# Patient Record
Sex: MALE | Race: WHITE | NOT HISPANIC OR LATINO | Employment: OTHER | URBAN - METROPOLITAN AREA
[De-identification: names, ages, dates, MRNs, and addresses within clinical notes are randomized per-mention and may not be internally consistent; named-entity substitution may affect disease eponyms.]

---

## 2017-06-14 ENCOUNTER — INITIAL CONSULT (OUTPATIENT)
Dept: RADIATION ONCOLOGY | Facility: CLINIC | Age: 82
End: 2017-06-14
Payer: MEDICARE

## 2017-06-14 VITALS
HEART RATE: 61 BPM | HEIGHT: 70 IN | DIASTOLIC BLOOD PRESSURE: 74 MMHG | TEMPERATURE: 98 F | SYSTOLIC BLOOD PRESSURE: 146 MMHG | RESPIRATION RATE: 18 BRPM | WEIGHT: 167 LBS | BODY MASS INDEX: 23.91 KG/M2

## 2017-06-14 DIAGNOSIS — C44.42 SQUAMOUS CELL CARCINOMA, SCALP/NECK: ICD-10-CM

## 2017-06-14 PROCEDURE — 99215 OFFICE O/P EST HI 40 MIN: CPT | Mod: ,,, | Performed by: RADIOLOGY

## 2017-06-14 NOTE — PROGRESS NOTES
Brit Jennings  [id number]  1935 6/14/2017  Norma Miles Md  [address]  [place] [address]    REASON FOR CONSULTATION:     ICD-10-CM ICD-9-CM    1. 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  In February of this year he underwent radiation to the right eyebrow with good results and has had a complete response to therapy. Undergoing routine skin surveillance and a new lesion was identified on the left anterior scalp. It was biopsy-proven to be a well-differentiated squamous cell carcinoma. The lesion was measuring approximately 3 mm although the path report states that the tumor does not extend to the inked margins as well as a biopsy specimen and he does have extensive nest of cells within the epidermis. There was no evidence of perineural or vascular invasion.  The patient has healed up well. He has no unusual signs or symptoms such as pain inflammation or swelling to indicate infection of the biopsy area    Review of Systems   Constitutional: Negative for chills and diaphoresis.   HENT:   Negative for lump/mass and mouth sores.    Eyes: Negative for eye problems and icterus.   Respiratory: Negative for cough and dizziness on exertion.    Cardiovascular: Negative for chest pain and leg swelling.   Gastrointestinal: Negative for blood in stool and constipation.   Genitourinary: Negative for dysuria and frequency.    Musculoskeletal: Negative for arthralgias and back pain.   Skin: Negative for itching and rash.   Neurological: Negative for headaches, light-headedness and numbness.   Hematological: Negative for adenopathy. Does not bruise/bleed easily.   Psychiatric/Behavioral: Negative for confusion. The patient is not  "nervous/anxious.      No past medical history on file.  No past surgical history on file.  Social History     Social History    Marital status:      Spouse name: N/A    Number of children: N/A    Years of education: N/A     Social History Main Topics    Smoking status: Never Smoker    Smokeless tobacco: None    Alcohol use None    Drug use: Unknown    Sexual activity: Not Asked     Other Topics Concern    None     Social History Narrative    None     No family history on file.    PRIOR HISTORY OF CHEMOTHERAPY OR RADIOTHERAPY: Please see HPI for patients prior oncologic history.    Medication List with Changes/Refills   Current Medications    AMLODIPINE (NORVASC) 5 MG TABLET    Take 5 mg by mouth once daily.    CHERATUSSIN AC  MG/5 ML SYRUP    GIVE 5 ML EVERY 4 HRS FOR 14 DAYS    GEL-ADELITA 0.4 % GEL    APPLY TO TEETH AS DIRECTED    LISINOPRIL (PRINIVIL,ZESTRIL) 20 MG TABLET    Take 20 mg by mouth once daily.    PROAIR HFA 90 MCG/ACTUATION INHALER    INHALE 2PUFFS BY MOUTH EVERY 4 HOURS AS NEEDED    SALIVA SUBSTITUTE COMBO NO.3 SPRA SPRAY    2 sprays by Mucous Membrane route 3 (three) times daily.    SOLIFENACIN (VESICARE) 10 MG TABLET    Take 1 tablet (10 mg total) by mouth once daily.     Review of patient's allergies indicates:   Allergen Reactions    Pollen extracts Other (See Comments)       QUALITY OF LIFE: 90%- Able to Carry on Normal Activity: Minor Symptoms of Disease    Vitals:    06/14/17 1316   BP: (!) 146/74   Pulse: 61   Resp: 18   Temp: 97.8 °F (36.6 °C)   TempSrc: Oral   Weight: 75.8 kg (167 lb)   Height: 5' 10" (1.778 m)   PainSc: 0-No pain       PHYSICAL EXAM:   GENERAL: alert; in no apparent distress.   HEAD: normocephalic, atraumatic.  EYES: pupils are equal, round, reactive to light and accommodation. Sclera anicteric. Conjunctiva not injected.   NOSE/THROAT: no nasal erythema or rhinorrhea. Oropharynx pink, without erythema, ulcerations or thrush.   NECK: no cervical motion " rigidity; supple with no masses.  CHEST: clear to auscultation bilaterally; no wheezes, crackles or rubs. Patient is speaking comfortably on room air with normal work of breathing without using accessory muscles of respiration.  CARDIOVASCULAR: regular rate and rhythm; no murmurs, rubs or gallops.  ABDOMEN: soft, nontender, nondistended. Bowel sounds present.   MUSCULOSKELETAL: no tenderness to palpation along the spine or scapulae. Normal range of motion.  NEUROLOGIC: cranial nerves II-XII intact bilaterally. Strength 5/5 in bilateral upper and lower extremities. No sensory deficits appreciated. Reflexes globally intact. No cerebellar signs. Normal gait.  LYMPHATIC: no cervical, supraclavicular or axillary adenopathy appreciated bilaterally.   EXTREMITIES: no clubbing, cyanosis, edema.  SKIN: no erythema, rashes, ulcerations noted. The area around the right eyebrow is well-healed. The skin is smooth and dry and no evidence of recurrent disease.  The new area on the scalp shows a circular 3 mm area of recent biopsy. There is a Band-Aid on the area itself. There does not appear to be significant progression or satellite lesion  Nodes-no palpable adenopathy in the face neck or submandibular areas  Eyes-patient has good extraocular mobility. There is no evidence of significant tearing or dryness    REVIEW OF IMAGING/PATHOLOGY/LABS: Please see HPI. All images reviewed personally by dictating physician.     ASSESSMENT: Very pleasant gentleman with a history of synchronous carcinomas of the skin with a new lesion on the right anterior scalp easily identified through pictures given by Dr. Miles.   PLAN: I discussed the options of surgery versus definitive radiation therapy. He is a better candidate for radiation therapy because of the location of the lesion and the risks involved in bone exposure. Radiation can be used the treat this area do recommend 50 andres in 20 fractions using a 6 energy electron-beam to the 90%  isodose line  I was very clear my discussion about the side effects of treatment including redness of the skin progressing the soreness possibly a sunburn-like effect and desquamation of the skin itself.  Long-term affects of scarring and tightening of the skin and hair loss was also disclosed. The patient's on the consent form    We discussed the risks and benefits of the above treatment and have gone over in detail the acute and late toxicities of radiation therapy to the skin of the scalp. The patient expressed  understanding and has signed a consent form which is included in the patients chart. The patient has our contact information and understands that they are free to contact us at any time with questions or concerns regarding radiation therapy.    DISPOSITION: RTC FOR CT SIM    TIME SPENT WITH PATIENT: I have personally seen and evaluated this patient. Approximately one hour was spent in consultation with the patient, greater than 50% of this time was spent discussing the personalized oncologic treatment plan and details of radiation therapy with the patient.     PHYSICIAN: Bi Hernandez MD

## 2017-06-15 ENCOUNTER — DOCUMENTATION ONLY (OUTPATIENT)
Dept: RADIATION ONCOLOGY | Facility: CLINIC | Age: 82
End: 2017-06-15

## 2017-06-15 ENCOUNTER — TREATMENT (OUTPATIENT)
Dept: RADIATION ONCOLOGY | Facility: CLINIC | Age: 82
End: 2017-06-15
Payer: MEDICARE

## 2017-06-15 PROCEDURE — 77334 RADIATION TREATMENT AID(S): CPT | Mod: ,,, | Performed by: RADIOLOGY

## 2017-06-15 PROCEDURE — 77290 THER RAD SIMULAJ FIELD CPLX: CPT | Mod: ,,, | Performed by: RADIOLOGY

## 2017-06-15 PROCEDURE — 77300 RADIATION THERAPY DOSE PLAN: CPT | Mod: ,,, | Performed by: RADIOLOGY

## 2017-06-15 PROCEDURE — 77263 THER RADIOLOGY TX PLNG CPLX: CPT | Mod: ,,, | Performed by: RADIOLOGY

## 2017-06-15 NOTE — PROGRESS NOTES
"Brit Jennings  [id number]  1935  6/15/2017  No referring provider defined for this encounter.    DIAGNOSIS: No diagnosis found.    TREATMENT SITE(S): scalp skin    INTENT: CURATIVE    TREATMENT SETTING: RT ALONE     MODALITY: ELECTRON    TECHNIQUE:  3D CONFORMAL RADIOTHERAPY (3DCRT)    IMRT MEDICAL NECESSITY: Target volume irregular shape/proximate to critical structures    HPI: This with a prior history of skin cancer recently treated to the right superior eyebrow with good results. Patient has a new lesion on his anterior left scalp biopsy-proven. Side effects discussed. KPS equal to 80    I have personally performed treatment planning for the patient, reviewing relevant history/physical and imaging. I have defined GTV, CTV, PTV and organs at risk.     In order to accomplish this plan, I am ordering:  SIMULATION: CT SIMULATION FOR PLACEMENT OF TREATMENT FIELDS    CONTRAST: none    TO ACCOMPLISH REPRODUCIBLE POSITION: VACLOC, BREAST BOARD, WING BOARD, AQUAPLAST MASK, PROLOCK (SBRT),"ACCUFORM HEADREST (SRS), FULL BLADDER and EMPTY STOMACH    DEVICES FOR BEAM SHAPING: CUSTOMIZED MLC    CUSTOMIZED BOLUS: 1CM DAILY    IMAGING: DAILY KV/KV OBI, DAILY KV/KV FOR SETUP" ASSISTANCE, DAILY PORTALS, WEEKLY PORTALS, CBCT DAILY and CBCT WEEKLY    I have ordered a weekly physics check.  SPECIAL PHYSICS CONSULT: NO  REASON: N/A    SPECIAL TREATMENT CIRCUMSTANCE: NO   Concurrent or recent administration of chemotherapeutic agents which are  known potent radiosensitizers and thus will require vigilant monitoring for  exaggerated radiation toxicities.    LABS: NONE    ANTICIPATED PRESCRIPTION: 5000 cgy in 20 fractions, 6 energy electron being, 1 cm bolus daily    TREATMENT: DAILY    PHYSICIAN: Bi Hernandez MD      "

## 2017-07-19 ENCOUNTER — DOCUMENTATION ONLY (OUTPATIENT)
Dept: RADIATION ONCOLOGY | Facility: CLINIC | Age: 82
End: 2017-07-19

## 2017-07-19 NOTE — PROGRESS NOTES
Brit Jennings  [id number]  1935  7/19/2017    DIAGNOSIS: No diagnosis found.    CURRENT DOSE (cGy): 1250 cGy    CONCURRENT CHEMOTHERAPY: no     SUBJECTIVE:  Patient has completed his first week of the planned four-week course of radiation for his squamous cell cancer of the scalp. Clinical well voices no problems today. He remains active    OBJECTIVE:  There were no vitals filed for this visit.  Weight: Stable  Exam  Treatment area showing grade 2 erythema the lesion is flattened out. Is no satellite progression or desquamation  KPS: 90%- Able to Carry on Normal Activity: Minor Symptoms of Disease    Portal imaging reviewed and approved.    ASSESSMENT AND PLAN:  Continue on with radiation therapy. He'll complete a fracture #20 or 50 gray.    Will continue as planned.    Treatment chart and setup reviewed and approved.    PHYSICIAN: Bi Hernandez MD

## 2017-07-24 ENCOUNTER — DOCUMENTATION ONLY (OUTPATIENT)
Dept: RADIATION ONCOLOGY | Facility: CLINIC | Age: 82
End: 2017-07-24

## 2017-07-24 NOTE — PROGRESS NOTES
Brit Jennings  [id number]  1935 7/24/2017    DIAGNOSIS: No diagnosis found.    CURRENT DOSE (cGy): 2000 cGy    CONCURRENT CHEMOTHERAPY: no     SUBJECTIVE:  Patient and his second week of radiation therapy doing very well. Is not report any unusual side effects of the treatment area on his left anterior scalp.  He remains active    OBJECTIVE:  There were no vitals filed for this visit.PS: 90%- Able to Carry on Normal Activity: Minor Symptoms of Disease   Treatment area showing grade 2 erythema, no desquamation    Portal imaging reviewed and approved.    ASSESSMENT AND PLAN:   continue with radiation therapy doing very well. The lesion is regressing    Will continue as planned.    Treatment chart and setup reviewed and approved.    PHYSICIAN: Bi Hernandez MD

## 2017-08-01 ENCOUNTER — DOCUMENTATION ONLY (OUTPATIENT)
Dept: RADIATION ONCOLOGY | Facility: CLINIC | Age: 82
End: 2017-08-01

## 2017-08-01 NOTE — PROGRESS NOTES
Brit Jennings  [id number]  1935 8/1/2017    DIAGNOSIS: I, [id number] SCCa scalp    CURRENT DOSE (cGy): 3500/5000    CONCURRENT CHEMOTHERAPY: none    SUBJECTIVE:  No complaints.    OBJECTIVE:  There were no vitals filed for this visit.  Weight: 174.5  KPS: 90%- Able to Carry on Normal Activity: Minor Symptoms of Disease  Nad, ao x 3  No LAD  Mild skin erythema without desquamation in field; smooth    Setup reviewed and approved.    ASSESSMENT AND PLAN:  Tolerating RT well. Aquaphor prn    Will continue as planned.    Treatment chart and setup reviewed and approved.    PHYSICIAN: Manjeet Aguirre Jr, MD

## 2017-08-08 ENCOUNTER — DOCUMENTATION ONLY (OUTPATIENT)
Dept: RADIATION ONCOLOGY | Facility: CLINIC | Age: 82
End: 2017-08-08

## 2017-08-08 NOTE — PROGRESS NOTES
Brit Jennings  [id number]  1935  8/8/2017    DIAGNOSIS: No diagnosis found.    CURRENT DOSE (cGy): 4750 cGy    CONCURRENT CHEMOTHERAPY: no     SUBJECTIVE:  Patient going to complete radiation therapy tomorrow for his squamous cell cancer of the left anterior scalp.    Clinically doing well and voices no prompts today the areas largely asymptomatic.    OBJECTIVE:  There were no vitals filed for this visit.  Weight: Stable  Exam  Treatment area showing grade 2 erythema. There is no desquamation.  The lesion has completely flattened. I do not see any progression or satellite  No adenopathy with respect to f the face buccal submandibular or cervical regions  KPS: 90%- Able to Carry on Normal Activity: Minor Symptoms of Disease    Portal imaging reviewed and approved.    ASSESSMENT AND PLAN:  Patient completing radiation therapy tomorrow. Dose will be 50 gray.  He will follow-up with me in 3 weeks for routine check of the acute effects of treatment.    I told to keep the area clean and dry.    I did ask him to make an appointment with Dr. Kwok in about 6-8 weeks.    Will continue as planned.    Treatment chart and setup reviewed and approved.    PHYSICIAN: Bi Hernandez MD

## 2017-08-09 ENCOUNTER — TREATMENT (OUTPATIENT)
Dept: RADIATION ONCOLOGY | Facility: CLINIC | Age: 82
End: 2017-08-09
Payer: MEDICARE

## 2017-08-09 PROCEDURE — G6012 RADIATION TREATMENT DELIVERY: HCPCS | Mod: ,,, | Performed by: RADIOLOGY

## 2017-09-01 ENCOUNTER — OFFICE VISIT (OUTPATIENT)
Dept: RADIATION ONCOLOGY | Facility: CLINIC | Age: 82
End: 2017-09-01
Payer: MEDICARE

## 2017-09-01 VITALS
WEIGHT: 168 LBS | SYSTOLIC BLOOD PRESSURE: 136 MMHG | BODY MASS INDEX: 24.05 KG/M2 | HEART RATE: 64 BPM | HEIGHT: 70 IN | RESPIRATION RATE: 18 BRPM | DIASTOLIC BLOOD PRESSURE: 62 MMHG

## 2017-09-01 DIAGNOSIS — C44.42 SQUAMOUS CELL CARCINOMA, SCALP/NECK: Primary | ICD-10-CM

## 2017-09-01 PROCEDURE — 99024 POSTOP FOLLOW-UP VISIT: CPT | Mod: ,,, | Performed by: RADIOLOGY

## 2017-09-01 NOTE — PROGRESS NOTES
Brit Jennings  [id number]  1935 9/1/2017  No referring provider defined for this encounter.    DIAGNOSIS: I, [id number] SCCa scalp  REASON FOR VISIT: Routine scheduled follow-up.    HISTORY OF PRESENT ILLNESS:   82-year-old gentleman with a prior history of skin cancer recently treated to the right eyebrow 50Gy ending 3/22/17 with a CR.     Undergoing routine skin surveillance and a new lesion was identified on the left anterior scalp. It was biopsy-proven to be a well-differentiated squamous cell carcinoma. The lesion was measuring approximately 3 mm although the path report states that the tumor does not extend to the inked margins as well as a biopsy specimen and he does have extensive nest of cells within the epidermis. There was no evidence of perineural or vascular invasion.    Completed 50Gy 8/9/17.    INTERVAL HISTORY:   Mr. Jennings tolerated his radiotherapy very well and had a very minimal skin reaction going to treatment. Today he presents without complaints, specifically no fatigue, lumps or bumps, pain or itchiness.    Review of Systems   Constitutional: Negative for appetite change, chills, fever and unexpected weight change.   HENT:   Negative for lump/mass, mouth sores, sore throat, trouble swallowing and voice change.    Eyes: Negative for eye problems and icterus.   Respiratory: Negative for chest tightness, cough, hemoptysis and shortness of breath.    Cardiovascular: Negative for chest pain and leg swelling.   Gastrointestinal: Negative for abdominal distention, abdominal pain, blood in stool, constipation, diarrhea, nausea and vomiting.   Genitourinary: Negative for bladder incontinence, difficulty urinating, dysuria, frequency, hematuria and nocturia.    Musculoskeletal: Negative for back pain, gait problem, neck pain and neck stiffness.   Skin: Positive for rash.   Neurological: Negative for extremity weakness, gait problem, headaches, numbness and seizures.   Hematological: Negative for  "adenopathy.     No past medical history on file.  No past surgical history on file.  Social History     Social History    Marital status:      Spouse name: N/A    Number of children: N/A    Years of education: N/A     Social History Main Topics    Smoking status: Never Smoker    Smokeless tobacco: None    Alcohol use None    Drug use: Unknown    Sexual activity: Not Asked     Other Topics Concern    None     Social History Narrative    None     No family history on file.  Medication List with Changes/Refills   Current Medications    AMLODIPINE (NORVASC) 5 MG TABLET    Take 5 mg by mouth once daily.    CHERATUSSIN AC  MG/5 ML SYRUP    GIVE 5 ML EVERY 4 HRS FOR 14 DAYS    GEL-ADELITA 0.4 % GEL    APPLY TO TEETH AS DIRECTED    LISINOPRIL (PRINIVIL,ZESTRIL) 20 MG TABLET    Take 20 mg by mouth once daily.    PROAIR HFA 90 MCG/ACTUATION INHALER    INHALE 2PUFFS BY MOUTH EVERY 4 HOURS AS NEEDED    SALIVA SUBSTITUTE COMBO NO.3 SPRA SPRAY    2 sprays by Mucous Membrane route 3 (three) times daily.    SOLIFENACIN (VESICARE) 10 MG TABLET    Take 1 tablet (10 mg total) by mouth once daily.     Review of patient's allergies indicates:   Allergen Reactions    Pollen extracts Other (See Comments)       QUALITY OF LIFE: 90%- Able to Carry on Normal Activity: Minor Symptoms of Disease    Vitals:    09/01/17 1324   BP: 136/62   Pulse: 64   Resp: 18   Weight: 76.2 kg (168 lb)   Height: 5' 10" (1.778 m)   PainSc: 0-No pain       PHYSICAL EXAM:   GENERAL: alert; in no apparent distress.   HEAD: normocephalic, atraumatic.  EYES: pupils are equal, round, reactive to light and accommodation. Sclera anicteric. Conjunctiva not injected.   NOSE/THROAT: no nasal erythema or rhinorrhea. Oropharynx pink, without erythema, ulcerations or thrush.   NECK: no cervical motion rigidity; supple with no masses.  CHEST:Patient is speaking comfortably on room air with normal work of breathing without using accessory muscles of " respiration.  NEUROLOGIC: cranial nerves II-XII intact bilaterally. Strength 5/5 in bilateral upper and lower extremities. No sensory deficits appreciated. Normal gait.  LYMPHATIC: no cervical, supraclavicular adenopathy appreciated bilaterally.   EXTREMITIES: no clubbing, cyanosis, edema.  SKIN: mild hyperpigmentation with dry scaling in field; no ulceration or nodularity, no erythema; prior site remains a CR     ANCILLARY DATA: none    ASSESSMENT: 82M with stage I SCCa of R eyebrow s/p 50Gy with CR and of L scalp s/p 50Gy ending 8/9/17.  PLAN:  Mr. Jennings did very well throughout his radiotherapeutic course and today is presenting without any complaints for his three-week follow-up. He is not having any pain or discomfort at the treated site is no longer applying any Aquaphor. He's not complaining any fatigue or any new lumps or bumps. He is going to arrange follow-up with his dermatologist in the next month. Physical exam today since no signs of persistent disease and I explained to him that the scaly skin will eventually slough off likely revealing hypopigmented smooth skin as was the case at his right eyebrow, which is still demonstrating a complete response. He expressed understanding and I would like to get one more look in 3 months to ensure complete response and we will set up an appointment with him today.    All questions answered and contact information provided. Patient understands free to call us anytime with any questions or concerns regarding radiation therapy.    TIME SPENT WITH PATIENT: I have personally seen and evaluated this patient. Approximately 30 minutes were spent with the patient discussing follow-up careplan.     PHYSICIAN: Manjeet Aguirre Jr, MD

## 2017-10-20 RX ORDER — SOLIFENACIN SUCCINATE 10 MG/1
10 TABLET, FILM COATED ORAL DAILY
Qty: 90 TABLET | Refills: 0 | Status: SHIPPED | OUTPATIENT
Start: 2017-10-20 | End: 2018-01-04 | Stop reason: ALTCHOICE

## 2017-12-08 ENCOUNTER — OFFICE VISIT (OUTPATIENT)
Dept: RADIATION ONCOLOGY | Facility: CLINIC | Age: 82
End: 2017-12-08
Payer: MEDICARE

## 2017-12-08 VITALS — BODY MASS INDEX: 24.39 KG/M2 | WEIGHT: 170 LBS

## 2017-12-08 DIAGNOSIS — C44.42 SQUAMOUS CELL CARCINOMA, SCALP/NECK: ICD-10-CM

## 2017-12-08 PROCEDURE — 99024 POSTOP FOLLOW-UP VISIT: CPT | Mod: ,,, | Performed by: RADIOLOGY

## 2017-12-08 NOTE — PROGRESS NOTES
Brit Jennings  [id number]  1935 12/8/2017  Norma Miles Md  [address] [place] [address]    DIAGNOSIS SKIN CANCER  REASON FOR VISIT: Routine scheduled follow-up.    HISTORY OF PRESENT ILLNESS:   82-year-old gentleman with a diagnosis of squamous cell cancer of the scalp treated with definitive radiation therapy, completed 3 weeks ago. He has a prior history of right-sided eyebrow carcinoma treated with radiation in the past.    INTERVAL HISTORY:   Since finishing his treatment he has done very well. He reports no unusual inflammation pain swelling or irritation in the treatment area.    Review of Systems   Skin: Negative for itching, rash and wound.     No past medical history on file.  No past surgical history on file.  Social History     Social History    Marital status:      Spouse name: N/A    Number of children: N/A    Years of education: N/A     Social History Main Topics    Smoking status: Never Smoker    Smokeless tobacco: Not on file    Alcohol use Not on file    Drug use: Unknown    Sexual activity: Not on file     Other Topics Concern    Not on file     Social History Narrative    No narrative on file     No family history on file.  Medication List with Changes/Refills   Current Medications    AMLODIPINE (NORVASC) 5 MG TABLET    Take 5 mg by mouth once daily.    CHERATUSSIN AC  MG/5 ML SYRUP    GIVE 5 ML EVERY 4 HRS FOR 14 DAYS    GEL-ADELITA 0.4 % GEL    APPLY TO TEETH AS DIRECTED    LISINOPRIL (PRINIVIL,ZESTRIL) 20 MG TABLET    Take 20 mg by mouth once daily.    PROAIR HFA 90 MCG/ACTUATION INHALER    INHALE 2PUFFS BY MOUTH EVERY 4 HOURS AS NEEDED    SOLIFENACIN (VESICARE) 10 MG TABLET    Take 1 tablet (10 mg total) by mouth once daily.   Discontinued Medications    SALIVA SUBSTITUTE COMBO NO.3 SPRA SPRAY    2 sprays by Mucous Membrane route 3 (three) times daily.     Review of patient's allergies indicates:   Allergen Reactions    Pollen extracts Other (See  Comments)       QUALITY OF LIFE: 100%- Normal, No Complaints, No Evidence of Disease    Vitals:    12/08/17 1320   Weight: 77.1 kg (170 lb)   PainSc: 0-No pain       PHYSICAL EXAM:   GENERAL: alert; in no apparent distress.   HEAD: normocephalic, atraumatic.  EYES: pupils are equal, round, reactive to light and accommodation. Sclera anicteric. Conjunctiva not injected.   NOSE/THROAT: no nasal erythema or rhinorrhea. Oropharynx pink, without erythema, ulcerations or thrush.   NECK: no cervical motion rigidity; supple with no masses.  CHEST: clear to auscultation bilaterally; no wheezes, crackles or rubs. Patient is speaking comfortably on room air with normal work of breathing without using accessory muscles of respiration.  CARDIOVASCULAR: regular rate and rhythm; no murmurs, rubs or gallops.  ABDOMEN: soft, nontender, nondistended. Bowel sounds present.   MUSCULOSKELETAL: no tenderness to palpation along the spine or scapulae. Normal range of motion.  NEUROLOGIC: cranial nerves II-XII intact bilaterally. Strength 5/5 in bilateral upper and lower extremities. No sensory deficits appreciated. Reflexes globally intact. No cerebellar signs. Normal gait.  LYMPHATIC: no cervical, supraclavicular or axillary adenopathy appreciated bilaterally. No adenopathy in the preauricular buccal or facial or submandibular areas bilaterally  EXTREMITIES: no clubbing, cyanosis, edema.  SKIN: no erythema, rashes, ulcerations noted. The treatment area shows complete resolution of the lesion. There is some slight tanning of the skin. Is no inflammation or pain.    ANCILLARY DATA:     ASSESSMENT: Patient with early stage squamous cell cancer of the scalp anteriorly completed definitive radiation therapy 3 weeks ago clinically [facility].  PLAN:  Patient doing very well he's had an excellent response. I did tell him to massage  area with any type of moisturizer he would like. I told him this would minimize the fibrosis which is a late reaction of  radiation therapy.    I asked him to follow-up with Dr. Miles.  He will follow-up with us as needed    All questions answered and contact information provided. Patient understands free to call us anytime with any questions or concerns regarding radiation therapy.    TIME SPENT WITH PATIENT: I have personally seen and evaluated this patient. Approximately 30 minutes were spent with the patient discussing follow-up careplan.     PHYSICIAN: Bi Hernandez MD

## 2018-01-03 ENCOUNTER — TELEPHONE (OUTPATIENT)
Dept: UROLOGY | Facility: CLINIC | Age: 83
End: 2018-01-03

## 2018-01-03 NOTE — TELEPHONE ENCOUNTER
----- Message from Lenore Vaughn sent at 1/3/2018 11:54 AM CST -----  Contact: patient  Patient calling to speak with the Nurse about a change in medication. The Vesicare isn't covered by the insurance anymore and the patient would like something different that is covered. Please advise.  Call back   Thanks!

## 2018-01-03 NOTE — TELEPHONE ENCOUNTER
Spoke with patient informed him her needs a 1 year follow up. Appointment scheduled for tomorrow at 9:45am. Patient verbally voiced understanding.

## 2018-01-04 ENCOUNTER — OFFICE VISIT (OUTPATIENT)
Dept: UROLOGY | Facility: CLINIC | Age: 83
End: 2018-01-04
Payer: MEDICARE

## 2018-01-04 VITALS
HEART RATE: 61 BPM | WEIGHT: 167 LBS | HEIGHT: 70 IN | BODY MASS INDEX: 23.91 KG/M2 | TEMPERATURE: 98 F | DIASTOLIC BLOOD PRESSURE: 79 MMHG | SYSTOLIC BLOOD PRESSURE: 139 MMHG

## 2018-01-04 DIAGNOSIS — N32.81 OAB (OVERACTIVE BLADDER): Primary | ICD-10-CM

## 2018-01-04 LAB
BILIRUB SERPL-MCNC: NORMAL MG/DL
BLOOD URINE, POC: NORMAL
COLOR, POC UA: NORMAL
GLUCOSE UR QL STRIP: NORMAL
KETONES UR QL STRIP: NORMAL
LEUKOCYTE ESTERASE URINE, POC: NORMAL
NITRITE, POC UA: NORMAL
PH, POC UA: 6
PROTEIN, POC: NORMAL
SPECIFIC GRAVITY, POC UA: 1020
UROBILINOGEN, POC UA: NORMAL

## 2018-01-04 PROCEDURE — 81002 URINALYSIS NONAUTO W/O SCOPE: CPT | Mod: S$GLB,,, | Performed by: UROLOGY

## 2018-01-04 PROCEDURE — 99999 PR PBB SHADOW E&M-EST. PATIENT-LVL III: CPT | Mod: PBBFAC,,, | Performed by: UROLOGY

## 2018-01-04 PROCEDURE — 99213 OFFICE O/P EST LOW 20 MIN: CPT | Mod: 25,S$GLB,, | Performed by: UROLOGY

## 2018-01-04 RX ORDER — FESOTERODINE FUMARATE 4 MG/1
4 TABLET, EXTENDED RELEASE ORAL DAILY
Qty: 30 TABLET | Refills: 1 | Status: SHIPPED | OUTPATIENT
Start: 2018-01-04 | End: 2018-04-05 | Stop reason: ALTCHOICE

## 2018-01-04 NOTE — PROGRESS NOTES
[facility] Progress Note  PCP: Dr.Michael D [facility]: will sign up    Chief Complaint: Follow-up      Subjective:        HPI: Brit Jennings is a 82 y.o. male presents with     Last seen 11/15/16 (1 year ago)    oab  8/12/16 seen for frequency that had been present for many years. He had freq q1 and urgency with UUI. flomax was not helping.     Has tried:  flomax   Ditropan - he says he took this years ago  myrbetriq - had not helped.   vesicare- I started him on vesicare 10mg once a day and seen 11/15/16 he stated that the vesicare had helped him significantly with voids q2 to 3 hours. No longer wearing any pads. I also wrote him for aquoral. Rare urge incontinence, drops. No teas or cokes. 1-2 cups of coffee/day.    Returns today stating he drinks about a gallon of water a day (after having dehydration a few years ago). He stopped vesicare 10mg in December bc it became too expensive since changing to [employer]. He did notice a difference since stopping the medication. When he decreased his fluid intake prior to bedtime had no nocturia (previously 1x a night)    Drinks 1 cup of coffee in the morning.     pvr by bladder scan today: 30    AUASSx: 18, unhappy ( previously 12, mostly satisfied)  IIEF:did not fill out     REVIEW OF SYSTEMS:  General ROS: no fevers, no chills  Psychological ROS: no depression  Endocrine ROS: no heat or cold  Respiratory ROS: no SOB  Cardiovascular ROS: no CP  Gastrointestinal ROS: no abdominal pain, no constipation, no diarrhea, no BRBPR  Musculoskeletal ROS: no muscle pain  Neurological ROS: no headaches  Dermatological ROS: no rashes  HEENT: noglasses, no sinus   ROS: per HPI    The past medical, surgical, social and family hx were reviewed. There have not been any changes.   Cataracts? none    Urologic meds: vesicare 10mg once a day  Anticoagulation: No    Objective:     Vitals:    01/04/18 1036   BP: 139/79   Pulse: 61   Temp: 98.3 °F  (36.8 °C)          Physical Exam   Constitutional: He is oriented to person, place, and time. He appears well-developed and well-nourished. He is cooperative. No distress.   HENT:   Head: Normocephalic and atraumatic.   Eyes: Pupils are equal, round, and reactive to light.   Cardiovascular: Normal rate and regular rhythm.    Pulmonary/Chest: Effort normal.   Abdominal: Soft. Normal appearance.   Musculoskeletal: Normal range of motion.   Neurological: He is alert and oriented to person, place, and time. He has normal reflexes.   Skin: Skin is intact.     Psychiatric: He has a normal mood and affect.       EBONIE today: 25g  nodules    Urinalysis: 1.020/6/remainder neg    Labs:  PSA:  Done recently and was told it was normal and was told he's not going to get anymore done by        PATHOLOGY REVIEW:  none     RADIOGRAPHIC REVIEW:  none        Assessment:       1. OAB (overactive bladder)        Plan:     Pt is going to try to cutting down his fluids - change to about 4 bottle of water a day or 1/2 gallon of water.    If no improvement in frequency with this then recommend starting toviaz 4mg daily in 2 weeks if no improvement with decreased fluid intake  -has tried ditropan, myrbetriq and vesicare   -has tried flomax     F/u 3 months but he will call for refill

## 2018-01-04 NOTE — PATIENT INSTRUCTIONS
Pt is going to try to cutting down his fluids - change to about 4 bottle of water a day or 1/2 gallon of water.    If no improvement in frequency with this then recommend starting toviaz 4mg daily in 2 weeks if no improvement with decreased fluid intake  -has tried ditropan, myrbetriq and vesicare   -has tried flomax     F/u 3 months but he will call for refill

## 2018-01-05 ENCOUNTER — PATIENT MESSAGE (OUTPATIENT)
Dept: UROLOGY | Facility: CLINIC | Age: 83
End: 2018-01-05

## 2018-01-05 RX ORDER — TOLTERODINE 4 MG/1
4 CAPSULE, EXTENDED RELEASE ORAL DAILY
Qty: 30 CAPSULE | Refills: 12 | Status: SHIPPED | OUTPATIENT
Start: 2018-01-05 | End: 2018-04-05 | Stop reason: ALTCHOICE

## 2018-01-05 NOTE — TELEPHONE ENCOUNTER
Has tried:  flomax   Ditropan - he says he took this years ago  myrbetriq - had not helped.   vesicare- I started him on vesicare 10mg once a day and seen 11/15/16 he stated that the vesicare had helped him significantly with voids q2 to 3 hours. No longer wearing any pads. I also wrote him for aquoral. Rare urge incontinence, drops. No teas or cokes. 1-2 cups of coffee/day    Pt contacted me via [employer]  Written for toviaz but this is tier 3  Will try Tolterodine or Darifenacin - tier 2    Writing for detrol 4mg daily   Will let pt know via [employer]

## 2018-03-20 ENCOUNTER — OFFICE VISIT (OUTPATIENT)
Dept: RADIATION ONCOLOGY | Facility: CLINIC | Age: 83
End: 2018-03-20
Payer: MEDICARE

## 2018-03-20 ENCOUNTER — DOCUMENTATION ONLY (OUTPATIENT)
Dept: RADIATION ONCOLOGY | Facility: CLINIC | Age: 83
End: 2018-03-20

## 2018-03-20 VITALS
DIASTOLIC BLOOD PRESSURE: 90 MMHG | BODY MASS INDEX: 23.68 KG/M2 | WEIGHT: 165 LBS | SYSTOLIC BLOOD PRESSURE: 125 MMHG | HEART RATE: 56 BPM

## 2018-03-20 DIAGNOSIS — C44.42 SQUAMOUS CELL CARCINOMA, SCALP/NECK: ICD-10-CM

## 2018-03-20 PROCEDURE — 99213 OFFICE O/P EST LOW 20 MIN: CPT | Mod: 25,,, | Performed by: RADIOLOGY

## 2018-03-20 PROCEDURE — 77290 THER RAD SIMULAJ FIELD CPLX: CPT | Mod: ,,, | Performed by: RADIOLOGY

## 2018-03-20 PROCEDURE — 77263 THER RADIOLOGY TX PLNG CPLX: CPT | Mod: ,,, | Performed by: RADIOLOGY

## 2018-03-20 NOTE — PROGRESS NOTES
Brit Jennings  [id number]  1935  3/20/2018  No referring provider defined for this encounter.    DIAGNOSIS: Skin cancer, squamous cell carcinoma in situ  TREATMENT SITE(S): Mid scalp    INTENT: PALLIATIVE    TREATMENT SETTING: RT ALONE     MODALITY: ELECTRON    TECHNIQUE:  ENFACE    IMRT MEDICAL NECESSITY:IMRT MEDICAL NECESSITY: N/A     HPI: Patient with a prior history of synchronous carcinomas of the skin. We previously treated the right eyebrow area and the left anterior scalp. He has had a great response to these areas.  A new area was biopsied recently on the mid scalp vertex, pathologically squamous cell carcinoma in situ with positive margin.    I have personally performed treatment planning for the patient, reviewing relevant history/physical and imaging. I have defined GTV, CTV, PTV and organs at risk.     In order to accomplish this plan, I am ordering:  SIMULATION: CLINICAL SIMULATION TO VERIFY PORTALS AND DESIGN BLOCKS    CONTRAST: none    TO ACCOMPLISH REPRODUCIBLE POSITION: none    DEVICES FOR BEAM SHAPING: BLOCKS    CUSTOMIZED BOLUS: 1CM DAILY    IMAGING: NONE    I have ordered a weekly physics check.  SPECIAL PHYSICS CONSULT: NO  REASON: N/A    SPECIAL TREATMENT CIRCUMSTANCE: NO   Concurrent or recent administration of chemotherapeutic agents which are  known potent radiosensitizers and thus will require vigilant monitoring for  exaggerated radiation toxicities.    LABS: NONE    ANTICIPATED PRESCRIPTION: 50 Gy in 20 Fractions    TREATMENT: DAILY    PHYSICIAN: Bi Hernandez MD

## 2018-03-20 NOTE — PROGRESS NOTES
Brit Torres Jennings  [id number]  1935  3/20/2018  Norma Miles Md  [address] [place] [address]    REASON FOR CONSULTATION: Skin carcinoma  TREATMENT GOAL: primary    HISTORY OF PRESENT ILLNESS:   82-year-old gentleman known is having undergone previous radiation treatment with definitive intent for synchronous. 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 He received 50 gray in 20 fractions. He was subsequently seen in June 2017 for a squamous cell cancer of the scalp, again treated 50 gray in 20 fractions. Both these areas had a complete response.    He's had regular follow-up with Dr. Kwok and her office for skin surveillance. Another lesion was identified near the apex of the scalp. It was biopsy proven to be squamous cell carcinoma site she. He says he did have liquid nitrogen treatment. The pathology report indicates that the lesion does extend to the margin.  Because of the risk of recurrence he has been referred to us for evaluation of definitive radiation therapy.  This is a separate area from the previous lesion seen and treated on the left anterior scalp.    The current lesion was biopsied approxi-5-6 weeks ago. The area has healed up.  He doesn't report any unusual signs or symptoms with respect to the scalp or facial area.        Review of Systems   Constitutional: Negative for appetite change and chills.   HENT:   Negative for lump/mass and nosebleeds.    Eyes: Negative for eye problems and icterus.   Respiratory: Negative for chest tightness and cough.    Cardiovascular: Negative for chest pain and leg swelling.   Gastrointestinal: Negative for abdominal distention and abdominal pain.   Genitourinary: Negative for dysuria and frequency.    Musculoskeletal: Negative for arthralgias, back pain and gait problem.   Skin: Negative for itching and rash.   Neurological: Negative for dizziness and gait problem.   Hematological: Negative for adenopathy. Does  not bruise/bleed easily.   Psychiatric/Behavioral: Negative for confusion. The patient is not nervous/anxious.      No past medical history on file.  No past surgical history on file.  Social History     Social History    Marital status:      Spouse name: N/A    Number of children: N/A    Years of education: N/A     Social History Main Topics    Smoking status: Never Smoker    Smokeless tobacco: None    Alcohol use None    Drug use: Unknown    Sexual activity: Not Asked     Other Topics Concern    None     Social History Narrative    None     No family history on file.    PRIOR HISTORY OF CHEMOTHERAPY OR RADIOTHERAPY: Please see HPI for patients prior oncologic history.    Medication List with Changes/Refills   Current Medications    AMLODIPINE (NORVASC) 5 MG TABLET    Take 5 mg by mouth once daily.    CHERATUSSIN AC  MG/5 ML SYRUP    GIVE 5 ML EVERY 4 HRS FOR 14 DAYS    FESOTERODINE (TOVIAZ) 4 MG TB24    Take 1 tablet (4 mg total) by mouth once daily.    GEL-ADELITA 0.4 % GEL    APPLY TO TEETH AS DIRECTED    LISINOPRIL (PRINIVIL,ZESTRIL) 20 MG TABLET    Take 20 mg by mouth once daily.    PROAIR HFA 90 MCG/ACTUATION INHALER    INHALE 2PUFFS BY MOUTH EVERY 4 HOURS AS NEEDED    TOLTERODINE (DETROL LA) 4 MG 24 HR CAPSULE    Take 1 capsule (4 mg total) by mouth once daily.     Review of patient's allergies indicates:   Allergen Reactions    Pollen extracts Other (See Comments)       QUALITY OF LIFE: 90%- Able to Carry on Normal Activity: Minor Symptoms of Disease    Vitals:    03/20/18 0908   BP: (!) 125/90   Pulse: (!) 56   Weight: 74.8 kg (165 lb)   PainSc: 0-No pain       PHYSICAL EXAM: Well-developed and nourished patient groomed well   GENERAL: alert; in no apparent distress.   HEAD: normocephalic, atraumatic.  EYES: pupils are equal, round, reactive to light and accommodation. Sclera anicteric. Conjunctiva not injected.   NOSE/THROAT: no nasal erythema or rhinorrhea. Oropharynx pink, without  erythema, ulcerations or thrush.   NECK: no cervical motion rigidity; supple with no masses. No palpable adenopathy in the preauricular buccal  or submandibular regions of the face and neck.  CHEST: clear to auscultation bilaterally; no wheezes, crackles or rubs. Patient is speaking comfortably on room air with normal work of breathing without using accessory muscles of respiration.  CARDIOVASCULAR: regular rate and rhythm; no murmurs, rubs or gallops.  ABDOMEN: soft, nontender, nondistended. Bowel sounds present.   MUSCULOSKELETAL: no tenderness to palpation along the spine or scapulae. Normal range of motion.  NEUROLOGIC: cranial nerves II-XII intact bilaterally. Strength 5/5 in bilateral upper and lower extremities. No sensory deficits appreciated. Reflexes globally intact. No cerebellar signs. Normal gait.  LYMPHATIC: no cervical, supraclavicular or axillary adenopathy appreciated bilaterally.   EXTREMITIES: no clubbing, cyanosis, edema.  SKIN: no erythema, rashes, ulcerations noted.  Looking at his facial area the right eyebrow lesion has completely regressed. There is a white scar noted. Likewise looking at his scalp the previous lesion was treated on the left anterior scalp near the hairline. There is a whitish scar also but no evidence of lesion.  The new area of concern I believe is more into the apex of the scalp. I believe it is healed over somewhat. For the exact location I've asked him to follow-up with Dr. Kwok to have this area circled.      REVIEW OF IMAGING/PATHOLOGY/LABS: Please see HPI. All images reviewed personally by dictating physician.     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 I explained to him that because of the very  minimal subcutaneous tissue of the scalp that surgery would be difficult to do with respect to closure.  I do recommend definitive radiation therapy to this area. I do see a very small red spot which I believe is the biopsy site however for accurate targeting I've asked him to follow-up with Dr. Kwok to have this area circled.    I recommended dose of 50 gray in 20 fractions using electron therapy.  I believe there will be no overlap with the prior treated area on the left anterior scalp.    The side effects were discussed in detail including early and late effects including but not limited to irritation the skin, a sunburn-like effect, skin peeling, loss of hair, long-term effects of skin necrosis, discoloration or hypopigmentation of the area..        We discussed the risks and benefits of the above treatment and have gone over in detail the acute and late toxicities of radiation therapy to the scalp. The patient expressed  understanding and has signed a consent form which is included in the patients chart. The patient has our contact information and understands that they are free to contact us at any time with questions or concerns regarding radiation therapy.    DISPOSITION: RTC FOR [facility]    TIME SPENT WITH PATIENT: I have personally seen and evaluated this patient. Greater than 50% of this time was spent discussing coordination of care and/or counseling.     PHYSICIAN: Bi Hernandez MD

## 2018-03-21 PROCEDURE — 77334 RADIATION TREATMENT AID(S): CPT | Mod: ,,, | Performed by: RADIOLOGY

## 2018-03-21 PROCEDURE — 77300 RADIATION THERAPY DOSE PLAN: CPT | Mod: ,,, | Performed by: RADIOLOGY

## 2018-04-02 PROCEDURE — 77427 RADIATION TX MANAGEMENT X5: CPT | Mod: ,,, | Performed by: RADIOLOGY

## 2018-04-04 ENCOUNTER — TREATMENT (OUTPATIENT)
Dept: RADIATION ONCOLOGY | Facility: CLINIC | Age: 83
End: 2018-04-04
Payer: MEDICARE

## 2018-04-04 PROCEDURE — G6012 RADIATION TREATMENT DELIVERY: HCPCS | Mod: ,,, | Performed by: RADIOLOGY

## 2018-04-05 ENCOUNTER — OFFICE VISIT (OUTPATIENT)
Dept: UROLOGY | Facility: CLINIC | Age: 83
End: 2018-04-05
Payer: MEDICARE

## 2018-04-05 VITALS
WEIGHT: 168 LBS | DIASTOLIC BLOOD PRESSURE: 72 MMHG | HEIGHT: 70 IN | TEMPERATURE: 98 F | SYSTOLIC BLOOD PRESSURE: 122 MMHG | HEART RATE: 59 BPM | BODY MASS INDEX: 24.05 KG/M2

## 2018-04-05 DIAGNOSIS — N32.81 OAB (OVERACTIVE BLADDER): Primary | ICD-10-CM

## 2018-04-05 DIAGNOSIS — N39.41 URGE INCONTINENCE: ICD-10-CM

## 2018-04-05 LAB
BILIRUB SERPL-MCNC: NORMAL MG/DL
BLOOD URINE, POC: NORMAL
COLOR, POC UA: YELLOW
GLUCOSE UR QL STRIP: NORMAL
KETONES UR QL STRIP: NORMAL
LEUKOCYTE ESTERASE URINE, POC: NORMAL
NITRITE, POC UA: NORMAL
PH, POC UA: 7
PROTEIN, POC: NORMAL
SPECIFIC GRAVITY, POC UA: 1.01
UROBILINOGEN, POC UA: NORMAL

## 2018-04-05 PROCEDURE — 99999 PR PBB SHADOW E&M-EST. PATIENT-LVL III: CPT | Mod: PBBFAC,,, | Performed by: UROLOGY

## 2018-04-05 PROCEDURE — 99213 OFFICE O/P EST LOW 20 MIN: CPT | Mod: 25,S$GLB,, | Performed by: UROLOGY

## 2018-04-05 PROCEDURE — 81002 URINALYSIS NONAUTO W/O SCOPE: CPT | Mod: S$GLB,,, | Performed by: UROLOGY

## 2018-04-05 RX ORDER — BUDESONIDE AND FORMOTEROL FUMARATE DIHYDRATE 160; 4.5 UG/1; UG/1
AEROSOL RESPIRATORY (INHALATION)
COMMUNITY
Start: 2018-03-27 | End: 2019-10-02 | Stop reason: SDUPTHER

## 2018-04-05 RX ORDER — TOLTERODINE 4 MG/1
CAPSULE, EXTENDED RELEASE ORAL
COMMUNITY
Start: 2018-02-28 | End: 2018-04-05 | Stop reason: ALTCHOICE

## 2018-04-05 RX ORDER — DARIFENACIN 15 MG/1
15 TABLET, EXTENDED RELEASE ORAL DAILY
Qty: 90 TABLET | Refills: 0 | Status: SHIPPED | OUTPATIENT
Start: 2018-04-05 | End: 2018-07-09 | Stop reason: SDUPTHER

## 2018-04-05 NOTE — PATIENT INSTRUCTIONS
-has tried flomax, ditropan, myrbetriq, vesicare and detrol. He says the vesicare helped him the most but insurance did not cover  -still drinking caffeinated coffee 2 cups of day. Given list of bladder irritants  -don't hold urine for more than 2 hours  -unsure we can recommend any other treatments after this- his main complaint is urgency associated with going from sitting to standing or laying down to standing.   -could do botox but only short term and usually reserved for people voiding very frequently with significant leakage.     Overall pt ok not with doing anything. He says he can live with it how it is. Wants to try one more medicine. enablex 15mg daily.    If his urge incontinence (leakage worsens) can do botox.     Follow-up  4 months

## 2018-04-05 NOTE — PROGRESS NOTES
[facility] Progress Note  PCP: Dr.[facility] D [facility]: will sign up    Chief Complaint: No chief complaint on file.      Subjective:        HPI: Brit Jennings is a 82 y.o. male presents with     Last seen 1/4/18     oab  -8/12/16 seen for frequency that had been present for many years. He had freq q1 and urgency with UUI. flomax was not helping.   -has tried flomax, ditropan, myrbetriq and vesicare. vesicare helped but had to stop due to costs. Had tried stopping coffee as well.   -Returned 1/4/18 stating he drinks about a gallon of water a day (after having dehydration a few years ago). He stopped vesicare 10mg in December bc it became too expensive since changing to [employer]. He did notice a difference since stopping the medication. When he decreased his fluid intake prior to bedtime he had no nocturia (previously 1x a night). I asked him to decrease his fluid intake during the day and prior to bedtime as well as cut out caffeine. pvr by bladder scan : 30  -he contacted me via my chart and wanted to try another medicine. Started him on detrol 4mg. Has been taking it for 3 months. Drinking less water. Symptoms worsen when he goes from sitting to standing. Voids q2-3 hours. Coffee in the morning, never stopped but he says problems is worse during day. Biggest issue is urgency. Nocturia 1x a night. Felt like the detrol worked initially. occ UUI - 1x a week.   -AUA ssx today : 15 (3 incomplete emptying, 4 frequency, 4 intermittency,  - urgency, 2 weak stream, 0 straining, 2 sleeping). QOL: unhappy     Has tried:  flomax   Ditropan - he says he took this years ago  myrbetriq - had not helped.   vesicare- I started him on vesicare 10mg once a day and seen 11/15/16 he stated that the vesicare had helped him significantly with voids q2 to 3 hours. No longer wearing any pads. I also wrote him for aquoral. Rare urge incontinence, drops. No teas or cokes. 1-2 cups of coffee/day.    Drinks 1 cup of coffee in the morning.     Currently on:  detrol 4mg daily     Has not tried:   enablex tier 2  toviaz - tier 3    IIEF:did not fill out     REVIEW OF SYSTEMS:  General ROS: no fevers, no chills  Psychological ROS: no depression  Endocrine ROS: no heat or cold  Respiratory ROS: no SOB  Cardiovascular ROS: no CP  Gastrointestinal ROS: no abdominal pain, no constipation, no diarrhea, no BRBPR  Musculoskeletal ROS: no muscle pain  Neurological ROS: no headaches  Dermatological ROS: no rashes  HEENT: noglasses, no sinus   ROS: per HPI    The past medical, surgical, social and family hx were reviewed. There have not been any changes.   Cataracts? none    Urologic meds: detrol 4mg daily.  Anticoagulation:yes asa 81mg daily.     Objective:     Vitals:    04/05/18 1111   BP: 122/72   Pulse: (!) 59   Temp: 98.1 °F (36.7 °C)          Physical Exam   Constitutional: He is oriented to person, place, and time. He appears well-developed and well-nourished. He is cooperative. No distress.   HENT:   Head: Normocephalic and atraumatic.   Eyes: Pupils are equal, round, and reactive to light.   Cardiovascular: Normal rate and regular rhythm.    Pulmonary/Chest: Effort normal.   Abdominal: Soft. Normal appearance.   Musculoskeletal: Normal range of motion.   Neurological: He is alert and oriented to person, place, and time. He has normal reflexes.   Skin: Skin is intact.     Psychiatric: He has a normal mood and affect.       EBONIE 1/4/18: 25g  nodules    Urinalysis: 1.015/7/rem neg    Labs:  PSA:  Done recently and was told it was normal and was told he's not going to get anymore done by        PATHOLOGY REVIEW:  none     RADIOGRAPHIC REVIEW:  none    Assessment:       1. OAB (overactive bladder)        Plan:       -has tried flomax, ditropan, myrbetriq, vesicare and detrol. He says the vesicare helped him the most but insurance did not cover  -still drinking caffeinated coffee 2 cups of day. Given list of  bladder irritants  -don't hold urine for more than 2 hours  -unsure we can recommend any other treatments after this- his main complaint is urgency associated with going from sitting to standing or laying down to standing.   -could do botox but only short term and usually reserved for people voiding very frequently with significant leakage.     Overall pt ok not with doing anything. He says he can live with it how it is. Wants to try one more medicine. enablex 15mg daily.    If his urge incontinence (leakage worsens) can do botox.     Follow-up  4 months

## 2018-05-18 ENCOUNTER — OFFICE VISIT (OUTPATIENT)
Dept: RADIATION ONCOLOGY | Facility: CLINIC | Age: 83
End: 2018-05-18
Payer: MEDICARE

## 2018-05-18 DIAGNOSIS — C44.42 SQUAMOUS CELL CARCINOMA, SCALP/NECK: ICD-10-CM

## 2018-05-18 PROCEDURE — 99024 POSTOP FOLLOW-UP VISIT: CPT | Mod: ,,, | Performed by: RADIOLOGY

## 2018-05-18 NOTE — PROGRESS NOTES
Brit Jennings  [id number]  1935 5/18/2018  No referring provider defined for this encounter.    DIAGNOSIS: Squamous cell carcinoma of the skin   REASON FOR VISIT: Routine scheduled follow-up.    HISTORY OF PRESENT ILLNESS:   82-year-old gentleman known to us with a prior history of skin cancers treated in the past. His previous lesions were located in the right eyebrow skin and in the left anterior vertex of the scalp. He had a good response to therapy to these areas.    A new area was identified in the vertex of the scalp, biopsy proven to be squamous cell carcinoma.  He completed a course of definitive radiation therapy, 3 weeks ago.    INTERVAL HISTORY:   Since finishing his done well. The area has healed over. He did hit the area on his car door, but did not sustain significant damage.  He states the area is asystematic at present    Review of Systems   Musculoskeletal: Negative for arthralgias and back pain.   Skin: Negative for itching and rash.     No past medical history on file.  No past surgical history on file.  Social History     Social History    Marital status:      Spouse name: N/A    Number of children: N/A    Years of education: N/A     Social History Main Topics    Smoking status: Never Smoker    Smokeless tobacco: Not on file    Alcohol use Not on file    Drug use: Unknown    Sexual activity: Not on file     Other Topics Concern    Not on file     Social History Narrative    No narrative on file     No family history on file.  Medication List with Changes/Refills   Current Medications    AMLODIPINE (NORVASC) 5 MG TABLET    Take 5 mg by mouth once daily.    CHERATUSSIN AC  MG/5 ML SYRUP    GIVE 5 ML EVERY 4 HRS FOR 14 DAYS    DARIFENACIN (ENABLEX) 15 MG 24 HR TABLET    Take 1 tablet (15 mg total) by mouth once daily.    GEL-ADELITA 0.4 % GEL    APPLY TO TEETH AS DIRECTED    LISINOPRIL (PRINIVIL,ZESTRIL) 20 MG TABLET    Take 20 mg by mouth once daily.    PROAIR HFA 90  MCG/ACTUATION INHALER    INHALE 2PUFFS BY MOUTH EVERY 4 HOURS AS NEEDED    SYMBICORT 160-4.5 MCG/ACTUATION HFAA         Review of patient's allergies indicates:   Allergen Reactions    Pollen extracts Other (See Comments)       QUALITY OF LIFE: 100%- Normal, No Complaints, No Evidence of Disease    There were no vitals filed for this visit.    PHYSICAL EXAM: Well-groomed alert and oriented  GENERAL: alert; in no apparent distress.   HEAD: normocephalic, atraumatic.  EYES: pupils are equal, round, reactive to light and accommodation. Sclera anicteric. Conjunctiva not injected.   NOSE/THROAT: no nasal erythema or rhinorrhea. Oropharynx pink, without erythema, ulcerations or thrush.   NECK: no cervical motion rigidity; supple with no masses.  CHEST: clear to auscultation bilaterally; no wheezes, crackles or rubs. Patient is speaking comfortably on room air with normal work of breathing without using accessory muscles of respiration.  CARDIOVASCULAR: regular rate and rhythm; no murmurs, rubs or gallops.  ABDOMEN: soft, nontender, nondistended. Bowel sounds present.   MUSCULOSKELETAL: no tenderness to palpation along the spine or scapulae. Normal range of motion.  NEUROLOGIC: cranial nerves II-XII intact bilaterally. Strength 5/5 in bilateral upper and lower extremities. No sensory deficits appreciated. Reflexes globally intact. No cerebellar signs. Normal gait.  LYMPHATIC: no cervical, supraclavicular or axillary adenopathy appreciated bilaterally.   EXTREMITIES: no clubbing, cyanosis, edema.  SKIN: no erythema, rashes, ulcerations noted . Prior areas of the right eyebrow skin and left anterior vertex show no evidence of recurrence. The most recent area in the vertex of the scalp shows resolving erythema. He does have an eschar from his recent injury but there is no evidence of recurrence or new lesion identified.     ANCILLARY DATA:     ASSESSMENT: Patient with metachronous skin cancers of the face and scalp recently  completed definitive radiation therapy to the vertex lesion which was juxtaposed for prior lesion, clinically recover from the acute effects of radiation therapy, no evidence of persistent disease  PLAN:  I told patient to continue monitoring the area and use aloe vera as needed.  I'll have him follow-up with us on an as-needed basis.    All questions answered and contact information provided. Patient understands free to call us anytime with any questions or concerns regarding radiation therapy.    TIME SPENT WITH PATIENT: I have personally seen and evaluated this patient. Greater than 50% of this time was spent discussing coordination of care and/or counseling.      PHYSICIAN: iB Hernandez MD

## 2018-07-09 RX ORDER — DARIFENACIN 15 MG/1
15 TABLET, EXTENDED RELEASE ORAL DAILY
Qty: 90 TABLET | Refills: 0 | Status: SHIPPED | OUTPATIENT
Start: 2018-07-09 | End: 2018-10-11 | Stop reason: SDUPTHER

## 2018-10-11 RX ORDER — DARIFENACIN 15 MG/1
15 TABLET, EXTENDED RELEASE ORAL DAILY
Qty: 90 TABLET | Refills: 0 | Status: SHIPPED | OUTPATIENT
Start: 2018-10-11 | End: 2019-10-02

## 2018-12-03 ENCOUNTER — DOCUMENTATION ONLY (OUTPATIENT)
Dept: RADIATION ONCOLOGY | Facility: CLINIC | Age: 83
End: 2018-12-03

## 2018-12-03 ENCOUNTER — OFFICE VISIT (OUTPATIENT)
Dept: RADIATION ONCOLOGY | Facility: CLINIC | Age: 83
End: 2018-12-03
Payer: MEDICARE

## 2018-12-03 VITALS
DIASTOLIC BLOOD PRESSURE: 72 MMHG | SYSTOLIC BLOOD PRESSURE: 133 MMHG | HEART RATE: 65 BPM | WEIGHT: 172.19 LBS | BODY MASS INDEX: 24.71 KG/M2

## 2018-12-03 DIAGNOSIS — C44.321 SQUAMOUS CELL CARCINOMA OF SKIN OF NOSE: ICD-10-CM

## 2018-12-03 PROCEDURE — 1101F PT FALLS ASSESS-DOCD LE1/YR: CPT | Mod: ,,, | Performed by: RADIOLOGY

## 2018-12-03 PROCEDURE — 99213 OFFICE O/P EST LOW 20 MIN: CPT | Mod: ,,, | Performed by: RADIOLOGY

## 2018-12-03 RX ORDER — NYSTATIN 100000 U/G
OINTMENT TOPICAL
COMMUNITY
Start: 2018-10-08 | End: 2019-10-02

## 2018-12-03 RX ORDER — TRIAMCINOLONE ACETONIDE 1 MG/G
OINTMENT TOPICAL
COMMUNITY
Start: 2018-10-08 | End: 2019-10-02

## 2018-12-03 RX ORDER — TEMAZEPAM 15 MG/1
15 CAPSULE ORAL NIGHTLY
Refills: 2 | COMMUNITY
Start: 2018-09-27 | End: 2019-09-19 | Stop reason: SDUPTHER

## 2018-12-03 NOTE — PROGRESS NOTES
Brit Jennings  [id number]  1935  12/3/2018  No referring provider defined for this encounter.    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 I have defined GTV, CTV, PTV and organs at risk.     In order to accomplish this plan, I am ordering:  SIMULATION: CLINICAL SIMULATION TO VERIFY PORTALS AND DESIGN BLOCKS    CONTRAST: none    TO ACCOMPLISH REPRODUCIBLE POSITION: none    DEVICES FOR BEAM SHAPING: BLOCKS    CUSTOMIZED BOLUS: 1CM DAILY    IMAGING: NONE    I have ordered a weekly physics check.  SPECIAL PHYSICS CONSULT: NO  REASON: N/A    SPECIAL TREATMENT CIRCUMSTANCE: NO   Concurrent or recent administration of chemotherapeutic agents which are  known potent radiosensitizers and thus will require vigilant monitoring for  exaggerated radiation toxicities.    LABS: NONE    ANTICIPATED PRESCRIPTION: 50 gray in 20 fractions    TREATMENT: DAILY    PHYSICIAN: Bi Hernandez MD

## 2018-12-03 NOTE — PROGRESS NOTES
Brit Jennings  [id number]  1935  12/3/2018  Norma Miles Md  [address] [place], [place] [address]    DIAGNOSIS: Squamous cell carcinoma of the skin  REASON FOR VISIT: Routine scheduled follow-up.    HISTORY OF PRESENT ILLNESS:   82-year-old gentleman known to us with a prior history of skin cancers treated in the past. His previous lesions were located in the right eyebrow skin and in the left anterior vertex of the scalp. He had a good response to therapy to these areas.  He had a another lesion in the scalp area which is treated in May of this year.    INTERVAL HISTORY:   A new lesion was identified by Dr. Miles. This was on the nasal bridge. Biopsy came back as squamous cell carcinoma.    Review of Systems   Constitutional: Negative for fatigue and unexpected weight change.   HENT:   Negative for mouth sores and sore throat.    Eyes: Negative for eye problems and icterus.   Skin: Negative for itching, rash and wound.   Hematological: Negative for adenopathy. Does not bruise/bleed easily.   Psychiatric/Behavioral: Negative for confusion. The patient is not nervous/anxious.      History reviewed. No pertinent past medical history.  History reviewed. No pertinent surgical history.  Social History     Socioeconomic History    Marital status:      Spouse name: None    Number of children: None    Years of education: None    Highest education level: None   Social Needs    Financial resource strain: None    Food insecurity - worry: None    Food insecurity - inability: None    Transportation needs - medical: None    Transportation needs - non-medical: None   Occupational History    None   Tobacco Use    Smoking status: Never Smoker   Substance and Sexual Activity    Alcohol use: None    Drug use: None    Sexual activity: None   Other Topics Concern    None   Social History Narrative    None     History reviewed. No pertinent family history.     Medication List           Accurate as  of 12/3/18  1:33 PM. If you have any questions, ask your nurse or doctor.               CONTINUE taking these medications    amLODIPine 5 MG tablet  Commonly known as:  NORVASC     CHERATUSSIN AC  mg/5 mL syrup  Generic drug:  guaifenesin-codeine 100-10 mg/5 ml     darifenacin 15 mg 24 hr tablet  Commonly known as:  ENABLEX  TAKE 1 TABLET (15 MG TOTAL) BY MOUTH ONCE DAILY.     GEL-ADELITA 0.4 % Gel  Generic drug:  stannous fluoride     lisinopril 20 MG tablet  Commonly known as:  PRINIVIL,ZESTRIL     PROAIR HFA 90 mcg/actuation inhaler  Generic drug:  albuterol     SYMBICORT 160-4.5 mcg/actuation Hfaa  Generic drug:  budesonide-formoterol 160-4.5 mcg          Review of patient's allergies indicates:   Allergen Reactions    Pollen extracts Other (See Comments)       QUALITY OF LIFE: 90%- Able to Carry on Normal Activity: Minor Symptoms of Disease    There were no vitals filed for this visit.    PHYSICAL EXAM: Oriented alert well groomed answers questions well  GENERAL: alert; in no apparent distress.   HEAD: normocephalic, atraumatic.  EYES: pupils are equal, round, reactive to light and accommodation. Sclera anicteric. Conjunctiva not injected.   NOSE/THROAT: no nasal erythema or rhinorrhea. Oropharynx pink, without erythema, ulcerations or thrush.   NECK: no cervical motion rigidity; supple with no masses.  CHEST: clear to auscultation bilaterally; no wheezes, crackles or rubs. Patient is speaking comfortably on room air with normal work of breathing without using accessory muscles of respiration.  CARDIOVASCULAR: regular rate and rhythm; no murmurs, rubs or gallops.     MUSCULOSKELETAL: no tenderness to palpation along the spine or scapulae. Normal range of motion.  NEUROLOGIC: cranial nerves II-XII intact bilaterally. Strength 5/5 in bilateral upper and lower extremities. No sensory deficits appreciated. Reflexes globally intact. No cerebellar signs. Normal gait.  LYMPHATIC: no cervical, supraclavicular or  axillary adenopathy appreciated bilaterally.   .  SKIN: no erythema, rashes, ulcerations noted. Over the nasal bridge is a small circular lesion recent resected , measuring about 3 mm in size. No satellite lesion identified. 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 I believe this is an area reasonable for radiation treatment because of the location. He is not indicated any desire for surgical resection.  I told the protocol and side effects possible comp occasions of radiation therapy in the definitive sense. All questions were addressed.    I recommend 50 gray in 20 fractions.        All questions answered and contact information provided. Patient understands free to call us anytime with any questions or concerns regarding radiation therapy.    TIME SPENT WITH PATIENT: I have personally seen and evaluated this patient. Greater than 50% of this time was spent discussing coordination of care and/or counseling.      PHYSICIAN: Bi Hernandez MD

## 2018-12-19 ENCOUNTER — DOCUMENTATION ONLY (OUTPATIENT)
Dept: RADIATION ONCOLOGY | Facility: CLINIC | Age: 83
End: 2018-12-19

## 2018-12-19 NOTE — PROGRESS NOTES
Patient given 1 tube of aquaphor sample with instructions to apply twice daily to affected area.  Do not apply within 6 hours of radiation therapy.  Patient verbalized understanding.

## 2019-01-18 ENCOUNTER — DOCUMENTATION ONLY (OUTPATIENT)
Dept: RADIATION ONCOLOGY | Facility: CLINIC | Age: 84
End: 2019-01-18

## 2019-02-07 ENCOUNTER — OFFICE VISIT (OUTPATIENT)
Dept: RADIATION ONCOLOGY | Facility: CLINIC | Age: 84
End: 2019-02-07
Payer: MEDICARE

## 2019-02-07 DIAGNOSIS — C44.42 SQUAMOUS CELL CARCINOMA, SCALP/NECK: ICD-10-CM

## 2019-02-07 PROCEDURE — 99024 PR POST-OP FOLLOW-UP VISIT: ICD-10-PCS | Mod: ,,, | Performed by: RADIOLOGY

## 2019-02-07 PROCEDURE — 99024 POSTOP FOLLOW-UP VISIT: CPT | Mod: ,,, | Performed by: RADIOLOGY

## 2019-02-07 NOTE — PROGRESS NOTES
Brit Jennings  [id number]  1935 2/7/2019  Norma Miles Md  [address] [place] [address]    DIAGNOSIS: Skin squamous cell carcinoma  REASON FOR VISIT: Routine scheduled follow-up.    HISTORY OF PRESENT ILLNESS:   82-year-old gentleman known to us with a prior history of skin cancers treated in the past. His previous lesions were located in the right eyebrow skin and in the left anterior vertex of the scalp. He had a good response to therapy to these areas.  He had a another lesion in the scalp area which is treated in May of this year.     A new lesion was identified by Dr. Miles. This was on the nasal bridge. Biopsy came back as squamous cell carcinoma.    INTERVAL HISTORY:   He recently completed definitive radition therapy 3 weeks ago. He is doing very well. No unusual side effects to report.    Review of Systems   HENT:   Negative for nosebleeds and tinnitus.    Respiratory: Negative for cough and shortness of breath.    Skin: Negative for itching, rash and wound.     No past medical history on file.  No past surgical history on file.  Social History     Socioeconomic History    Marital status:      Spouse name: Not on file    Number of children: Not on file    Years of education: Not on file    Highest education level: Not on file   Social Needs    Financial resource strain: Not on file    Food insecurity - worry: Not on file    Food insecurity - inability: Not on file    Transportation needs - medical: Not on file    Transportation needs - non-medical: Not on file   Occupational History    Not on file   Tobacco Use    Smoking status: Never Smoker   Substance and Sexual Activity    Alcohol use: Not on file    Drug use: Not on file    Sexual activity: Not on file   Other Topics Concern    Not on file   Social History Narrative    Not on file     No family history on file.  Medication List with Changes/Refills   Current Medications    AMLODIPINE (NORVASC) 5 MG TABLET     Take 5 mg by mouth once daily.    CHERATUSSIN AC  MG/5 ML SYRUP    GIVE 5 ML EVERY 4 HRS FOR 14 DAYS    DARIFENACIN (ENABLEX) 15 MG 24 HR TABLET    TAKE 1 TABLET (15 MG TOTAL) BY MOUTH ONCE DAILY.    GEL-ADELITA 0.4 % GEL    APPLY TO TEETH AS DIRECTED    LISINOPRIL (PRINIVIL,ZESTRIL) 20 MG TABLET    Take 20 mg by mouth once daily.    NYSTATIN (MYCOSTATIN) OINTMENT        PROAIR HFA 90 MCG/ACTUATION INHALER    INHALE 2PUFFS BY MOUTH EVERY 4 HOURS AS NEEDED    SYMBICORT 160-4.5 MCG/ACTUATION HFAA        TEMAZEPAM (RESTORIL) 15 MG CAP    Take 15 mg by mouth nightly.    TRIAMCINOLONE ACETONIDE 0.1% (KENALOG) 0.1 % OINTMENT         Review of patient's allergies indicates:   Allergen Reactions    Pollen extracts Other (See Comments)       QUALITY OF LIFE: 90%- Able to Carry on Normal Activity: Minor Symptoms of Disease    There were no vitals filed for this visit.    PHYSICAL EXAM:   GENERAL: alert; in no apparent distress.   HEAD: normocephalic, atraumatic.  EYES: pupils are equal, round, reactive to light and accommodation. Sclera anicteric. Conjunctiva not injected.   NOSE/THROAT: no nasal erythema or rhinorrhea. Oropharynx pink, without erythema, ulcerations or thrush.   NECK: no cervical motion rigidity; supple with no masses.  it.  LYMPHATIC: no cervical, supraclavicular or axillary adenopathy appreciated bilaterally.   EXTREMITIES: no clubbing, cyanosis, edema.  SKIN: no erythema, rashes, ulcerations noted. Treatment area over the nasal bridge showing no residual side effects. 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 I'll told him to use any type of lotion he wishes. The lesion is  completely regressed. I've asked the follow-up with Dr. Miles as well    All questions answered and contact information provided. Patient understands free to call us anytime with any questions or concerns regarding radiation therapy.    TIME SPENT WITH PATIENT: I have personally seen and evaluated this patient. Greater than 50% of this time was spent discussing coordination of care and/or counseling.      PHYSICIAN: Bi Hernandez MD

## 2019-02-07 NOTE — PATIENT INSTRUCTIONS
Patient given 6 tubes of aquaphor samples with instructions to apply twice daily to affected area.  Do not apply within 6 hours of radiation therapy.  Patient verbalized understanding.

## 2019-05-10 ENCOUNTER — TELEPHONE (OUTPATIENT)
Dept: HEMATOLOGY/ONCOLOGY | Facility: CLINIC | Age: 84
End: 2019-05-10

## 2019-09-19 DIAGNOSIS — G47.00 INSOMNIA, UNSPECIFIED TYPE: Primary | ICD-10-CM

## 2019-09-19 RX ORDER — TEMAZEPAM 15 MG/1
15 CAPSULE ORAL NIGHTLY
Qty: 30 CAPSULE | Refills: 2 | Status: SHIPPED | OUTPATIENT
Start: 2019-09-19 | End: 2019-10-02

## 2019-09-19 NOTE — TELEPHONE ENCOUNTER
----- Message from Sadaf Ham sent at 9/19/2019  2:43 PM CDT -----  Needs refill on restoril 15 mg. [facility] [address]

## 2019-09-27 LAB
ALBUMIN SERPL-MCNC: 4.1 G/DL (ref 3.6–5.1)
ALBUMIN/GLOB SERPL: 1.7 (CALC) (ref 1–2.5)
ALP SERPL-CCNC: 52 U/L (ref 40–115)
ALT SERPL-CCNC: 14 U/L (ref 9–46)
AST SERPL-CCNC: 27 U/L (ref 10–35)
BASOPHILS # BLD AUTO: 62 CELLS/UL (ref 0–200)
BASOPHILS NFR BLD AUTO: 1.5 %
BILIRUB SERPL-MCNC: 0.5 MG/DL (ref 0.2–1.2)
BUN SERPL-MCNC: 15 MG/DL (ref 7–25)
BUN/CREAT SERPL: 12 (CALC) (ref 6–22)
CALCIUM SERPL-MCNC: 9.5 MG/DL (ref 8.6–10.3)
CHLORIDE SERPL-SCNC: 97 MMOL/L (ref 98–110)
CHOLEST SERPL-MCNC: 127 MG/DL
CHOLEST/HDLC SERPL: 4.2 (CALC)
CO2 SERPL-SCNC: 27 MMOL/L (ref 20–32)
CREAT SERPL-MCNC: 1.29 MG/DL (ref 0.7–1.11)
EOSINOPHIL # BLD AUTO: 238 CELLS/UL (ref 15–500)
EOSINOPHIL NFR BLD AUTO: 5.8 %
ERYTHROCYTE [DISTWIDTH] IN BLOOD BY AUTOMATED COUNT: 12.1 % (ref 11–15)
GFRSERPLBLD MDRD-ARVRAT: 51 ML/MIN/1.73M2
GLOBULIN SER CALC-MCNC: 2.4 G/DL (CALC) (ref 1.9–3.7)
GLUCOSE SERPL-MCNC: 91 MG/DL (ref 65–99)
HCT VFR BLD AUTO: 38.9 % (ref 38.5–50)
HDLC SERPL-MCNC: 30 MG/DL
HGB BLD-MCNC: 13.7 G/DL (ref 13.2–17.1)
LDLC SERPL CALC-MCNC: 78 MG/DL (CALC)
LYMPHOCYTES # BLD AUTO: 1566 CELLS/UL (ref 850–3900)
LYMPHOCYTES NFR BLD AUTO: 38.2 %
MCH RBC QN AUTO: 34.9 PG (ref 27–33)
MCHC RBC AUTO-ENTMCNC: 35.2 G/DL (ref 32–36)
MCV RBC AUTO: 99.2 FL (ref 80–100)
MONOCYTES # BLD AUTO: 988 CELLS/UL (ref 200–950)
MONOCYTES NFR BLD AUTO: 24.1 %
NEUTROPHILS # BLD AUTO: 1246 CELLS/UL (ref 1500–7800)
NEUTROPHILS NFR BLD AUTO: 30.4 %
NONHDLC SERPL-MCNC: 97 MG/DL (CALC)
PLATELET # BLD AUTO: 189 THOUSAND/UL (ref 140–400)
PMV BLD REES-ECKER: 10.4 FL (ref 7.5–12.5)
POTASSIUM SERPL-SCNC: 4.7 MMOL/L (ref 3.5–5.3)
PROT SERPL-MCNC: 6.5 G/DL (ref 6.1–8.1)
RBC # BLD AUTO: 3.92 MILLION/UL (ref 4.2–5.8)
SODIUM SERPL-SCNC: 131 MMOL/L (ref 135–146)
TRIGL SERPL-MCNC: 104 MG/DL
WBC # BLD AUTO: 4.1 THOUSAND/UL (ref 3.8–10.8)

## 2019-10-02 ENCOUNTER — OFFICE VISIT (OUTPATIENT)
Dept: FAMILY MEDICINE | Facility: CLINIC | Age: 84
End: 2019-10-02
Payer: MEDICARE

## 2019-10-02 VITALS
SYSTOLIC BLOOD PRESSURE: 136 MMHG | HEART RATE: 72 BPM | WEIGHT: 170 LBS | BODY MASS INDEX: 25.18 KG/M2 | DIASTOLIC BLOOD PRESSURE: 76 MMHG | HEIGHT: 69 IN

## 2019-10-02 DIAGNOSIS — I10 ESSENTIAL HYPERTENSION: ICD-10-CM

## 2019-10-02 DIAGNOSIS — G47.00 INSOMNIA, UNSPECIFIED TYPE: ICD-10-CM

## 2019-10-02 DIAGNOSIS — J43.1 PANLOBULAR EMPHYSEMA: ICD-10-CM

## 2019-10-02 DIAGNOSIS — G47.33 OBSTRUCTIVE SLEEP APNEA: ICD-10-CM

## 2019-10-02 DIAGNOSIS — Z23 NEED FOR INFLUENZA VACCINATION: ICD-10-CM

## 2019-10-02 DIAGNOSIS — C44.42 SQUAMOUS CELL CARCINOMA, SCALP/NECK: ICD-10-CM

## 2019-10-02 DIAGNOSIS — G47.9 SLEEP DISTURBANCE: ICD-10-CM

## 2019-10-02 DIAGNOSIS — R97.20 ELEVATED PSA: Primary | ICD-10-CM

## 2019-10-02 PROCEDURE — 1101F PT FALLS ASSESS-DOCD LE1/YR: CPT | Mod: S$GLB,,, | Performed by: FAMILY MEDICINE

## 2019-10-02 PROCEDURE — 90662 IIV NO PRSV INCREASED AG IM: CPT | Mod: S$GLB,,, | Performed by: FAMILY MEDICINE

## 2019-10-02 PROCEDURE — G0008 FLU VACCINE - HIGH DOSE (65+) PRESERVATIVE FREE IM: ICD-10-PCS | Mod: S$GLB,,, | Performed by: FAMILY MEDICINE

## 2019-10-02 PROCEDURE — 90662 FLU VACCINE - HIGH DOSE (65+) PRESERVATIVE FREE IM: ICD-10-PCS | Mod: S$GLB,,, | Performed by: FAMILY MEDICINE

## 2019-10-02 PROCEDURE — 99214 OFFICE O/P EST MOD 30 MIN: CPT | Mod: 25,S$GLB,, | Performed by: FAMILY MEDICINE

## 2019-10-02 PROCEDURE — 99214 PR OFFICE/OUTPT VISIT, EST, LEVL IV, 30-39 MIN: ICD-10-PCS | Mod: 25,S$GLB,, | Performed by: FAMILY MEDICINE

## 2019-10-02 PROCEDURE — G0008 ADMIN INFLUENZA VIRUS VAC: HCPCS | Mod: S$GLB,,, | Performed by: FAMILY MEDICINE

## 2019-10-02 PROCEDURE — 1101F PR PT FALLS ASSESS DOC 0-1 FALLS W/OUT INJ PAST YR: ICD-10-PCS | Mod: S$GLB,,, | Performed by: FAMILY MEDICINE

## 2019-10-02 RX ORDER — TEMAZEPAM 15 MG/1
15 CAPSULE ORAL NIGHTLY
Qty: 30 CAPSULE | Refills: 2 | Status: SHIPPED | OUTPATIENT
Start: 2019-10-02 | End: 2020-03-24 | Stop reason: SDUPTHER

## 2019-10-02 RX ORDER — LISINOPRIL 20 MG/1
20 TABLET ORAL DAILY
Qty: 90 TABLET | Refills: 1 | Status: SHIPPED | OUTPATIENT
Start: 2019-10-02 | End: 2020-04-16 | Stop reason: SDUPTHER

## 2019-10-02 RX ORDER — BUDESONIDE AND FORMOTEROL FUMARATE DIHYDRATE 160; 4.5 UG/1; UG/1
2 AEROSOL RESPIRATORY (INHALATION) EVERY 12 HOURS
Qty: 3 INHALER | Refills: 3 | Status: SHIPPED | OUTPATIENT
Start: 2019-10-02 | End: 2021-11-03 | Stop reason: SDUPTHER

## 2019-10-02 RX ORDER — AMLODIPINE BESYLATE 5 MG/1
5 TABLET ORAL DAILY
Qty: 90 TABLET | Refills: 1 | Status: SHIPPED | OUTPATIENT
Start: 2019-10-02 | End: 2020-08-31 | Stop reason: SDUPTHER

## 2019-10-02 RX ORDER — ALBUTEROL SULFATE 90 UG/1
2 AEROSOL, METERED RESPIRATORY (INHALATION) EVERY 6 HOURS PRN
Qty: 3 INHALER | Refills: 3 | Status: SHIPPED | OUTPATIENT
Start: 2019-10-02 | End: 2020-02-19

## 2019-10-02 NOTE — PROGRESS NOTES
SUBJECTIVE:    Patient ID: Brit Jennings is a 84 y.o. male.    Chief Complaint: Follow-up    This 84-year-old male has been having an elevated PSA.   in our clinic it was 6.7 he was referred to  who has subsequently performed a prostate biopsy on him last week.    Patient stays very active he works part-time 3 days a week at the [employer], he goes to the gym 3 to 4 times a week and works out on treadmills and weights.  He has some COPD and uses his inhalers routinely.  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    He had some skin cancers and continues to see dermatologist on a routine basis.  He takes Restoril 15 mg at night for sleep periodically    He states he would like to look into a sleep study ,. he snores does wake up a lot at night with nocturia and has to take naps during the day at times.      Orders Only on 09/26/2019   Component Date Value Ref Range Status    Cholesterol 09/26/2019 127  <200 mg/dL Final    HDL 09/26/2019 30* >40 mg/dL Final    Triglycerides 09/26/2019 104  <150 mg/dL Final    LDL Cholesterol 09/26/2019 78  mg/dL (calc) Final    Hdl/Cholesterol Ratio 09/26/2019 4.2  <5.0 (calc) Final    Non HDL Chol. (LDL+VLDL) 09/26/2019 97  <130 mg/dL (calc) Final    Glucose 09/26/2019 91  65 - 99 mg/dL Final    BUN, Bld 09/26/2019 15  7 - 25 mg/dL Final    Creatinine 09/26/2019 1.29* 0.70 - 1.11 mg/dL Final    eGFR if non African American 09/26/2019 51* > OR = 60 mL/min/1.73m2 Final    eGFR if  09/26/2019 59* > OR = 60 mL/min/1.73m2 Final    BUN/Creatinine Ratio 09/26/2019 12  6 - 22 (calc) Final    Sodium 09/26/2019 131* 135 - 146 mmol/L Final    Potassium 09/26/2019 4.7  3.5 - 5.3 mmol/L Final    Chloride 09/26/2019 97* 98 - 110 mmol/L Final    CO2 09/26/2019 27  20 - 32 mmol/L Final    Calcium 09/26/2019 9.5  8.6 - 10.3 mg/dL Final    Total Protein 09/26/2019 6.5  6.1 - 8.1 g/dL Final    Albumin  09/26/2019 4.1  3.6 - 5.1 g/dL Final    Globulin, Total 09/26/2019 2.4  1.9 - 3.7 g/dL (calc) Final    Albumin/Globulin Ratio 09/26/2019 1.7  1.0 - 2.5 (calc) Final    Total Bilirubin 09/26/2019 0.5  0.2 - 1.2 mg/dL Final    Alkaline Phosphatase 09/26/2019 52  40 - 115 U/L Final    AST 09/26/2019 27  10 - 35 U/L Final    ALT 09/26/2019 14  9 - 46 U/L Final    WBC 09/26/2019 4.1  3.8 - 10.8 Thousand/uL Final    RBC 09/26/2019 3.92* 4.20 - 5.80 Million/uL Final    Hemoglobin 09/26/2019 13.7  13.2 - 17.1 g/dL Final    Hematocrit 09/26/2019 38.9  38.5 - 50.0 % Final    Mean Corpuscular Volume 09/26/2019 99.2  80.0 - 100.0 fL Final    Mean Corpuscular Hemoglobin 09/26/2019 34.9* 27.0 - 33.0 pg Final    Mean Corpuscular Hemoglobin Conc 09/26/2019 35.2  32.0 - 36.0 g/dL Final    RDW 09/26/2019 12.1  11.0 - 15.0 % Final    Platelets 09/26/2019 189  140 - 400 Thousand/uL Final    MPV 09/26/2019 10.4  7.5 - 12.5 fL Final    Neutrophils Absolute 09/26/2019 1,246* 1,500 - 7,800 cells/uL Final    Lymph # 09/26/2019 1,566  850 - 3,900 cells/uL Final    Mono # 09/26/2019 988* 200 - 950 cells/uL Final    Eos # 09/26/2019 238  15 - 500 cells/uL Final    Baso # 09/26/2019 62  0 - 200 cells/uL Final    Neutrophils Relative 09/26/2019 30.4  % Final    Lymph% 09/26/2019 38.2  % Final    Mono% 09/26/2019 24.1  % Final    Eosinophil% 09/26/2019 5.8  % Final    Basophil% 09/26/2019 1.5  % Final       Past Medical History:   Diagnosis Date    Cataract     Hypertension      Past Surgical History:   Procedure Laterality Date    APPENDECTOMY      EYE SURGERY      PROSTATE BIOPSY      TONSILLECTOMY       No family history on file.    Marital Status:   Alcohol History:  has no alcohol history on file.  Tobacco History:  reports that he has never smoked. He does not have any smokeless tobacco history on file.  Drug History:  has no drug history on file.    Review of patient's allergies indicates:   Allergen  "Reactions    Pollen extracts Other (See Comments)       Current Outpatient Medications:     temazepam (RESTORIL) 15 mg Cap, Take 1 capsule (15 mg total) by mouth nightly., Disp: 30 capsule, Rfl: 2    albuterol (VENTOLIN HFA) 90 mcg/actuation inhaler, Inhale 2 puffs into the lungs every 6 (six) hours as needed for Wheezing. Rescue, Disp: 3 Inhaler, Rfl: 3    amLODIPine (NORVASC) 5 MG tablet, Take 1 tablet (5 mg total) by mouth once daily., Disp: 90 tablet, Rfl: 1    GEL-ADELITA 0.4 % Gel, APPLY TO TEETH AS DIRECTED, Disp: , Rfl: 3    lisinopril (PRINIVIL,ZESTRIL) 20 MG tablet, Take 1 tablet (20 mg total) by mouth once daily., Disp: 90 tablet, Rfl: 1    SYMBICORT 160-4.5 mcg/actuation HFAA, Inhale 2 puffs into the lungs every 12 (twelve) hours., Disp: 3 Inhaler, Rfl: 3    Review of Systems   Constitutional: Negative for appetite change, chills, fatigue, fever and unexpected weight change.   HENT: Negative for congestion, ear pain and trouble swallowing.    Eyes: Negative for pain, discharge and visual disturbance.   Respiratory: Positive for shortness of breath (uses inhalers,). Negative for apnea, cough and wheezing.    Cardiovascular: Negative for chest pain and leg swelling.   Gastrointestinal: Negative for abdominal pain, blood in stool, constipation, diarrhea, nausea and vomiting.   Endocrine: Negative for cold intolerance, heat intolerance and polydipsia.   Genitourinary: Positive for frequency (nocturia  3-4/nite). Negative for dysuria, hematuria, testicular pain and urgency.   Musculoskeletal: Negative for gait problem, joint swelling and myalgias.   Neurological: Negative for dizziness, seizures and numbness.   Psychiatric/Behavioral: Positive for sleep disturbance (zolpidem helps). Negative for agitation, behavioral problems and hallucinations. The patient is not nervous/anxious.           Objective:      Vitals:    10/02/19 1141   BP: 136/76   Pulse: 72   Weight: 77.1 kg (170 lb)   Height: 5' 8.5" (1.74 " m)     Body mass index is 25.47 kg/m².  Physical Exam   Constitutional: He is oriented to person, place, and time. He appears well-developed and well-nourished.   HENT:   Head: Normocephalic and atraumatic.   Right Ear: External ear normal.   Left Ear: External ear normal.   Nose: Nose normal.   Mouth/Throat: Oropharynx is clear and moist.   Wears bilateral hearing aids   Eyes: Pupils are equal, round, and reactive to light. EOM are normal.   Neck: Normal range of motion. Neck supple. Carotid bruit is not present. No thyromegaly present.   Cardiovascular: Normal rate, regular rhythm, normal heart sounds and intact distal pulses.   No murmur heard.  Pulmonary/Chest: Effort normal and breath sounds normal. He has no wheezes. He has no rales.   Abdominal: Soft. Bowel sounds are normal. He exhibits no distension. There is no hepatosplenomegaly. There is no tenderness.   Musculoskeletal: Normal range of motion. He exhibits no tenderness or deformity.        Lumbar back: Normal. He exhibits no pain and no spasm.   Bends 90 degrees at  Waist, knees are crepitant bilaterally but have full range of motion, his gait seems normal   Lymphadenopathy:     He has no cervical adenopathy.   Neurological: He is alert and oriented to person, place, and time. No cranial nerve deficit. Coordination normal.   Skin: Skin is warm and dry. No rash noted.   Psychiatric: He has a normal mood and affect. His behavior is normal. Judgment and thought content normal.   Nursing note and vitals reviewed.        Assessment:       1. Elevated PSA    2. Insomnia, unspecified type    3. Need for influenza vaccination    4. Squamous cell carcinoma, scalp/neck    5. Essential hypertension    6. Panlobular emphysema    7. Sleep disturbance    8. Obstructive sleep apnea         Plan:       Elevated PSA  Follow-up with  for prostate biopsy results and treatment  Insomnia, unspecified type  -     temazepam (RESTORIL) 15 mg Cap; Take 1 capsule  (15 mg total) by mouth nightly.  Dispense: 30 capsule; Refill: 2  Refilled temazepam  Need for influenza vaccination  -     Influenza - High Dose (65+) (PF) (IM)  Flu vaccine today  Squamous cell carcinoma, scalp/neck  Continue q.6 months follow-up with dermatologist  Essential hypertension  -     amLODIPine (NORVASC) 5 MG tablet; Take 1 tablet (5 mg total) by mouth once daily.  Dispense: 90 tablet; Refill: 1  -     lisinopril (PRINIVIL,ZESTRIL) 20 MG tablet; Take 1 tablet (20 mg total) by mouth once daily.  Dispense: 90 tablet; Refill: 1  Stable on current medications  Panlobular emphysema  -     SYMBICORT 160-4.5 mcg/actuation HFAA; Inhale 2 puffs into the lungs every 12 (twelve) hours.  Dispense: 3 Inhaler; Refill: 3  -     albuterol (VENTOLIN HFA) 90 mcg/actuation inhaler; Inhale 2 puffs into the lungs every 6 (six) hours as needed for Wheezing. Rescue  Dispense: 3 Inhaler; Refill: 3  Continue oral inhalers  Sleep disturbance    Obstructive sleep apnea  -     Polysomnogram (CPAP will be added if patient meets diagnostic criteria.); Future  Set up sleep study at patient's request    No follow-ups on file.

## 2019-10-09 DIAGNOSIS — C61 MALIGNANT NEOPLASM OF PROSTATE: Primary | ICD-10-CM

## 2019-10-10 ENCOUNTER — HOSPITAL ENCOUNTER (OUTPATIENT)
Dept: RADIOLOGY | Facility: HOSPITAL | Age: 84
Discharge: HOME | End: 2019-10-10
Attending: SPECIALIST
Payer: MEDICARE

## 2019-10-10 DIAGNOSIS — C61 MALIGNANT NEOPLASM OF PROSTATE: ICD-10-CM

## 2019-10-10 PROCEDURE — 25500020 PHARM REV CODE 255: Mod: PO | Performed by: SPECIALIST

## 2019-10-10 PROCEDURE — 74178 CT ABD&PLV WO CNTR FLWD CNTR: CPT | Mod: TC,PO

## 2019-10-10 RX ADMIN — IOHEXOL 100 ML: 350 INJECTION, SOLUTION INTRAVENOUS at 02:10

## 2019-10-14 ENCOUNTER — HOSPITAL ENCOUNTER (OUTPATIENT)
Dept: RADIOLOGY | Facility: HOSPITAL | Age: 84
Discharge: HOME | End: 2019-10-14
Attending: SPECIALIST
Payer: MEDICARE

## 2019-10-14 DIAGNOSIS — C61 MALIGNANT NEOPLASM OF PROSTATE: ICD-10-CM

## 2019-10-14 PROCEDURE — A9503 TC99M MEDRONATE: HCPCS

## 2019-10-15 ENCOUNTER — SOCIAL WORK (OUTPATIENT)
Dept: HEMATOLOGY/ONCOLOGY | Facility: CLINIC | Age: 84
End: 2019-10-15

## 2019-10-15 ENCOUNTER — DOCUMENTATION ONLY (OUTPATIENT)
Dept: RADIATION ONCOLOGY | Facility: CLINIC | Age: 84
End: 2019-10-15

## 2019-10-15 ENCOUNTER — OFFICE VISIT (OUTPATIENT)
Dept: RADIATION ONCOLOGY | Facility: CLINIC | Age: 84
End: 2019-10-15
Payer: MEDICARE

## 2019-10-15 VITALS
OXYGEN SATURATION: 97 % | TEMPERATURE: 98 F | WEIGHT: 167.81 LBS | SYSTOLIC BLOOD PRESSURE: 195 MMHG | BODY MASS INDEX: 25.14 KG/M2 | DIASTOLIC BLOOD PRESSURE: 73 MMHG | HEART RATE: 59 BPM

## 2019-10-15 DIAGNOSIS — C61 MALIGNANT NEOPLASM OF PROSTATE: ICD-10-CM

## 2019-10-15 PROCEDURE — 1101F PT FALLS ASSESS-DOCD LE1/YR: CPT | Mod: S$GLB,,, | Performed by: RADIOLOGY

## 2019-10-15 PROCEDURE — 99215 PR OFFICE/OUTPT VISIT, EST, LEVL V, 40-54 MIN: ICD-10-PCS | Mod: S$GLB,,, | Performed by: RADIOLOGY

## 2019-10-15 PROCEDURE — 99215 OFFICE O/P EST HI 40 MIN: CPT | Mod: S$GLB,,, | Performed by: RADIOLOGY

## 2019-10-15 PROCEDURE — 1101F PR PT FALLS ASSESS DOC 0-1 FALLS W/OUT INJ PAST YR: ICD-10-PCS | Mod: S$GLB,,, | Performed by: RADIOLOGY

## 2019-10-15 NOTE — PROGRESS NOTES
Brit Jennings  [id number]  1935  10/15/2019  No referring provider defined for this encounter.    DIAGNOSIS:  Prostate cancer, adenocarcinoma  TREATMENT SITE(S):  Prostate and seminal vesicles    INTENT: CURATIVE    TREATMENT SETTING: RT ALONE     MODALITY: PHOTON    TECHNIQUE:  INTENSITY MODULATED RADIOTHERAPY (IMRT)    IMRT MEDICAL NECESSITY:IMRT MEDICAL NECESSITY: Target volume irregular shape/proximate to critical structures, Narrow margins needed to protect adjacent structures and High precision necessary     HPI:  84-year-old gentleman known to us having undergone previous radiation therapy for synchronous carcinomas of the skin located in the face and upper torso now with prostate adenocarcinoma, intermediate risk with a Kevon's of 7 and PSA 7.7, KPS 90    I have personally performed treatment planning for the patient, reviewing relevant history/physical and imaging. I have defined GTV, CTV, PTV and organs at risk.     In order to accomplish this plan, I am ordering:  SIMULATION: CT SIMULATION FOR PLACEMENT OF TREATMENT FIELDS    CONTRAST: IV    TO ACCOMPLISH REPRODUCIBLE POSITION: VACLOC and FULL BLADDER    DEVICES FOR BEAM SHAPING: CUSTOMIZED MLC    CUSTOMIZED BOLUS: none    IMAGING: DAILY KV/KV OBI and CBCT WEEKLY    I have ordered a weekly physics check.  SPECIAL PHYSICS CONSULT: NO  REASON: N/A    SPECIAL TREATMENT CIRCUMSTANCE: NO   Concurrent or recent administration of chemotherapeutic agents which are  known potent radiosensitizers and thus will require vigilant monitoring for  exaggerated radiation toxicities.    LABS: PSA LAST WEEK OF RT    ANTICIPATED PRESCRIPTION:  70.2 Gy to the prostate and proximal seminal vesicles, 77.4 Gy to the prostate    TREATMENT: DAILY    PHYSICIAN: Bi Hernandez MD

## 2019-10-15 NOTE — PROGRESS NOTES
Brit Jnenings  [id number]  1935  10/15/2019  No referring provider defined for this encounter.    REASON FOR CONSULTATION:  Prostate cancer  TREATMENT GOAL: primary    HISTORY OF PRESENT ILLNESS:   84-year-old gentleman known to me having undergone multiple courses of radiation therapy for synchronous carcinomas of the skin most recently treated to the bridge of his nose, other areas in the upper torso, presents today to discuss his recent biopsy diagnosing prostate cancer.  He had PSAs measured in the past in the 2-3 range however recently his PSA did increase up to over 6.  His last value showed increase up to 7.7.    He underwent a transrectal ultrasound and biopsy on 09/24/2019.  Six core samples were removed.  Prostate cancer was identified on the left apex, left mid and left base.  The left base had a Kevon 4 + 3 with 90% of the core involved.  The left mid and left apex had Kevon score 3+3.    Post biopsy he has done well.    He underwent staging studies including a bone scan which showed only arthritic changes. He had a CT scan of the abdomen and pelvis which showed enlargement of the prostate and some nodularity as described below:    Enlargement of the prostate gland with suggestion of nodular extension from the prostate gland along its left superior, posterior and lateral margin into the adjacent seminal vesicle.  No evidence of metastatic disease.    Clinically doing well overall.  He remains very active.  He is a nonsmoker.  He does have moderate nocturia.  He has no evidence of hematuria or difficulty with urination.  Bowel function is good without rectal irritation or pain.  He is unable to have erections and does not have any evidence of significant incontinence.    He is scheduled to undergo his 1st androgen deprivation injection.      Review of Systems   Constitutional: Negative for fatigue and unexpected weight change.   HENT:   Negative for sore throat and trouble swallowing.     Respiratory: Negative for chest tightness and shortness of breath.    Cardiovascular: Negative for chest pain and leg swelling.   Gastrointestinal: Negative for abdominal distention, abdominal pain and blood in stool.   Genitourinary: Negative for dysuria and pelvic pain.    Musculoskeletal: Negative for back pain, flank pain and gait problem.   Neurological: Negative for gait problem and headaches.   Hematological: Negative for adenopathy. Does not bruise/bleed easily.   Psychiatric/Behavioral: Negative for confusion. The patient is not nervous/anxious.      Past Medical History:   Diagnosis Date    Cataract     Hypertension      Past Surgical History:   Procedure Laterality Date    APPENDECTOMY      EYE SURGERY      PROSTATE BIOPSY      TONSILLECTOMY       Social History     Socioeconomic History    Marital status:      Spouse name: Not on file    Number of children: Not on file    Years of education: Not on file    Highest education level: Not on file   Occupational History    Not on file   Social Needs    Financial resource strain: Not on file    Food insecurity:     Worry: Not on file     Inability: Not on file    Transportation needs:     Medical: Not on file     Non-medical: Not on file   Tobacco Use    Smoking status: Never Smoker   Substance and Sexual Activity    Alcohol use: Not on file    Drug use: Not on file    Sexual activity: Not on file   Lifestyle    Physical activity:     Days per week: Not on file     Minutes per session: Not on file    Stress: Not on file   Relationships    Social connections:     Talks on phone: Not on file     Gets together: Not on file     Attends Restorationist service: Not on file     Active member of club or organization: Not on file     Attends meetings of clubs or organizations: Not on file     Relationship status: Not on file   Other Topics Concern    Not on file   Social History Narrative    Not on file     History reviewed. No pertinent  family history.    PRIOR HISTORY OF CHEMOTHERAPY OR RADIOTHERAPY: Please see HPI for patients prior oncologic history.    Medication List with Changes/Refills   Current Medications    ALBUTEROL (VENTOLIN HFA) 90 MCG/ACTUATION INHALER    Inhale 2 puffs into the lungs every 6 (six) hours as needed for Wheezing. Rescue    AMLODIPINE (NORVASC) 5 MG TABLET    Take 1 tablet (5 mg total) by mouth once daily.    GEL-ADELITA 0.4 % GEL    APPLY TO TEETH AS DIRECTED    LISINOPRIL (PRINIVIL,ZESTRIL) 20 MG TABLET    Take 1 tablet (20 mg total) by mouth once daily.    SYMBICORT 160-4.5 MCG/ACTUATION HFAA    Inhale 2 puffs into the lungs every 12 (twelve) hours.    TEMAZEPAM (RESTORIL) 15 MG CAP    Take 1 capsule (15 mg total) by mouth nightly.     Review of patient's allergies indicates:   Allergen Reactions    Pollen extracts Other (See Comments)       QUALITY OF LIFE: 90%- Able to Carry on Normal Activity: Minor Symptoms of Disease    Vitals:    10/15/19 0847 10/15/19 0848   BP: 132/83 (!) 195/73   Pulse: (!) 59    Temp: 98.1 °F (36.7 °C)    TempSrc: Oral    SpO2: 97%    Weight: 76.1 kg (167 lb 12.8 oz)    PainSc: 0-No pain        PHYSICAL EXAM:  Well groomed oriented alert sensorium is sharp  GENERAL: alert; in no apparent distress.   HEAD: normocephalic, atraumatic.  EYES: pupils are equal, round, reactive to light and accommodation. Sclera anicteric. Conjunctiva not injected.   NOSE/THROAT: no nasal erythema or rhinorrhea. Oropharynx pink, without erythema, ulcerations or thrush.   NECK: no cervical motion rigidity; supple with no masses.  CHEST: clear to auscultation bilaterally; no wheezes, crackles or rubs. Patient is speaking comfortably on room air with normal work of breathing without using accessory muscles of respiration.  CARDIOVASCULAR: regular rate and rhythm; no murmurs, rubs or gallops.  ABDOMEN: soft, nontender, nondistended. Bowel sounds present.   MUSCULOSKELETAL: no tenderness to palpation along the spine or  scapulae. Normal range of motion.  NEUROLOGIC: cranial nerves II-XII intact bilaterally. Strength 5/5 in bilateral upper and lower extremities. No sensory deficits appreciated. Reflexes globally intact. No cerebellar signs. Normal gait.  LYMPHATIC: no cervical, supraclavicular or axillary adenopathy appreciated bilaterally.   EXTREMITIES: no clubbing, cyanosis, edema.  SKIN: no erythema, rashes, ulcerations noted.  Previously treated areas of the scalp and nose and neck have all resolved.  No evidence of local recurrence    REVIEW OF IMAGING/PATHOLOGY/LABS: Please see HPI. All images reviewed personally by dictating physician.     ASSESSMENT:  84-year-old gentleman with prostate adenocarcinoma PSA 7.7, Kevon's 4+ 3 and a single core, 3+ 3 in 2 of the cores, stage [id number], KPS 90  PLAN:  Had a long discussion with him reviewing treatment options of surgery versus radiation therapy.  Because of his age I would recommend external beam radiation therapy.  I did disclose to him the pros and cons of surgery versus radiation including radioactive seed implantation.  He is very interested in proceeding with external beam radiation therapy.    Because of his [place]'s score being 7 I would recommend androgen blockade therapy for up to a year, and he is scheduled to have this injection in about a week or 2.    I did discuss with him the role of fudicial marker placement for image guided radiation therapy technique.      In terms of external being radiation therapy I would recommend 70.2 Gy to the prostate and seminal vesicles with a boost to 77.4 Gy to the prostate.    I gave him information on the follow-up schedule and that he will be monitor with his PSA values the next 1 to be drawn on his last day of radiation therapy, and subsequently thereafter for surveillance purposes.  I did disclose the him the definition of biochemical failure.      The side effects and possible complications of external beam was discussed with  him and his wife.    Will him return to us after he undergoes his marker placement and initiation of androgen blockade therapy for simulation and treatment planning.      We discussed the risks and benefits of the above treatment and have gone over in detail the acute and late toxicities of radiation therapy to the pelvis. The patient expressed  understanding and has signed a consent form which is included in the patients chart. The patient has our contact information and understands that they are free to contact us at any time with questions or concerns regarding radiation therapy.    DISPOSITION: RTC FOR CT SIM    TIME SPENT WITH PATIENT: I have personally seen and evaluated this patient. Greater than 50% of this time was spent discussing coordination of care and/or counseling.     PHYSICIAN: Bi Hernanedz MD

## 2019-10-18 NOTE — PROGRESS NOTES
I met with patient to complete new patient orientation and to complete the distress screening: patient indicated a rating of 1.  Patient declined to sign consent for the [employer].  I provided patient with [facility]'s community events flyer and encouraged them to attend some of the support groups.  I provided my contact information in the event they need supportive services in the future.

## 2019-12-09 ENCOUNTER — TELEPHONE (OUTPATIENT)
Dept: RADIATION ONCOLOGY | Facility: CLINIC | Age: 84
End: 2019-12-09

## 2019-12-09 DIAGNOSIS — C61 MALIGNANT NEOPLASM OF PROSTATE: Primary | ICD-10-CM

## 2019-12-16 ENCOUNTER — TELEPHONE (OUTPATIENT)
Dept: RADIATION ONCOLOGY | Facility: CLINIC | Age: 84
End: 2019-12-16

## 2019-12-16 ENCOUNTER — DOCUMENTATION ONLY (OUTPATIENT)
Dept: RADIATION ONCOLOGY | Facility: CLINIC | Age: 84
End: 2019-12-16

## 2019-12-16 DIAGNOSIS — C61 MALIGNANT NEOPLASM OF PROSTATE: Primary | ICD-10-CM

## 2019-12-16 NOTE — PROGRESS NOTES
Met with patient and spouse.  Discussed bowel and bladder prep instructions.  Order given to patient for serum creatinine to be drawn at [facility].  Patient verbalized understanding.

## 2019-12-18 LAB
CREAT SERPL-MCNC: 1.15 MG/DL (ref 0.7–1.11)
GFRSERPLBLD MDRD-ARVRAT: 58 ML/MIN/1.73M2

## 2020-01-22 DIAGNOSIS — C61 MALIGNANT NEOPLASM OF PROSTATE: Primary | ICD-10-CM

## 2020-01-22 RX ORDER — TAMSULOSIN HYDROCHLORIDE 0.4 MG/1
0.4 CAPSULE ORAL NIGHTLY
Qty: 90 CAPSULE | Refills: 0 | Status: SHIPPED | OUTPATIENT
Start: 2020-01-22 | End: 2020-04-27

## 2020-01-24 NOTE — TELEPHONE ENCOUNTER
----- Message from Lashanda Moreau sent at 10/20/2017  2:53 PM CDT -----  [facility]   solifenacin (VESICARE) 10 MG tablet  90 day   .  [facility]/pharm[facility] #[id number] - [place] - [address]  [address] [address]  Phone: [phone] Fax: [phone]       
Patient requesting 90 day supply refill.   
Pt needs f/u wihtin 3 months before another refill will be given  
No

## 2020-02-18 ENCOUNTER — TELEPHONE (OUTPATIENT)
Dept: RADIATION ONCOLOGY | Facility: CLINIC | Age: 85
End: 2020-02-18

## 2020-02-18 DIAGNOSIS — C61 MALIGNANT NEOPLASM OF PROSTATE: Primary | ICD-10-CM

## 2020-02-19 ENCOUNTER — OFFICE VISIT (OUTPATIENT)
Dept: FAMILY MEDICINE | Facility: CLINIC | Age: 85
End: 2020-02-19
Payer: MEDICARE

## 2020-02-19 VITALS
WEIGHT: 171 LBS | HEIGHT: 69 IN | DIASTOLIC BLOOD PRESSURE: 70 MMHG | BODY MASS INDEX: 25.33 KG/M2 | HEART RATE: 72 BPM | SYSTOLIC BLOOD PRESSURE: 128 MMHG

## 2020-02-19 DIAGNOSIS — I10 ESSENTIAL HYPERTENSION: ICD-10-CM

## 2020-02-19 DIAGNOSIS — L30.0 NUMMULAR ECZEMATOUS DERMATITIS: Primary | ICD-10-CM

## 2020-02-19 DIAGNOSIS — J43.1 PANLOBULAR EMPHYSEMA: ICD-10-CM

## 2020-02-19 DIAGNOSIS — L84 FOOT CALLUS: ICD-10-CM

## 2020-02-19 DIAGNOSIS — C61 PROSTATE CANCER: ICD-10-CM

## 2020-02-19 DIAGNOSIS — R97.20 ELEVATED PSA: ICD-10-CM

## 2020-02-19 PROCEDURE — 1101F PR PT FALLS ASSESS DOC 0-1 FALLS W/OUT INJ PAST YR: ICD-10-PCS | Mod: S$GLB,,, | Performed by: FAMILY MEDICINE

## 2020-02-19 PROCEDURE — 1159F PR MEDICATION LIST DOCUMENTED IN MEDICAL RECORD: ICD-10-PCS | Mod: S$GLB,,, | Performed by: FAMILY MEDICINE

## 2020-02-19 PROCEDURE — 3078F PR MOST RECENT DIASTOLIC BLOOD PRESSURE < 80 MM HG: ICD-10-PCS | Mod: S$GLB,,, | Performed by: FAMILY MEDICINE

## 2020-02-19 PROCEDURE — 3074F SYST BP LT 130 MM HG: CPT | Mod: S$GLB,,, | Performed by: FAMILY MEDICINE

## 2020-02-19 PROCEDURE — 99213 OFFICE O/P EST LOW 20 MIN: CPT | Mod: S$GLB,,, | Performed by: FAMILY MEDICINE

## 2020-02-19 PROCEDURE — 1159F MED LIST DOCD IN RCRD: CPT | Mod: S$GLB,,, | Performed by: FAMILY MEDICINE

## 2020-02-19 PROCEDURE — 3074F PR MOST RECENT SYSTOLIC BLOOD PRESSURE < 130 MM HG: ICD-10-PCS | Mod: S$GLB,,, | Performed by: FAMILY MEDICINE

## 2020-02-19 PROCEDURE — 3078F DIAST BP <80 MM HG: CPT | Mod: S$GLB,,, | Performed by: FAMILY MEDICINE

## 2020-02-19 PROCEDURE — 1101F PT FALLS ASSESS-DOCD LE1/YR: CPT | Mod: S$GLB,,, | Performed by: FAMILY MEDICINE

## 2020-02-19 PROCEDURE — 99213 PR OFFICE/OUTPT VISIT, EST, LEVL III, 20-29 MIN: ICD-10-PCS | Mod: S$GLB,,, | Performed by: FAMILY MEDICINE

## 2020-02-19 RX ORDER — CLOBETASOL PROPIONATE 0.5 MG/G
OINTMENT TOPICAL 2 TIMES DAILY
Qty: 45 G | Refills: 2 | Status: SHIPPED | OUTPATIENT
Start: 2020-02-19 | End: 2020-10-21

## 2020-02-21 NOTE — PROGRESS NOTES
This 84-year-old male comes in complaining of scaly lesions on the tops of both feet.  It is slightly pruritic but has no significant pain. he does see Dr. Monique the podiatrist and get his calluses filed down.    He goes to the gym 3 times a week and likes to work part-time at the [employer] in [place].    He was diagnosed with prostate carcinoma when his PSA reached a maximum of 7.0, now after 30  radiation treatments and hormone injections his PSA down to 0.3    SUBJECTIVE:    Patient ID: Brit Jennings is a 84 y.o. male.    Chief Complaint: Recurrent Skin Infections (possible infection on foot and legs, did not bring bottles. -ac )    HPI    Telephone on 12/16/2019   Component Date Value Ref Range Status    Creatinine 12/17/2019 1.15* 0.70 - 1.11 mg/dL Final    eGFR if non African American 12/17/2019 58* > OR = 60 mL/min/1.73m2 Final    eGFR if  12/17/2019 67  > OR = 60 mL/min/1.73m2 Final   Lab Visit on 10/10/2019   Component Date Value Ref Range Status    Sodium 10/10/2019 131* 136 - 145 mmol/L Final    Potassium 10/10/2019 4.4  3.5 - 5.1 mmol/L Final    Chloride 10/10/2019 93* 95 - 110 mmol/L Final    CO2 10/10/2019 28  23 - 29 mmol/L Final    Glucose 10/10/2019 100  70 - 110 mg/dL Final    BUN, Bld 10/10/2019 16  8 - 23 mg/dL Final    Creatinine 10/10/2019 1.0  0.5 - 1.4 mg/dL Final    Calcium 10/10/2019 9.4  8.7 - 10.5 mg/dL Final    Anion Gap 10/10/2019 10  8 - 16 mmol/L Final    eGFR if African American 10/10/2019 >60.0  >60 mL/min/1.73 m^2 Final    eGFR if non African American 10/10/2019 >60.0  >60 mL/min/1.73 m^2 Final   Orders Only on 09/26/2019   Component Date Value Ref Range Status    Cholesterol 09/26/2019 127  <200 mg/dL Final    HDL 09/26/2019 30* >40 mg/dL Final    Triglycerides 09/26/2019 104  <150 mg/dL Final    LDL Cholesterol 09/26/2019 78  mg/dL (calc) Final    Hdl/Cholesterol Ratio 09/26/2019 4.2  <5.0 (calc) Final    Non HDL Chol. (LDL+VLDL)  09/26/2019 97  <130 mg/dL (calc) Final    Glucose 09/26/2019 91  65 - 99 mg/dL Final    BUN, Bld 09/26/2019 15  7 - 25 mg/dL Final    Creatinine 09/26/2019 1.29* 0.70 - 1.11 mg/dL Final    eGFR if non African American 09/26/2019 51* > OR = 60 mL/min/1.73m2 Final    eGFR if  09/26/2019 59* > OR = 60 mL/min/1.73m2 Final    BUN/Creatinine Ratio 09/26/2019 12  6 - 22 (calc) Final    Sodium 09/26/2019 131* 135 - 146 mmol/L Final    Potassium 09/26/2019 4.7  3.5 - 5.3 mmol/L Final    Chloride 09/26/2019 97* 98 - 110 mmol/L Final    CO2 09/26/2019 27  20 - 32 mmol/L Final    Calcium 09/26/2019 9.5  8.6 - 10.3 mg/dL Final    Total Protein 09/26/2019 6.5  6.1 - 8.1 g/dL Final    Albumin 09/26/2019 4.1  3.6 - 5.1 g/dL Final    Globulin, Total 09/26/2019 2.4  1.9 - 3.7 g/dL (calc) Final    Albumin/Globulin Ratio 09/26/2019 1.7  1.0 - 2.5 (calc) Final    Total Bilirubin 09/26/2019 0.5  0.2 - 1.2 mg/dL Final    Alkaline Phosphatase 09/26/2019 52  40 - 115 U/L Final    AST 09/26/2019 27  10 - 35 U/L Final    ALT 09/26/2019 14  9 - 46 U/L Final    WBC 09/26/2019 4.1  3.8 - 10.8 Thousand/uL Final    RBC 09/26/2019 3.92* 4.20 - 5.80 Million/uL Final    Hemoglobin 09/26/2019 13.7  13.2 - 17.1 g/dL Final    Hematocrit 09/26/2019 38.9  38.5 - 50.0 % Final    Mean Corpuscular Volume 09/26/2019 99.2  80.0 - 100.0 fL Final    Mean Corpuscular Hemoglobin 09/26/2019 34.9* 27.0 - 33.0 pg Final    Mean Corpuscular Hemoglobin Conc 09/26/2019 35.2  32.0 - 36.0 g/dL Final    RDW 09/26/2019 12.1  11.0 - 15.0 % Final    Platelets 09/26/2019 189  140 - 400 Thousand/uL Final    MPV 09/26/2019 10.4  7.5 - 12.5 fL Final    Neutrophils Absolute 09/26/2019 1,246* 1,500 - 7,800 cells/uL Final    Lymph # 09/26/2019 1,566  850 - 3,900 cells/uL Final    Mono # 09/26/2019 988* 200 - 950 cells/uL Final    Eos # 09/26/2019 238  15 - 500 cells/uL Final    Baso # 09/26/2019 62  0 - 200 cells/uL Final     Neutrophils Relative 09/26/2019 30.4  % Final    Lymph% 09/26/2019 38.2  % Final    Mono% 09/26/2019 24.1  % Final    Eosinophil% 09/26/2019 5.8  % Final    Basophil% 09/26/2019 1.5  % Final       Past Medical History:   Diagnosis Date    Cataract     Hypertension      Past Surgical History:   Procedure Laterality Date    APPENDECTOMY      EYE SURGERY      PROSTATE BIOPSY      TONSILLECTOMY       No family history on file.    Marital Status:   Alcohol History:  has no alcohol history on file.  Tobacco History:  reports that he has never smoked. He does not have any smokeless tobacco history on file.  Drug History:  has no drug history on file.    Review of patient's allergies indicates:   Allergen Reactions    Pollen extracts Other (See Comments)       Current Outpatient Medications:     amLODIPine (NORVASC) 5 MG tablet, Take 1 tablet (5 mg total) by mouth once daily., Disp: 90 tablet, Rfl: 1    clobetasol 0.05% (TEMOVATE) 0.05 % Oint, Apply topically 2 (two) times daily., Disp: 45 g, Rfl: 2    lisinopril (PRINIVIL,ZESTRIL) 20 MG tablet, Take 1 tablet (20 mg total) by mouth once daily., Disp: 90 tablet, Rfl: 1    SYMBICORT 160-4.5 mcg/actuation HFAA, Inhale 2 puffs into the lungs every 12 (twelve) hours., Disp: 3 Inhaler, Rfl: 3    tamsulosin (FLOMAX) 0.4 mg Cap, Take 1 capsule (0.4 mg total) by mouth nightly., Disp: 90 capsule, Rfl: 0    temazepam (RESTORIL) 15 mg Cap, Take 1 capsule (15 mg total) by mouth nightly., Disp: 30 capsule, Rfl: 2    Review of Systems   Constitutional: Negative for appetite change, chills, fatigue, fever and unexpected weight change.   HENT: Negative for congestion, ear pain and trouble swallowing.    Eyes: Negative for pain, discharge and visual disturbance.   Respiratory: Negative for apnea, cough, shortness of breath and wheezing.    Cardiovascular: Negative for chest pain and leg swelling.   Gastrointestinal: Negative for abdominal pain, blood in stool,  "constipation, diarrhea, nausea and vomiting.   Endocrine: Negative for cold intolerance, heat intolerance and polydipsia.   Genitourinary: Negative for dysuria, hematuria, testicular pain and urgency.   Musculoskeletal: Negative for gait problem, joint swelling and myalgias.   Skin: Positive for rash (Scaly rash to the tops of his right greater than left foot).   Neurological: Negative for dizziness, seizures and numbness.   Psychiatric/Behavioral: Negative for agitation, behavioral problems and hallucinations. The patient is not nervous/anxious.           Objective:      Vitals:    02/19/20 1054   BP: 128/70   Pulse: 72   Weight: 77.6 kg (171 lb)   Height: 5' 8.5" (1.74 m)     Body mass index is 25.62 kg/m².  Physical Exam   Constitutional: He is oriented to person, place, and time. He appears well-developed and well-nourished.   HENT:   Head: Normocephalic and atraumatic.   Right Ear: External ear normal.   Left Ear: External ear normal.   Nose: Nose normal.   Mouth/Throat: Oropharynx is clear and moist.   Eyes: Pupils are equal, round, and reactive to light. EOM are normal.   Neck: Normal range of motion. Neck supple. Carotid bruit is not present. No thyromegaly present.   Cardiovascular: Normal rate, regular rhythm, normal heart sounds and intact distal pulses.   No murmur heard.  Pulmonary/Chest: Effort normal and breath sounds normal. He has no wheezes. He has no rales.   Abdominal: Soft. Bowel sounds are normal. He exhibits no distension. There is no hepatosplenomegaly. There is no tenderness.   Musculoskeletal: Normal range of motion. He exhibits no tenderness or deformity.        Lumbar back: Normal. He exhibits no pain and no spasm.   Bends 90 degrees at  waist   Lymphadenopathy:     He has no cervical adenopathy.   Neurological: He is alert and oriented to person, place, and time. No cranial nerve deficit. Coordination normal.   Skin: Skin is warm and dry. No rash noted.   Scaly erythematous patches " compatible with nummular eczema on the dorsum of both feet.  He has calluses to the soles of the plantar surfaces of both feet   Psychiatric: He has a normal mood and affect. His behavior is normal. Judgment and thought content normal.   Nursing note and vitals reviewed.        Assessment:       1. Nummular eczematous dermatitis    2. Panlobular emphysema    3. Essential hypertension    4. Elevated PSA    5. Prostate cancer    6. Foot callus         Plan:       Nummular eczematous dermatitis  -     clobetasol 0.05% (TEMOVATE) 0.05 % Oint; Apply topically 2 (two) times daily.  Dispense: 45 g; Refill: 2  Trial of clobetasol 0.05 % ointment twice a day  Panlobular emphysema  Stable at this time  Essential hypertension  Well controlled  Elevated PSA  Responding well to hormone therapy  Prostate cancer  Responding well to hormone therapy  Foot callus  -     Ambulatory referral/consult to Podiatry; Future; Expected date: 02/26/2020  He needs to see Podiatry for frequent filing of his thick calluses    No follow-ups on file.

## 2020-03-07 ENCOUNTER — PATIENT MESSAGE (OUTPATIENT)
Dept: FAMILY MEDICINE | Facility: CLINIC | Age: 85
End: 2020-03-07

## 2020-03-07 DIAGNOSIS — Z79.899 ENCOUNTER FOR LONG-TERM (CURRENT) USE OF OTHER MEDICATIONS: ICD-10-CM

## 2020-03-07 DIAGNOSIS — I10 ESSENTIAL HYPERTENSION: ICD-10-CM

## 2020-03-07 DIAGNOSIS — Z00.00 ROUTINE GENERAL MEDICAL EXAMINATION AT A HEALTH CARE FACILITY: Primary | ICD-10-CM

## 2020-03-19 LAB — PSA SERPL-MCNC: <0.1 NG/ML

## 2020-03-20 ENCOUNTER — DOCUMENTATION ONLY (OUTPATIENT)
Dept: RADIATION ONCOLOGY | Facility: CLINIC | Age: 85
End: 2020-03-20

## 2020-03-20 NOTE — PROGRESS NOTES
Patient completed definitive treatment for intermediate risk prostate adenocarcinoma, 7740 cGy ending 02/28/2020.  He was treated with concurrent ADT per Dr. Andrade and is planned for a total of 1 year.    Post treatment PSA < 0.1.    Occult patient today for his 3 week visit and he denies fatigue, dysuria, hematuria, diarrhea, bright red blood per rectum.  He had excellent tolerance to therapy in this continues.  He had an isolated episode of painful urination that self corrected but he is otherwise without complaints.    He has plans to follow up with Dr. Andrade in May with PSA and placement of ADT.  I recommended he continue this course and today discuss that Dr. Andrade will be ordering his PSA levels and I described PSA kinetics for him today.  I expect PSA to continue to be suppressed and will likely increase after testosterone reconstitution but as long as this levels off the be no evidence of tumor growth.  He expressed understanding.    He will return to clinic in 6 months for f/u.  Contact information provided should he have questions.    COVID-19 precautions discussed.

## 2020-03-23 ENCOUNTER — PATIENT MESSAGE (OUTPATIENT)
Dept: FAMILY MEDICINE | Facility: CLINIC | Age: 85
End: 2020-03-23

## 2020-03-24 DIAGNOSIS — G47.00 INSOMNIA, UNSPECIFIED TYPE: ICD-10-CM

## 2020-03-24 RX ORDER — TEMAZEPAM 15 MG/1
15 CAPSULE ORAL NIGHTLY
Qty: 30 CAPSULE | Refills: 2 | Status: SHIPPED | OUTPATIENT
Start: 2020-03-24 | End: 2020-06-22 | Stop reason: SDUPTHER

## 2020-04-16 DIAGNOSIS — I10 ESSENTIAL HYPERTENSION: ICD-10-CM

## 2020-04-16 RX ORDER — LISINOPRIL 20 MG/1
20 TABLET ORAL DAILY
Qty: 90 TABLET | Refills: 1 | Status: SHIPPED | OUTPATIENT
Start: 2020-04-16 | End: 2020-09-01 | Stop reason: SDUPTHER

## 2020-04-24 DIAGNOSIS — C61 MALIGNANT NEOPLASM OF PROSTATE: ICD-10-CM

## 2020-04-27 RX ORDER — TAMSULOSIN HYDROCHLORIDE 0.4 MG/1
CAPSULE ORAL
Qty: 90 CAPSULE | Refills: 0 | Status: SHIPPED | OUTPATIENT
Start: 2020-04-27 | End: 2020-08-07 | Stop reason: ALTCHOICE

## 2020-05-22 ENCOUNTER — HOSPITAL ENCOUNTER (EMERGENCY)
Facility: HOSPITAL | Age: 85
Discharge: HOME | End: 2020-05-22
Attending: EMERGENCY MEDICINE
Payer: MEDICARE

## 2020-05-22 VITALS
SYSTOLIC BLOOD PRESSURE: 164 MMHG | TEMPERATURE: 98 F | BODY MASS INDEX: 25.01 KG/M2 | WEIGHT: 165 LBS | DIASTOLIC BLOOD PRESSURE: 74 MMHG | HEART RATE: 65 BPM | OXYGEN SATURATION: 98 % | HEIGHT: 68 IN | RESPIRATION RATE: 18 BRPM

## 2020-05-22 DIAGNOSIS — M19.90 OSTEOARTHRITIS, UNSPECIFIED OSTEOARTHRITIS TYPE, UNSPECIFIED SITE: ICD-10-CM

## 2020-05-22 DIAGNOSIS — S83.91XA SPRAIN OF RIGHT KNEE, UNSPECIFIED LIGAMENT, INITIAL ENCOUNTER: Primary | ICD-10-CM

## 2020-05-22 DIAGNOSIS — T14.90XA INJURY: ICD-10-CM

## 2020-05-22 PROCEDURE — 99283 EMERGENCY DEPT VISIT LOW MDM: CPT | Mod: 25

## 2020-05-22 NOTE — DISCHARGE INSTRUCTIONS
Advised him to follow up with orthopedic doctor for further evaluation possible ligamentous injury, may need MRI for further evaluations.  Advised rest ice elevation compression, crutch use.  Continue check for cap refill less then 3 sec as demonstrated in ER.  Return to ER for increased pain numbness tingling color changes or any other concerning symptoms..  Advised follow up with primary doctor history relation issues also lower extremity.  Xr finding arthersclerotic calcification in popliteal artery noted.
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
## 2020-05-26 ENCOUNTER — OFFICE VISIT (OUTPATIENT)
Dept: FAMILY MEDICINE | Facility: CLINIC | Age: 85
End: 2020-05-26
Payer: MEDICARE

## 2020-05-26 ENCOUNTER — TELEPHONE (OUTPATIENT)
Dept: FAMILY MEDICINE | Facility: CLINIC | Age: 85
End: 2020-05-26

## 2020-05-26 VITALS
DIASTOLIC BLOOD PRESSURE: 70 MMHG | SYSTOLIC BLOOD PRESSURE: 128 MMHG | HEART RATE: 56 BPM | WEIGHT: 166 LBS | BODY MASS INDEX: 24.59 KG/M2 | HEIGHT: 69 IN | TEMPERATURE: 99 F

## 2020-05-26 DIAGNOSIS — M25.561 ACUTE PAIN OF RIGHT KNEE: Primary | ICD-10-CM

## 2020-05-26 PROCEDURE — 3078F DIAST BP <80 MM HG: CPT | Mod: S$GLB,,, | Performed by: NURSE PRACTITIONER

## 2020-05-26 PROCEDURE — 99213 PR OFFICE/OUTPT VISIT, EST, LEVL III, 20-29 MIN: ICD-10-PCS | Mod: S$GLB,,, | Performed by: NURSE PRACTITIONER

## 2020-05-26 PROCEDURE — 1101F PR PT FALLS ASSESS DOC 0-1 FALLS W/OUT INJ PAST YR: ICD-10-PCS | Mod: S$GLB,,, | Performed by: NURSE PRACTITIONER

## 2020-05-26 PROCEDURE — 3078F PR MOST RECENT DIASTOLIC BLOOD PRESSURE < 80 MM HG: ICD-10-PCS | Mod: S$GLB,,, | Performed by: NURSE PRACTITIONER

## 2020-05-26 PROCEDURE — 99213 OFFICE O/P EST LOW 20 MIN: CPT | Mod: S$GLB,,, | Performed by: NURSE PRACTITIONER

## 2020-05-26 PROCEDURE — 1101F PT FALLS ASSESS-DOCD LE1/YR: CPT | Mod: S$GLB,,, | Performed by: NURSE PRACTITIONER

## 2020-05-26 PROCEDURE — 1159F PR MEDICATION LIST DOCUMENTED IN MEDICAL RECORD: ICD-10-PCS | Mod: S$GLB,,, | Performed by: NURSE PRACTITIONER

## 2020-05-26 PROCEDURE — 3074F PR MOST RECENT SYSTOLIC BLOOD PRESSURE < 130 MM HG: ICD-10-PCS | Mod: S$GLB,,, | Performed by: NURSE PRACTITIONER

## 2020-05-26 PROCEDURE — 1125F AMNT PAIN NOTED PAIN PRSNT: CPT | Mod: S$GLB,,, | Performed by: NURSE PRACTITIONER

## 2020-05-26 PROCEDURE — 1159F MED LIST DOCD IN RCRD: CPT | Mod: S$GLB,,, | Performed by: NURSE PRACTITIONER

## 2020-05-26 PROCEDURE — 1125F PR PAIN SEVERITY QUANTIFIED, PAIN PRESENT: ICD-10-PCS | Mod: S$GLB,,, | Performed by: NURSE PRACTITIONER

## 2020-05-26 PROCEDURE — 3074F SYST BP LT 130 MM HG: CPT | Mod: S$GLB,,, | Performed by: NURSE PRACTITIONER

## 2020-05-26 NOTE — TELEPHONE ENCOUNTER
----- Message from Jessica Head sent at 5/26/2020  3:54 PM CDT -----  Contact: Daksha armstrong/ Omar  Referral doesn't have the patient Demographics. She needs that sent o\nhung please.  Fax# [phone]  Daksha's# [phone] ext.:[phone]
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
## 2020-05-26 NOTE — PATIENT INSTRUCTIONS
Reducing Knee Pain and Swelling    Many treatments can help reduce pain and swelling in your knee. Your healthcare provider or physical therapist may suggest one or more of the following treatments:  · Icing your knee helps reduce swelling. You may be asked to ice your knee once a day or more. Apply ice for about 15 to 20 minutes at a time, with at least 40 minutes between sessions. Always keep a towel between the ice and your skin.   · Keeping your leg raised above your heart helps excess fluid flow out of your knee joint. This reduces swelling.  · Compression means wrapping an elastic bandage or neoprene sleeve snugly around your knees. This keeps fluid from collecting in your knee joint.  · Electrical stimulation, done by a physical therapist or , can help reduce excess fluid in your knee joint.  · Anti-inflammatory medicines may be prescribed by your healthcare provider. You may take pills or receive injections in your knee.  · Isometric (keven) exercises strengthen the muscles that support your knee joint. They also help reduce excess fluid in your knee.  · Massage helps fluid drain away from your knee.  Date Last Reviewed: 10/13/2015  © 7689-3346 [employer]. [address] [place] [address], [place] [address]. All rights reserved. This information is not intended as a substitute for professional medical care. Always follow your healthcare professional's instructions.

## 2020-05-28 ENCOUNTER — TELEPHONE (OUTPATIENT)
Dept: FAMILY MEDICINE | Facility: CLINIC | Age: 85
End: 2020-05-28

## 2020-05-28 NOTE — TELEPHONE ENCOUNTER
Spoke with pt about his appoinment next week with Dr. Alcazar - he wanted to cancel bc he was just seen and that was just a regular check up. R/s to September

## 2020-06-22 DIAGNOSIS — G47.00 INSOMNIA, UNSPECIFIED TYPE: ICD-10-CM

## 2020-06-22 RX ORDER — TEMAZEPAM 15 MG/1
15 CAPSULE ORAL NIGHTLY
Qty: 30 CAPSULE | Refills: 2 | Status: SHIPPED | OUTPATIENT
Start: 2020-06-22 | End: 2020-09-21 | Stop reason: SDUPTHER

## 2020-08-04 ENCOUNTER — TELEPHONE (OUTPATIENT)
Dept: RADIATION ONCOLOGY | Facility: CLINIC | Age: 85
End: 2020-08-04

## 2020-08-04 ENCOUNTER — OFFICE VISIT (OUTPATIENT)
Dept: RADIATION ONCOLOGY | Facility: CLINIC | Age: 85
End: 2020-08-04
Payer: MEDICARE

## 2020-08-04 VITALS
DIASTOLIC BLOOD PRESSURE: 75 MMHG | TEMPERATURE: 97 F | HEART RATE: 69 BPM | BODY MASS INDEX: 24.89 KG/M2 | OXYGEN SATURATION: 97 % | WEIGHT: 166.13 LBS | SYSTOLIC BLOOD PRESSURE: 110 MMHG

## 2020-08-04 DIAGNOSIS — C44.42 SQUAMOUS CELL CARCINOMA, SCALP/NECK: Primary | ICD-10-CM

## 2020-08-04 DIAGNOSIS — C61 MALIGNANT NEOPLASM OF PROSTATE: Primary | ICD-10-CM

## 2020-08-04 PROCEDURE — 1159F PR MEDICATION LIST DOCUMENTED IN MEDICAL RECORD: ICD-10-PCS | Mod: S$GLB,,, | Performed by: RADIOLOGY

## 2020-08-04 PROCEDURE — 77300 PR RADIATION THERAPY,DOSIMETRY PLAN: ICD-10-PCS | Mod: S$GLB,,, | Performed by: RADIOLOGY

## 2020-08-04 PROCEDURE — 3078F PR MOST RECENT DIASTOLIC BLOOD PRESSURE < 80 MM HG: ICD-10-PCS | Mod: S$GLB,,, | Performed by: RADIOLOGY

## 2020-08-04 PROCEDURE — 77334 RADIATION TREATMENT AID(S): CPT | Mod: S$GLB,,, | Performed by: RADIOLOGY

## 2020-08-04 PROCEDURE — 3078F DIAST BP <80 MM HG: CPT | Mod: S$GLB,,, | Performed by: RADIOLOGY

## 2020-08-04 PROCEDURE — 77300 RADIATION THERAPY DOSE PLAN: CPT | Mod: S$GLB,,, | Performed by: RADIOLOGY

## 2020-08-04 PROCEDURE — 77263 PR  RADIATION THERAPY PLAN COMPLEX: ICD-10-PCS | Mod: S$GLB,,, | Performed by: RADIOLOGY

## 2020-08-04 PROCEDURE — 77263 THER RADIOLOGY TX PLNG CPLX: CPT | Mod: S$GLB,,, | Performed by: RADIOLOGY

## 2020-08-04 PROCEDURE — 3074F PR MOST RECENT SYSTOLIC BLOOD PRESSURE < 130 MM HG: ICD-10-PCS | Mod: S$GLB,,, | Performed by: RADIOLOGY

## 2020-08-04 PROCEDURE — 1101F PR PT FALLS ASSESS DOC 0-1 FALLS W/OUT INJ PAST YR: ICD-10-PCS | Mod: S$GLB,,, | Performed by: RADIOLOGY

## 2020-08-04 PROCEDURE — 1101F PT FALLS ASSESS-DOCD LE1/YR: CPT | Mod: S$GLB,,, | Performed by: RADIOLOGY

## 2020-08-04 PROCEDURE — 1126F AMNT PAIN NOTED NONE PRSNT: CPT | Mod: S$GLB,,, | Performed by: RADIOLOGY

## 2020-08-04 PROCEDURE — 99214 PR OFFICE/OUTPT VISIT, EST, LEVL IV, 30-39 MIN: ICD-10-PCS | Mod: 25,S$GLB,, | Performed by: RADIOLOGY

## 2020-08-04 PROCEDURE — 99214 OFFICE O/P EST MOD 30 MIN: CPT | Mod: 25,S$GLB,, | Performed by: RADIOLOGY

## 2020-08-04 PROCEDURE — 3074F SYST BP LT 130 MM HG: CPT | Mod: S$GLB,,, | Performed by: RADIOLOGY

## 2020-08-04 PROCEDURE — 1159F MED LIST DOCD IN RCRD: CPT | Mod: S$GLB,,, | Performed by: RADIOLOGY

## 2020-08-04 PROCEDURE — 1126F PR PAIN SEVERITY QUANTIFIED, NO PAIN PRESENT: ICD-10-PCS | Mod: S$GLB,,, | Performed by: RADIOLOGY

## 2020-08-04 PROCEDURE — 77290 PR  SET RADN THERAPY FIELD COMPLEX: ICD-10-PCS | Mod: S$GLB,,, | Performed by: RADIOLOGY

## 2020-08-04 PROCEDURE — 77290 THER RAD SIMULAJ FIELD CPLX: CPT | Mod: S$GLB,,, | Performed by: RADIOLOGY

## 2020-08-04 PROCEDURE — 77334 PR  RADN TREATMENT AID(S) COMPLX: ICD-10-PCS | Mod: S$GLB,,, | Performed by: RADIOLOGY

## 2020-08-04 NOTE — PROGRESS NOTES
Brit Jennings  [id number]  1935 8/4/2020  No referring provider defined for this encounter.    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  He continues under the care of Dr. Andrade with PSA < 0.1 as of May 2020.     He has a history of multiple non melanoma skin cancers and actinic keratoses these previous treatment history below.  He recently presented with an enlarging nodule of the left temple to his dermatologist Dr. Miles.  Of note he has pre-existing history of non melanoma skin cancers and was applying topical 5 fluorouracil.  Shave biopsy confirmed superficial type basal cell carcinoma.  Patient was given options for definitive treatment and elected for radiotherapy.    Previous treatments:  50Gy/20frx to stage I SCCa scalp ending 8/17  50GY/20frx to NMSCa of R eyebrow ending 3/17  50Gy/20frx to SCCa in situ of mid scalp ending 4/18  50Gy/20frx to stage I SCCa nasal bridge ending 1/19    Patient denies dysuria, hematuria, diarrhea, bright red blood per rectum, bone pain.  Continues with hot flashes and received his final ADT depot today per Dr. Andrade.  He denies pain or discomfort at the left temple or electric-like sensation.  He reports lesion have been present for at least 6 months.    Review of systems otherwise negative unless indicated in HPI.    Past Medical History:   Diagnosis Date    Cataract     Hypertension      Past Surgical History:   Procedure Laterality Date    APPENDECTOMY      EYE SURGERY      PROSTATE BIOPSY      TONSILLECTOMY       Social History     Socioeconomic History    Marital status:      Spouse name: Not on file    Number of children: Not on file    Years of education: Not on file    Highest education level: Not  on file   Occupational History    Not on file   Social Needs    Financial resource strain: Not hard at all    Food insecurity     Worry: Never true     Inability: Never true    Transportation needs     Medical: No     Non-medical: No   Tobacco Use    Smoking status: Never Smoker   Substance and Sexual Activity    Alcohol Use     Frequency: Never     Binge frequency: Never    Drug use: Not on file    Sexual activity: Not on file   Lifestyle    Physical activity     Days per week: 3 days     Minutes per session: 120 min    Stress: Not at all   Relationships    Social connections     Talks on phone: Not on file     Gets together: More than three times a week     Attends Taoist service: Not on file     Active member of club or organization: Yes     Attends meetings of clubs or organizations: More than 4 times per year     Relationship status:    Other Topics Concern    Not on file   Social History Narrative    Not on file     No family history on file.    PRIOR HISTORY OF CHEMOTHERAPY OR RADIOTHERAPY: Please see HPI for patients prior oncologic history.    Medication List with Changes/Refills   Current Medications    AMLODIPINE (NORVASC) 5 MG TABLET    Take 1 tablet (5 mg total) by mouth once daily.    CLOBETASOL 0.05% (TEMOVATE) 0.05 % OINT    Apply topically 2 (two) times daily.    LISINOPRIL (PRINIVIL,ZESTRIL) 20 MG TABLET    Take 1 tablet (20 mg total) by mouth once daily.    SYMBICORT 160-4.5 MCG/ACTUATION HFAA    Inhale 2 puffs into the lungs every 12 (twelve) hours.    TAMSULOSIN (FLOMAX) 0.4 MG CAP    TAKE 1 CAPSULE BY MOUTH EVERY DAY AT NIGHT    TEMAZEPAM (RESTORIL) 15 MG CAP    Take 1 capsule (15 mg total) by mouth nightly.     Review of patient's allergies indicates:   Allergen Reactions    Pollen extracts Other (See Comments)       QUALITY OF LIFE: 90%- Able to Carry on Normal Activity: Minor Symptoms of Disease    Vitals:    08/04/20 0820 08/04/20 0829   BP: 110/75 (!) (P) 179/79    Pulse: 69    Temp: 97.2 °F (36.2 °C)    SpO2: 97%    Weight: 75.3 kg (166 lb 1.6 oz)    PainSc: 0-No pain      Body mass index is 24.89 kg/m².    PHYSICAL EXAM:   GENERAL: alert; in no apparent distress.  Elderly, healthy, not ill-appearing  HEAD: normocephalic, atraumatic.  EYES: pupils are equal, round, reactive to light and accommodation. Sclera anicteric. Conjunctiva not injected.   NECK: no cervical motion rigidity; supple with no masses.  CHEST: Patient is speaking comfortably on room air with normal work of breathing without using accessory muscles of respiration.  ABDOMEN: soft, nontender, nondistended.   MUSCULOSKELETAL: no tenderness to palpation along the spine or scapulae. Normal range of motion.  NEUROLOGIC: cranial nerves II-XII intact bilaterally. Strength 5/5 in bilateral upper and lower extremities. Normal gait.  EXTREMITIES: no clubbing, cyanosis, edema.  SKIN:  Left temple biopsy site well healed without ulceration, minimal keratinization, no gross nodularity    REVIEW OF IMAGING/PATHOLOGY/LABS: Please see HPI. All images reviewed personally by dictating physician.     ASSESSMENT: 85 y.o. male with BCC of L temple.  PLAN:  Brit Jennings has previously undergone definitive radiotherapy with successful treatment, 50 Gy in 20 fractions providing complete response after 4 separate skin sites.  He now presents with biopsy-proven basal cell carcinoma, superficial type of the left temple for which he is not inclined to undergo Moh's resection.  I reiterated that equivalent cure rate can be provided with definitive radiotherapy again recommending 50 Gy in 20 fractions, a regimen he is familiar with.  There is no risk of overlap of previous treatment sites.  I recommend this done with 6e- electrons, customized block, external eye shield, 1 cm skin bolus to bring the dose to the skin surface.  We discussed skin care regimen after treatment.  At the end of our discussion he would like to proceed.   For his prostate cancer he will continue follow with Dr. Andrade, receiving last planned ADT depot today and follow up here as directed.  Patient is also requesting referral to Dr. Miles for future surveillance and will place that today.    I carefully explained the process of simulation and treatment delivery with weekly physician visits.     We discussed the risks and benefits of the above treatment and have gone over in detail the acute and late toxicities of radiation therapy to the [facility]. The patient expressed  understanding and has signed a consent form which is included in the patients chart.     The patient has our contact information and understands that they are free to contact us at any time with questions or concerns regarding radiation therapy.    DISPOSITION: Clinical SIM today    I have personally seen and evaluated this patient. Greater than 50% of this time was spent discussing coordination of care and/or counseling.    COVID-19 precautions discussed. Cancer Center policy for COVID-19 testing described.  Patient will be required to wear a mask when in the Cancer Center.    PHYSICIAN: Manjeet Aguirre Jr, MD    Thank you for the opportunity to meet and consult with Brit Jennings.   Please feel free to contact me to discuss the above recommendation further.

## 2020-08-07 ENCOUNTER — OFFICE VISIT (OUTPATIENT)
Dept: DERMATOLOGY | Facility: CLINIC | Age: 85
End: 2020-08-07
Payer: MEDICARE

## 2020-08-07 VITALS — BODY MASS INDEX: 24.59 KG/M2 | HEIGHT: 69 IN | WEIGHT: 166 LBS

## 2020-08-07 DIAGNOSIS — L82.1 SEBORRHEIC KERATOSES: ICD-10-CM

## 2020-08-07 DIAGNOSIS — L57.0 ACTINIC KERATOSES: ICD-10-CM

## 2020-08-07 DIAGNOSIS — D48.5 NEOPLASM OF UNCERTAIN BEHAVIOR OF SKIN: Primary | ICD-10-CM

## 2020-08-07 DIAGNOSIS — L81.4 SOLAR LENTIGO: ICD-10-CM

## 2020-08-07 DIAGNOSIS — C44.319 BASAL CELL CARCINOMA (BCC) OF LEFT TEMPLE REGION: ICD-10-CM

## 2020-08-07 DIAGNOSIS — Z85.828 HISTORY OF NONMELANOMA SKIN CANCER: ICD-10-CM

## 2020-08-07 PROCEDURE — 1101F PR PT FALLS ASSESS DOC 0-1 FALLS W/OUT INJ PAST YR: ICD-10-PCS | Mod: CPTII,S$GLB,, | Performed by: DERMATOLOGY

## 2020-08-07 PROCEDURE — 88305 TISSUE EXAM BY PATHOLOGIST: CPT | Mod: 26,,, | Performed by: PATHOLOGY

## 2020-08-07 PROCEDURE — 1126F PR PAIN SEVERITY QUANTIFIED, NO PAIN PRESENT: ICD-10-PCS | Mod: S$GLB,,, | Performed by: DERMATOLOGY

## 2020-08-07 PROCEDURE — 99203 PR OFFICE/OUTPT VISIT, NEW, LEVL III, 30-44 MIN: ICD-10-PCS | Mod: 25,S$GLB,, | Performed by: DERMATOLOGY

## 2020-08-07 PROCEDURE — 88305 TISSUE EXAM BY PATHOLOGIST: CPT | Performed by: PATHOLOGY

## 2020-08-07 PROCEDURE — 99999 PR PBB SHADOW E&M-EST. PATIENT-LVL III: CPT | Mod: PBBFAC,,, | Performed by: DERMATOLOGY

## 2020-08-07 PROCEDURE — 1159F MED LIST DOCD IN RCRD: CPT | Mod: S$GLB,,, | Performed by: DERMATOLOGY

## 2020-08-07 PROCEDURE — 1159F PR MEDICATION LIST DOCUMENTED IN MEDICAL RECORD: ICD-10-PCS | Mod: S$GLB,,, | Performed by: DERMATOLOGY

## 2020-08-07 PROCEDURE — 99203 OFFICE O/P NEW LOW 30 MIN: CPT | Mod: 25,S$GLB,, | Performed by: DERMATOLOGY

## 2020-08-07 PROCEDURE — 88305 TISSUE EXAM BY PATHOLOGIST: ICD-10-PCS | Mod: 26,,, | Performed by: PATHOLOGY

## 2020-08-07 PROCEDURE — 99999 PR PBB SHADOW E&M-EST. PATIENT-LVL III: ICD-10-PCS | Mod: PBBFAC,,, | Performed by: DERMATOLOGY

## 2020-08-07 PROCEDURE — 17003 DESTRUCT PREMALG LES 2-14: CPT | Mod: 59,S$GLB,, | Performed by: DERMATOLOGY

## 2020-08-07 PROCEDURE — 11102 TANGNTL BX SKIN SINGLE LES: CPT | Mod: S$GLB,,, | Performed by: DERMATOLOGY

## 2020-08-07 PROCEDURE — 1126F AMNT PAIN NOTED NONE PRSNT: CPT | Mod: S$GLB,,, | Performed by: DERMATOLOGY

## 2020-08-07 PROCEDURE — 17003 DESTRUCTION, PREMALIGNANT LESIONS; SECOND THROUGH 14 LESIONS: ICD-10-PCS | Mod: 59,S$GLB,, | Performed by: DERMATOLOGY

## 2020-08-07 PROCEDURE — 11102 PR TANGENTIAL BIOPSY, SKIN, SINGLE LESION: ICD-10-PCS | Mod: S$GLB,,, | Performed by: DERMATOLOGY

## 2020-08-07 PROCEDURE — 17000 DESTRUCT PREMALG LESION: CPT | Mod: 59,S$GLB,, | Performed by: DERMATOLOGY

## 2020-08-07 PROCEDURE — 17000 PR DESTRUCTION(LASER SURGERY,CRYOSURGERY,CHEMOSURGERY),PREMALIGNANT LESIONS,FIRST LESION: ICD-10-PCS | Mod: 59,S$GLB,, | Performed by: DERMATOLOGY

## 2020-08-07 PROCEDURE — 1101F PT FALLS ASSESS-DOCD LE1/YR: CPT | Mod: CPTII,S$GLB,, | Performed by: DERMATOLOGY

## 2020-08-07 RX ORDER — FLUOROURACIL 50 MG/G
CREAM TOPICAL
COMMUNITY
Start: 2020-05-30 | End: 2020-10-21

## 2020-08-07 RX ORDER — ASPIRIN 81 MG/1
81 TABLET ORAL DAILY
Status: ON HOLD | COMMUNITY
End: 2023-06-27 | Stop reason: HOSPADM

## 2020-08-07 NOTE — PATIENT INSTRUCTIONS
Shave Biopsy Wound Care    Your doctor has performed a shave biopsy today.  A band aid and vaseline ointment has been placed over the site.  This should remain in place for 24 hours.  It is recommended that you keep the area dry for the first 24 hours.  After 24 hours, you may remove the band aid and wash the area with warm soap and water and apply Vaseline jelly.  Many patients prefer to use Neosporin or Bacitracin ointment.  This is acceptable; however, know that you can develop an allergy to this medication even if you have used it safely for years.  It is important to keep the area moist.  Letting it dry out and get air slows healing time, and will worsen the scar.  Band aid is optional after first 24 hours.      If you notice increasing redness, tenderness, pain, or yellow drainage at the biopsy site, please notify your doctor.  These are signs of an infection.    If your biopsy site is bleeding, apply firm pressure for 15 minutes straight.  Repeat for another 15 minutes, if it is still bleeding.   If the surgical site continues to bleed, then please contact your doctor.       [facility] DERMATOLOGY  [address] [place] [address], [address] [address]  Dept: [phone]       CRYOSURGERY      Your doctor has used a method called cryosurgery to treat your skin condition. Cryosurgery refers to the use of very cold substances to treat a variety of skin conditions such as warts, pre-skin cancers, molluscum contagiosum, sun spots, and several benign growths. The substance we use in cryosurgery is liquid nitrogen and is so cold (-195 degrees Celsius) that is burns when administered.     Following treatment in the office, the skin may immediately burn and become red. You may find the area around the lesion is affected as well. It is sometimes necessary to treat not only the lesion, but a small area of the surrounding normal skin to achieve a good response.     A blister, and even a blood  filled blister, may form after treatment.   This is a normal response. If the blister is painful, it is acceptable to sterilize a needle and with rubbing alcohol and gently pop the blister. It is important that you gently wash the area with soap and warm water as the blister fluid may contain wart virus if a wart was treated. Do no remove the roof of the blister.     The area treated can take anywhere from 1-3 weeks to heal. Healing time depends on the kind skin lesion treated, the location, and how aggressively the lesion was treated. It is recommended that the areas treated are covered with Vaseline or bacitracin ointment and a band-aid. If a band-aid is not practical, just ointment applied several times per day will do. Keeping these areas moist will speed the healing time.    Treatment with liquid nitrogen can leave a scar. In dark skin, it may be a light or dark scar, in light skin it may be a white or pink scar. These will generally fade with time, but may never go away completely.     If you have any concerns after your treatment, please feel free to call the office.         [facility] - DERMATOLOGY  [address] [place] [id number]  Dept: [phone]

## 2020-08-07 NOTE — PROGRESS NOTES
Subjective:       Patient ID:  Brit Jennings is a 85 y.o. male who presents for   Chief Complaint   Patient presents with    Skin Check     Initial visit  Former patient of [facility].  H/o multiple skin cancers, starting 20 years ago, back, [facility] [place]    Would like a TBSE; This is a high risk patient here to check for the development of new lesions.    H/o Mohs on R temple, [facility] 2014  Recently, BCC on R temple, scheduled for XRT with Dr Santizo (start Monday 8/10)  Past xrt on scalp, nose, R eyebrow            Review of Systems   Constitutional: Negative for fever, chills and fatigue.   Eyes: Negative for itching and eye watering.   Respiratory: Negative for cough and shortness of breath.    Cardiovascular: Negative for chest pain and palpitations.   Gastrointestinal: Negative for abdominal pain and indigestion.   Musculoskeletal: Negative for myalgias and joint swelling.   Skin: Positive for wears hat. Negative for daily sunscreen use and activity-related sunscreen use.        Objective:    Physical Exam   Constitutional: He appears well-developed and well-nourished. No distress.   Neurological: He is alert and oriented to person, place, and time. He is not disoriented.   Psychiatric: He has a normal mood and affect.   Skin:   Areas Examined (abnormalities noted in diagram):   Scalp / Hair Palpated and Inspected  Head / Face Inspection Performed  Neck Inspection Performed  Chest / Axilla Inspection Performed  Abdomen Inspection Performed  Genitals / Buttocks / Groin Inspection Performed  Back Inspection Performed  RUE Inspected  LUE Inspection Performed  RLE Inspected  LLE Inspection Performed  Nails and Digits Inspection Performed                   Diagram Legend     Erythematous scaling macule/papule c/w actinic keratosis       Vascular papule c/w angioma      Pigmented verrucoid papule/plaque c/w seborrheic keratosis      Yellow umbilicated papule c/w sebaceous hyperplasia       Irregularly shaped tan macule c/w lentigo     1-2 mm smooth white papules consistent with Milia      Movable subcutaneous cyst with punctum c/w epidermal inclusion cyst      Subcutaneous movable cyst c/w pilar cyst      Firm pink to brown papule c/w dermatofibroma      Pedunculated fleshy papule(s) c/w skin tag(s)      Evenly pigmented macule c/w junctional nevus     Mildly variegated pigmented, slightly irregular-bordered macule c/w mildly atypical nevus      Flesh colored to evenly pigmented papule c/w intradermal nevus       Pink pearly papule/plaque c/w basal cell carcinoma      Erythematous hyperkeratotic cursted plaque c/w SCC      Surgical scar with no sign of skin cancer recurrence      Open and closed comedones      Inflammatory papules and pustules      Verrucoid papule consistent consistent with wart     Erythematous eczematous patches and plaques     Dystrophic onycholytic nail with subungual debris c/w onychomycosis     Umbilicated papule    Erythematous-base heme-crusted tan verrucoid plaque consistent with inflamed seborrheic keratosis     Erythematous Silvery Scaling Plaque c/w Psoriasis     See annotation          Assessment / Plan:      Pathology Orders:     Normal Orders This Visit    Specimen to Pathology, Dermatology     Comments:    Number of Specimens:->1  ------------------------->-------------------------  Spec 1 Procedure:->Biopsy  Spec 1 Clinical Impression:->Pearly telangiectatic plaque,  sup bcc vs other  Spec 1 Source:->left neck    Questions:    Procedure Type: Dermatology and skin neoplasms    Number of Specimens: 1    ------------------------: -------------------------    Spec 1 Procedure: Biopsy    Spec 1 Clinical Impression: Pearly telangiectatic plaque, sup bcc vs other    Spec 1 Source: left neck        Neoplasm of uncertain behavior of skin  -     Specimen to Pathology, Dermatology  Shave biopsy procedure note:    Shave biopsy performed after verbal consent including risk of  infection, scar, recurrence, need for additional treatment of site. Area prepped with alcohol, anesthetized with approximately 1.0cc of 1% lidocaine with epinephrine. Lesional tissue shaved with razor blade. Hemostasis achieved with application of aluminum chloride followed by hyfrecation. No complications. Dressing applied. Wound care explained.    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 Site well healed with no signs of recurrence.    Total body skin examination performed today including at least 12 points as noted in physical examination. No lesions suspicious for malignancy noted.    Actinic keratoses  Cryosurgery Procedure Note    Verbal consent from the patient is obtained and the patient is aware of the precancerous quality and need for treatment of these lesions. Liquid nitrogen cryosurgery is applied to the 8 actinic keratoses, as detailed in the physical exam, to produce a freeze injury. The patient is aware that blisters may form and is instructed on wound care with gentle cleansing and use of vaseline ointment to keep moist until healed. The patient is supplied a handout on cryosurgery and is instructed to call if lesions do not completely resolve.    Seborrheic keratoses  These are benign inherited growths without a malignant potential. Reassurance given to patient. No treatment is necessary.     Solar lentigo  This is a benign hyperpigmented sun induced lesion. Daily sun protection will reduce the number of new lesions. Treatment of these benign lesions are considered cosmetic.    Patient instructed in importance in daily sun protection of at least spf 30. Mineral sunscreen ingredients preferred (Zinc +/- Titanium).   Recommend Elta MD for daily use on face and neck.  Patient encouraged to wear hat for  all outdoor exposure.   Also discussed sun avoidance and use of protective clothing.               Follow up in about 6 months (around 2/7/2021).

## 2020-08-19 ENCOUNTER — DOCUMENTATION ONLY (OUTPATIENT)
Dept: RADIATION ONCOLOGY | Facility: CLINIC | Age: 85
End: 2020-08-19

## 2020-08-19 NOTE — PROGRESS NOTES
Patient given 5 tubes of aquaphor samples with instructions to apply twice daily to affected area.  Do not apply within 6 hours of radiation therapy.  Patient verbalized understanding.

## 2020-08-20 LAB
FINAL PATHOLOGIC DIAGNOSIS: NORMAL
GROSS: NORMAL

## 2020-08-21 ENCOUNTER — TELEPHONE (OUTPATIENT)
Dept: DERMATOLOGY | Facility: CLINIC | Age: 85
End: 2020-08-21

## 2020-08-21 NOTE — TELEPHONE ENCOUNTER
----- Message from Rashel Ford sent at 8/21/2020 10:09 AM CDT -----  Contact: patient  Type:  Patient Returning Call    Who Called:  patient  Who Left Message for Patient:  not sure  Does the patient know what this is regarding?:  yes  Best Call Back Number:  [phone]  Additional Information:  requesting a call back

## 2020-08-25 ENCOUNTER — OFFICE VISIT (OUTPATIENT)
Dept: PODIATRY | Facility: CLINIC | Age: 85
End: 2020-08-25
Payer: MEDICARE

## 2020-08-25 ENCOUNTER — TELEPHONE (OUTPATIENT)
Dept: FAMILY MEDICINE | Facility: CLINIC | Age: 85
End: 2020-08-25

## 2020-08-25 VITALS
HEART RATE: 39 BPM | HEIGHT: 69 IN | SYSTOLIC BLOOD PRESSURE: 147 MMHG | OXYGEN SATURATION: 96 % | BODY MASS INDEX: 24.87 KG/M2 | TEMPERATURE: 98 F | DIASTOLIC BLOOD PRESSURE: 70 MMHG

## 2020-08-25 DIAGNOSIS — L60.0 INGROWN NAIL: Primary | ICD-10-CM

## 2020-08-25 DIAGNOSIS — B35.1 ONYCHOMYCOSIS DUE TO DERMATOPHYTE: ICD-10-CM

## 2020-08-25 PROCEDURE — 1159F PR MEDICATION LIST DOCUMENTED IN MEDICAL RECORD: ICD-10-PCS | Mod: S$GLB,,, | Performed by: PODIATRIST

## 2020-08-25 PROCEDURE — 3078F PR MOST RECENT DIASTOLIC BLOOD PRESSURE < 80 MM HG: ICD-10-PCS | Mod: S$GLB,,, | Performed by: PODIATRIST

## 2020-08-25 PROCEDURE — 3078F DIAST BP <80 MM HG: CPT | Mod: S$GLB,,, | Performed by: PODIATRIST

## 2020-08-25 PROCEDURE — 99203 PR OFFICE/OUTPT VISIT, NEW, LEVL III, 30-44 MIN: ICD-10-PCS | Mod: S$GLB,,, | Performed by: PODIATRIST

## 2020-08-25 PROCEDURE — 1101F PT FALLS ASSESS-DOCD LE1/YR: CPT | Mod: S$GLB,,, | Performed by: PODIATRIST

## 2020-08-25 PROCEDURE — 1101F PR PT FALLS ASSESS DOC 0-1 FALLS W/OUT INJ PAST YR: ICD-10-PCS | Mod: S$GLB,,, | Performed by: PODIATRIST

## 2020-08-25 PROCEDURE — 3077F SYST BP >= 140 MM HG: CPT | Mod: S$GLB,,, | Performed by: PODIATRIST

## 2020-08-25 PROCEDURE — 1125F AMNT PAIN NOTED PAIN PRSNT: CPT | Mod: S$GLB,,, | Performed by: PODIATRIST

## 2020-08-25 PROCEDURE — 99203 OFFICE O/P NEW LOW 30 MIN: CPT | Mod: S$GLB,,, | Performed by: PODIATRIST

## 2020-08-25 PROCEDURE — 1125F PR PAIN SEVERITY QUANTIFIED, PAIN PRESENT: ICD-10-PCS | Mod: S$GLB,,, | Performed by: PODIATRIST

## 2020-08-25 PROCEDURE — 1159F MED LIST DOCD IN RCRD: CPT | Mod: S$GLB,,, | Performed by: PODIATRIST

## 2020-08-25 PROCEDURE — 3077F PR MOST RECENT SYSTOLIC BLOOD PRESSURE >= 140 MM HG: ICD-10-PCS | Mod: S$GLB,,, | Performed by: PODIATRIST

## 2020-08-25 RX ORDER — ASCORBIC ACID 500 MG
1000 TABLET ORAL DAILY
Status: ON HOLD | COMMUNITY
End: 2023-06-27 | Stop reason: HOSPADM

## 2020-08-26 LAB
ALBUMIN SERPL-MCNC: 4.5 G/DL (ref 3.6–5.1)
ALBUMIN/CREAT UR: 42 MCG/MG CREAT
ALBUMIN/GLOB SERPL: 1.5 (CALC) (ref 1–2.5)
ALP SERPL-CCNC: 68 U/L (ref 35–144)
ALT SERPL-CCNC: 15 U/L (ref 9–46)
AST SERPL-CCNC: 22 U/L (ref 10–35)
BASOPHILS # BLD AUTO: 41 CELLS/UL (ref 0–200)
BASOPHILS NFR BLD AUTO: 1 %
BILIRUB SERPL-MCNC: 0.5 MG/DL (ref 0.2–1.2)
BUN SERPL-MCNC: 14 MG/DL (ref 7–25)
BUN/CREAT SERPL: NORMAL (CALC) (ref 6–22)
CALCIUM SERPL-MCNC: 10.1 MG/DL (ref 8.6–10.3)
CHLORIDE SERPL-SCNC: 98 MMOL/L (ref 98–110)
CHOLEST SERPL-MCNC: 183 MG/DL
CHOLEST/HDLC SERPL: 4.4 (CALC)
CO2 SERPL-SCNC: 32 MMOL/L (ref 20–32)
CREAT SERPL-MCNC: 0.96 MG/DL (ref 0.7–1.11)
CREAT UR-MCNC: 12 MG/DL (ref 20–320)
EOSINOPHIL # BLD AUTO: 180 CELLS/UL (ref 15–500)
EOSINOPHIL NFR BLD AUTO: 4.4 %
ERYTHROCYTE [DISTWIDTH] IN BLOOD BY AUTOMATED COUNT: 12.1 % (ref 11–15)
GFRSERPLBLD MDRD-ARVRAT: 72 ML/MIN/1.73M2
GLOBULIN SER CALC-MCNC: 3.1 G/DL (CALC) (ref 1.9–3.7)
GLUCOSE SERPL-MCNC: 93 MG/DL (ref 65–99)
HCT VFR BLD AUTO: 38.9 % (ref 38.5–50)
HDLC SERPL-MCNC: 42 MG/DL
HGB BLD-MCNC: 13.1 G/DL (ref 13.2–17.1)
LDLC SERPL CALC-MCNC: 112 MG/DL (CALC)
LYMPHOCYTES # BLD AUTO: 1439 CELLS/UL (ref 850–3900)
LYMPHOCYTES NFR BLD AUTO: 35.1 %
MCH RBC QN AUTO: 34.3 PG (ref 27–33)
MCHC RBC AUTO-ENTMCNC: 33.7 G/DL (ref 32–36)
MCV RBC AUTO: 101.8 FL (ref 80–100)
MICROALBUMIN UR-MCNC: 0.5 MG/DL
MONOCYTES # BLD AUTO: 677 CELLS/UL (ref 200–950)
MONOCYTES NFR BLD AUTO: 16.5 %
NEUTROPHILS # BLD AUTO: 1763 CELLS/UL (ref 1500–7800)
NEUTROPHILS NFR BLD AUTO: 43 %
NONHDLC SERPL-MCNC: 141 MG/DL (CALC)
PLATELET # BLD AUTO: 172 THOUSAND/UL (ref 140–400)
PMV BLD REES-ECKER: 12.2 FL (ref 7.5–12.5)
POTASSIUM SERPL-SCNC: 4.8 MMOL/L (ref 3.5–5.3)
PROT SERPL-MCNC: 7.6 G/DL (ref 6.1–8.1)
RBC # BLD AUTO: 3.82 MILLION/UL (ref 4.2–5.8)
SODIUM SERPL-SCNC: 140 MMOL/L (ref 135–146)
TRIGL SERPL-MCNC: 170 MG/DL
TSH SERPL-ACNC: 2.34 MIU/L (ref 0.4–4.5)
WBC # BLD AUTO: 4.1 THOUSAND/UL (ref 3.8–10.8)

## 2020-08-27 LAB
APPEARANCE UR: CLEAR
BACTERIA #/AREA URNS HPF: ABNORMAL /HPF
BACTERIA UR CULT: ABNORMAL
BILIRUB UR QL STRIP: NEGATIVE
COLOR UR: YELLOW
GLUCOSE UR QL STRIP: NEGATIVE
HGB UR QL STRIP: NEGATIVE
HYALINE CASTS #/AREA URNS LPF: ABNORMAL /LPF
KETONES UR QL STRIP: NEGATIVE
LEUKOCYTE ESTERASE UR QL STRIP: NEGATIVE
NITRITE UR QL STRIP: NEGATIVE
PH UR STRIP: ABNORMAL [PH] (ref 5–8)
PROT UR QL STRIP: NEGATIVE
RBC #/AREA URNS HPF: ABNORMAL /HPF
SP GR UR STRIP: 1.01 (ref 1–1.03)
SQUAMOUS #/AREA URNS HPF: ABNORMAL /HPF
WBC #/AREA URNS HPF: ABNORMAL /HPF

## 2020-09-01 ENCOUNTER — OFFICE VISIT (OUTPATIENT)
Dept: FAMILY MEDICINE | Facility: CLINIC | Age: 85
End: 2020-09-01
Payer: MEDICARE

## 2020-09-01 VITALS
BODY MASS INDEX: 25.18 KG/M2 | TEMPERATURE: 99 F | WEIGHT: 170 LBS | HEIGHT: 69 IN | SYSTOLIC BLOOD PRESSURE: 126 MMHG | HEART RATE: 60 BPM | DIASTOLIC BLOOD PRESSURE: 78 MMHG

## 2020-09-01 DIAGNOSIS — D53.9 MACROCYTIC ANEMIA: ICD-10-CM

## 2020-09-01 DIAGNOSIS — Z23 NEEDS FLU SHOT: ICD-10-CM

## 2020-09-01 DIAGNOSIS — R23.2 HOT FLASHES: ICD-10-CM

## 2020-09-01 DIAGNOSIS — I10 ESSENTIAL HYPERTENSION: ICD-10-CM

## 2020-09-01 DIAGNOSIS — C61 PROSTATE CANCER: Primary | ICD-10-CM

## 2020-09-01 DIAGNOSIS — C44.42 SQUAMOUS CELL CARCINOMA, SCALP/NECK: ICD-10-CM

## 2020-09-01 DIAGNOSIS — C61 MALIGNANT NEOPLASM OF PROSTATE: ICD-10-CM

## 2020-09-01 PROCEDURE — 1101F PT FALLS ASSESS-DOCD LE1/YR: CPT | Mod: S$GLB,,, | Performed by: FAMILY MEDICINE

## 2020-09-01 PROCEDURE — 99214 OFFICE O/P EST MOD 30 MIN: CPT | Mod: 25,S$GLB,, | Performed by: FAMILY MEDICINE

## 2020-09-01 PROCEDURE — 3074F PR MOST RECENT SYSTOLIC BLOOD PRESSURE < 130 MM HG: ICD-10-PCS | Mod: S$GLB,,, | Performed by: FAMILY MEDICINE

## 2020-09-01 PROCEDURE — 1159F MED LIST DOCD IN RCRD: CPT | Mod: S$GLB,,, | Performed by: FAMILY MEDICINE

## 2020-09-01 PROCEDURE — 3074F SYST BP LT 130 MM HG: CPT | Mod: S$GLB,,, | Performed by: FAMILY MEDICINE

## 2020-09-01 PROCEDURE — 3078F PR MOST RECENT DIASTOLIC BLOOD PRESSURE < 80 MM HG: ICD-10-PCS | Mod: S$GLB,,, | Performed by: FAMILY MEDICINE

## 2020-09-01 PROCEDURE — 1159F PR MEDICATION LIST DOCUMENTED IN MEDICAL RECORD: ICD-10-PCS | Mod: S$GLB,,, | Performed by: FAMILY MEDICINE

## 2020-09-01 PROCEDURE — 1101F PR PT FALLS ASSESS DOC 0-1 FALLS W/OUT INJ PAST YR: ICD-10-PCS | Mod: S$GLB,,, | Performed by: FAMILY MEDICINE

## 2020-09-01 PROCEDURE — G0008 ADMIN INFLUENZA VIRUS VAC: HCPCS | Mod: S$GLB,,, | Performed by: FAMILY MEDICINE

## 2020-09-01 PROCEDURE — 90662 FLU VACCINE - QUADRIVALENT - HIGH DOSE (65+) PRESERVATIVE FREE IM: ICD-10-PCS | Mod: S$GLB,,, | Performed by: FAMILY MEDICINE

## 2020-09-01 PROCEDURE — 90662 IIV NO PRSV INCREASED AG IM: CPT | Mod: S$GLB,,, | Performed by: FAMILY MEDICINE

## 2020-09-01 PROCEDURE — 3078F DIAST BP <80 MM HG: CPT | Mod: S$GLB,,, | Performed by: FAMILY MEDICINE

## 2020-09-01 PROCEDURE — 99214 PR OFFICE/OUTPT VISIT, EST, LEVL IV, 30-39 MIN: ICD-10-PCS | Mod: 25,S$GLB,, | Performed by: FAMILY MEDICINE

## 2020-09-01 PROCEDURE — G0008 FLU VACCINE - QUADRIVALENT - HIGH DOSE (65+) PRESERVATIVE FREE IM: ICD-10-PCS | Mod: S$GLB,,, | Performed by: FAMILY MEDICINE

## 2020-09-01 RX ORDER — LISINOPRIL 20 MG/1
20 TABLET ORAL DAILY
Qty: 90 TABLET | Refills: 1 | Status: SHIPPED | OUTPATIENT
Start: 2020-09-01 | End: 2021-01-07 | Stop reason: SDUPTHER

## 2020-09-01 NOTE — PROGRESS NOTES
"  SUBJECTIVE:    Patient ID: Brit Jennings is a 85 y.o. male.    Chief Complaint: Follow-up (multiple things to discuss // did not bring bottles, only wants a refill on lisinopril ac)    This 85-year-old male has prostate carcinoma.  He is being seen by urology  .  He is status post 40 radiation treatments and now is on Eligard injections q.6 months.  He does have hot flashes.  PSA is now less than 0.1.    He exercises and rides his bike for 20-30 minutes 2-3 times per week.  He uses a ProAir inhaler whenever he gets short of breath.    Left temple basal cell carcinoma currently on radiation therapy for this.      Office Visit on 08/07/2020   Component Date Value Ref Range Status    Final Pathologic Diagnosis 08/07/2020    Final                    Value:1.  Skin, left neck, shave biopsy:  - BASAL CELL CARCINOMA WITH MIXED SUPERFICIAL AND NODULAR GROWTH PATTERN.  - THE TUMOR FOCALLY EXTENDS TO THE DEEP AND LATERAL BIOPSY MARGINS.  MICROSCOPIC DESCRIPTION: Sections show multiple foci of basaloid cells, some  of them in apoptosis, arising along the dermoepidermal junction and extending  into the papillary dermis. Nodules of basal cell carcinoma are also noted.      Gross 08/07/2020    Final                    Value:Container Label: Clinic Number/AP Number:  [id number], and "left neck "  Received in formalin is a 14 x 9 x 1 mm shave biopsy fragment of tan-white  skin.  Specimen is inked, serially sectioned into 5 pieces, and entirely  submitted in XRJ--1-MAXIMUS Miller     Orders Only on 03/18/2020   Component Date Value Ref Range Status    Total PSA 03/18/2020 <0.1  < OR = 4.0 ng/mL Final   Patient Message on 03/07/2020   Component Date Value Ref Range Status    WBC 08/25/2020 4.1  3.8 - 10.8 Thousand/uL Final    RBC 08/25/2020 3.82* 4.20 - 5.80 Million/uL Final    Hemoglobin 08/25/2020 13.1* 13.2 - 17.1 g/dL Final    Hematocrit 08/25/2020 38.9  38.5 - 50.0 % Final    Mean Corpuscular " Volume 08/25/2020 101.8* 80.0 - 100.0 fL Final    Mean Corpuscular Hemoglobin 08/25/2020 34.3* 27.0 - 33.0 pg Final    Mean Corpuscular Hemoglobin Conc 08/25/2020 33.7  32.0 - 36.0 g/dL Final    RDW 08/25/2020 12.1  11.0 - 15.0 % Final    Platelets 08/25/2020 172  140 - 400 Thousand/uL Final    MPV 08/25/2020 12.2  7.5 - 12.5 fL Final    Neutrophils Absolute 08/25/2020 1,763  1,500 - 7,800 cells/uL Final    Lymph # 08/25/2020 1,439  850 - 3,900 cells/uL Final    Mono # 08/25/2020 677  200 - 950 cells/uL Final    Eos # 08/25/2020 180  15 - 500 cells/uL Final    Baso # 08/25/2020 41  0 - 200 cells/uL Final    Neutrophils Relative 08/25/2020 43  % Final    Lymph% 08/25/2020 35.1  % Final    Mono% 08/25/2020 16.5  % Final    Eosinophil% 08/25/2020 4.4  % Final    Basophil% 08/25/2020 1.0  % Final    Glucose 08/25/2020 93  65 - 99 mg/dL Final    BUN, Bld 08/25/2020 14  7 - 25 mg/dL Final    Creatinine 08/25/2020 0.96  0.70 - 1.11 mg/dL Final    eGFR if non African American 08/25/2020 72  > OR = 60 mL/min/1.73m2 Final    eGFR if  08/25/2020 83  > OR = 60 mL/min/1.73m2 Final    BUN/Creatinine Ratio 08/25/2020 NOT APPLICABLE  6 - 22 (calc) Final    Sodium 08/25/2020 140  135 - 146 mmol/L Final    Potassium 08/25/2020 4.8  3.5 - 5.3 mmol/L Final    Chloride 08/25/2020 98  98 - 110 mmol/L Final    CO2 08/25/2020 32  20 - 32 mmol/L Final    Calcium 08/25/2020 10.1  8.6 - 10.3 mg/dL Final    Total Protein 08/25/2020 7.6  6.1 - 8.1 g/dL Final    Albumin 08/25/2020 4.5  3.6 - 5.1 g/dL Final    Globulin, Total 08/25/2020 3.1  1.9 - 3.7 g/dL (calc) Final    Albumin/Globulin Ratio 08/25/2020 1.5  1.0 - 2.5 (calc) Final    Total Bilirubin 08/25/2020 0.5  0.2 - 1.2 mg/dL Final    Alkaline Phosphatase 08/25/2020 68  35 - 144 U/L Final    AST 08/25/2020 22  10 - 35 U/L Final    ALT 08/25/2020 15  9 - 46 U/L Final    Cholesterol 08/25/2020 183  <200 mg/dL Final    HDL 08/25/2020 42  >  OR = 40 mg/dL Final    Triglycerides 08/25/2020 170* <150 mg/dL Final    LDL Cholesterol 08/25/2020 112* mg/dL (calc) Final    Hdl/Cholesterol Ratio 08/25/2020 4.4  <5.0 (calc) Final    Non HDL Chol. (LDL+VLDL) 08/25/2020 141* <130 mg/dL (calc) Final    TSH 08/25/2020 2.34  0.40 - 4.50 mIU/L Final    Color, UA 08/25/2020 YELLOW  YELLOW Final    Appearance, UA 08/25/2020 CLEAR  CLEAR Final    Specific Palmer, UA 08/25/2020 1.006  1.001 - 1.035 Final    pH, UA 08/25/2020 > OR = 8.5* 5.0 - 8.0 Final    Glucose, UA 08/25/2020 NEGATIVE  NEGATIVE Final    Bilirubin, UA 08/25/2020 NEGATIVE  NEGATIVE Final    Ketones, UA 08/25/2020 NEGATIVE  NEGATIVE Final    Occult Blood UA 08/25/2020 NEGATIVE  NEGATIVE Final    Protein, UA 08/25/2020 NEGATIVE  NEGATIVE Final    Nitrite, UA 08/25/2020 NEGATIVE  NEGATIVE Final    Leukocytes, UA 08/25/2020 NEGATIVE  NEGATIVE Final    WBC Casts, UA 08/25/2020 NONE SEEN  < OR = 5 /HPF Final    RBC Casts, UA 08/25/2020 NONE SEEN  < OR = 2 /HPF Final    Squam Epithel, UA 08/25/2020 NONE SEEN  < OR = 5 /HPF Final    Bacteria, UA 08/25/2020 NONE SEEN  NONE SEEN /HPF Final    Hyaline Casts, UA 08/25/2020 NONE SEEN  NONE SEEN /LPF Final    Reflexive Urine Culture 08/25/2020 NO CULTURE INDICATED   Final    Creatinine, Random Ur 08/25/2020 12* 20 - 320 mg/dL Final    Microalb, Ur 08/25/2020 0.5  See Note: mg/dL Final    Microalb Creat Ratio 08/25/2020 42* <30 mcg/mg creat Final       Past Medical History:   Diagnosis Date    Basal cell carcinoma     Cataract     Hypertension      Social History     Socioeconomic History    Marital status:      Spouse name: Not on file    Number of children: Not on file    Years of education: Not on file    Highest education level: Not on file   Occupational History    Not on file   Social Needs    Financial resource strain: Not hard at all    Food insecurity     Worry: Never true     Inability: Never true    Transportation  needs     Medical: No     Non-medical: No   Tobacco Use    Smoking status: Never Smoker    Smokeless tobacco: Never Used   Substance and Sexual Activity    Alcohol Use     Frequency: Never     Binge frequency: Never    Drug use: Not on file    Sexual activity: Not on file   Lifestyle    Physical activity     Days per week: 3 days     Minutes per session: 120 min    Stress: Not at all   Relationships    Social connections     Talks on phone: Not on file     Gets together: More than three times a week     Attends Baptism service: Not on file     Active member of club or organization: Yes     Attends meetings of clubs or organizations: More than 4 times per year     Relationship status:    Other Topics Concern    Not on file   Social History Narrative    Not on file     Past Surgical History:   Procedure Laterality Date    APPENDECTOMY      EYE SURGERY      Moh's surgery on right temple area Right 2015    Cancerous spot    PROSTATE BIOPSY      TONSILLECTOMY       No family history on file.    Review of patient's allergies indicates:   Allergen Reactions    Pollen extracts Other (See Comments)       Current Outpatient Medications:     amLODIPine (NORVASC) 5 MG tablet, Take 1 tablet (5 mg total) by mouth once daily., Disp: 90 tablet, Rfl: 1    ascorbic acid, vitamin C, (VITAMIN C) 500 MG tablet, TAKE TWO TABLETS BY MOUTH EVERY DAY, Disp: , Rfl:     ASCORBIC ACID-ASCORBATE CALC ORAL, TAKE TWO TABLETS BY MOUTH EVERY DAY, Disp: , Rfl:     aspirin 81 MG Chew, Take 81 mg by mouth once daily., Disp: , Rfl:     clobetasol 0.05% (TEMOVATE) 0.05 % Oint, Apply topically 2 (two) times daily., Disp: 45 g, Rfl: 2    fluorouraciL (EFUDEX) 5 % cream, , Disp: , Rfl:     lisinopriL (PRINIVIL,ZESTRIL) 20 MG tablet, Take 1 tablet (20 mg total) by mouth once daily., Disp: 90 tablet, Rfl: 1    SYMBICORT 160-4.5 mcg/actuation HFAA, Inhale 2 puffs into the lungs every 12 (twelve) hours., Disp: 3 Inhaler, Rfl:  "3    temazepam (RESTORIL) 15 mg Cap, Take 1 capsule (15 mg total) by mouth nightly., Disp: 30 capsule, Rfl: 2    Review of Systems   Constitutional: Negative for appetite change, chills, fatigue, fever and unexpected weight change.   HENT: Negative for congestion, ear pain and trouble swallowing.    Eyes: Negative for pain, discharge and visual disturbance.   Respiratory: Negative for apnea, cough, shortness of breath (w/ exertion) and wheezing.    Cardiovascular: Negative for chest pain and leg swelling.   Gastrointestinal: Negative for abdominal pain, blood in stool, constipation, diarrhea, nausea and vomiting.   Endocrine: Negative for cold intolerance, heat intolerance and polydipsia.   Genitourinary: Negative for dysuria, hematuria, testicular pain and urgency.   Musculoskeletal: Negative for gait problem, joint swelling and myalgias.   Neurological: Positive for dizziness (Occasional bouts of dizziness). Negative for seizures and numbness.   Psychiatric/Behavioral: Negative for agitation, behavioral problems and hallucinations. The patient is not nervous/anxious.           Objective:      Vitals:    09/01/20 1129   BP: 126/78   Pulse: 60   Temp: 98.7 °F (37.1 °C)   Weight: 77.1 kg (170 lb)   Height: 5' 8.5" (1.74 m)     Physical Exam  Vitals signs and nursing note reviewed.   Constitutional:       Appearance: He is well-developed.   HENT:      Head: Normocephalic and atraumatic.      Right Ear: External ear normal.      Left Ear: External ear normal.      Nose: Nose normal.   Eyes:      Pupils: Pupils are equal, round, and reactive to light.   Neck:      Musculoskeletal: Normal range of motion and neck supple.      Thyroid: No thyromegaly.      Vascular: No carotid bruit.   Cardiovascular:      Rate and Rhythm: Normal rate. Rhythm irregular.      Heart sounds: Normal heart sounds. No murmur.      Comments: Ventricular bigeminy or trigeminy is heard  Pulmonary:      Effort: Pulmonary effort is normal.      " Breath sounds: Normal breath sounds. No wheezing or rales.   Abdominal:      General: Bowel sounds are normal. There is no distension.      Palpations: Abdomen is soft.      Tenderness: There is no abdominal tenderness.   Musculoskeletal: Normal range of motion.         General: No tenderness or deformity.      Lumbar back: Normal. He exhibits no pain and no spasm.      Comments: Bends 90 degrees at  waist shoulders and knees have good range of motion.  No pitting edema noted   Lymphadenopathy:      Cervical: No cervical adenopathy.   Skin:     General: Skin is warm and dry.      Findings: No rash.      Comments: Left temple scaly ulcerated lesion present 1 x 1 cm   Neurological:      Mental Status: He is alert and oriented to person, place, and time.      Cranial Nerves: No cranial nerve deficit.      Coordination: Coordination normal.   Psychiatric:         Behavior: Behavior normal.         Thought Content: Thought content normal.         Judgment: Judgment normal.           Assessment:       1. Prostate cancer    2. Essential hypertension    3. Malignant neoplasm of prostate    4. Squamous cell carcinoma, scalp/neck    5. Hot flashes    6. Macrocytic anemia         Plan:       Prostate cancer  Status post 40 radiation treatments and now on Eligard hormone treatments.  PSA was responded well to less than 0.1.  Essential hypertension  -     lisinopriL (PRINIVIL,ZESTRIL) 20 MG tablet; Take 1 tablet (20 mg total) by mouth once daily.  Dispense: 90 tablet; Refill: 1  Refill lisinopril for blood pressure control   Malignant neoplasm of prostate    Squamous cell carcinoma, scalp/neck    Hot flashes  Due  to testosterone blockage on the hormone shots  Macrocytic anemia  -     CBC auto differential; Future; Expected date: 09/01/2020  -     Vitamin B12; Future; Expected date: 09/01/2020  -     Folate; Future; Expected date: 09/01/2020  Repeat labs in 6 months    Follow up in about 6 months (around 3/1/2021).         9/1/2020 Jian Alcazar

## 2020-09-04 ENCOUNTER — TREATMENT (OUTPATIENT)
Dept: RADIATION ONCOLOGY | Facility: CLINIC | Age: 85
End: 2020-09-04
Payer: MEDICARE

## 2020-09-04 PROCEDURE — 77336 RADIATION PHYSICS CONSULT: CPT | Mod: S$GLB,,, | Performed by: RADIOLOGY

## 2020-09-04 PROCEDURE — G6012 RADIATION TREATMENT DELIVERY: HCPCS | Mod: S$GLB,,, | Performed by: RADIOLOGY

## 2020-09-04 PROCEDURE — G6012 PR RADN TX DELIVERY, 6-10 MEV, >= 3 TX AREAS: ICD-10-PCS | Mod: S$GLB,,, | Performed by: RADIOLOGY

## 2020-09-04 PROCEDURE — 77336 PR  RADN PHYSICS CONSULT CONTINUING: ICD-10-PCS | Mod: S$GLB,,, | Performed by: RADIOLOGY

## 2020-09-20 ENCOUNTER — PATIENT MESSAGE (OUTPATIENT)
Dept: FAMILY MEDICINE | Facility: CLINIC | Age: 85
End: 2020-09-20

## 2020-09-20 DIAGNOSIS — G47.00 INSOMNIA, UNSPECIFIED TYPE: ICD-10-CM

## 2020-09-21 RX ORDER — TEMAZEPAM 15 MG/1
15 CAPSULE ORAL NIGHTLY
Qty: 30 CAPSULE | Refills: 2 | Status: SHIPPED | OUTPATIENT
Start: 2020-09-21 | End: 2021-01-07 | Stop reason: SDUPTHER

## 2020-09-22 DIAGNOSIS — I10 ESSENTIAL HYPERTENSION: ICD-10-CM

## 2020-09-22 DIAGNOSIS — G47.00 INSOMNIA, UNSPECIFIED TYPE: ICD-10-CM

## 2020-09-22 RX ORDER — TEMAZEPAM 15 MG/1
15 CAPSULE ORAL NIGHTLY
Qty: 30 CAPSULE | Refills: 2 | Status: CANCELLED | OUTPATIENT
Start: 2020-09-22

## 2020-09-22 RX ORDER — LISINOPRIL 20 MG/1
20 TABLET ORAL DAILY
Qty: 90 TABLET | Refills: 1 | Status: CANCELLED | OUTPATIENT
Start: 2020-09-22

## 2020-09-29 ENCOUNTER — DOCUMENTATION ONLY (OUTPATIENT)
Dept: RADIATION ONCOLOGY | Facility: CLINIC | Age: 85
End: 2020-09-29

## 2020-09-29 NOTE — PROGRESS NOTES
DIAGNOSIS: BCC of L temple    TREATMENT  Patient completed definitive radiotherapy, 50 Gy in 20 fractions on September 4, 2020.  Treatment was well tolerated with grade 1/2 radiation dermatitis.    Contacted patient today for 3 week checkup.  Patient reports serosanguineous discharge with active scabbing of the last 2 weeks.  He reports discharge now decreased and does complain of pruritus with a dry scab the size of a dime.  He denies fever, chills, redness.    A/P  1.  Patient tolerated therapy very well.  He has undergone multiple courses for skin cancer previously with good tolerance.  He will send us a picture for evaluation and recommended transition to steroid cream if itchiness persist.  Also recommended discontinue moisturizers to allow scabbing with air exposure.    2.  Follow-up with DERM.  3.  Return to clinic in 3 months for response assessment.  4.  COVID-19 precautions discussed.

## 2020-10-21 ENCOUNTER — OFFICE VISIT (OUTPATIENT)
Dept: FAMILY MEDICINE | Facility: CLINIC | Age: 85
End: 2020-10-21
Payer: MEDICARE

## 2020-10-21 VITALS
WEIGHT: 176.38 LBS | TEMPERATURE: 98 F | HEIGHT: 69 IN | OXYGEN SATURATION: 98 % | DIASTOLIC BLOOD PRESSURE: 86 MMHG | BODY MASS INDEX: 26.12 KG/M2 | HEART RATE: 64 BPM | SYSTOLIC BLOOD PRESSURE: 138 MMHG

## 2020-10-21 DIAGNOSIS — H60.501 ACUTE OTITIS EXTERNA OF RIGHT EAR, UNSPECIFIED TYPE: Primary | ICD-10-CM

## 2020-10-21 DIAGNOSIS — H91.93 DECREASED HEARING OF BOTH EARS: ICD-10-CM

## 2020-10-21 PROCEDURE — 1170F PR FUNCTIONAL STATUS ASSESSED: ICD-10-PCS | Mod: S$GLB,,, | Performed by: NURSE PRACTITIONER

## 2020-10-21 PROCEDURE — 3075F PR MOST RECENT SYSTOLIC BLOOD PRESS GE 130-139MM HG: ICD-10-PCS | Mod: S$GLB,,, | Performed by: NURSE PRACTITIONER

## 2020-10-21 PROCEDURE — 1159F MED LIST DOCD IN RCRD: CPT | Mod: S$GLB,,, | Performed by: NURSE PRACTITIONER

## 2020-10-21 PROCEDURE — 3075F SYST BP GE 130 - 139MM HG: CPT | Mod: S$GLB,,, | Performed by: NURSE PRACTITIONER

## 2020-10-21 PROCEDURE — 1101F PR PT FALLS ASSESS DOC 0-1 FALLS W/OUT INJ PAST YR: ICD-10-PCS | Mod: S$GLB,,, | Performed by: NURSE PRACTITIONER

## 2020-10-21 PROCEDURE — 1170F FXNL STATUS ASSESSED: CPT | Mod: S$GLB,,, | Performed by: NURSE PRACTITIONER

## 2020-10-21 PROCEDURE — 1159F PR MEDICATION LIST DOCUMENTED IN MEDICAL RECORD: ICD-10-PCS | Mod: S$GLB,,, | Performed by: NURSE PRACTITIONER

## 2020-10-21 PROCEDURE — 99212 PR OFFICE/OUTPT VISIT, EST, LEVL II, 10-19 MIN: ICD-10-PCS | Mod: S$GLB,,, | Performed by: NURSE PRACTITIONER

## 2020-10-21 PROCEDURE — 3079F DIAST BP 80-89 MM HG: CPT | Mod: S$GLB,,, | Performed by: NURSE PRACTITIONER

## 2020-10-21 PROCEDURE — 1101F PT FALLS ASSESS-DOCD LE1/YR: CPT | Mod: S$GLB,,, | Performed by: NURSE PRACTITIONER

## 2020-10-21 PROCEDURE — 99212 OFFICE O/P EST SF 10 MIN: CPT | Mod: S$GLB,,, | Performed by: NURSE PRACTITIONER

## 2020-10-21 PROCEDURE — 3079F PR MOST RECENT DIASTOLIC BLOOD PRESSURE 80-89 MM HG: ICD-10-PCS | Mod: S$GLB,,, | Performed by: NURSE PRACTITIONER

## 2020-10-21 RX ORDER — CIPROFLOXACIN AND DEXAMETHASONE 3; 1 MG/ML; MG/ML
4 SUSPENSION/ DROPS AURICULAR (OTIC) 2 TIMES DAILY
Qty: 7.5 ML | Refills: 0 | Status: SHIPPED | OUTPATIENT
Start: 2020-10-21 | End: 2020-10-26

## 2020-10-21 NOTE — PROGRESS NOTES
"  SUBJECTIVE:    Patient ID: Brit Jennings is a 85 y.o. male.    Chief Complaint: Otalgia (rt ear, started this weekend. Tender to touch)    Pt presents with c/o right ear pain that started this weekend. Tender to touch and feels like ear canal is swollen and like there is something in it. Denies fevers or drainage from the ear. Wears hearing aids. States he always tries to keep ears dry before inserting hearing aids. Denies any sinus or cold-like symptoms.       Office Visit on 08/07/2020   Component Date Value Ref Range Status    Final Pathologic Diagnosis 08/07/2020    Final                    Value:1.  Skin, left neck, shave biopsy:  - BASAL CELL CARCINOMA WITH MIXED SUPERFICIAL AND NODULAR GROWTH PATTERN.  - THE TUMOR FOCALLY EXTENDS TO THE DEEP AND LATERAL BIOPSY MARGINS.  MICROSCOPIC DESCRIPTION: Sections show multiple foci of basaloid cells, some  of them in apoptosis, arising along the dermoepidermal junction and extending  into the papillary dermis. Nodules of basal cell carcinoma are also noted.      Gross 08/07/2020    Final                    Value:Container Label: Clinic Number/AP Number:  [id number], and "left neck "  Received in formalin is a 14 x 9 x 1 mm shave biopsy fragment of tan-white  skin.  Specimen is inked, serially sectioned into 5 pieces, and entirely  submitted in NQB--1-[facility]IM[facility]         Past Medical History:   Diagnosis Date    Basal cell carcinoma     Cataract     Hypertension      Past Surgical History:   Procedure Laterality Date    APPENDECTOMY      EYE SURGERY      Moh's surgery on right temple area Right 2015    Cancerous spot    PROSTATE BIOPSY      TONSILLECTOMY       History reviewed. No pertinent family history.    Marital Status:   Alcohol History:  has no history on file for alcohol.  Tobacco History:  reports that he has never smoked. He has never used smokeless tobacco.  Drug History:  has no history on file for drug.    Review of patient's " "allergies indicates:   Allergen Reactions    Pollen extracts Other (See Comments)       Current Outpatient Medications:     amLODIPine (NORVASC) 5 MG tablet, Take 1 tablet (5 mg total) by mouth once daily., Disp: 90 tablet, Rfl: 1    ascorbic acid, vitamin C, (VITAMIN C) 500 MG tablet, TAKE TWO TABLETS BY MOUTH EVERY DAY, Disp: , Rfl:     ASCORBIC ACID-ASCORBATE CALC ORAL, TAKE TWO TABLETS BY MOUTH EVERY DAY, Disp: , Rfl:     aspirin 81 MG Chew, Take 81 mg by mouth once daily., Disp: , Rfl:     ciprofloxacin-dexamethasone 0.3-0.1% (CIPRODEX) 0.3-0.1 % DrpS, Place 4 drops into the right ear 2 (two) times daily. for 5 days, Disp: 7.5 mL, Rfl: 0    lisinopriL (PRINIVIL,ZESTRIL) 20 MG tablet, Take 1 tablet (20 mg total) by mouth once daily., Disp: 90 tablet, Rfl: 1    SYMBICORT 160-4.5 mcg/actuation HFAA, Inhale 2 puffs into the lungs every 12 (twelve) hours., Disp: 3 Inhaler, Rfl: 3    temazepam (RESTORIL) 15 mg Cap, Take 1 capsule (15 mg total) by mouth nightly., Disp: 30 capsule, Rfl: 2    Review of Systems   Constitutional: Negative for chills and fever.   HENT: Positive for ear pain. Negative for congestion, ear discharge, sinus pressure, sinus pain and sore throat.    Eyes: Negative for pain and redness.   Respiratory: Negative for cough, shortness of breath and wheezing.    Cardiovascular: Negative for chest pain.   Gastrointestinal: Negative for nausea and vomiting.   Genitourinary: Negative for dysuria.   Musculoskeletal: Negative for myalgias and neck pain.   Skin: Negative.    Neurological: Negative for dizziness, weakness and headaches.   Psychiatric/Behavioral: Negative.           Objective:      Vitals:    10/21/20 1026   BP: 138/86   Pulse: 64   Temp: 98 °F (36.7 °C)   TempSrc: Temporal   SpO2: 98%   Weight: 80 kg (176 lb 6.4 oz)   Height: 5' 8.5" (1.74 m)     Body mass index is 26.43 kg/m².  Physical Exam  Constitutional:       Appearance: He is well-developed.   HENT:      Head: Normocephalic. "      Right Ear: Tympanic membrane normal. Swelling and tenderness present.      Left Ear: Tympanic membrane normal.      Nose: Nose normal.   Eyes:      Pupils: Pupils are equal, round, and reactive to light.   Neck:      Musculoskeletal: Normal range of motion.   Cardiovascular:      Rate and Rhythm: Normal rate and regular rhythm.   Pulmonary:      Effort: Pulmonary effort is normal.      Breath sounds: No wheezing.   Abdominal:      Palpations: Abdomen is soft.   Musculoskeletal: Normal range of motion.   Lymphadenopathy:      Cervical: No cervical adenopathy.   Skin:     General: Skin is warm and dry.   Neurological:      Mental Status: He is alert and oriented to person, place, and time.           Assessment:       1. Acute otitis externa of right ear, unspecified type    2. Decreased hearing of both ears         Plan:       Acute otitis externa of right ear, unspecified type  -     ciprofloxacin-dexamethasone 0.3-0.1% (CIPRODEX) 0.3-0.1 % DrpS; Place 4 drops into the right ear 2 (two) times daily. for 5 days  Dispense: 7.5 mL; Refill: 0    Decreased hearing of both ears      Follow up if symptoms worsen or fail to improve.

## 2020-11-09 ENCOUNTER — PATIENT MESSAGE (OUTPATIENT)
Dept: FAMILY MEDICINE | Facility: CLINIC | Age: 85
End: 2020-11-09

## 2020-11-10 ENCOUNTER — OFFICE VISIT (OUTPATIENT)
Dept: FAMILY MEDICINE | Facility: CLINIC | Age: 85
End: 2020-11-10
Payer: MEDICARE

## 2020-11-10 VITALS
DIASTOLIC BLOOD PRESSURE: 70 MMHG | HEART RATE: 52 BPM | SYSTOLIC BLOOD PRESSURE: 138 MMHG | BODY MASS INDEX: 24.59 KG/M2 | WEIGHT: 166 LBS | TEMPERATURE: 98 F | HEIGHT: 69 IN

## 2020-11-10 DIAGNOSIS — G44.209 ACUTE NON INTRACTABLE TENSION-TYPE HEADACHE: ICD-10-CM

## 2020-11-10 DIAGNOSIS — I10 ESSENTIAL HYPERTENSION: Primary | ICD-10-CM

## 2020-11-10 DIAGNOSIS — I49.8 OTHER CARDIAC ARRHYTHMIA: ICD-10-CM

## 2020-11-10 PROCEDURE — 3075F PR MOST RECENT SYSTOLIC BLOOD PRESS GE 130-139MM HG: ICD-10-PCS | Mod: S$GLB,,, | Performed by: NURSE PRACTITIONER

## 2020-11-10 PROCEDURE — 99212 PR OFFICE/OUTPT VISIT, EST, LEVL II, 10-19 MIN: ICD-10-PCS | Mod: S$GLB,,, | Performed by: NURSE PRACTITIONER

## 2020-11-10 PROCEDURE — 3078F DIAST BP <80 MM HG: CPT | Mod: S$GLB,,, | Performed by: NURSE PRACTITIONER

## 2020-11-10 PROCEDURE — 1101F PR PT FALLS ASSESS DOC 0-1 FALLS W/OUT INJ PAST YR: ICD-10-PCS | Mod: S$GLB,,, | Performed by: NURSE PRACTITIONER

## 2020-11-10 PROCEDURE — 3078F PR MOST RECENT DIASTOLIC BLOOD PRESSURE < 80 MM HG: ICD-10-PCS | Mod: S$GLB,,, | Performed by: NURSE PRACTITIONER

## 2020-11-10 PROCEDURE — 1159F MED LIST DOCD IN RCRD: CPT | Mod: S$GLB,,, | Performed by: NURSE PRACTITIONER

## 2020-11-10 PROCEDURE — 1159F PR MEDICATION LIST DOCUMENTED IN MEDICAL RECORD: ICD-10-PCS | Mod: S$GLB,,, | Performed by: NURSE PRACTITIONER

## 2020-11-10 PROCEDURE — 99212 OFFICE O/P EST SF 10 MIN: CPT | Mod: S$GLB,,, | Performed by: NURSE PRACTITIONER

## 2020-11-10 PROCEDURE — 1101F PT FALLS ASSESS-DOCD LE1/YR: CPT | Mod: S$GLB,,, | Performed by: NURSE PRACTITIONER

## 2020-11-10 PROCEDURE — 3075F SYST BP GE 130 - 139MM HG: CPT | Mod: S$GLB,,, | Performed by: NURSE PRACTITIONER

## 2020-11-10 NOTE — PROGRESS NOTES
"  SUBJECTIVE:    Patient ID: Birt Jennings is a 85 y.o. male.    Chief Complaint: Hypertension (also was having chest pains on Saturday and Sunday at night, no bottles, brought list// SW)    Pt presents with concerns regarding high blood pressure. States he developed a headache on Saturday night and decided to check his blood pressure. Found to be systolic in 140s and diastolic in 80s. He was concerned that his blood pressure was causing the headache he experienced. Denied dizziness, blurred vision, diplopia, or weakness. Also had some right upper chest wall pain at the time but had an antacid and pain subsided. Does report he has a "left sided blockage" that has been there for several years. Has seen Dr. Stark in the past but no intervention. No SOB or heart palpitations. Headache has subsided. Currently on Amlodipine and Lisinopril for BP control.       Office Visit on 08/07/2020   Component Date Value Ref Range Status    Final Pathologic Diagnosis 08/07/2020    Final                    Value:1.  Skin, left neck, shave biopsy:  - BASAL CELL CARCINOMA WITH MIXED SUPERFICIAL AND NODULAR GROWTH PATTERN.  - THE TUMOR FOCALLY EXTENDS TO THE DEEP AND LATERAL BIOPSY MARGINS.  MICROSCOPIC DESCRIPTION: Sections show multiple foci of basaloid cells, some  of them in apoptosis, arising along the dermoepidermal junction and extending  into the papillary dermis. Nodules of basal cell carcinoma are also noted.      Gross 08/07/2020    Final                    Value:Container Label: Clinic Number/AP Number:  [id number], and "left neck "  Received in formalin is a 14 x 9 x 1 mm shave biopsy fragment of tan-white  skin.  Specimen is inked, serially sectioned into 5 pieces, and entirely  submitted in [facility]--1-[facility]         Past Medical History:   Diagnosis Date    Basal cell carcinoma     Cataract     Hypertension      Past Surgical History:   Procedure Laterality Date    APPENDECTOMY      EYE SURGERY      " "Moh's surgery on right temple area Right 2015    Cancerous spot    PROSTATE BIOPSY      TONSILLECTOMY       History reviewed. No pertinent family history.    Marital Status:   Alcohol History:  has no history on file for alcohol.  Tobacco History:  reports that he has never smoked. He has never used smokeless tobacco.  Drug History:  has no history on file for drug.    Review of patient's allergies indicates:   Allergen Reactions    Pollen extracts Other (See Comments)       Current Outpatient Medications:     amLODIPine (NORVASC) 5 MG tablet, Take 1 tablet (5 mg total) by mouth once daily., Disp: 90 tablet, Rfl: 1    ascorbic acid, vitamin C, (VITAMIN C) 500 MG tablet, TAKE TWO TABLETS BY MOUTH EVERY DAY, Disp: , Rfl:     ASCORBIC ACID-ASCORBATE CALC ORAL, TAKE TWO TABLETS BY MOUTH EVERY DAY, Disp: , Rfl:     aspirin 81 MG Chew, Take 81 mg by mouth once daily., Disp: , Rfl:     lisinopriL (PRINIVIL,ZESTRIL) 20 MG tablet, Take 1 tablet (20 mg total) by mouth once daily., Disp: 90 tablet, Rfl: 1    SYMBICORT 160-4.5 mcg/actuation HFAA, Inhale 2 puffs into the lungs every 12 (twelve) hours., Disp: 3 Inhaler, Rfl: 3    temazepam (RESTORIL) 15 mg Cap, Take 1 capsule (15 mg total) by mouth nightly., Disp: 30 capsule, Rfl: 2    Review of Systems   Constitutional: Negative for activity change, chills, diaphoresis and fatigue.   HENT: Negative.    Respiratory: Negative for chest tightness, shortness of breath and wheezing.    Cardiovascular: Positive for chest pain (Saturday about 30 minutes. Resolved with antacid). Negative for palpitations.   Musculoskeletal: Negative.    Neurological: Positive for headaches (Saturday night. Took Tylenol). Negative for dizziness, weakness and light-headedness.          Objective:      Vitals:    11/10/20 0855 11/10/20 0859 11/10/20 0932   BP: 106/86 138/60 138/70   Pulse: (!) 52     Temp: 97.7 °F (36.5 °C)     Weight: 75.3 kg (166 lb)     Height: 5' 8.5" (1.74 m)   "     Body mass index is 24.87 kg/m².  Physical Exam  Constitutional:       Appearance: Normal appearance.      Comments: Elderly male   HENT:      Head: Normocephalic.   Cardiovascular:      Rate and Rhythm: Bradycardia present.      Comments: Regularly irregular rhythm. Possible bigeminal or trigeminal rhythm  Pulmonary:      Effort: Pulmonary effort is normal. No respiratory distress.   Skin:     General: Skin is warm and dry.      Capillary Refill: Capillary refill takes less than 2 seconds.   Neurological:      Mental Status: He is alert and oriented to person, place, and time.   Psychiatric:         Mood and Affect: Mood normal.           Assessment:       1. Essential hypertension    2. Acute non intractable tension-type headache    3. Other cardiac arrhythmia         Plan:       Essential hypertension    Acute non intractable tension-type headache    Other cardiac arrhythmia    Unlikely HA r/t blood pressure as numbers were not far off from where they are now. Consider re-consulting Cardiology about blockage. Pt will consider.     Follow up if symptoms worsen or fail to improve.

## 2020-12-30 ENCOUNTER — OFFICE VISIT (OUTPATIENT)
Dept: RADIATION ONCOLOGY | Facility: CLINIC | Age: 85
End: 2020-12-30
Payer: MEDICARE

## 2020-12-30 VITALS
WEIGHT: 171 LBS | RESPIRATION RATE: 16 BRPM | OXYGEN SATURATION: 98 % | DIASTOLIC BLOOD PRESSURE: 60 MMHG | BODY MASS INDEX: 25.62 KG/M2 | SYSTOLIC BLOOD PRESSURE: 93 MMHG | HEART RATE: 65 BPM | TEMPERATURE: 98 F

## 2020-12-30 DIAGNOSIS — C44.42 SQUAMOUS CELL CARCINOMA, SCALP/NECK: ICD-10-CM

## 2020-12-30 DIAGNOSIS — C61 MALIGNANT NEOPLASM OF PROSTATE: Primary | ICD-10-CM

## 2020-12-30 PROCEDURE — 99214 PR OFFICE/OUTPT VISIT, EST, LEVL IV, 30-39 MIN: ICD-10-PCS | Mod: S$GLB,,, | Performed by: RADIOLOGY

## 2020-12-30 PROCEDURE — 3078F PR MOST RECENT DIASTOLIC BLOOD PRESSURE < 80 MM HG: ICD-10-PCS | Mod: S$GLB,,, | Performed by: RADIOLOGY

## 2020-12-30 PROCEDURE — 3074F PR MOST RECENT SYSTOLIC BLOOD PRESSURE < 130 MM HG: ICD-10-PCS | Mod: S$GLB,,, | Performed by: RADIOLOGY

## 2020-12-30 PROCEDURE — 1159F MED LIST DOCD IN RCRD: CPT | Mod: S$GLB,,, | Performed by: RADIOLOGY

## 2020-12-30 PROCEDURE — 3078F DIAST BP <80 MM HG: CPT | Mod: S$GLB,,, | Performed by: RADIOLOGY

## 2020-12-30 PROCEDURE — 1126F PR PAIN SEVERITY QUANTIFIED, NO PAIN PRESENT: ICD-10-PCS | Mod: S$GLB,,, | Performed by: RADIOLOGY

## 2020-12-30 PROCEDURE — 1126F AMNT PAIN NOTED NONE PRSNT: CPT | Mod: S$GLB,,, | Performed by: RADIOLOGY

## 2020-12-30 PROCEDURE — 3074F SYST BP LT 130 MM HG: CPT | Mod: S$GLB,,, | Performed by: RADIOLOGY

## 2020-12-30 PROCEDURE — 99214 OFFICE O/P EST MOD 30 MIN: CPT | Mod: S$GLB,,, | Performed by: RADIOLOGY

## 2020-12-30 PROCEDURE — 1159F PR MEDICATION LIST DOCUMENTED IN MEDICAL RECORD: ICD-10-PCS | Mod: S$GLB,,, | Performed by: RADIOLOGY

## 2020-12-30 NOTE — PROGRESS NOTES
Brit Jennings  [id number]  1935 12/30/2020  No referring provider defined for this encounter.    DIAGNOSIS: BCC of L temple  REASON FOR VISIT: Routine scheduled follow-up.    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  He continues under the care of Dr. Andrade with PSA < 0.1 as of May 2020.     He has a history of multiple non melanoma skin cancers and actinic keratoses these previous treatment history below.  He recently presented with an enlarging nodule of the left temple to his dermatologist Dr. Miles.  Of note he has pre-existing history of non melanoma skin cancers and was applying topical 5 fluorouracil.  Shave biopsy confirmed superficial type basal cell carcinoma.  Patient was given options for definitive treatment and elected for radiotherapy.    Previous treatments:  50Gy/20frx to stage I SCCa scalp ending 8/17  50GY/20frx to NMSCa of R eyebrow ending 3/17  50Gy/20frx to SCCa in situ of mid scalp ending 4/18  50Gy/20frx to stage I SCCa nasal bridge ending 1/19    Patient completed definitive radiotherapy, 50 Gy in 20 fractions on September 4, 2020.  Treatment was well tolerated with grade 1/2 radiation dermatitis.  INTERVAL HISTORY:   Patient denies dysuria, hematuria, diarrhea, bright red blood per rectum.  He reports increased frequency and urgency of urination as well as nocturia.  He denies incontinence.  He denies bone pain.  He denies pain or discomfort at prior left temple site but does have some itchiness just inferior to this near the lateral canthus of the left eye.    AUA 26  QOL 6  IIEF n/a    Review of systems otherwise negative unless indicated in HPI/interval history.    Past Medical History:   Diagnosis Date    Basal cell carcinoma     Cataract     Hypertension      Past Surgical History:   Procedure Laterality  Date    APPENDECTOMY      EYE SURGERY      Moh's surgery on right temple area Right 2015    Cancerous spot    PROSTATE BIOPSY      TONSILLECTOMY       Social History     Socioeconomic History    Marital status:      Spouse name: Not on file    Number of children: Not on file    Years of education: Not on file    Highest education level: Not on file   Occupational History    Not on file   Social Needs    Financial resource strain: Not hard at all    Food insecurity     Worry: Never true     Inability: Never true    Transportation needs     Medical: No     Non-medical: No   Tobacco Use    Smoking status: Never Smoker    Smokeless tobacco: Never Used   Substance and Sexual Activity    Alcohol Use     Frequency: Never     Binge frequency: Never    Drug use: Not on file    Sexual activity: Not on file   Lifestyle    Physical activity     Days per week: 3 days     Minutes per session: 120 min    Stress: Not at all   Relationships    Social connections     Talks on phone: Not on file     Gets together: More than three times a week     Attends Zoroastrianism service: Not on file     Active member of club or organization: Yes     Attends meetings of clubs or organizations: More than 4 times per year     Relationship status:    Other Topics Concern    Not on file   Social History Narrative    Not on file     No family history on file.  Medication List with Changes/Refills   Current Medications    AMLODIPINE (NORVASC) 5 MG TABLET    Take 1 tablet (5 mg total) by mouth once daily.    ASCORBIC ACID, VITAMIN C, (VITAMIN C) 500 MG TABLET    TAKE TWO TABLETS BY MOUTH EVERY DAY    ASCORBIC ACID-ASCORBATE CALC ORAL    TAKE TWO TABLETS BY MOUTH EVERY DAY    ASPIRIN 81 MG CHEW    Take 81 mg by mouth once daily.    LISINOPRIL (PRINIVIL,ZESTRIL) 20 MG TABLET    Take 1 tablet (20 mg total) by mouth once daily.    SYMBICORT 160-4.5 MCG/ACTUATION HFAA    Inhale 2 puffs into the lungs every 12 (twelve) hours.     TEMAZEPAM (RESTORIL) 15 MG CAP    Take 1 capsule (15 mg total) by mouth nightly.     Review of patient's allergies indicates:   Allergen Reactions    Pollen extracts Other (See Comments)       QUALITY OF LIFE: 100%- Normal, No Complaints, No Evidence of Disease    Vitals:    12/30/20 1302   BP: 93/60   Pulse: 65   Resp: 16   Temp: 98.4 °F (36.9 °C)   SpO2: 98%   Weight: 77.6 kg (171 lb)   PainSc: 0-No pain     Body mass index is 25.62 kg/m².    PHYSICAL EXAM:   GENERAL: alert; in no apparent distress.   HEAD: normocephalic, atraumatic.  EYES: pupils are equal, round, reactive to light and accommodation. Sclera anicteric. Conjunctiva not injected.    NECK: no cervical motion rigidity; supple with no masses.  CHEST:  Patient is speaking comfortably on room air with normal work of breathing without using accessory muscles of respiration.  ABDOMEN: soft, nontender, nondistended.  MUSCULOSKELETAL: no tenderness to palpation along the spine or scapulae. Normal range of motion.  NEUROLOGIC: cranial nerves II-XII intact bilaterally. Strength 5/5 in bilateral upper and lower extremities. No sensory deficits appreciated. Normal gait.  EXTREMITIES: no clubbing, cyanosis, edema.  SKIN:  Smooth hyperpigmentation [place] without palpable nodularity.  There is some scaling inferior to this near the left lateral canthus    ANCILLARY DATA:   PSA  8/20 <0.1    ASSESSMENT: 85 y.o. male with BCC of L temple s/p 50 Gy in 20 fractions on September 4, 2020 (multiple prior NMSCa's s/p RT as above); prior intermediate risk prostate adenocarcinoma treated with 1 year of ADT with definitive radiotherapy to 7740 cGy ending February 28, 2020--final ADT 8/20.  PLAN:   Brit Jennings presents with physical exam consistent with complete response to therapy.  There is some scaling out of field inferior to this that he will have investigated by Dermatology next month.  He reports urinary function is stable and denies incontinence or  obstructive uropathy of his AUA symptomatology score is a poor.  He continues to follow with Dr. Andrade.  He is no longer on hormone deprivation and is reporting resolution of symptomatology.  He is due for PSA next month.  Return to clinic in 6 months.    All questions answered and contact information provided. Patient understands free to call us anytime with any questions or concerns regarding radiation therapy.    I have personally seen and evaluated this patient. Greater than 50% of this time was spent discussing coordination of care and/or counseling.    PHYSICIAN: Manjeet Aguirre Jr, MD

## 2021-01-07 ENCOUNTER — PATIENT MESSAGE (OUTPATIENT)
Dept: FAMILY MEDICINE | Facility: CLINIC | Age: 86
End: 2021-01-07

## 2021-01-07 DIAGNOSIS — I10 ESSENTIAL HYPERTENSION: ICD-10-CM

## 2021-01-07 DIAGNOSIS — G47.00 INSOMNIA, UNSPECIFIED TYPE: ICD-10-CM

## 2021-01-07 RX ORDER — TEMAZEPAM 15 MG/1
15 CAPSULE ORAL NIGHTLY
Qty: 30 CAPSULE | Refills: 2 | Status: SHIPPED | OUTPATIENT
Start: 2021-01-07 | End: 2021-03-03

## 2021-01-07 RX ORDER — LISINOPRIL 20 MG/1
20 TABLET ORAL DAILY
Qty: 90 TABLET | Refills: 1 | Status: SHIPPED | OUTPATIENT
Start: 2021-01-07 | End: 2021-08-17 | Stop reason: SDUPTHER

## 2021-01-07 RX ORDER — AMLODIPINE BESYLATE 5 MG/1
5 TABLET ORAL DAILY
Qty: 90 TABLET | Refills: 1 | Status: SHIPPED | OUTPATIENT
Start: 2021-01-07 | End: 2021-02-04 | Stop reason: SDUPTHER

## 2021-01-22 ENCOUNTER — PATIENT MESSAGE (OUTPATIENT)
Dept: ADMINISTRATIVE | Facility: OTHER | Age: 86
End: 2021-01-22

## 2021-02-08 ENCOUNTER — OFFICE VISIT (OUTPATIENT)
Dept: DERMATOLOGY | Facility: CLINIC | Age: 86
End: 2021-02-08
Payer: MEDICARE

## 2021-02-08 DIAGNOSIS — L90.5 SCAR: ICD-10-CM

## 2021-02-08 DIAGNOSIS — D22.9 MULTIPLE BENIGN NEVI: ICD-10-CM

## 2021-02-08 DIAGNOSIS — Z85.828 HISTORY OF NONMELANOMA SKIN CANCER: ICD-10-CM

## 2021-02-08 DIAGNOSIS — L57.0 ACTINIC KERATOSES: Primary | ICD-10-CM

## 2021-02-08 DIAGNOSIS — L82.1 SEBORRHEIC KERATOSES: ICD-10-CM

## 2021-02-08 DIAGNOSIS — L81.4 SOLAR LENTIGO: ICD-10-CM

## 2021-02-08 PROCEDURE — 1126F AMNT PAIN NOTED NONE PRSNT: CPT | Mod: S$GLB,,, | Performed by: DERMATOLOGY

## 2021-02-08 PROCEDURE — 99999 PR PBB SHADOW E&M-EST. PATIENT-LVL III: CPT | Mod: PBBFAC,,, | Performed by: DERMATOLOGY

## 2021-02-08 PROCEDURE — 17003 DESTRUCT PREMALG LES 2-14: CPT | Mod: S$GLB,,, | Performed by: DERMATOLOGY

## 2021-02-08 PROCEDURE — 99213 OFFICE O/P EST LOW 20 MIN: CPT | Mod: 25,S$GLB,, | Performed by: DERMATOLOGY

## 2021-02-08 PROCEDURE — 1159F PR MEDICATION LIST DOCUMENTED IN MEDICAL RECORD: ICD-10-PCS | Mod: S$GLB,,, | Performed by: DERMATOLOGY

## 2021-02-08 PROCEDURE — 17003 DESTRUCTION, PREMALIGNANT LESIONS; SECOND THROUGH 14 LESIONS: ICD-10-PCS | Mod: S$GLB,,, | Performed by: DERMATOLOGY

## 2021-02-08 PROCEDURE — 99213 PR OFFICE/OUTPT VISIT, EST, LEVL III, 20-29 MIN: ICD-10-PCS | Mod: 25,S$GLB,, | Performed by: DERMATOLOGY

## 2021-02-08 PROCEDURE — 17000 PR DESTRUCTION(LASER SURGERY,CRYOSURGERY,CHEMOSURGERY),PREMALIGNANT LESIONS,FIRST LESION: ICD-10-PCS | Mod: S$GLB,,, | Performed by: DERMATOLOGY

## 2021-02-08 PROCEDURE — 1126F PR PAIN SEVERITY QUANTIFIED, NO PAIN PRESENT: ICD-10-PCS | Mod: S$GLB,,, | Performed by: DERMATOLOGY

## 2021-02-08 PROCEDURE — 1159F MED LIST DOCD IN RCRD: CPT | Mod: S$GLB,,, | Performed by: DERMATOLOGY

## 2021-02-08 PROCEDURE — 99999 PR PBB SHADOW E&M-EST. PATIENT-LVL III: ICD-10-PCS | Mod: PBBFAC,,, | Performed by: DERMATOLOGY

## 2021-02-08 PROCEDURE — 17000 DESTRUCT PREMALG LESION: CPT | Mod: S$GLB,,, | Performed by: DERMATOLOGY

## 2021-02-18 ENCOUNTER — IMMUNIZATION (OUTPATIENT)
Dept: PRIMARY CARE CLINIC | Facility: CLINIC | Age: 86
End: 2021-02-18
Payer: MEDICARE

## 2021-02-18 ENCOUNTER — TELEPHONE (OUTPATIENT)
Dept: FAMILY MEDICINE | Facility: CLINIC | Age: 86
End: 2021-02-18

## 2021-02-18 DIAGNOSIS — Z00.00 ROUTINE GENERAL MEDICAL EXAMINATION AT A HEALTH CARE FACILITY: Primary | ICD-10-CM

## 2021-02-18 DIAGNOSIS — Z23 NEED FOR VACCINATION: Primary | ICD-10-CM

## 2021-02-18 DIAGNOSIS — Z79.899 ENCOUNTER FOR LONG-TERM (CURRENT) USE OF OTHER MEDICATIONS: ICD-10-CM

## 2021-02-18 DIAGNOSIS — I10 ESSENTIAL HYPERTENSION: ICD-10-CM

## 2021-02-18 PROCEDURE — 91300 COVID-19, MRNA, LNP-S, PF, 30 MCG/0.3 ML DOSE VACCINE: ICD-10-PCS | Mod: S$GLB,,, | Performed by: FAMILY MEDICINE

## 2021-02-18 PROCEDURE — 0001A COVID-19, MRNA, LNP-S, PF, 30 MCG/0.3 ML DOSE VACCINE: CPT | Mod: CV19,S$GLB,, | Performed by: FAMILY MEDICINE

## 2021-02-18 PROCEDURE — 0001A COVID-19, MRNA, LNP-S, PF, 30 MCG/0.3 ML DOSE VACCINE: ICD-10-PCS | Mod: CV19,S$GLB,, | Performed by: FAMILY MEDICINE

## 2021-02-18 PROCEDURE — 91300 COVID-19, MRNA, LNP-S, PF, 30 MCG/0.3 ML DOSE VACCINE: CPT | Mod: S$GLB,,, | Performed by: FAMILY MEDICINE

## 2021-02-25 LAB
ALBUMIN SERPL-MCNC: 4.2 G/DL (ref 3.6–5.1)
ALBUMIN/CREAT UR: 11 MCG/MG CREAT
ALBUMIN/GLOB SERPL: 1.6 (CALC) (ref 1–2.5)
ALP SERPL-CCNC: 69 U/L (ref 35–144)
ALT SERPL-CCNC: 16 U/L (ref 9–46)
APPEARANCE UR: CLEAR
AST SERPL-CCNC: 24 U/L (ref 10–35)
BACTERIA #/AREA URNS HPF: NORMAL /HPF
BACTERIA UR CULT: NORMAL
BASOPHILS # BLD AUTO: 32 CELLS/UL (ref 0–200)
BASOPHILS NFR BLD AUTO: 0.7 %
BILIRUB SERPL-MCNC: 0.6 MG/DL (ref 0.2–1.2)
BILIRUB UR QL STRIP: NEGATIVE
BUN SERPL-MCNC: 16 MG/DL (ref 7–25)
BUN/CREAT SERPL: ABNORMAL (CALC) (ref 6–22)
CALCIUM SERPL-MCNC: 9.8 MG/DL (ref 8.6–10.3)
CHLORIDE SERPL-SCNC: 97 MMOL/L (ref 98–110)
CHOLEST SERPL-MCNC: 160 MG/DL
CHOLEST/HDLC SERPL: 4.7 (CALC)
CO2 SERPL-SCNC: 30 MMOL/L (ref 20–32)
COLOR UR: YELLOW
CREAT SERPL-MCNC: 0.97 MG/DL (ref 0.7–1.11)
CREAT UR-MCNC: 61 MG/DL (ref 20–320)
EOSINOPHIL # BLD AUTO: 189 CELLS/UL (ref 15–500)
EOSINOPHIL NFR BLD AUTO: 4.2 %
ERYTHROCYTE [DISTWIDTH] IN BLOOD BY AUTOMATED COUNT: 12.5 % (ref 11–15)
GFRSERPLBLD MDRD-ARVRAT: 71 ML/MIN/1.73M2
GLOBULIN SER CALC-MCNC: 2.6 G/DL (CALC) (ref 1.9–3.7)
GLUCOSE SERPL-MCNC: 101 MG/DL (ref 65–99)
GLUCOSE UR QL STRIP: NEGATIVE
HCT VFR BLD AUTO: 35.8 % (ref 38.5–50)
HDLC SERPL-MCNC: 34 MG/DL
HGB BLD-MCNC: 12.4 G/DL (ref 13.2–17.1)
HGB UR QL STRIP: NEGATIVE
HYALINE CASTS #/AREA URNS LPF: NORMAL /LPF
KETONES UR QL STRIP: NEGATIVE
LDLC SERPL CALC-MCNC: 95 MG/DL (CALC)
LEUKOCYTE ESTERASE UR QL STRIP: NEGATIVE
LYMPHOCYTES # BLD AUTO: 1296 CELLS/UL (ref 850–3900)
LYMPHOCYTES NFR BLD AUTO: 28.8 %
MCH RBC QN AUTO: 34.9 PG (ref 27–33)
MCHC RBC AUTO-ENTMCNC: 34.6 G/DL (ref 32–36)
MCV RBC AUTO: 100.8 FL (ref 80–100)
MICROALBUMIN UR-MCNC: 0.7 MG/DL
MONOCYTES # BLD AUTO: 846 CELLS/UL (ref 200–950)
MONOCYTES NFR BLD AUTO: 18.8 %
NEUTROPHILS # BLD AUTO: 2138 CELLS/UL (ref 1500–7800)
NEUTROPHILS NFR BLD AUTO: 47.5 %
NITRITE UR QL STRIP: NEGATIVE
NONHDLC SERPL-MCNC: 126 MG/DL (CALC)
PH UR STRIP: 6 [PH] (ref 5–8)
PLATELET # BLD AUTO: 193 THOUSAND/UL (ref 140–400)
PMV BLD REES-ECKER: 11.4 FL (ref 7.5–12.5)
POTASSIUM SERPL-SCNC: 4.3 MMOL/L (ref 3.5–5.3)
PROT SERPL-MCNC: 6.8 G/DL (ref 6.1–8.1)
PROT UR QL STRIP: NEGATIVE
RBC # BLD AUTO: 3.55 MILLION/UL (ref 4.2–5.8)
RBC #/AREA URNS HPF: NORMAL /HPF
SODIUM SERPL-SCNC: 133 MMOL/L (ref 135–146)
SP GR UR STRIP: 1.01 (ref 1–1.03)
SQUAMOUS #/AREA URNS HPF: NORMAL /HPF
TRIGL SERPL-MCNC: 214 MG/DL
TSH SERPL-ACNC: 4.88 MIU/L (ref 0.4–4.5)
WBC # BLD AUTO: 4.5 THOUSAND/UL (ref 3.8–10.8)
WBC #/AREA URNS HPF: NORMAL /HPF

## 2021-03-03 ENCOUNTER — OFFICE VISIT (OUTPATIENT)
Dept: FAMILY MEDICINE | Facility: CLINIC | Age: 86
End: 2021-03-03
Payer: MEDICARE

## 2021-03-03 VITALS
WEIGHT: 170 LBS | BODY MASS INDEX: 25.18 KG/M2 | HEIGHT: 69 IN | DIASTOLIC BLOOD PRESSURE: 78 MMHG | HEART RATE: 60 BPM | SYSTOLIC BLOOD PRESSURE: 118 MMHG

## 2021-03-03 DIAGNOSIS — C61 MALIGNANT NEOPLASM OF PROSTATE: ICD-10-CM

## 2021-03-03 DIAGNOSIS — J43.1 PANLOBULAR EMPHYSEMA: ICD-10-CM

## 2021-03-03 DIAGNOSIS — Z85.46 HISTORY OF PROSTATE CANCER: ICD-10-CM

## 2021-03-03 DIAGNOSIS — I77.1 STRICTURE OF ARTERY: ICD-10-CM

## 2021-03-03 DIAGNOSIS — I10 ESSENTIAL HYPERTENSION: ICD-10-CM

## 2021-03-03 DIAGNOSIS — I49.8 BIGEMINY: ICD-10-CM

## 2021-03-03 DIAGNOSIS — F51.01 PRIMARY INSOMNIA: Primary | ICD-10-CM

## 2021-03-03 DIAGNOSIS — L30.0 NUMMULAR ECZEMATOUS DERMATITIS: ICD-10-CM

## 2021-03-03 PROCEDURE — 1159F PR MEDICATION LIST DOCUMENTED IN MEDICAL RECORD: ICD-10-PCS | Mod: S$GLB,,, | Performed by: FAMILY MEDICINE

## 2021-03-03 PROCEDURE — 99214 OFFICE O/P EST MOD 30 MIN: CPT | Mod: S$GLB,,, | Performed by: FAMILY MEDICINE

## 2021-03-03 PROCEDURE — 3078F PR MOST RECENT DIASTOLIC BLOOD PRESSURE < 80 MM HG: ICD-10-PCS | Mod: S$GLB,,, | Performed by: FAMILY MEDICINE

## 2021-03-03 PROCEDURE — 3074F PR MOST RECENT SYSTOLIC BLOOD PRESSURE < 130 MM HG: ICD-10-PCS | Mod: S$GLB,,, | Performed by: FAMILY MEDICINE

## 2021-03-03 PROCEDURE — 3078F DIAST BP <80 MM HG: CPT | Mod: S$GLB,,, | Performed by: FAMILY MEDICINE

## 2021-03-03 PROCEDURE — 1159F MED LIST DOCD IN RCRD: CPT | Mod: S$GLB,,, | Performed by: FAMILY MEDICINE

## 2021-03-03 PROCEDURE — 99214 PR OFFICE/OUTPT VISIT, EST, LEVL IV, 30-39 MIN: ICD-10-PCS | Mod: S$GLB,,, | Performed by: FAMILY MEDICINE

## 2021-03-03 PROCEDURE — 3074F SYST BP LT 130 MM HG: CPT | Mod: S$GLB,,, | Performed by: FAMILY MEDICINE

## 2021-03-03 RX ORDER — TEMAZEPAM 30 MG/1
30 CAPSULE ORAL NIGHTLY PRN
Qty: 30 CAPSULE | Refills: 3 | Status: SHIPPED | OUTPATIENT
Start: 2021-03-03 | End: 2021-03-08 | Stop reason: SDUPTHER

## 2021-03-08 DIAGNOSIS — F51.01 PRIMARY INSOMNIA: ICD-10-CM

## 2021-03-08 RX ORDER — TEMAZEPAM 30 MG/1
30 CAPSULE ORAL NIGHTLY PRN
Qty: 30 CAPSULE | Refills: 3 | Status: SHIPPED | OUTPATIENT
Start: 2021-03-08 | End: 2021-04-07

## 2021-03-08 RX ORDER — TEMAZEPAM 30 MG/1
30 CAPSULE ORAL NIGHTLY PRN
Qty: 30 CAPSULE | Refills: 3 | Status: CANCELLED | OUTPATIENT
Start: 2021-03-08 | End: 2021-04-07

## 2021-03-11 ENCOUNTER — IMMUNIZATION (OUTPATIENT)
Dept: PRIMARY CARE CLINIC | Facility: CLINIC | Age: 86
End: 2021-03-11

## 2021-03-11 DIAGNOSIS — Z23 NEED FOR VACCINATION: Primary | ICD-10-CM

## 2021-03-11 PROCEDURE — 0002A COVID-19, MRNA, LNP-S, PF, 30 MCG/0.3 ML DOSE VACCINE: ICD-10-PCS | Mod: CV19,S$GLB,, | Performed by: FAMILY MEDICINE

## 2021-03-11 PROCEDURE — 0002A COVID-19, MRNA, LNP-S, PF, 30 MCG/0.3 ML DOSE VACCINE: CPT | Mod: CV19,S$GLB,, | Performed by: FAMILY MEDICINE

## 2021-03-11 PROCEDURE — 91300 COVID-19, MRNA, LNP-S, PF, 30 MCG/0.3 ML DOSE VACCINE: CPT | Mod: S$GLB,,, | Performed by: FAMILY MEDICINE

## 2021-03-11 PROCEDURE — 91300 COVID-19, MRNA, LNP-S, PF, 30 MCG/0.3 ML DOSE VACCINE: ICD-10-PCS | Mod: S$GLB,,, | Performed by: FAMILY MEDICINE

## 2021-03-23 DIAGNOSIS — Z01.818 OTHER SPECIFIED PRE-OPERATIVE EXAMINATION: Primary | ICD-10-CM

## 2021-03-23 DIAGNOSIS — I10 HYPERTENSION: ICD-10-CM

## 2021-03-23 DIAGNOSIS — G47.19 EXCESSIVE DAYTIME SLEEPINESS: ICD-10-CM

## 2021-03-23 DIAGNOSIS — R53.83 FATIGUE DUE TO SLEEP PATTERN DISTURBANCE: ICD-10-CM

## 2021-03-23 DIAGNOSIS — R06.83 SNORING: ICD-10-CM

## 2021-03-23 DIAGNOSIS — G47.33 OSA (OBSTRUCTIVE SLEEP APNEA): ICD-10-CM

## 2021-03-23 DIAGNOSIS — G47.9 FATIGUE DUE TO SLEEP PATTERN DISTURBANCE: ICD-10-CM

## 2021-03-27 ENCOUNTER — LAB VISIT (OUTPATIENT)
Dept: PRIMARY CARE CLINIC | Facility: CLINIC | Age: 86
End: 2021-03-27
Payer: MEDICARE

## 2021-03-27 DIAGNOSIS — Z01.818 OTHER SPECIFIED PRE-OPERATIVE EXAMINATION: ICD-10-CM

## 2021-03-27 PROCEDURE — U0005 INFEC AGEN DETEC AMPLI PROBE: HCPCS | Performed by: INTERNAL MEDICINE

## 2021-03-27 PROCEDURE — U0003 INFECTIOUS AGENT DETECTION BY NUCLEIC ACID (DNA OR RNA); SEVERE ACUTE RESPIRATORY SYNDROME CORONAVIRUS 2 (SARS-COV-2) (CORONAVIRUS DISEASE [COVID-19]), AMPLIFIED PROBE TECHNIQUE, MAKING USE OF HIGH THROUGHPUT TECHNOLOGIES AS DESCRIBED BY CMS-2020-01-R: HCPCS | Performed by: INTERNAL MEDICINE

## 2021-03-28 LAB — SARS-COV-2 RNA RESP QL NAA+PROBE: NOT DETECTED

## 2021-03-30 ENCOUNTER — PROCEDURE VISIT (OUTPATIENT)
Dept: SLEEP MEDICINE | Facility: HOSPITAL | Age: 86
End: 2021-03-30
Attending: INTERNAL MEDICINE
Payer: MEDICARE

## 2021-03-30 DIAGNOSIS — G47.9 FATIGUE DUE TO SLEEP PATTERN DISTURBANCE: ICD-10-CM

## 2021-03-30 DIAGNOSIS — R06.83 SNORING: ICD-10-CM

## 2021-03-30 DIAGNOSIS — G47.33 OSA (OBSTRUCTIVE SLEEP APNEA): ICD-10-CM

## 2021-03-30 DIAGNOSIS — I10 HYPERTENSION: ICD-10-CM

## 2021-03-30 DIAGNOSIS — G47.19 EXCESSIVE DAYTIME SLEEPINESS: ICD-10-CM

## 2021-03-30 DIAGNOSIS — R53.83 FATIGUE DUE TO SLEEP PATTERN DISTURBANCE: ICD-10-CM

## 2021-03-30 PROCEDURE — 95810 POLYSOM 6/> YRS 4/> PARAM: CPT

## 2021-05-10 ENCOUNTER — PATIENT MESSAGE (OUTPATIENT)
Dept: FAMILY MEDICINE | Facility: CLINIC | Age: 86
End: 2021-05-10

## 2021-05-11 ENCOUNTER — CLINICAL SUPPORT (OUTPATIENT)
Dept: FAMILY MEDICINE | Facility: CLINIC | Age: 86
End: 2021-05-11

## 2021-05-11 ENCOUNTER — PATIENT MESSAGE (OUTPATIENT)
Dept: DERMATOLOGY | Facility: CLINIC | Age: 86
End: 2021-05-11

## 2021-05-11 RX ORDER — TEMAZEPAM 30 MG/1
30 CAPSULE ORAL NIGHTLY PRN
Qty: 30 CAPSULE | Refills: 2 | Status: SHIPPED | OUTPATIENT
Start: 2021-05-11 | End: 2021-08-15 | Stop reason: SDUPTHER

## 2021-05-11 RX ORDER — TEMAZEPAM 15 MG/1
15 CAPSULE ORAL NIGHTLY
COMMUNITY
Start: 2021-03-08 | End: 2021-05-11

## 2021-05-11 RX ORDER — TEMAZEPAM 30 MG/1
30 CAPSULE ORAL NIGHTLY PRN
COMMUNITY
End: 2021-05-11 | Stop reason: SDUPTHER

## 2021-05-11 RX ORDER — TEMAZEPAM 30 MG/1
30 CAPSULE ORAL NIGHTLY PRN
Qty: 30 CAPSULE | Refills: 0 | Status: SHIPPED | OUTPATIENT
Start: 2021-05-11 | End: 2021-05-19

## 2021-05-19 ENCOUNTER — OFFICE VISIT (OUTPATIENT)
Dept: DERMATOLOGY | Facility: CLINIC | Age: 86
End: 2021-05-19
Payer: MEDICARE

## 2021-05-19 DIAGNOSIS — L57.0 ACTINIC KERATOSES: Primary | ICD-10-CM

## 2021-05-19 PROCEDURE — 99499 UNLISTED E&M SERVICE: CPT | Mod: S$GLB,,, | Performed by: DERMATOLOGY

## 2021-05-19 PROCEDURE — 17000 DESTRUCT PREMALG LESION: CPT | Mod: S$GLB,,, | Performed by: DERMATOLOGY

## 2021-05-19 PROCEDURE — 99499 NO LOS: ICD-10-PCS | Mod: S$GLB,,, | Performed by: DERMATOLOGY

## 2021-05-19 PROCEDURE — 99999 PR PBB SHADOW E&M-EST. PATIENT-LVL III: ICD-10-PCS | Mod: PBBFAC,,, | Performed by: DERMATOLOGY

## 2021-05-19 PROCEDURE — 17000 PR DESTRUCTION(LASER SURGERY,CRYOSURGERY,CHEMOSURGERY),PREMALIGNANT LESIONS,FIRST LESION: ICD-10-PCS | Mod: S$GLB,,, | Performed by: DERMATOLOGY

## 2021-05-19 PROCEDURE — 17003 DESTRUCTION, PREMALIGNANT LESIONS; SECOND THROUGH 14 LESIONS: ICD-10-PCS | Mod: S$GLB,,, | Performed by: DERMATOLOGY

## 2021-05-19 PROCEDURE — 99999 PR PBB SHADOW E&M-EST. PATIENT-LVL III: CPT | Mod: PBBFAC,,, | Performed by: DERMATOLOGY

## 2021-05-19 PROCEDURE — 17003 DESTRUCT PREMALG LES 2-14: CPT | Mod: S$GLB,,, | Performed by: DERMATOLOGY

## 2021-06-05 ENCOUNTER — HOSPITAL ENCOUNTER (EMERGENCY)
Facility: HOSPITAL | Age: 86
Discharge: HOME | End: 2021-06-05
Attending: EMERGENCY MEDICINE
Payer: MEDICARE

## 2021-06-05 VITALS
HEIGHT: 68 IN | BODY MASS INDEX: 26.52 KG/M2 | TEMPERATURE: 98 F | OXYGEN SATURATION: 97 % | HEART RATE: 73 BPM | WEIGHT: 175 LBS | SYSTOLIC BLOOD PRESSURE: 176 MMHG | DIASTOLIC BLOOD PRESSURE: 73 MMHG | RESPIRATION RATE: 18 BRPM

## 2021-06-05 DIAGNOSIS — S52.502A CLOSED FRACTURE OF DISTAL END OF LEFT RADIUS, UNSPECIFIED FRACTURE MORPHOLOGY, INITIAL ENCOUNTER: ICD-10-CM

## 2021-06-05 DIAGNOSIS — S52.615A CLOSED NONDISPLACED FRACTURE OF STYLOID PROCESS OF LEFT ULNA, INITIAL ENCOUNTER: Primary | ICD-10-CM

## 2021-06-05 DIAGNOSIS — R52 PAIN: ICD-10-CM

## 2021-06-05 PROCEDURE — 25000003 PHARM REV CODE 250: Performed by: NURSE PRACTITIONER

## 2021-06-05 PROCEDURE — 99284 EMERGENCY DEPT VISIT MOD MDM: CPT | Mod: 25

## 2021-06-05 PROCEDURE — 29125 APPL SHORT ARM SPLINT STATIC: CPT | Mod: LT

## 2021-06-05 RX ORDER — OXYCODONE AND ACETAMINOPHEN 5; 325 MG/1; MG/1
1 TABLET ORAL EVERY 4 HOURS PRN
Qty: 18 TABLET | Refills: 0 | Status: SHIPPED | OUTPATIENT
Start: 2021-06-05 | End: 2021-08-17

## 2021-06-05 RX ORDER — OXYCODONE AND ACETAMINOPHEN 5; 325 MG/1; MG/1
1 TABLET ORAL
Status: COMPLETED | OUTPATIENT
Start: 2021-06-05 | End: 2021-06-05

## 2021-06-05 RX ADMIN — OXYCODONE HYDROCHLORIDE AND ACETAMINOPHEN 1 TABLET: 5; 325 TABLET ORAL at 04:06

## 2021-06-21 ENCOUNTER — PATIENT MESSAGE (OUTPATIENT)
Dept: FAMILY MEDICINE | Facility: CLINIC | Age: 86
End: 2021-06-21

## 2021-06-22 ENCOUNTER — OFFICE VISIT (OUTPATIENT)
Dept: FAMILY MEDICINE | Facility: CLINIC | Age: 86
End: 2021-06-22
Payer: MEDICARE

## 2021-06-22 VITALS
BODY MASS INDEX: 25.01 KG/M2 | SYSTOLIC BLOOD PRESSURE: 126 MMHG | DIASTOLIC BLOOD PRESSURE: 84 MMHG | HEART RATE: 72 BPM | WEIGHT: 165 LBS | HEIGHT: 68 IN

## 2021-06-22 DIAGNOSIS — H61.23 BILATERAL IMPACTED CERUMEN: ICD-10-CM

## 2021-06-22 DIAGNOSIS — S83.411S SPRAIN OF MEDIAL COLLATERAL LIGAMENT OF RIGHT KNEE, SEQUELA: Primary | ICD-10-CM

## 2021-06-22 PROCEDURE — 69210 EAR CERUMEN REMOVAL: ICD-10-PCS | Mod: S$GLB,,, | Performed by: NURSE PRACTITIONER

## 2021-06-22 PROCEDURE — 3288F FALL RISK ASSESSMENT DOCD: CPT | Mod: S$GLB,,, | Performed by: NURSE PRACTITIONER

## 2021-06-22 PROCEDURE — 3288F PR FALLS RISK ASSESSMENT DOCUMENTED: ICD-10-PCS | Mod: S$GLB,,, | Performed by: NURSE PRACTITIONER

## 2021-06-22 PROCEDURE — 69210 REMOVE IMPACTED EAR WAX UNI: CPT | Mod: S$GLB,,, | Performed by: NURSE PRACTITIONER

## 2021-06-22 PROCEDURE — 1159F PR MEDICATION LIST DOCUMENTED IN MEDICAL RECORD: ICD-10-PCS | Mod: S$GLB,,, | Performed by: NURSE PRACTITIONER

## 2021-06-22 PROCEDURE — 1159F MED LIST DOCD IN RCRD: CPT | Mod: S$GLB,,, | Performed by: NURSE PRACTITIONER

## 2021-06-22 PROCEDURE — 99213 OFFICE O/P EST LOW 20 MIN: CPT | Mod: 25,S$GLB,, | Performed by: NURSE PRACTITIONER

## 2021-06-22 PROCEDURE — 1101F PR PT FALLS ASSESS DOC 0-1 FALLS W/OUT INJ PAST YR: ICD-10-PCS | Mod: S$GLB,,, | Performed by: NURSE PRACTITIONER

## 2021-06-22 PROCEDURE — 99213 PR OFFICE/OUTPT VISIT, EST, LEVL III, 20-29 MIN: ICD-10-PCS | Mod: 25,S$GLB,, | Performed by: NURSE PRACTITIONER

## 2021-06-22 PROCEDURE — 1101F PT FALLS ASSESS-DOCD LE1/YR: CPT | Mod: S$GLB,,, | Performed by: NURSE PRACTITIONER

## 2021-08-16 RX ORDER — TEMAZEPAM 30 MG/1
30 CAPSULE ORAL NIGHTLY PRN
Qty: 30 CAPSULE | Refills: 2 | Status: SHIPPED | OUTPATIENT
Start: 2021-08-16 | End: 2021-09-16 | Stop reason: SDUPTHER

## 2021-08-17 ENCOUNTER — OFFICE VISIT (OUTPATIENT)
Dept: FAMILY MEDICINE | Facility: CLINIC | Age: 86
End: 2021-08-17
Payer: MEDICARE

## 2021-08-17 VITALS
HEIGHT: 68 IN | DIASTOLIC BLOOD PRESSURE: 82 MMHG | SYSTOLIC BLOOD PRESSURE: 134 MMHG | WEIGHT: 169 LBS | HEART RATE: 76 BPM | BODY MASS INDEX: 25.61 KG/M2

## 2021-08-17 DIAGNOSIS — G47.33 OBSTRUCTIVE SLEEP APNEA: ICD-10-CM

## 2021-08-17 DIAGNOSIS — I10 ESSENTIAL HYPERTENSION: Primary | ICD-10-CM

## 2021-08-17 DIAGNOSIS — D53.9 MACROCYTIC ANEMIA: ICD-10-CM

## 2021-08-17 DIAGNOSIS — F51.01 PRIMARY INSOMNIA: ICD-10-CM

## 2021-08-17 DIAGNOSIS — H91.93 DECREASED HEARING OF BOTH EARS: ICD-10-CM

## 2021-08-17 DIAGNOSIS — Z85.46 HISTORY OF PROSTATE CANCER: ICD-10-CM

## 2021-08-17 PROCEDURE — 99214 OFFICE O/P EST MOD 30 MIN: CPT | Mod: S$GLB,,, | Performed by: FAMILY MEDICINE

## 2021-08-17 PROCEDURE — 99214 PR OFFICE/OUTPT VISIT, EST, LEVL IV, 30-39 MIN: ICD-10-PCS | Mod: S$GLB,,, | Performed by: FAMILY MEDICINE

## 2021-08-17 PROCEDURE — 1100F PTFALLS ASSESS-DOCD GE2>/YR: CPT | Mod: S$GLB,,, | Performed by: FAMILY MEDICINE

## 2021-08-17 PROCEDURE — 3288F PR FALLS RISK ASSESSMENT DOCUMENTED: ICD-10-PCS | Mod: S$GLB,,, | Performed by: FAMILY MEDICINE

## 2021-08-17 PROCEDURE — 1100F PR PT FALLS ASSESS DOC 2+ FALLS/FALL W/INJURY/YR: ICD-10-PCS | Mod: S$GLB,,, | Performed by: FAMILY MEDICINE

## 2021-08-17 PROCEDURE — 3288F FALL RISK ASSESSMENT DOCD: CPT | Mod: S$GLB,,, | Performed by: FAMILY MEDICINE

## 2021-08-17 RX ORDER — AMLODIPINE BESYLATE 5 MG/1
5 TABLET ORAL DAILY
Qty: 90 TABLET | Refills: 1 | Status: SHIPPED | OUTPATIENT
Start: 2021-08-17 | End: 2022-02-23 | Stop reason: SDUPTHER

## 2021-08-17 RX ORDER — LISINOPRIL 20 MG/1
20 TABLET ORAL DAILY
Qty: 90 TABLET | Refills: 1 | Status: SHIPPED | OUTPATIENT
Start: 2021-08-17 | End: 2022-02-23 | Stop reason: SDUPTHER

## 2021-09-02 ENCOUNTER — PATIENT MESSAGE (OUTPATIENT)
Dept: DERMATOLOGY | Facility: CLINIC | Age: 86
End: 2021-09-02

## 2021-09-16 RX ORDER — TEMAZEPAM 30 MG/1
30 CAPSULE ORAL NIGHTLY PRN
Qty: 30 CAPSULE | Refills: 2 | Status: SHIPPED | OUTPATIENT
Start: 2021-09-16 | End: 2021-12-16 | Stop reason: SDUPTHER

## 2021-09-17 DIAGNOSIS — R31.0 GROSS HEMATURIA: ICD-10-CM

## 2021-09-17 DIAGNOSIS — N41.0 ACUTE PROSTATITIS: Primary | ICD-10-CM

## 2021-09-21 ENCOUNTER — TELEPHONE (OUTPATIENT)
Dept: FAMILY MEDICINE | Facility: CLINIC | Age: 86
End: 2021-09-21

## 2021-09-30 ENCOUNTER — HOSPITAL ENCOUNTER (OUTPATIENT)
Dept: RADIOLOGY | Facility: HOSPITAL | Age: 86
Discharge: HOME | End: 2021-09-30
Attending: SPECIALIST
Payer: MEDICARE

## 2021-09-30 DIAGNOSIS — R31.0 GROSS HEMATURIA: ICD-10-CM

## 2021-09-30 DIAGNOSIS — N41.0 ACUTE PROSTATITIS: ICD-10-CM

## 2021-09-30 LAB
CREAT SERPL-MCNC: 1.1 MG/DL (ref 0.5–1.4)
SAMPLE: NORMAL

## 2021-09-30 PROCEDURE — 82565 ASSAY OF CREATININE: CPT | Mod: PO

## 2021-09-30 PROCEDURE — 25500020 PHARM REV CODE 255: Mod: PO | Performed by: SPECIALIST

## 2021-09-30 RX ADMIN — IOHEXOL 100 ML: 350 INJECTION, SOLUTION INTRAVENOUS at 08:09

## 2021-11-03 DIAGNOSIS — J43.1 PANLOBULAR EMPHYSEMA: ICD-10-CM

## 2021-11-03 RX ORDER — BUDESONIDE AND FORMOTEROL FUMARATE DIHYDRATE 160; 4.5 UG/1; UG/1
2 AEROSOL RESPIRATORY (INHALATION) EVERY 12 HOURS
Qty: 30 G | Refills: 1 | Status: SHIPPED | OUTPATIENT
Start: 2021-11-03 | End: 2022-02-23 | Stop reason: SDUPTHER

## 2021-11-16 ENCOUNTER — OFFICE VISIT (OUTPATIENT)
Dept: DERMATOLOGY | Facility: CLINIC | Age: 86
End: 2021-11-16
Payer: MEDICARE

## 2021-11-16 DIAGNOSIS — L82.1 SEBORRHEIC KERATOSES: ICD-10-CM

## 2021-11-16 DIAGNOSIS — L85.3 XEROSIS CUTIS: ICD-10-CM

## 2021-11-16 DIAGNOSIS — L57.0 ACTINIC KERATOSES: Primary | ICD-10-CM

## 2021-11-16 DIAGNOSIS — Z85.828 HISTORY OF NONMELANOMA SKIN CANCER: ICD-10-CM

## 2021-11-16 DIAGNOSIS — D22.9 MULTIPLE BENIGN NEVI: ICD-10-CM

## 2021-11-16 DIAGNOSIS — L90.5 SCAR: ICD-10-CM

## 2021-11-16 DIAGNOSIS — L29.9 PRURITUS: ICD-10-CM

## 2021-11-16 PROCEDURE — 17000 DESTRUCT PREMALG LESION: CPT | Mod: S$GLB,,, | Performed by: DERMATOLOGY

## 2021-11-16 PROCEDURE — 99214 OFFICE O/P EST MOD 30 MIN: CPT | Mod: 25,S$GLB,, | Performed by: DERMATOLOGY

## 2021-11-16 PROCEDURE — 17003 DESTRUCT PREMALG LES 2-14: CPT | Mod: S$GLB,,, | Performed by: DERMATOLOGY

## 2021-11-16 PROCEDURE — 99999 PR PBB SHADOW E&M-EST. PATIENT-LVL II: CPT | Mod: PBBFAC,,, | Performed by: DERMATOLOGY

## 2021-11-16 PROCEDURE — 1160F RVW MEDS BY RX/DR IN RCRD: CPT | Mod: CPTII,S$GLB,, | Performed by: DERMATOLOGY

## 2021-11-16 PROCEDURE — 1160F PR REVIEW ALL MEDS BY PRESCRIBER/CLIN PHARMACIST DOCUMENTED: ICD-10-PCS | Mod: CPTII,S$GLB,, | Performed by: DERMATOLOGY

## 2021-11-16 PROCEDURE — 17003 DESTRUCTION, PREMALIGNANT LESIONS; SECOND THROUGH 14 LESIONS: ICD-10-PCS | Mod: S$GLB,,, | Performed by: DERMATOLOGY

## 2021-11-16 PROCEDURE — 1159F PR MEDICATION LIST DOCUMENTED IN MEDICAL RECORD: ICD-10-PCS | Mod: CPTII,S$GLB,, | Performed by: DERMATOLOGY

## 2021-11-16 PROCEDURE — 1159F MED LIST DOCD IN RCRD: CPT | Mod: CPTII,S$GLB,, | Performed by: DERMATOLOGY

## 2021-11-16 PROCEDURE — 99999 PR PBB SHADOW E&M-EST. PATIENT-LVL II: ICD-10-PCS | Mod: PBBFAC,,, | Performed by: DERMATOLOGY

## 2021-11-16 PROCEDURE — 99214 PR OFFICE/OUTPT VISIT, EST, LEVL IV, 30-39 MIN: ICD-10-PCS | Mod: 25,S$GLB,, | Performed by: DERMATOLOGY

## 2021-11-16 PROCEDURE — 17000 PR DESTRUCTION(LASER SURGERY,CRYOSURGERY,CHEMOSURGERY),PREMALIGNANT LESIONS,FIRST LESION: ICD-10-PCS | Mod: S$GLB,,, | Performed by: DERMATOLOGY

## 2021-11-16 RX ORDER — TRIAMCINOLONE ACETONIDE 1 MG/G
CREAM TOPICAL
Qty: 80 G | Refills: 3 | Status: SHIPPED | OUTPATIENT
Start: 2021-11-16 | End: 2022-05-17 | Stop reason: ALTCHOICE

## 2021-11-29 ENCOUNTER — PATIENT MESSAGE (OUTPATIENT)
Dept: FAMILY MEDICINE | Facility: CLINIC | Age: 86
End: 2021-11-29
Payer: MEDICARE

## 2021-11-30 ENCOUNTER — PATIENT MESSAGE (OUTPATIENT)
Dept: FAMILY MEDICINE | Facility: CLINIC | Age: 86
End: 2021-11-30
Payer: MEDICARE

## 2021-12-08 ENCOUNTER — PATIENT MESSAGE (OUTPATIENT)
Dept: FAMILY MEDICINE | Facility: CLINIC | Age: 86
End: 2021-12-08
Payer: MEDICARE

## 2021-12-09 ENCOUNTER — OFFICE VISIT (OUTPATIENT)
Dept: FAMILY MEDICINE | Facility: CLINIC | Age: 86
End: 2021-12-09
Payer: MEDICARE

## 2021-12-09 VITALS
DIASTOLIC BLOOD PRESSURE: 80 MMHG | SYSTOLIC BLOOD PRESSURE: 124 MMHG | BODY MASS INDEX: 25.89 KG/M2 | TEMPERATURE: 100 F | HEIGHT: 68 IN | WEIGHT: 170.81 LBS | HEART RATE: 76 BPM | OXYGEN SATURATION: 96 %

## 2021-12-09 DIAGNOSIS — J06.9 UPPER RESPIRATORY TRACT INFECTION, UNSPECIFIED TYPE: Primary | ICD-10-CM

## 2021-12-09 PROCEDURE — 96372 PR INJECTION,THERAP/PROPH/DIAG2ST, IM OR SUBCUT: ICD-10-PCS | Mod: S$GLB,,, | Performed by: NURSE PRACTITIONER

## 2021-12-09 PROCEDURE — 96372 THER/PROPH/DIAG INJ SC/IM: CPT | Mod: S$GLB,,, | Performed by: NURSE PRACTITIONER

## 2021-12-09 PROCEDURE — 99213 OFFICE O/P EST LOW 20 MIN: CPT | Mod: 25,S$GLB,, | Performed by: NURSE PRACTITIONER

## 2021-12-09 PROCEDURE — 99213 PR OFFICE/OUTPT VISIT, EST, LEVL III, 20-29 MIN: ICD-10-PCS | Mod: 25,S$GLB,, | Performed by: NURSE PRACTITIONER

## 2021-12-09 RX ORDER — SOLIFENACIN SUCCINATE 10 MG/1
10 TABLET, FILM COATED ORAL DAILY
COMMUNITY
Start: 2021-09-21 | End: 2022-05-17 | Stop reason: ALTCHOICE

## 2021-12-09 RX ORDER — DEXAMETHASONE SODIUM PHOSPHATE 4 MG/ML
8 INJECTION, SOLUTION INTRA-ARTICULAR; INTRALESIONAL; INTRAMUSCULAR; INTRAVENOUS; SOFT TISSUE ONCE
Status: COMPLETED | OUTPATIENT
Start: 2021-12-09 | End: 2021-12-09

## 2021-12-09 RX ORDER — AZITHROMYCIN 250 MG/1
TABLET, FILM COATED ORAL
Qty: 6 TABLET | Refills: 0 | Status: SHIPPED | OUTPATIENT
Start: 2021-12-09 | End: 2022-05-17 | Stop reason: ALTCHOICE

## 2021-12-09 RX ADMIN — DEXAMETHASONE SODIUM PHOSPHATE 8 MG: 4 INJECTION, SOLUTION INTRA-ARTICULAR; INTRALESIONAL; INTRAMUSCULAR; INTRAVENOUS; SOFT TISSUE at 02:12

## 2021-12-13 ENCOUNTER — TELEPHONE (OUTPATIENT)
Dept: FAMILY MEDICINE | Facility: CLINIC | Age: 86
End: 2021-12-13
Payer: MEDICARE

## 2021-12-13 DIAGNOSIS — J06.9 UPPER RESPIRATORY TRACT INFECTION, UNSPECIFIED TYPE: Primary | ICD-10-CM

## 2021-12-13 DIAGNOSIS — R05.9 COUGH: ICD-10-CM

## 2021-12-13 RX ORDER — CODEINE PHOSPHATE AND GUAIFENESIN 10; 100 MG/5ML; MG/5ML
5 SOLUTION ORAL EVERY 6 HOURS PRN
Qty: 100 ML | Refills: 0 | Status: SHIPPED | OUTPATIENT
Start: 2021-12-13 | End: 2021-12-23

## 2021-12-14 ENCOUNTER — OFFICE VISIT (OUTPATIENT)
Dept: FAMILY MEDICINE | Facility: CLINIC | Age: 86
End: 2021-12-14
Payer: MEDICARE

## 2021-12-14 ENCOUNTER — HOSPITAL ENCOUNTER (OUTPATIENT)
Dept: RADIOLOGY | Facility: HOSPITAL | Age: 86
Discharge: HOME | End: 2021-12-14
Attending: NURSE PRACTITIONER
Payer: MEDICARE

## 2021-12-14 VITALS
DIASTOLIC BLOOD PRESSURE: 68 MMHG | HEART RATE: 76 BPM | WEIGHT: 170 LBS | OXYGEN SATURATION: 94 % | SYSTOLIC BLOOD PRESSURE: 122 MMHG | BODY MASS INDEX: 25.76 KG/M2 | TEMPERATURE: 98 F | HEIGHT: 68 IN

## 2021-12-14 DIAGNOSIS — U07.1 COVID-19 VIRUS DETECTED: ICD-10-CM

## 2021-12-14 DIAGNOSIS — U07.1 COVID-19 VIRUS INFECTION: Primary | ICD-10-CM

## 2021-12-14 DIAGNOSIS — R05.9 COUGH: ICD-10-CM

## 2021-12-14 DIAGNOSIS — J43.1 PANLOBULAR EMPHYSEMA: ICD-10-CM

## 2021-12-14 LAB
CTP QC/QA: YES
CTP QC/QA: YES
POC MOLECULAR INFLUENZA A AGN: NEGATIVE
POC MOLECULAR INFLUENZA B AGN: NEGATIVE
SARS-COV-2 RDRP RESP QL NAA+PROBE: POSITIVE

## 2021-12-14 PROCEDURE — 99213 PR OFFICE/OUTPT VISIT, EST, LEVL III, 20-29 MIN: ICD-10-PCS | Mod: S$GLB,,, | Performed by: NURSE PRACTITIONER

## 2021-12-14 PROCEDURE — U0002 COVID-19 LAB TEST NON-CDC: HCPCS | Mod: QW,S$GLB,, | Performed by: NURSE PRACTITIONER

## 2021-12-14 PROCEDURE — 71046 X-RAY EXAM CHEST 2 VIEWS: CPT | Mod: TC,PO

## 2021-12-14 PROCEDURE — U0002: ICD-10-PCS | Mod: QW,S$GLB,, | Performed by: NURSE PRACTITIONER

## 2021-12-14 PROCEDURE — 99213 OFFICE O/P EST LOW 20 MIN: CPT | Mod: S$GLB,,, | Performed by: NURSE PRACTITIONER

## 2021-12-14 PROCEDURE — 87502 INFLUENZA DNA AMP PROBE: CPT | Mod: QW,,, | Performed by: NURSE PRACTITIONER

## 2021-12-14 PROCEDURE — 87502 POCT INFLUENZA A/B MOLECULAR: ICD-10-PCS | Mod: QW,,, | Performed by: NURSE PRACTITIONER

## 2021-12-15 ENCOUNTER — INFUSION (OUTPATIENT)
Dept: INFECTIOUS DISEASES | Facility: HOSPITAL | Age: 86
End: 2021-12-15
Attending: NURSE PRACTITIONER
Payer: MEDICARE

## 2021-12-15 VITALS
OXYGEN SATURATION: 95 % | SYSTOLIC BLOOD PRESSURE: 106 MMHG | DIASTOLIC BLOOD PRESSURE: 59 MMHG | HEART RATE: 63 BPM | RESPIRATION RATE: 18 BRPM | TEMPERATURE: 98 F

## 2021-12-15 DIAGNOSIS — U07.1 COVID-19 VIRUS INFECTION: Primary | ICD-10-CM

## 2021-12-15 PROCEDURE — M0243 CASIRIVI AND IMDEVI INFUSION: HCPCS | Performed by: NURSE PRACTITIONER

## 2021-12-15 PROCEDURE — 63600175 PHARM REV CODE 636 W HCPCS: Performed by: NURSE PRACTITIONER

## 2021-12-15 PROCEDURE — 25000003 PHARM REV CODE 250: Performed by: NURSE PRACTITIONER

## 2021-12-15 RX ORDER — EPINEPHRINE 0.3 MG/.3ML
0.3 INJECTION SUBCUTANEOUS
Status: ACTIVE | OUTPATIENT
Start: 2021-12-15

## 2021-12-15 RX ORDER — SODIUM CHLORIDE 0.9 % (FLUSH) 0.9 %
10 SYRINGE (ML) INJECTION
Status: ACTIVE | OUTPATIENT
Start: 2021-12-15

## 2021-12-15 RX ORDER — DIPHENHYDRAMINE HYDROCHLORIDE 50 MG/ML
25 INJECTION INTRAMUSCULAR; INTRAVENOUS ONCE AS NEEDED
Status: ACTIVE | OUTPATIENT
Start: 2021-12-15 | End: 2033-05-13

## 2021-12-15 RX ORDER — ALBUTEROL SULFATE 90 UG/1
2 AEROSOL, METERED RESPIRATORY (INHALATION)
Status: ACTIVE | OUTPATIENT
Start: 2021-12-15

## 2021-12-15 RX ORDER — ONDANSETRON 4 MG/1
4 TABLET, ORALLY DISINTEGRATING ORAL ONCE AS NEEDED
Status: DISPENSED | OUTPATIENT
Start: 2021-12-15 | End: 2033-05-13

## 2021-12-15 RX ORDER — ACETAMINOPHEN 325 MG/1
650 TABLET ORAL ONCE AS NEEDED
Status: DISCONTINUED | OUTPATIENT
Start: 2021-12-15 | End: 2023-02-01

## 2021-12-15 RX ADMIN — CASIRIVIMAB AND IMDEVIMAB 600 MG: 600; 600 INJECTION, SOLUTION, CONCENTRATE INTRAVENOUS at 01:12

## 2021-12-15 RX ADMIN — SODIUM CHLORIDE: 0.9 INJECTION, SOLUTION INTRAVENOUS at 01:12

## 2021-12-17 RX ORDER — TEMAZEPAM 30 MG/1
30 CAPSULE ORAL NIGHTLY PRN
Qty: 30 CAPSULE | Refills: 2 | Status: SHIPPED | OUTPATIENT
Start: 2021-12-17 | End: 2022-02-23 | Stop reason: SDUPTHER

## 2021-12-20 ENCOUNTER — PATIENT MESSAGE (OUTPATIENT)
Dept: FAMILY MEDICINE | Facility: CLINIC | Age: 86
End: 2021-12-20
Payer: MEDICARE

## 2021-12-20 ENCOUNTER — TELEPHONE (OUTPATIENT)
Dept: FAMILY MEDICINE | Facility: CLINIC | Age: 86
End: 2021-12-20
Payer: MEDICARE

## 2022-01-28 ENCOUNTER — PATIENT MESSAGE (OUTPATIENT)
Dept: FAMILY MEDICINE | Facility: CLINIC | Age: 87
End: 2022-01-28
Payer: MEDICARE

## 2022-01-31 RX ORDER — CODEINE PHOSPHATE AND GUAIFENESIN 10; 100 MG/5ML; MG/5ML
5 SOLUTION ORAL EVERY 6 HOURS PRN
Qty: 120 ML | Refills: 0 | Status: SHIPPED | OUTPATIENT
Start: 2022-01-31 | End: 2022-02-10

## 2022-02-02 ENCOUNTER — TELEPHONE (OUTPATIENT)
Dept: FAMILY MEDICINE | Facility: CLINIC | Age: 87
End: 2022-02-02
Payer: MEDICARE

## 2022-02-02 ENCOUNTER — PATIENT MESSAGE (OUTPATIENT)
Dept: FAMILY MEDICINE | Facility: CLINIC | Age: 87
End: 2022-02-02
Payer: MEDICARE

## 2022-02-03 ENCOUNTER — HOSPITAL ENCOUNTER (EMERGENCY)
Facility: HOSPITAL | Age: 87
Discharge: HOME | End: 2022-02-03
Attending: EMERGENCY MEDICINE
Payer: MEDICARE

## 2022-02-03 VITALS
HEIGHT: 68 IN | OXYGEN SATURATION: 98 % | SYSTOLIC BLOOD PRESSURE: 137 MMHG | DIASTOLIC BLOOD PRESSURE: 81 MMHG | RESPIRATION RATE: 17 BRPM | HEART RATE: 77 BPM | TEMPERATURE: 98 F | WEIGHT: 170 LBS | BODY MASS INDEX: 25.76 KG/M2
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
## 2022-02-03 PROCEDURE — 99283 EMERGENCY DEPT VISIT LOW MDM: CPT | Mod: 25
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
## 2022-02-08 ENCOUNTER — PATIENT MESSAGE (OUTPATIENT)
Dept: FAMILY MEDICINE | Facility: CLINIC | Age: 87
End: 2022-02-08
Payer: MEDICARE

## 2022-02-08 ENCOUNTER — OFFICE VISIT (OUTPATIENT)
Dept: FAMILY MEDICINE | Facility: CLINIC | Age: 87
End: 2022-02-08
Payer: MEDICARE

## 2022-02-08 VITALS
HEIGHT: 68 IN | BODY MASS INDEX: 25.73 KG/M2 | SYSTOLIC BLOOD PRESSURE: 114 MMHG | DIASTOLIC BLOOD PRESSURE: 70 MMHG | HEART RATE: 62 BPM | WEIGHT: 169.81 LBS | TEMPERATURE: 98 F | OXYGEN SATURATION: 97 %

## 2022-02-08 DIAGNOSIS — I10 ESSENTIAL HYPERTENSION: ICD-10-CM

## 2022-02-08 DIAGNOSIS — J43.1 PANLOBULAR EMPHYSEMA: ICD-10-CM

## 2022-02-08 DIAGNOSIS — Z85.46 HISTORY OF PROSTATE CANCER: ICD-10-CM

## 2022-02-08 DIAGNOSIS — G47.33 OBSTRUCTIVE SLEEP APNEA: ICD-10-CM

## 2022-02-08 DIAGNOSIS — F51.01 PRIMARY INSOMNIA: ICD-10-CM

## 2022-02-08 DIAGNOSIS — B35.1 ONYCHOMYCOSIS: Primary | ICD-10-CM

## 2022-02-08 PROCEDURE — 1101F PT FALLS ASSESS-DOCD LE1/YR: CPT | Mod: S$GLB,,, | Performed by: PHYSICIAN ASSISTANT

## 2022-02-08 PROCEDURE — 3288F FALL RISK ASSESSMENT DOCD: CPT | Mod: S$GLB,,, | Performed by: PHYSICIAN ASSISTANT

## 2022-02-08 PROCEDURE — 99214 OFFICE O/P EST MOD 30 MIN: CPT | Mod: S$GLB,,, | Performed by: PHYSICIAN ASSISTANT

## 2022-02-08 PROCEDURE — 1159F MED LIST DOCD IN RCRD: CPT | Mod: S$GLB,,, | Performed by: PHYSICIAN ASSISTANT

## 2022-02-08 PROCEDURE — 1160F RVW MEDS BY RX/DR IN RCRD: CPT | Mod: S$GLB,,, | Performed by: PHYSICIAN ASSISTANT

## 2022-02-08 PROCEDURE — 3288F PR FALLS RISK ASSESSMENT DOCUMENTED: ICD-10-PCS | Mod: S$GLB,,, | Performed by: PHYSICIAN ASSISTANT

## 2022-02-08 PROCEDURE — 99214 PR OFFICE/OUTPT VISIT, EST, LEVL IV, 30-39 MIN: ICD-10-PCS | Mod: S$GLB,,, | Performed by: PHYSICIAN ASSISTANT

## 2022-02-08 PROCEDURE — 1101F PR PT FALLS ASSESS DOC 0-1 FALLS W/OUT INJ PAST YR: ICD-10-PCS | Mod: S$GLB,,, | Performed by: PHYSICIAN ASSISTANT

## 2022-02-08 PROCEDURE — 1160F PR REVIEW ALL MEDS BY PRESCRIBER/CLIN PHARMACIST DOCUMENTED: ICD-10-PCS | Mod: S$GLB,,, | Performed by: PHYSICIAN ASSISTANT

## 2022-02-08 PROCEDURE — 1159F PR MEDICATION LIST DOCUMENTED IN MEDICAL RECORD: ICD-10-PCS | Mod: S$GLB,,, | Performed by: PHYSICIAN ASSISTANT

## 2022-02-08 RX ORDER — CICLOPIROX 80 MG/ML
SOLUTION TOPICAL NIGHTLY
Qty: 6.6 ML | Refills: 3 | Status: SHIPPED | OUTPATIENT
Start: 2022-02-08 | End: 2022-05-17 | Stop reason: ALTCHOICE

## 2022-02-08 RX ORDER — MUPIROCIN 20 MG/G
OINTMENT TOPICAL 3 TIMES DAILY
Qty: 30 G | Refills: 3 | Status: SHIPPED | OUTPATIENT
Start: 2022-02-08 | End: 2022-05-17 | Stop reason: ALTCHOICE

## 2022-02-08 RX ORDER — CHLORHEXIDINE GLUCONATE 40 MG/ML
SOLUTION TOPICAL DAILY PRN
Qty: 946 ML | Refills: 2 | Status: SHIPPED | OUTPATIENT
Start: 2022-02-08 | End: 2022-05-17 | Stop reason: ALTCHOICE

## 2022-02-08 NOTE — PROGRESS NOTES
"  SUBJECTIVE:    Patient ID: Brit Jennings is a 86 y.o. male.    Chief Complaint: Nail Problem (No bottles, reviewed from list, toe nail on middle toe of left foot popped up when putting on sock and thinks it is toe fungus related//dp)    Patient is an 86-year-old male who presents today for a sick visit with a CC of "toe nail popped up." Yesterday, he was attempting to put on his sock when the material caught his left middle toe resulting in the toenail coming off the nail bed.  He states there is minimal pain and more of a soreness sensation in the toe.  When pressure is applied to the toe, this will exacerbate a 1/10 pain that is not limiting his ADLs. He placed a band-aid on the toe. He denies any active bleeding, drainage from the nail bed, or trauma to the toe.       Office Visit on 12/14/2021   Component Date Value Ref Range Status    POC Rapid COVID 12/14/2021 Positive* Negative Final     Acceptable 12/14/2021 Yes   Final    POC Molecular Influenza A Ag 12/14/2021 Negative  Negative, Not Reported Final    POC Molecular Influenza B Ag 12/14/2021 Negative  Negative, Not Reported Final     Acceptable 12/14/2021 Yes   Final   Hospital Outpatient Visit on 09/30/2021   Component Date Value Ref Range Status    POC Creatinine 09/30/2021 1.1  0.5 - 1.4 mg/dL Final    Sample 09/30/2021 VENOUS   Final       Past Medical History:   Diagnosis Date    Basal cell carcinoma 2020    L temple / radiation therapy    Cataract     Hypertension      Past Surgical History:   Procedure Laterality Date    APPENDECTOMY      EYE SURGERY      Moh's surgery on right temple area Right 2015    Cancerous spot    PROSTATE BIOPSY      TONSILLECTOMY       History reviewed. No pertinent family history.    Marital Status:   Alcohol History:  has no history on file for alcohol use.  Tobacco History:  reports that he has never smoked. He has never used smokeless tobacco.  Drug History:  " has no history on file for drug use.    Health Maintenance Topics with due status: Not Due       Topic Last Completion Date    Lipid Panel 02/24/2021     Immunization History   Administered Date(s) Administered    COVID-19, MRNA, LN-S, PF (Pfizer) (Purple Cap) 02/18/2021, 03/11/2021    Hepatitis A, Adult 09/16/2005    Influenza 10/07/2004, 11/01/2005, 10/23/2007, 10/27/2009    Influenza - High Dose - PF (65 years and older) 11/06/2015, 10/25/2016, 09/14/2017, 09/27/2018, 10/02/2019    Influenza - Quadrivalent - High Dose - PF (65 years and older) 09/01/2020    Influenza - Trivalent (ADULT) 09/26/2014    Influenza Whole 10/17/2000, 10/24/2001    Pneumococcal 10/17/2000, 02/09/2006    Pneumococcal Conjugate - 13 Valent 01/08/2016, 12/06/2016    Pneumococcal Polysaccharide - 23 Valent 09/14/2017    Td (ADULT) 09/16/2005    Tdap 06/01/2011    Zoster 01/01/2010       Review of patient's allergies indicates:   Allergen Reactions    Pollen extracts Other (See Comments)       Current Outpatient Medications:     amLODIPine (NORVASC) 5 MG tablet, Take 1 tablet (5 mg total) by mouth once daily., Disp: 90 tablet, Rfl: 1    ascorbic acid, vitamin C, (VITAMIN C) 500 MG tablet, TAKE TWO TABLETS BY MOUTH EVERY DAY, Disp: , Rfl:     ASCORBIC ACID-ASCORBATE CALC ORAL, TAKE TWO TABLETS BY MOUTH EVERY DAY, Disp: , Rfl:     aspirin 81 MG Chew, Take 81 mg by mouth once daily., Disp: , Rfl:     azithromycin (Z-MARIANA) 250 MG tablet, Take 2 tablets today then 1 tablet daily for 4 days, Disp: 6 tablet, Rfl: 0    guaiFENesin-codeine 100-10 mg/5 ml (TUSSI-ORGANIDIN NR)  mg/5 mL syrup, Take 5 mLs by mouth every 6 (six) hours as needed for Cough., Disp: 120 mL, Rfl: 0    lisinopriL (PRINIVIL,ZESTRIL) 20 MG tablet, Take 1 tablet (20 mg total) by mouth once daily., Disp: 90 tablet, Rfl: 1    solifenacin (VESICARE) 10 MG tablet, Take 10 mg by mouth once daily., Disp: , Rfl:     SYMBICORT 160-4.5 mcg/actuation HFAA, Inhale  "2 puffs into the lungs every 12 (twelve) hours., Disp: 30 g, Rfl: 1    temazepam (RESTORIL) 30 mg capsule, Take 1 capsule (30 mg total) by mouth nightly as needed for Insomnia., Disp: 30 capsule, Rfl: 2    triamcinolone acetonide 0.1% (KENALOG) 0.1 % cream, AAA on lower legs and back daily to bid PRN itchy rash, Disp: 80 g, Rfl: 3    chlorhexidine (HIBICLENS) 4 % external liquid, Apply topically daily as needed., Disp: 946 mL, Rfl: 2    ciclopirox (PENLAC) 8 % Soln, Apply topically nightly., Disp: 6.6 mL, Rfl: 3    mupirocin (BACTROBAN) 2 % ointment, Apply topically 3 (three) times daily., Disp: 30 g, Rfl: 3    Current Facility-Administered Medications:     acetaminophen tablet 650 mg, 650 mg, Oral, Once PRN, Farhana Riggins NP    albuterol inhaler 2 puff, 2 puff, Inhalation, Q20 Min PRN, Farhana Riggins NP    diphenhydrAMINE injection 25 mg, 25 mg, Intravenous, Once PRN, Farhana Riggins NP    EPINEPHrine (EPIPEN) 0.3 mg/0.3 mL pen injection 0.3 mg, 0.3 mg, Intramuscular, PRN, Farhana Riggins NP    methylPREDNISolone sodium succinate injection 40 mg, 40 mg, Intravenous, Once PRN, Farhana Riggins NP    ondansetron disintegrating tablet 4 mg, 4 mg, Oral, Once PRN, Farhana Riggins NP    sodium chloride 0.9% 500 mL flush bag, , Intravenous, PRN, Farhana Riggins NP, Stopped at 12/15/21 1455    sodium chloride 0.9% flush 10 mL, 10 mL, Intravenous, PRN, Farhana Riggins NP    Review of Systems   Constitutional: Negative for appetite change, chills, fatigue and fever.   Respiratory: Negative for cough, shortness of breath and wheezing.    Cardiovascular: Negative for chest pain, palpitations and leg swelling.   Gastrointestinal: Negative for abdominal distention, abdominal pain, constipation, diarrhea, nausea and vomiting.   Genitourinary: Negative for dysuria and hematuria.   Musculoskeletal: Negative for arthralgias, gait problem and myalgias.        "Toe nail is coming off the left " "middle toe."   Skin: Negative for rash.   Neurological: Negative for dizziness, syncope, light-headedness and headaches.   Hematological: Does not bruise/bleed easily.          Objective:      Vitals:    02/08/22 1531   BP: 114/70   Pulse: 62   Temp: 98.1 °F (36.7 °C)   SpO2: 97%   Weight: 77 kg (169 lb 12.8 oz)   Height: 5' 8" (1.727 m)     Physical Exam  Vitals reviewed.   Constitutional:       General: He is not in acute distress.     Appearance: Normal appearance. He is normal weight. He is not ill-appearing, toxic-appearing or diaphoretic.   HENT:      Head: Normocephalic and atraumatic.      Right Ear: External ear normal.      Left Ear: External ear normal.   Eyes:      General: No scleral icterus.        Right eye: No discharge.         Left eye: No discharge.      Extraocular Movements: Extraocular movements intact.      Conjunctiva/sclera: Conjunctivae normal.   Cardiovascular:      Rate and Rhythm: Normal rate and regular rhythm.      Pulses: Normal pulses.           Dorsalis pedis pulses are 2+ on the left side.        Posterior tibial pulses are 2+ on the left side.      Heart sounds: Normal heart sounds. No friction rub. No gallop.    Pulmonary:      Effort: Pulmonary effort is normal. No respiratory distress.      Breath sounds: Normal breath sounds. No wheezing, rhonchi or rales.   Abdominal:      General: There is no distension.      Palpations: Abdomen is soft.      Tenderness: There is no abdominal tenderness. There is no guarding or rebound.   Musculoskeletal:      Cervical back: Normal range of motion and neck supple.      Right lower leg: No edema.      Left lower leg: No edema.        Feet:    Feet:      Left foot:      Skin integrity: No ulcer, blister, skin breakdown or fissure.      Toenail Condition: Left toenails are abnormally thick. Fungal disease present.  Skin:     General: Skin is warm and dry.      Coloration: Skin is not jaundiced.      Findings: No rash.   Neurological:      " Mental Status: He is alert. Mental status is at baseline.      Cranial Nerves: No cranial nerve deficit.      Gait: Gait normal.   Psychiatric:         Mood and Affect: Mood normal.         Behavior: Behavior normal. Behavior is cooperative.           Assessment:       1. Onychomycosis    2. Panlobular emphysema    3. Essential hypertension    4. Primary insomnia    5. History of prostate cancer    6. Obstructive sleep apnea           Plan:       Onychomycosis  Comments:  Nail bed was cleaned with an alcohol pad today in office.  Triple Abx ointment was applied to the nailbed and wound was covered with a band-aid.  Start frequent Hibiclens soaks nightly.   Once nail displaces, apply Mupirocin ointment t.i.d. and keep area covered with a Kerlix or a Band-Aid to prevent infection.  Apply Penlac 8% solution to each nail q.h.s..  If nail does not displace, consider podiatry refer for removal.   Orders:  -     ciclopirox (PENLAC) 8 % Soln; Apply topically nightly.  Dispense: 6.6 mL; Refill: 3  -     mupirocin (BACTROBAN) 2 % ointment; Apply topically 3 (three) times daily.  Dispense: 30 g; Refill: 3  -     chlorhexidine (HIBICLENS) 4 % external liquid; Apply topically daily as needed.  Dispense: 946 mL; Refill: 2    Panlobular emphysema    Essential hypertension    Primary insomnia    History of prostate cancer    Obstructive sleep apnea      Follow up if symptoms worsen or fail to improve, for onychomycosis.        2/8/2022 Giuseppe Feliz PA-C

## 2022-02-08 NOTE — PATIENT INSTRUCTIONS
"Patient Education       Fungal Nail Infections   The Basics   Written by the doctors and editors at [facility]   What is a fungal nail infection? -- A fungal nail infection is an infection of the nail that makes it get thick or turn white, yellow, or brown. It is caused by a type of germ called a "fungus."  Fungal infections happen in the toenails more often than the fingernails. They usually start on the big toe, and can affect 1 or more nails. People who have a toenail infection might also have a condition known as "athlete's foot" (a fungal infection of the skin on the foot). That's because certain types of fungi (plural of fungus) can cause both of these problems.  Fungi like to grow in warm and moist places. People who swim or whose feet sweat a lot might have a higher chance of getting a fungal nail infection.  What are the symptoms of a fungal nail infection? -- A fungal infection can cause a nail to:  · Turn white, yellow, or brown  · Get thick, change shape, or lift up  · Break off easily  · Hurt  Fungal nail infections don't usually lead to serious long-term problems. But in some people they can. In people who have diabetes or whose bodies have trouble fighting infections, the nail infection can make them more likely to get other infections.  Is there a test for a fungal nail infection? -- Yes. Usually, your doctor or nurse can tell if you have a fungal nail infection by talking with you and doing an exam. But to make sure, they might take a small sample of the nail. They she might look at it under a microscope, or send it to a lab for another doctor to look at. Your doctor might also send the sample to a lab for tests that can show which type of fungus is causing the infection.  Can I treat my fungal nail infection on my own? -- You can buy over-the-counter creams or products, but they usually don't work.  How are fungal nail infections treated? -- Treatment depends, in part, on how severe the infection is, " "and how much it bothers you. If your infection is mild or doesn't bother you very much, you might choose not to treat it. An untreated nail infection probably won't go away, but it probably won't cause any long-term problems either.  When people need or choose to have treatment, it usually involves "antifungal" medicines that you get with a prescription from your doctor. These medicines are taken by mouth or put on the nail.  Treatment with pills usually lasts a few months. Some people who take these medicines need to have blood tests. That's because these medicines can affect the liver.  If you don't want to or can't take antifungal pills, your doctor will talk with you about other treatment options. These might include using an antifungal medicine on the nail or having surgery to remove your nail.  Before starting any of these treatments, you should know that:  · It can take many months for your nail to look normal again.  · There is a chance that the treatment won't work. The infection might not get better, or it might come back. If either of these things happen, your doctor can try another treatment or send you to a specialist.  Can fungal nail infections be prevented? -- Sometimes. To reduce your chance of getting one, you can:  · Keep your feet clean and dry.  · Avoid sharing nail tools, such as clippers and scissors.  · Wear flip-flops or other footwear in a gym shower or locker room.  What if I want to get pregnant? -- If you want to get pregnant, let your doctor or nurse know. They might recommend that you not take certain antifungal medicines during pregnancy.  All topics are updated as new evidence becomes available and our peer review process is complete.  This topic retrieved from [employer] on: Sep 21, 2021.  Topic 99844 Version 8.0  Release: 29.4.2 - C29.263  © 2021 UpToDate, Inc. and/or its affiliates. All rights reserved.  picture 1: Fungal nail infections     These are examples of fungal nail " infections.  Graphic 96922 Version 4.0    Consumer Information Use and Disclaimer   This information is not specific medical advice and does not replace information you receive from your health care provider. This is only a brief summary of general information. It does NOT include all information about conditions, illnesses, injuries, tests, procedures, treatments, therapies, discharge instructions or life-style choices that may apply to you. You must talk with your health care provider for complete information about your health and treatment options. This information should not be used to decide whether or not to accept your health care provider's advice, instructions or recommendations. Only your health care provider has the knowledge and training to provide advice that is right for you. The use of this information is governed by the [employer] End User License Agreement, available at [email or web address]://[email or web address]/en/[email or web address]/jessa.The use of [employer] content is governed by the [employer] Terms of Use. ©2021 UpToDate, Inc. All rights reserved.  Copyright   © 2021 UpToDate, Inc. and/or its affiliates. All rights reserved.

## 2022-02-16 ENCOUNTER — TELEPHONE (OUTPATIENT)
Dept: FAMILY MEDICINE | Facility: CLINIC | Age: 87
End: 2022-02-16
Payer: MEDICARE

## 2022-02-16 ENCOUNTER — PATIENT MESSAGE (OUTPATIENT)
Dept: FAMILY MEDICINE | Facility: CLINIC | Age: 87
End: 2022-02-16
Payer: MEDICARE

## 2022-02-16 LAB
ALBUMIN SERPL-MCNC: 4.5 G/DL (ref 3.6–5.1)
ALBUMIN/CREAT UR: 10 MCG/MG CREAT
ALBUMIN/GLOB SERPL: 1.7 (CALC) (ref 1–2.5)
ALP SERPL-CCNC: 56 U/L (ref 35–144)
ALT SERPL-CCNC: 17 U/L (ref 9–46)
APPEARANCE UR: CLEAR
AST SERPL-CCNC: 27 U/L (ref 10–35)
BASOPHILS # BLD AUTO: 42 CELLS/UL (ref 0–200)
BASOPHILS NFR BLD AUTO: 1.2 %
BILIRUB SERPL-MCNC: 0.5 MG/DL (ref 0.2–1.2)
BILIRUB UR QL STRIP: NEGATIVE
BUN SERPL-MCNC: 15 MG/DL (ref 7–25)
BUN/CREAT SERPL: NORMAL (CALC) (ref 6–22)
CALCIUM SERPL-MCNC: 9.9 MG/DL (ref 8.6–10.3)
CHLORIDE SERPL-SCNC: 100 MMOL/L (ref 98–110)
CHOLEST SERPL-MCNC: 148 MG/DL
CHOLEST/HDLC SERPL: 4.4 (CALC)
CO2 SERPL-SCNC: 29 MMOL/L (ref 20–32)
COLOR UR: YELLOW
CREAT SERPL-MCNC: 1.06 MG/DL (ref 0.7–1.11)
CREAT UR-MCNC: 86 MG/DL (ref 20–320)
EOSINOPHIL # BLD AUTO: 193 CELLS/UL (ref 15–500)
EOSINOPHIL NFR BLD AUTO: 5.5 %
ERYTHROCYTE [DISTWIDTH] IN BLOOD BY AUTOMATED COUNT: 12.7 % (ref 11–15)
GLOBULIN SER CALC-MCNC: 2.7 G/DL (CALC) (ref 1.9–3.7)
GLUCOSE SERPL-MCNC: 93 MG/DL (ref 65–99)
GLUCOSE UR QL STRIP: NEGATIVE
HCT VFR BLD AUTO: 39.4 % (ref 38.5–50)
HDLC SERPL-MCNC: 34 MG/DL
HGB BLD-MCNC: 13.3 G/DL (ref 13.2–17.1)
HGB UR QL STRIP: NEGATIVE
KETONES UR QL STRIP: NEGATIVE
LDLC SERPL CALC-MCNC: 89 MG/DL (CALC)
LEUKOCYTE ESTERASE UR QL STRIP: NEGATIVE
LYMPHOCYTES # BLD AUTO: 1124 CELLS/UL (ref 850–3900)
LYMPHOCYTES NFR BLD AUTO: 32.1 %
MCH RBC QN AUTO: 34.1 PG (ref 27–33)
MCHC RBC AUTO-ENTMCNC: 33.8 G/DL (ref 32–36)
MCV RBC AUTO: 101 FL (ref 80–100)
MICROALBUMIN UR-MCNC: 0.9 MG/DL
MONOCYTES # BLD AUTO: 697 CELLS/UL (ref 200–950)
MONOCYTES NFR BLD AUTO: 19.9 %
NEUTROPHILS # BLD AUTO: 1446 CELLS/UL (ref 1500–7800)
NEUTROPHILS NFR BLD AUTO: 41.3 %
NITRITE UR QL STRIP: NEGATIVE
NONHDLC SERPL-MCNC: 114 MG/DL (CALC)
PH UR STRIP: 7 [PH] (ref 5–8)
PLATELET # BLD AUTO: 165 THOUSAND/UL (ref 140–400)
PMV BLD REES-ECKER: 11 FL (ref 7.5–12.5)
POTASSIUM SERPL-SCNC: 4 MMOL/L (ref 3.5–5.3)
PROT SERPL-MCNC: 7.2 G/DL (ref 6.1–8.1)
PROT UR QL STRIP: NEGATIVE
RBC # BLD AUTO: 3.9 MILLION/UL (ref 4.2–5.8)
SODIUM SERPL-SCNC: 136 MMOL/L (ref 135–146)
SP GR UR STRIP: 1.01 (ref 1–1.03)
TRIGL SERPL-MCNC: 151 MG/DL
TSH SERPL-ACNC: 3.51 MIU/L (ref 0.4–4.5)
WBC # BLD AUTO: 3.5 THOUSAND/UL (ref 3.8–10.8)

## 2022-02-16 NOTE — TELEPHONE ENCOUNTER
----- Message from Kiesha Bustamante sent at 2/16/2022  2:45 PM CST -----  Pt calling would like to speak to Giuseppe about his asthma dx  cb # [phone]

## 2022-02-16 NOTE — TELEPHONE ENCOUNTER
Patient was contacted and is questions regarding his asthma was addressed.  He will come by tomorrow to  his letter work.

## 2022-02-16 NOTE — TELEPHONE ENCOUNTER
----- Message from Kiesha Bustamante sent at 2/16/2022  2:45 PM CST -----  Pt calling would like to speak to Giuseppe about his asthma dx  cb # [phone]

## 2022-02-17 ENCOUNTER — PATIENT MESSAGE (OUTPATIENT)
Dept: FAMILY MEDICINE | Facility: CLINIC | Age: 87
End: 2022-02-17
Payer: MEDICARE

## 2022-02-17 DIAGNOSIS — U07.1 COVID-19 VIRUS INFECTION: Primary | ICD-10-CM

## 2022-02-23 ENCOUNTER — OFFICE VISIT (OUTPATIENT)
Dept: FAMILY MEDICINE | Facility: CLINIC | Age: 87
End: 2022-02-23
Payer: MEDICARE

## 2022-02-23 VITALS
HEART RATE: 70 BPM | SYSTOLIC BLOOD PRESSURE: 120 MMHG | HEIGHT: 68 IN | DIASTOLIC BLOOD PRESSURE: 74 MMHG | BODY MASS INDEX: 25.76 KG/M2 | WEIGHT: 170 LBS

## 2022-02-23 DIAGNOSIS — Z85.46 HISTORY OF PROSTATE CANCER: ICD-10-CM

## 2022-02-23 DIAGNOSIS — F51.01 PRIMARY INSOMNIA: ICD-10-CM

## 2022-02-23 DIAGNOSIS — J43.1 PANLOBULAR EMPHYSEMA: ICD-10-CM

## 2022-02-23 DIAGNOSIS — S16.1XXA ACUTE STRAIN OF NECK MUSCLE, INITIAL ENCOUNTER: Primary | ICD-10-CM

## 2022-02-23 DIAGNOSIS — C61 MALIGNANT NEOPLASM OF PROSTATE: ICD-10-CM

## 2022-02-23 DIAGNOSIS — I10 ESSENTIAL HYPERTENSION: ICD-10-CM

## 2022-02-23 PROCEDURE — 99214 PR OFFICE/OUTPT VISIT, EST, LEVL IV, 30-39 MIN: ICD-10-PCS | Mod: S$GLB,,, | Performed by: FAMILY MEDICINE

## 2022-02-23 PROCEDURE — 1159F PR MEDICATION LIST DOCUMENTED IN MEDICAL RECORD: ICD-10-PCS | Mod: S$GLB,,, | Performed by: FAMILY MEDICINE

## 2022-02-23 PROCEDURE — 1159F MED LIST DOCD IN RCRD: CPT | Mod: S$GLB,,, | Performed by: FAMILY MEDICINE

## 2022-02-23 PROCEDURE — 99214 OFFICE O/P EST MOD 30 MIN: CPT | Mod: S$GLB,,, | Performed by: FAMILY MEDICINE

## 2022-02-23 RX ORDER — TEMAZEPAM 30 MG/1
30 CAPSULE ORAL NIGHTLY PRN
Qty: 30 CAPSULE | Refills: 2 | Status: SHIPPED | OUTPATIENT
Start: 2022-02-23 | End: 2022-05-07 | Stop reason: SDUPTHER

## 2022-02-23 RX ORDER — LISINOPRIL 20 MG/1
20 TABLET ORAL DAILY
Qty: 90 TABLET | Refills: 1 | Status: SHIPPED | OUTPATIENT
Start: 2022-02-23 | End: 2022-07-08 | Stop reason: SDUPTHER

## 2022-02-23 RX ORDER — BUDESONIDE AND FORMOTEROL FUMARATE DIHYDRATE 160; 4.5 UG/1; UG/1
2 AEROSOL RESPIRATORY (INHALATION) EVERY 12 HOURS
Qty: 30 G | Refills: 4 | Status: SHIPPED | OUTPATIENT
Start: 2022-02-23 | End: 2023-10-06 | Stop reason: SDUPTHER

## 2022-02-23 RX ORDER — AMLODIPINE BESYLATE 5 MG/1
5 TABLET ORAL DAILY
Qty: 90 TABLET | Refills: 1 | Status: SHIPPED | OUTPATIENT
Start: 2022-02-23 | End: 2022-07-08 | Stop reason: SDUPTHER
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
## 2022-03-01 NOTE — TELEPHONE ENCOUNTER
Spoke with pt about doing a vv with Dr. Alcazar next week, pt wants to think about it and call me back.   Not applicable

## 2022-05-07 DIAGNOSIS — F51.01 PRIMARY INSOMNIA: ICD-10-CM

## 2022-05-09 RX ORDER — TEMAZEPAM 30 MG/1
30 CAPSULE ORAL NIGHTLY PRN
Qty: 30 CAPSULE | Refills: 2 | Status: SHIPPED | OUTPATIENT
Start: 2022-05-09 | End: 2022-08-04 | Stop reason: SDUPTHER

## 2022-05-17 ENCOUNTER — OFFICE VISIT (OUTPATIENT)
Dept: DERMATOLOGY | Facility: CLINIC | Age: 87
End: 2022-05-17
Payer: MEDICARE

## 2022-05-17 VITALS — BODY MASS INDEX: 25.76 KG/M2 | WEIGHT: 170 LBS | HEIGHT: 68 IN

## 2022-05-17 DIAGNOSIS — K13.0 ANGULAR CHEILITIS: ICD-10-CM

## 2022-05-17 DIAGNOSIS — L90.5 SCAR: ICD-10-CM

## 2022-05-17 DIAGNOSIS — D18.01 CHERRY ANGIOMA: ICD-10-CM

## 2022-05-17 DIAGNOSIS — Z85.828 HISTORY OF NONMELANOMA SKIN CANCER: ICD-10-CM

## 2022-05-17 DIAGNOSIS — L82.1 SEBORRHEIC KERATOSES: ICD-10-CM

## 2022-05-17 DIAGNOSIS — L30.9 HAND DERMATITIS: ICD-10-CM

## 2022-05-17 DIAGNOSIS — L57.0 ACTINIC KERATOSES: Primary | ICD-10-CM

## 2022-05-17 DIAGNOSIS — L21.9 SEBORRHEIC DERMATITIS: ICD-10-CM

## 2022-05-17 PROCEDURE — 99214 OFFICE O/P EST MOD 30 MIN: CPT | Mod: 25,S$GLB,, | Performed by: DERMATOLOGY

## 2022-05-17 PROCEDURE — 1159F PR MEDICATION LIST DOCUMENTED IN MEDICAL RECORD: ICD-10-PCS | Mod: CPTII,S$GLB,, | Performed by: DERMATOLOGY

## 2022-05-17 PROCEDURE — 99214 PR OFFICE/OUTPT VISIT, EST, LEVL IV, 30-39 MIN: ICD-10-PCS | Mod: 25,S$GLB,, | Performed by: DERMATOLOGY

## 2022-05-17 PROCEDURE — 17000 PR DESTRUCTION(LASER SURGERY,CRYOSURGERY,CHEMOSURGERY),PREMALIGNANT LESIONS,FIRST LESION: ICD-10-PCS | Mod: S$GLB,,, | Performed by: DERMATOLOGY

## 2022-05-17 PROCEDURE — 99999 PR PBB SHADOW E&M-EST. PATIENT-LVL III: ICD-10-PCS | Mod: PBBFAC,,, | Performed by: DERMATOLOGY

## 2022-05-17 PROCEDURE — 99999 PR PBB SHADOW E&M-EST. PATIENT-LVL III: CPT | Mod: PBBFAC,,, | Performed by: DERMATOLOGY

## 2022-05-17 PROCEDURE — 1160F PR REVIEW ALL MEDS BY PRESCRIBER/CLIN PHARMACIST DOCUMENTED: ICD-10-PCS | Mod: CPTII,S$GLB,, | Performed by: DERMATOLOGY

## 2022-05-17 PROCEDURE — 3288F FALL RISK ASSESSMENT DOCD: CPT | Mod: CPTII,S$GLB,, | Performed by: DERMATOLOGY

## 2022-05-17 PROCEDURE — 3288F PR FALLS RISK ASSESSMENT DOCUMENTED: ICD-10-PCS | Mod: CPTII,S$GLB,, | Performed by: DERMATOLOGY

## 2022-05-17 PROCEDURE — 1101F PT FALLS ASSESS-DOCD LE1/YR: CPT | Mod: CPTII,S$GLB,, | Performed by: DERMATOLOGY

## 2022-05-17 PROCEDURE — 17003 DESTRUCT PREMALG LES 2-14: CPT | Mod: S$GLB,,, | Performed by: DERMATOLOGY

## 2022-05-17 PROCEDURE — 1101F PR PT FALLS ASSESS DOC 0-1 FALLS W/OUT INJ PAST YR: ICD-10-PCS | Mod: CPTII,S$GLB,, | Performed by: DERMATOLOGY

## 2022-05-17 PROCEDURE — 1159F MED LIST DOCD IN RCRD: CPT | Mod: CPTII,S$GLB,, | Performed by: DERMATOLOGY

## 2022-05-17 PROCEDURE — 1126F AMNT PAIN NOTED NONE PRSNT: CPT | Mod: CPTII,S$GLB,, | Performed by: DERMATOLOGY

## 2022-05-17 PROCEDURE — 1126F PR PAIN SEVERITY QUANTIFIED, NO PAIN PRESENT: ICD-10-PCS | Mod: CPTII,S$GLB,, | Performed by: DERMATOLOGY

## 2022-05-17 PROCEDURE — 17003 DESTRUCTION, PREMALIGNANT LESIONS; SECOND THROUGH 14 LESIONS: ICD-10-PCS | Mod: S$GLB,,, | Performed by: DERMATOLOGY

## 2022-05-17 PROCEDURE — 17000 DESTRUCT PREMALG LESION: CPT | Mod: S$GLB,,, | Performed by: DERMATOLOGY

## 2022-05-17 PROCEDURE — 1160F RVW MEDS BY RX/DR IN RCRD: CPT | Mod: CPTII,S$GLB,, | Performed by: DERMATOLOGY

## 2022-05-17 RX ORDER — HYDROCORTISONE 25 MG/G
CREAM TOPICAL
Qty: 28 G | Refills: 1 | Status: SHIPPED | OUTPATIENT
Start: 2022-05-17 | End: 2023-04-26

## 2022-05-17 RX ORDER — KETOCONAZOLE 20 MG/G
CREAM TOPICAL
Qty: 60 G | Refills: 3 | Status: SHIPPED | OUTPATIENT
Start: 2022-05-17 | End: 2023-04-26

## 2022-05-17 NOTE — PATIENT INSTRUCTIONS

## 2022-05-17 NOTE — PROGRESS NOTES
Subjective:       Patient ID:  Brit Jennings is a 86 y.o. male who presents for   Chief Complaint   Patient presents with    Skin Check     UBSE     LOV: 11/16/21 - AK, SK, pruritis, h/o NMSC    Skin Check - UBSE  No area of concern    Derm hx  H/o Mohs on R temple[facility]  BCC on R temple, radiation therapy 08/2020 Dr Dewitt  Past xrt on scalp, nose, R eyebrow   L neck BCC 08/2020 [facility]    Current Outpatient Medications:     amLODIPine (NORVASC) 5 MG tablet, Take 1 tablet (5 mg total) by mouth once daily., Disp: 90 tablet, Rfl: 1    ascorbic acid, vitamin C, (VITAMIN C) 500 MG tablet, TAKE TWO TABLETS BY MOUTH EVERY DAY, Disp: , Rfl:     aspirin 81 MG Chew, Take 81 mg by mouth once daily., Disp: , Rfl:     lisinopriL (PRINIVIL,ZESTRIL) 20 MG tablet, Take 1 tablet (20 mg total) by mouth once daily., Disp: 90 tablet, Rfl: 1    SYMBICORT 160-4.5 mcg/actuation HFAA, Inhale 2 puffs into the lungs every 12 (twelve) hours., Disp: 30 g, Rfl: 4    temazepam (RESTORIL) 30 mg capsule, Take 1 capsule (30 mg total) by mouth nightly as needed for Insomnia., Disp: 30 capsule, Rfl: 2            Review of Systems   Constitutional: Negative for fever, chills and fatigue.   Respiratory: Negative for cough and shortness of breath.    Skin: Positive for dry skin and activity-related sunscreen use.   Hematologic/Lymphatic: Bruises/bleeds easily.        Objective:    Physical Exam   Constitutional: He appears well-developed and well-nourished. No distress.   Neurological: He is alert and oriented to person, place, and time. He is not disoriented.   Psychiatric: He has a normal mood and affect.   Skin:   Areas Examined (abnormalities noted in diagram):   Scalp / Hair Palpated and Inspected  Head / Face Inspection Performed  Neck Inspection Performed  Chest / Axilla Inspection Performed  Abdomen Inspection Performed  Back Inspection Performed  RUE Inspected  LUE Inspection Performed  Nails and Digits  Inspection Performed                       Diagram Legend     Erythematous scaling macule/papule c/w actinic keratosis       Vascular papule c/w angioma      Pigmented verrucoid papule/plaque c/w seborrheic keratosis      Yellow umbilicated papule c/w sebaceous hyperplasia      Irregularly shaped tan macule c/w lentigo     1-2 mm smooth white papules consistent with Milia      Movable subcutaneous cyst with punctum c/w epidermal inclusion cyst      Subcutaneous movable cyst c/w pilar cyst      Firm pink to brown papule c/w dermatofibroma      Pedunculated fleshy papule(s) c/w skin tag(s)      Evenly pigmented macule c/w junctional nevus     Mildly variegated pigmented, slightly irregular-bordered macule c/w mildly atypical nevus      Flesh colored to evenly pigmented papule c/w intradermal nevus       Pink pearly papule/plaque c/w basal cell carcinoma      Erythematous hyperkeratotic cursted plaque c/w SCC      Surgical scar with no sign of skin cancer recurrence      Open and closed comedones      Inflammatory papules and pustules      Verrucoid papule consistent consistent with wart     Erythematous eczematous patches and plaques     Dystrophic onycholytic nail with subungual debris c/w onychomycosis     Umbilicated papule    Erythematous-base heme-crusted tan verrucoid plaque consistent with inflamed seborrheic keratosis     Erythematous Silvery Scaling Plaque c/w Psoriasis     See annotation      Assessment / Plan:        Actinic keratoses  Cryosurgery Procedure Note    Verbal consent from the patient is obtained and the patient is aware of the precancerous quality and need for treatment of these lesions. Liquid nitrogen cryosurgery is applied to the 5 actinic keratoses, as detailed in the physical exam, to produce a freeze injury. The patient is aware that blisters may form and is instructed on wound care with gentle cleansing and use of vaseline ointment to keep moist until healed. The patient is supplied a  "handout on cryosurgery and is instructed to call if lesions do not completely resolve.    Seborrheic dermatitis  -     ketoconazole (NIZORAL) 2 % cream; Thin film to red scaly rash on face twice daily as needed for flare  Dispense: 60 g; Refill: 3  -     hydrocortisone 2.5 % cream; Thin film to AA onface daily PRN flare; use short term only  Dispense: 28 g; Refill: 1    Angular cheilitis  Treat as above    Hand dermatitis  Improve moisturizer use, eucerin rough skin with urea nightly, rich moisturizer after every hand washing (O"rickie working hand)  Triamcinolone cream PRN flare    Seborrheic keratoses  These are benign inherited growths without a malignant potential. Reassurance given to patient. No treatment is necessary.     Cherry angioma  This is a benign vascular lesion. Reassurance given. No treatment required.     History of nonmelanoma skin cancer  Scar  Area of previous NMSC (multiple) examined. Sites well healed with no signs of recurrence.  Upper body skin examination performed today including at least 9 points as noted in physical examination. No lesions suspicious for malignancy noted.    Patient instructed in importance in daily broad spectrum sun protection of at least spf 30. Mineral sunscreen ingredients preferred (Zinc +/- Titanium) and can be found OTC.   Recommend Elta MD for daily use on face and neck.  Patient encouraged to wear hat for all outdoor exposure.   Also discussed sun avoidance and use of protective clothing.             Follow up in about 6 months (around 11/17/2022), or if symptoms worsen or fail to improve.  "

## 2022-06-07 ENCOUNTER — PATIENT MESSAGE (OUTPATIENT)
Dept: DERMATOLOGY | Facility: CLINIC | Age: 87
End: 2022-06-07
Payer: MEDICARE

## 2022-07-07 DIAGNOSIS — R06.83 SNORING: ICD-10-CM

## 2022-07-07 DIAGNOSIS — G47.33 OSA (OBSTRUCTIVE SLEEP APNEA): Primary | ICD-10-CM

## 2022-07-07 DIAGNOSIS — R53.83 LOSS OF ENERGY: ICD-10-CM

## 2022-07-08 DIAGNOSIS — I10 ESSENTIAL HYPERTENSION: ICD-10-CM

## 2022-07-11 RX ORDER — LISINOPRIL 20 MG/1
20 TABLET ORAL DAILY
Qty: 90 TABLET | Refills: 1 | Status: SHIPPED | OUTPATIENT
Start: 2022-07-11 | End: 2022-08-24 | Stop reason: SDUPTHER

## 2022-07-11 RX ORDER — AMLODIPINE BESYLATE 5 MG/1
5 TABLET ORAL DAILY
Qty: 90 TABLET | Refills: 1 | Status: SHIPPED | OUTPATIENT
Start: 2022-07-11 | End: 2022-08-24 | Stop reason: SDUPTHER

## 2022-08-11 ENCOUNTER — PATIENT MESSAGE (OUTPATIENT)
Dept: FAMILY MEDICINE | Facility: CLINIC | Age: 87
End: 2022-08-11

## 2022-08-24 ENCOUNTER — OFFICE VISIT (OUTPATIENT)
Dept: FAMILY MEDICINE | Facility: CLINIC | Age: 87
End: 2022-08-24
Payer: MEDICARE

## 2022-08-24 VITALS
HEIGHT: 68 IN | BODY MASS INDEX: 24.71 KG/M2 | SYSTOLIC BLOOD PRESSURE: 130 MMHG | WEIGHT: 163 LBS | HEART RATE: 66 BPM | DIASTOLIC BLOOD PRESSURE: 80 MMHG

## 2022-08-24 DIAGNOSIS — N40.1 BPH ASSOCIATED WITH NOCTURIA: ICD-10-CM

## 2022-08-24 DIAGNOSIS — J43.1 PANLOBULAR EMPHYSEMA: ICD-10-CM

## 2022-08-24 DIAGNOSIS — I10 ESSENTIAL HYPERTENSION: Primary | ICD-10-CM

## 2022-08-24 DIAGNOSIS — Z85.46 HISTORY OF PROSTATE CANCER: ICD-10-CM

## 2022-08-24 DIAGNOSIS — F51.01 PRIMARY INSOMNIA: ICD-10-CM

## 2022-08-24 DIAGNOSIS — R35.1 BPH ASSOCIATED WITH NOCTURIA: ICD-10-CM

## 2022-08-24 DIAGNOSIS — I49.8 BIGEMINY: ICD-10-CM

## 2022-08-24 PROCEDURE — 1101F PT FALLS ASSESS-DOCD LE1/YR: CPT | Mod: CPTII,S$GLB,, | Performed by: FAMILY MEDICINE

## 2022-08-24 PROCEDURE — 99214 OFFICE O/P EST MOD 30 MIN: CPT | Mod: S$GLB,,, | Performed by: FAMILY MEDICINE

## 2022-08-24 PROCEDURE — 1101F PR PT FALLS ASSESS DOC 0-1 FALLS W/OUT INJ PAST YR: ICD-10-PCS | Mod: CPTII,S$GLB,, | Performed by: FAMILY MEDICINE

## 2022-08-24 PROCEDURE — 3288F FALL RISK ASSESSMENT DOCD: CPT | Mod: CPTII,S$GLB,, | Performed by: FAMILY MEDICINE

## 2022-08-24 PROCEDURE — 1159F MED LIST DOCD IN RCRD: CPT | Mod: CPTII,S$GLB,, | Performed by: FAMILY MEDICINE

## 2022-08-24 PROCEDURE — 1159F PR MEDICATION LIST DOCUMENTED IN MEDICAL RECORD: ICD-10-PCS | Mod: CPTII,S$GLB,, | Performed by: FAMILY MEDICINE

## 2022-08-24 PROCEDURE — 99214 PR OFFICE/OUTPT VISIT, EST, LEVL IV, 30-39 MIN: ICD-10-PCS | Mod: S$GLB,,, | Performed by: FAMILY MEDICINE

## 2022-08-24 PROCEDURE — 3288F PR FALLS RISK ASSESSMENT DOCUMENTED: ICD-10-PCS | Mod: CPTII,S$GLB,, | Performed by: FAMILY MEDICINE

## 2022-08-24 RX ORDER — AMLODIPINE BESYLATE 5 MG/1
5 TABLET ORAL DAILY
Qty: 90 TABLET | Refills: 1 | Status: SHIPPED | OUTPATIENT
Start: 2022-08-24 | End: 2023-01-30 | Stop reason: SDUPTHER

## 2022-08-24 RX ORDER — SOLIFENACIN SUCCINATE 10 MG/1
10 TABLET, FILM COATED ORAL NIGHTLY
Status: ON HOLD | COMMUNITY
End: 2024-01-21 | Stop reason: HOSPADM

## 2022-08-24 RX ORDER — LISINOPRIL 20 MG/1
20 TABLET ORAL DAILY
Qty: 90 TABLET | Refills: 1 | Status: SHIPPED | OUTPATIENT
Start: 2022-08-24 | End: 2023-01-29 | Stop reason: SDUPTHER

## 2022-08-24 RX ORDER — TAMSULOSIN HYDROCHLORIDE 0.4 MG/1
0.4 CAPSULE ORAL DAILY
Qty: 30 CAPSULE | Refills: 3 | Status: SHIPPED | OUTPATIENT
Start: 2022-08-24 | End: 2023-01-05 | Stop reason: SDUPTHER

## 2022-08-24 NOTE — PROGRESS NOTES
SUBJECTIVE:    Patient ID: Brit Jennings is a 87 y.o. male.    Chief Complaint: Hypertension (Brought med list // no complains // abc )    87-year-old male stays active by going to the gym 2 to 3 times a week.  He no longer mows the lawn.  He walks in the neighborhood daily with his daughter he also rides a bike occasionally.  He works 3 days a week at the [employer].    Prostate cancer-in remission sees urology Dr. Andrade.  He still has overactive bladder and been prescribed VESIcare which he finds very drying.  Still has nocturia 2-4 times per night    He was positive for COVID in December of 2021.  He had symptoms for a week and improved greatly after a antibody infusion.      No visits with results within 6 Month(s) from this visit.   Latest known visit with results is:   Office Visit on 12/14/2021   Component Date Value Ref Range Status    POC Rapid COVID 12/14/2021 Positive (A) Negative Final     Acceptable 12/14/2021 Yes   Final    POC Molecular Influenza A Ag 12/14/2021 Negative  Negative, Not Reported Final    POC Molecular Influenza B Ag 12/14/2021 Negative  Negative, Not Reported Final     Acceptable 12/14/2021 Yes   Final       Past Medical History:   Diagnosis Date    Basal cell carcinoma 2020    L temple / radiation therapy    Cataract     Hypertension      Social History     Socioeconomic History    Marital status:    Tobacco Use    Smoking status: Never Smoker    Smokeless tobacco: Never Used     Past Surgical History:   Procedure Laterality Date    APPENDECTOMY      EYE SURGERY      Moh's surgery on right temple area Right 2015    Cancerous spot    PROSTATE BIOPSY      TONSILLECTOMY       Family History   Problem Relation Age of Onset    Melanoma Neg Hx     Psoriasis Neg Hx     Lupus Neg Hx     Eczema Neg Hx        Review of patient's allergies indicates:   Allergen Reactions    Pollen extracts Other (See Comments)        Current Outpatient Medications:     ascorbic acid, vitamin C, (VITAMIN C) 500 MG tablet, TAKE TWO TABLETS BY MOUTH EVERY DAY, Disp: , Rfl:     aspirin 81 MG Chew, Take 81 mg by mouth once daily., Disp: , Rfl:     hydrocortisone 2.5 % cream, Thin film to AA onface daily PRN flare; use short term only, Disp: 28 g, Rfl: 1    ketoconazole (NIZORAL) 2 % cream, Thin film to red scaly rash on face twice daily as needed for flare, Disp: 60 g, Rfl: 3    solifenacin (VESICARE) 10 MG tablet, Take 10 mg by mouth once daily., Disp: , Rfl:     SYMBICORT 160-4.5 mcg/actuation HFAA, Inhale 2 puffs into the lungs every 12 (twelve) hours., Disp: 30 g, Rfl: 4    temazepam (RESTORIL) 30 mg capsule, Take 1 capsule (30 mg total) by mouth nightly as needed for Insomnia., Disp: 30 capsule, Rfl: 2    amLODIPine (NORVASC) 5 MG tablet, Take 1 tablet (5 mg total) by mouth once daily., Disp: 90 tablet, Rfl: 1    lisinopriL (PRINIVIL,ZESTRIL) 20 MG tablet, Take 1 tablet (20 mg total) by mouth once daily., Disp: 90 tablet, Rfl: 1    tamsulosin (FLOMAX) 0.4 mg Cap, Take 1 capsule (0.4 mg total) by mouth once daily., Disp: 30 capsule, Rfl: 3    Current Facility-Administered Medications:     acetaminophen tablet 650 mg, 650 mg, Oral, Once PRN, Farhana Riggins NP    albuterol inhaler 2 puff, 2 puff, Inhalation, Q20 Min PRN, Farhana Riggins NP    diphenhydrAMINE injection 25 mg, 25 mg, Intravenous, Once PRN, Farhana Riggins NP    EPINEPHrine (EPIPEN) 0.3 mg/0.3 mL pen injection 0.3 mg, 0.3 mg, Intramuscular, PRN, Farhana Riggins NP    methylPREDNISolone sodium succinate injection 40 mg, 40 mg, Intravenous, Once PRN, Farhana Riggins NP    ondansetron disintegrating tablet 4 mg, 4 mg, Oral, Once PRN, Farhana Riggins NP    sodium chloride 0.9% 500 mL flush bag, , Intravenous, PRN, Farhana Riggins NP, Stopped at 12/15/21 1455    sodium chloride 0.9% flush 10 mL, 10 mL, Intravenous, PRN, Farhana Riggins,  "NP    Review of Systems   Constitutional: Negative for appetite change, chills, fatigue, fever and unexpected weight change.   HENT: Positive for hearing loss. Negative for congestion, ear pain and trouble swallowing.    Eyes: Negative for pain, discharge and visual disturbance.   Respiratory: Negative for apnea, cough, shortness of breath and wheezing.    Cardiovascular: Negative for chest pain and leg swelling.   Gastrointestinal: Negative for abdominal pain, blood in stool, constipation, diarrhea, nausea and vomiting.   Endocrine: Negative for cold intolerance, heat intolerance and polydipsia.   Genitourinary: Negative for dysuria, hematuria, testicular pain and urgency.        Nocturia 2-4 x night   Musculoskeletal: Negative for gait problem, joint swelling and myalgias.   Neurological: Negative for dizziness, seizures and numbness.   Psychiatric/Behavioral: Negative for agitation, behavioral problems and hallucinations. The patient is not nervous/anxious.           Objective:      Vitals:    08/24/22 1111   BP: 130/80   Pulse: 66   Weight: 73.9 kg (163 lb)   Height: 5' 8" (1.727 m)     Physical Exam  Vitals and nursing note reviewed.   Constitutional:       General: He is not in acute distress.     Appearance: Normal appearance. He is well-developed.   HENT:      Head: Normocephalic and atraumatic.      Right Ear: Tympanic membrane and external ear normal.      Left Ear: Tympanic membrane and external ear normal.      Ears:      Comments: Bilateral hearing aids     Nose: Nose normal.   Eyes:      Pupils: Pupils are equal, round, and reactive to light.   Neck:      Thyroid: No thyromegaly.      Vascular: No carotid bruit.   Cardiovascular:      Rate and Rhythm: Normal rate and regular rhythm.      Heart sounds: Normal heart sounds. No murmur heard.  Pulmonary:      Effort: Pulmonary effort is normal.      Breath sounds: Normal breath sounds. No wheezing or rales.   Abdominal:      General: Bowel sounds are " normal. There is no distension.      Palpations: Abdomen is soft.      Tenderness: There is no abdominal tenderness.   Musculoskeletal:         General: No tenderness or deformity. Normal range of motion.      Cervical back: Normal range of motion and neck supple.      Lumbar back: Normal. No spasms.      Comments: Bends 90 degrees at  waist, shoulders and knees have good range of motion without crepitance.  No pitting edema to lower extremities   Lymphadenopathy:      Cervical: No cervical adenopathy.   Skin:     General: Skin is warm and dry.      Findings: No rash.   Neurological:      Mental Status: He is alert and oriented to person, place, and time. Mental status is at baseline.      Cranial Nerves: No cranial nerve deficit.      Coordination: Coordination normal.      Gait: Gait normal.   Psychiatric:         Mood and Affect: Mood normal.         Behavior: Behavior normal.         Thought Content: Thought content normal.         Judgment: Judgment normal.           Assessment:       1. Essential hypertension    2. BPH associated with nocturia    3. Panlobular emphysema    4. Bigeminy    5. History of prostate cancer    6. Primary insomnia         Plan:       Essential hypertension  Comments:  Blood pressure currently stable and well controlled on Norvasc and Lisinopril.  Continue as is.  Refill sent today.  Will repeat annual labs on next visit  Orders:  -     amLODIPine (NORVASC) 5 MG tablet; Take 1 tablet (5 mg total) by mouth once daily.  Dispense: 90 tablet; Refill: 1  -     lisinopriL (PRINIVIL,ZESTRIL) 20 MG tablet; Take 1 tablet (20 mg total) by mouth once daily.  Dispense: 90 tablet; Refill: 1    BPH associated with nocturia  -     tamsulosin (FLOMAX) 0.4 mg Cap; Take 1 capsule (0.4 mg total) by mouth once daily.  Dispense: 30 capsule; Refill: 3  Had tamsulosin to help with nocturia  Panlobular emphysema  Asymptomatic  Bigeminy  Sinus rhythm today  History of prostate cancer  In remission followed by  Dr. Andrade  Primary insomnia      Follow up in about 6 months (around 2/24/2023), or htn [facility]- Farhana.        8/24/2022 Jian Alcazar

## 2022-09-26 DIAGNOSIS — F51.01 PRIMARY INSOMNIA: ICD-10-CM

## 2022-09-26 RX ORDER — TEMAZEPAM 30 MG/1
30 CAPSULE ORAL NIGHTLY PRN
Qty: 30 CAPSULE | Refills: 2 | Status: SHIPPED | OUTPATIENT
Start: 2022-09-26 | End: 2022-11-16 | Stop reason: SDUPTHER

## 2022-10-23 DIAGNOSIS — F51.01 PRIMARY INSOMNIA: ICD-10-CM

## 2022-10-23 RX ORDER — TEMAZEPAM 30 MG/1
30 CAPSULE ORAL NIGHTLY PRN
Qty: 30 CAPSULE | Refills: 2 | Status: CANCELLED | OUTPATIENT
Start: 2022-10-23

## 2022-11-16 DIAGNOSIS — F51.01 PRIMARY INSOMNIA: ICD-10-CM

## 2022-11-17 ENCOUNTER — OFFICE VISIT (OUTPATIENT)
Dept: DERMATOLOGY | Facility: CLINIC | Age: 87
End: 2022-11-17
Payer: MEDICARE

## 2022-11-17 VITALS — HEIGHT: 68 IN | BODY MASS INDEX: 24.71 KG/M2 | WEIGHT: 163 LBS

## 2022-11-17 DIAGNOSIS — L57.8 OTHER SKIN CHANGES DUE TO CHRONIC EXPOSURE TO NONIONIZING RADIATION: ICD-10-CM

## 2022-11-17 DIAGNOSIS — L57.0 ACTINIC KERATOSES: Primary | ICD-10-CM

## 2022-11-17 DIAGNOSIS — D69.2 PURPURA: ICD-10-CM

## 2022-11-17 DIAGNOSIS — L30.9 HAND DERMATITIS: ICD-10-CM

## 2022-11-17 DIAGNOSIS — L90.5 SCAR: ICD-10-CM

## 2022-11-17 DIAGNOSIS — L82.1 SEBORRHEIC KERATOSES: ICD-10-CM

## 2022-11-17 DIAGNOSIS — Z85.828 HISTORY OF NONMELANOMA SKIN CANCER: ICD-10-CM

## 2022-11-17 PROCEDURE — 3288F PR FALLS RISK ASSESSMENT DOCUMENTED: ICD-10-PCS | Mod: CPTII,S$GLB,, | Performed by: DERMATOLOGY

## 2022-11-17 PROCEDURE — 1126F PR PAIN SEVERITY QUANTIFIED, NO PAIN PRESENT: ICD-10-PCS | Mod: CPTII,S$GLB,, | Performed by: DERMATOLOGY

## 2022-11-17 PROCEDURE — 17000 DESTRUCT PREMALG LESION: CPT | Mod: S$GLB,,, | Performed by: DERMATOLOGY

## 2022-11-17 PROCEDURE — 17003 DESTRUCT PREMALG LES 2-14: CPT | Mod: S$GLB,,, | Performed by: DERMATOLOGY

## 2022-11-17 PROCEDURE — 99213 PR OFFICE/OUTPT VISIT, EST, LEVL III, 20-29 MIN: ICD-10-PCS | Mod: 25,S$GLB,, | Performed by: DERMATOLOGY

## 2022-11-17 PROCEDURE — 3288F FALL RISK ASSESSMENT DOCD: CPT | Mod: CPTII,S$GLB,, | Performed by: DERMATOLOGY

## 2022-11-17 PROCEDURE — 99999 PR PBB SHADOW E&M-EST. PATIENT-LVL III: CPT | Mod: PBBFAC,,, | Performed by: DERMATOLOGY

## 2022-11-17 PROCEDURE — 17003 DESTRUCTION, PREMALIGNANT LESIONS; SECOND THROUGH 14 LESIONS: ICD-10-PCS | Mod: S$GLB,,, | Performed by: DERMATOLOGY

## 2022-11-17 PROCEDURE — 99999 PR PBB SHADOW E&M-EST. PATIENT-LVL III: ICD-10-PCS | Mod: PBBFAC,,, | Performed by: DERMATOLOGY

## 2022-11-17 PROCEDURE — 17000 PR DESTRUCTION(LASER SURGERY,CRYOSURGERY,CHEMOSURGERY),PREMALIGNANT LESIONS,FIRST LESION: ICD-10-PCS | Mod: S$GLB,,, | Performed by: DERMATOLOGY

## 2022-11-17 PROCEDURE — 1101F PT FALLS ASSESS-DOCD LE1/YR: CPT | Mod: CPTII,S$GLB,, | Performed by: DERMATOLOGY

## 2022-11-17 PROCEDURE — 1126F AMNT PAIN NOTED NONE PRSNT: CPT | Mod: CPTII,S$GLB,, | Performed by: DERMATOLOGY

## 2022-11-17 PROCEDURE — 1159F PR MEDICATION LIST DOCUMENTED IN MEDICAL RECORD: ICD-10-PCS | Mod: CPTII,S$GLB,, | Performed by: DERMATOLOGY

## 2022-11-17 PROCEDURE — 1159F MED LIST DOCD IN RCRD: CPT | Mod: CPTII,S$GLB,, | Performed by: DERMATOLOGY

## 2022-11-17 PROCEDURE — 1160F PR REVIEW ALL MEDS BY PRESCRIBER/CLIN PHARMACIST DOCUMENTED: ICD-10-PCS | Mod: CPTII,S$GLB,, | Performed by: DERMATOLOGY

## 2022-11-17 PROCEDURE — 1160F RVW MEDS BY RX/DR IN RCRD: CPT | Mod: CPTII,S$GLB,, | Performed by: DERMATOLOGY

## 2022-11-17 PROCEDURE — 99213 OFFICE O/P EST LOW 20 MIN: CPT | Mod: 25,S$GLB,, | Performed by: DERMATOLOGY

## 2022-11-17 PROCEDURE — 1101F PR PT FALLS ASSESS DOC 0-1 FALLS W/OUT INJ PAST YR: ICD-10-PCS | Mod: CPTII,S$GLB,, | Performed by: DERMATOLOGY

## 2022-11-17 RX ORDER — TEMAZEPAM 30 MG/1
30 CAPSULE ORAL NIGHTLY PRN
Qty: 30 CAPSULE | Refills: 2 | Status: SHIPPED | OUTPATIENT
Start: 2022-11-17 | End: 2022-12-12 | Stop reason: SDUPTHER

## 2022-11-17 NOTE — PATIENT INSTRUCTIONS

## 2022-11-17 NOTE — PROGRESS NOTES
Subjective:       Patient ID:  Brit Jennings is a 87 y.o. male who presents for   Chief Complaint   Patient presents with    Skin Check     UBSE     LOV: 5/17/22 - AK, seb hyper, angular cheilitis, hand dermatitis, SK, angioma, h/o NMSC, scar    Skin Check - UBSE    C/o spot to upper forehead  Few days, no symptoms, denies trauma    Derm hx  H/o Mohs on R temple, [facility]  BCC on R temple, radiation therapy 08/2020 Dr Dewitt  Past xrt on scalp, nose, R eyebrow   L neck BCC 08/2020 [facility]    Current Outpatient Medications:   ·  amLODIPine (NORVASC) 5 MG tablet, Take 1 tablet (5 mg total) by mouth once daily., Disp: 90 tablet, Rfl: 1  ·  ascorbic acid, vitamin C, (VITAMIN C) 500 MG tablet, TAKE TWO TABLETS BY MOUTH EVERY DAY, Disp: , Rfl:   ·  aspirin 81 MG Chew, Take 81 mg by mouth once daily., Disp: , Rfl:   ·  hydrocortisone 2.5 % cream, Thin film to AA onface daily PRN flare; use short term only, Disp: 28 g, Rfl: 1  ·  ketoconazole (NIZORAL) 2 % cream, Thin film to red scaly rash on face twice daily as needed for flare, Disp: 60 g, Rfl: 3  ·  lisinopriL (PRINIVIL,ZESTRIL) 20 MG tablet, Take 1 tablet (20 mg total) by mouth once daily., Disp: 90 tablet, Rfl: 1  ·  solifenacin (VESICARE) 10 MG tablet, Take 10 mg by mouth once daily., Disp: , Rfl:   ·  SYMBICORT 160-4.5 mcg/actuation HFAA, Inhale 2 puffs into the lungs every 12 (twelve) hours., Disp: 30 g, Rfl: 4  ·  tamsulosin (FLOMAX) 0.4 mg Cap, Take 1 capsule (0.4 mg total) by mouth once daily., Disp: 30 capsule, Rfl: 3  ·  temazepam (RESTORIL) 30 mg capsule, Take 1 capsule (30 mg total) by mouth nightly as needed for Insomnia., Disp: 30 capsule, Rfl: 2          Review of Systems   Constitutional:  Negative for fever, chills and fatigue.   Respiratory:  Negative for cough and shortness of breath.    Skin:  Positive for dry skin and activity-related sunscreen use.   Hematologic/Lymphatic: Bruises/bleeds easily.      Objective:    Physical  Exam   Constitutional: He appears well-developed and well-nourished. No distress.   Neurological: He is alert and oriented to person, place, and time. He is not disoriented.   Psychiatric: He has a normal mood and affect.   Skin:   Areas Examined (abnormalities noted in diagram):   Scalp / Hair Palpated and Inspected  Head / Face Inspection Performed  Neck Inspection Performed  RUE Inspected  LUE Inspection Performed                     Diagram Legend     Erythematous scaling macule/papule c/w actinic keratosis       Vascular papule c/w angioma      Pigmented verrucoid papule/plaque c/w seborrheic keratosis      Yellow umbilicated papule c/w sebaceous hyperplasia      Irregularly shaped tan macule c/w lentigo     1-2 mm smooth white papules consistent with Milia      Movable subcutaneous cyst with punctum c/w epidermal inclusion cyst      Subcutaneous movable cyst c/w pilar cyst      Firm pink to brown papule c/w dermatofibroma      Pedunculated fleshy papule(s) c/w skin tag(s)      Evenly pigmented macule c/w junctional nevus     Mildly variegated pigmented, slightly irregular-bordered macule c/w mildly atypical nevus      Flesh colored to evenly pigmented papule c/w intradermal nevus       Pink pearly papule/plaque c/w basal cell carcinoma      Erythematous hyperkeratotic cursted plaque c/w SCC      Surgical scar with no sign of skin cancer recurrence      Open and closed comedones      Inflammatory papules and pustules      Verrucoid papule consistent consistent with wart     Erythematous eczematous patches and plaques     Dystrophic onycholytic nail with subungual debris c/w onychomycosis     Umbilicated papule    Erythematous-base heme-crusted tan verrucoid plaque consistent with inflamed seborrheic keratosis     Erythematous Silvery Scaling Plaque c/w Psoriasis     See annotation      Assessment / Plan:        Actinic keratoses  Cryosurgery Procedure Note    Verbal consent from the patient is obtained and the  patient is aware of the precancerous quality and need for treatment of these lesions. Liquid nitrogen cryosurgery is applied to the 10 actinic keratoses, as detailed in the physical exam, to produce a freeze injury. The patient is aware that blisters may form and is instructed on wound care with gentle cleansing and use of vaseline ointment to keep moist until healed. The patient is supplied a handout on cryosurgery and is instructed to call if lesions do not completely resolve.    Seborrheic keratoses  These are benign inherited growths without a malignant potential. Reassurance given to patient. No treatment is necessary.     History of nonmelanoma skin cancer  Scar  Area of previous NMSC (multiple) examined. Sites well healed with no signs of recurrence.  Skin examination performed today including at least 9 points as noted in physical examination. No lesions suspicious for malignancy noted.    Purpura  Focal, forehead, reassurance, monitor for resolution    Hand dermatitis  Chronic, improve moisturizer use  Gloves for wet work    Actinic skin damage  Patient instructed in importance in daily broad spectrum sun protection of at least spf 30. Mineral sunscreen ingredients preferred (Zinc +/- Titanium) and can be found OTC.   Recommend Elta MD for daily use on face and neck.  Patient encouraged to wear hat for all outdoor exposure.   Also discussed sun avoidance and use of protective clothing.           No follow-ups on file.

## 2022-12-12 DIAGNOSIS — F51.01 PRIMARY INSOMNIA: ICD-10-CM

## 2022-12-12 RX ORDER — TEMAZEPAM 30 MG/1
30 CAPSULE ORAL NIGHTLY PRN
Qty: 90 CAPSULE | Refills: 1 | Status: SHIPPED | OUTPATIENT
Start: 2022-12-12 | End: 2023-03-07 | Stop reason: SDUPTHER

## 2023-01-06 DIAGNOSIS — R35.1 BPH ASSOCIATED WITH NOCTURIA: ICD-10-CM

## 2023-01-06 DIAGNOSIS — N40.1 BPH ASSOCIATED WITH NOCTURIA: ICD-10-CM

## 2023-01-06 RX ORDER — TAMSULOSIN HYDROCHLORIDE 0.4 MG/1
0.4 CAPSULE ORAL DAILY
Qty: 30 CAPSULE | Refills: 3 | Status: CANCELLED | OUTPATIENT
Start: 2023-01-06 | End: 2024-01-06

## 2023-01-09 NOTE — TELEPHONE ENCOUNTER
Pt is needing a refill on his Tamsulosin. Last office visit 08/24/2022. Next office visit 02/15/2023    New prescription for pt's tamsulosin was sent to the pharmacy on 01/06/2023 for a 30 day supply with 3 refills.

## 2023-01-29 DIAGNOSIS — I10 ESSENTIAL HYPERTENSION: ICD-10-CM

## 2023-01-29 RX ORDER — AMLODIPINE BESYLATE 5 MG/1
5 TABLET ORAL DAILY
Qty: 90 TABLET | Refills: 1 | Status: CANCELLED | OUTPATIENT
Start: 2023-01-29

## 2023-01-30 RX ORDER — AMLODIPINE BESYLATE 5 MG/1
5 TABLET ORAL DAILY
Qty: 90 TABLET | Refills: 3 | Status: SHIPPED | OUTPATIENT
Start: 2023-02-11 | End: 2023-04-26

## 2023-01-30 RX ORDER — LISINOPRIL 20 MG/1
20 TABLET ORAL DAILY
Qty: 90 TABLET | Refills: 3 | Status: SHIPPED | OUTPATIENT
Start: 2023-02-11 | End: 2023-06-22 | Stop reason: CLARIF

## 2023-01-30 NOTE — TELEPHONE ENCOUNTER
Spoke with pt he is not out of meds but he received a notification from [facility] it's time to renew. Rx set up for next refill date of 2/11 due to pt will be out out meds before his 2/15 appt.

## 2023-02-01 ENCOUNTER — PATIENT OUTREACH (OUTPATIENT)
Dept: ADMINISTRATIVE | Facility: HOSPITAL | Age: 88
End: 2023-02-01
Payer: MEDICARE

## 2023-02-01 ENCOUNTER — HOSPITAL ENCOUNTER (EMERGENCY)
Facility: HOSPITAL | Age: 88
Discharge: HOME | End: 2023-02-01
Attending: STUDENT IN AN ORGANIZED HEALTH CARE EDUCATION/TRAINING PROGRAM
Payer: MEDICARE

## 2023-02-01 VITALS
RESPIRATION RATE: 16 BRPM | WEIGHT: 160 LBS | BODY MASS INDEX: 24.25 KG/M2 | SYSTOLIC BLOOD PRESSURE: 160 MMHG | TEMPERATURE: 98 F | HEIGHT: 68 IN | HEART RATE: 64 BPM | OXYGEN SATURATION: 96 % | DIASTOLIC BLOOD PRESSURE: 86 MMHG

## 2023-02-01 DIAGNOSIS — Z79.82 ASPIRIN LONG-TERM USE: ICD-10-CM

## 2023-02-01 DIAGNOSIS — S42.291A HUMERAL HEAD FRACTURE, RIGHT, CLOSED, INITIAL ENCOUNTER: Primary | ICD-10-CM

## 2023-02-01 PROCEDURE — 99283 EMERGENCY DEPT VISIT LOW MDM: CPT

## 2023-02-01 RX ORDER — ACETAMINOPHEN 500 MG
1000 TABLET ORAL EVERY 6 HOURS PRN
Refills: 0
Start: 2023-02-01

## 2023-02-01 NOTE — PROGRESS NOTES
Chart review completed 2023.      Orders reviewed for any that may be . No orders removed     Immunizations reviewed.

## 2023-02-02 NOTE — ED PROVIDER NOTES
Encounter Date: 2/1/2023       History     Chief Complaint   Patient presents with    Fall     Tripped and fell onto L shoulder.     87-year-old male presents for evaluation of right shoulder pain.  The pain started after a mechanical fall and he struck his right shoulder on the ground or a wall.  He is not sure.  Since that time it hurts to move the shoulder, but he has normal range of motion of the elbow and the wrist.  Did not hit his head.  He did not lose consciousness.  He has no other complaints.  He takes 81 mg aspirin daily by his own choice.  He is not on other blood thinners.    Review of patient's allergies indicates:   Allergen Reactions    Pollen extracts Other (See Comments)     Past Medical History:   Diagnosis Date    Basal cell carcinoma 2020    L temple / radiation therapy    Cataract     Hypertension      Past Surgical History:   Procedure Laterality Date    APPENDECTOMY      EYE SURGERY      Moh's surgery on right temple area Right 2015    Cancerous spot    PROSTATE BIOPSY      TONSILLECTOMY       Family History   Problem Relation Age of Onset    Melanoma Neg Hx     Psoriasis Neg Hx     Lupus Neg Hx     Eczema Neg Hx      Social History     Tobacco Use    Smoking status: Never    Smokeless tobacco: Never     Review of Systems   Constitutional:  Negative for activity change and appetite change.   HENT:  Negative for congestion and drooling.    Eyes:  Negative for discharge and itching.   Respiratory:  Negative for cough and chest tightness.    Cardiovascular:  Negative for chest pain and leg swelling.   Gastrointestinal:  Negative for abdominal distention and abdominal pain.   Genitourinary:  Negative for difficulty urinating and dysuria.   Musculoskeletal:  Positive for arthralgias.   Skin:  Negative for color change and pallor.   Neurological:  Negative for dizziness and facial asymmetry.   Psychiatric/Behavioral:  Negative for agitation and behavioral problems.      Physical Exam     Initial  Vitals [02/01/23 2145]   BP Pulse Resp Temp SpO2   (!) 166/86 68 18 97.9 °F (36.6 °C) 96 %      MAP       --         Physical Exam    Nursing note and vitals reviewed.  Constitutional: He appears well-developed and well-nourished.   HENT:   Head: Normocephalic and atraumatic.   Mouth/Throat: Oropharynx is clear and moist.   Eyes: Conjunctivae and EOM are normal. Pupils are equal, round, and reactive to light.   Neck: No thyromegaly present.   Normal range of motion.  Cardiovascular:  Normal rate, regular rhythm and intact distal pulses.           Pulmonary/Chest: Breath sounds normal. No respiratory distress. He has no wheezes.   Abdominal: Abdomen is soft. Bowel sounds are normal. He exhibits no distension. There is no abdominal tenderness.   Musculoskeletal:         General: Tenderness present. No edema. Normal range of motion.      Cervical back: Normal range of motion.      Comments: Tenderness on the right humeral head     Neurological: He is alert and oriented to person, place, and time. He has normal strength. No cranial nerve deficit.   Skin: Skin is warm and dry. No rash noted.   Psychiatric: He has a normal mood and affect. His behavior is normal. Thought content normal.       ED Course   Procedures  Labs Reviewed - No data to display       Imaging Results              X-ray Shoulder 2 or More Views Right (In process)  Result time 02/01/23 22:32:46   Procedure changed from X-Ray Shoulder 2 or More Views Left                    Medications - No data to display                          87-year-old male presents for mechanical fall and right shoulder injury. x-ray reviewed by me, notable for right humeral head fracture.  Patient is neurovascularly intact distal injury.  Patient is on chronic aspirin use.  He did not strike his head, GCS 15, doubt acute intracranial abnormality such as subdural or epidural hematoma, no further imaging of his head or C-spine warranted at this time.  Patient is in his sling.   Will give Tylenol for pain.  Stable for discharge with outpatient follow-up with orthopedist Dr. Kearney.  Clinical Impression:   Final diagnoses:  [S42.328A] Humeral head fracture, right, closed, initial encounter (Primary)  [Z79.82] Aspirin long-term use        ED Disposition Condition    Discharge Stable          ED Prescriptions       Medication Sig Dispense Start Date End Date Auth. Provider    acetaminophen (TYLENOL) 500 MG tablet Take 2 tablets (1,000 mg total) by mouth every 6 (six) hours as needed for Pain. -- 2/1/2023 -- Jaskaran Enriquez MD          Follow-up Information       Follow up With Specialties Details Why Contact Info    Marquis Kearney MD Orthopedic Surgery Call in 1 day To set up a follow-up appointment, To recheck today's symptoms [address] [place] [address] ORTHOPEDICS & SP[facility]ICINE  [facility] [address]  [phone]               Jaskaran Enriquez MD  02/02/23 0359

## 2023-02-02 NOTE — DISCHARGE INSTRUCTIONS
Wear a sling for comfort and support. Call Dr. Kearney to set up close otupatient follow-up. Return if symptoms worsen.    Thank you for coming to our Emergency Department today. It is important to remember that some problems or medical conditions are difficult to diagnose and may not be found during your Emergency Department visit.     Be sure to follow up with your primary care doctor and review all labs/imaging/tests that were performed during your ER visit with them. Some labs/tests may be outside of the normal range and require non-emergent follow-up and further investigation to help diagnose/exclude/prevent complications or other potentially serious medical conditions that were not addressed during your ER visit.    If you do not have a primary care doctor, you may contact the one listed on your discharge paperwork or you may also call the [facility] Appointment Desk at [phone] to schedule an appointment and establish care with one. Another resources for finding primary care physicians: [email or web address] It is important to your health that you have a primary care doctor.    Please take all medications as directed. All medications may potentially have side-effects and it is impossible to predict which medications may give you side-effects or what side-effects (if any) they will give you. If you feel that you are having a negative effect or side-effect of any medication you should immediately stop taking them and seek medical attention. If you feel that you are having a life-threatening reaction call 911.    Return to the ER with any questions/concerns, new/concerning symptoms, worsening or failure to improve.     Do not drive, swim, climb to height, take a bath, operate heavy machinery, drink alcohol or take potentially sedating medications, sign any legal documents or make any important decisions for 24 hours if you have received any pain medications, sedatives or mood altering drugs during your  ER visit or within 24 hours of taking them if they have been prescribed to you.     You can find additional resources for Dentists, hearing aids, durable medical equipment, low cost pharmacies and other resources at [email or web address]

## 2023-02-02 NOTE — FIRST PROVIDER EVALUATION
"Medical screening examination initiated.  I have conducted a focused provider triage encounter, findings are as follows:    Brief history of present illness:  Patient presents to the ED for right shoulder pain after a fall.  Patient states he was walking when he tripped on the rug and he fell into the wall and then to the ground.  Patient states he turned in and his right shoulder hit the wall.  Patient denies any head injury patient denies any neck pain.  Patient takes 80 mg aspirin everyday.  Patient denies any loss of consciousness vomiting or nausea.  Patient states he felt dizzy after but that has since resolved.    Vitals:    02/01/23 2145   BP: (!) 166/86   Pulse: 68   Resp: 18   Temp: 97.9 °F (36.6 °C)   TempSrc: Oral   SpO2: 96%   Weight: 72.6 kg (160 lb)   Height: 5' 8" (1.727 m)       Pertinent physical exam:  Patient is awake and alert in no acute distress.  Patient has limited range of motion of right shoulder pain patient has good pulses and sensation distally.    Brief workup plan:  X-rays    Preliminary workup initiated; this workup will be continued and followed by the physician or advanced practice provider that is assigned to the patient when roomed.  "

## 2023-02-09 ENCOUNTER — TELEPHONE (OUTPATIENT)
Dept: FAMILY MEDICINE | Facility: CLINIC | Age: 88
End: 2023-02-09

## 2023-02-11 LAB
ALBUMIN SERPL-MCNC: 4.1 G/DL (ref 3.6–5.1)
ALBUMIN/CREAT UR: 9 MCG/MG CREAT
ALBUMIN/GLOB SERPL: 1.6 (CALC) (ref 1–2.5)
ALP SERPL-CCNC: 57 U/L (ref 35–144)
ALT SERPL-CCNC: 11 U/L (ref 9–46)
APPEARANCE UR: CLEAR
AST SERPL-CCNC: 19 U/L (ref 10–35)
BACTERIA #/AREA URNS HPF: NORMAL /HPF
BACTERIA UR CULT: NORMAL
BASOPHILS # BLD AUTO: 29 CELLS/UL (ref 0–200)
BASOPHILS NFR BLD AUTO: 0.7 %
BILIRUB SERPL-MCNC: 0.8 MG/DL (ref 0.2–1.2)
BILIRUB UR QL STRIP: NEGATIVE
BUN SERPL-MCNC: 11 MG/DL (ref 7–25)
BUN/CREAT SERPL: ABNORMAL (CALC) (ref 6–22)
CALCIUM SERPL-MCNC: 9.9 MG/DL (ref 8.6–10.3)
CHLORIDE SERPL-SCNC: 97 MMOL/L (ref 98–110)
CHOLEST SERPL-MCNC: 127 MG/DL
CHOLEST/HDLC SERPL: 4.2 (CALC)
CO2 SERPL-SCNC: 29 MMOL/L (ref 20–32)
COLOR UR: NORMAL
CREAT SERPL-MCNC: 0.95 MG/DL (ref 0.7–1.22)
CREAT UR-MCNC: 86 MG/DL (ref 20–320)
EGFR: 77 ML/MIN/1.73M2
EOSINOPHIL # BLD AUTO: 82 CELLS/UL (ref 15–500)
EOSINOPHIL NFR BLD AUTO: 2 %
ERYTHROCYTE [DISTWIDTH] IN BLOOD BY AUTOMATED COUNT: 11.8 % (ref 11–15)
GLOBULIN SER CALC-MCNC: 2.6 G/DL (CALC) (ref 1.9–3.7)
GLUCOSE SERPL-MCNC: 91 MG/DL (ref 65–99)
GLUCOSE UR QL STRIP: NEGATIVE
HCT VFR BLD AUTO: 35.2 % (ref 38.5–50)
HDLC SERPL-MCNC: 30 MG/DL
HGB BLD-MCNC: 12.3 G/DL (ref 13.2–17.1)
HGB UR QL STRIP: NEGATIVE
HYALINE CASTS #/AREA URNS LPF: NORMAL /LPF
KETONES UR QL STRIP: NEGATIVE
LDLC SERPL CALC-MCNC: 79 MG/DL (CALC)
LEUKOCYTE ESTERASE UR QL STRIP: NEGATIVE
LYMPHOCYTES # BLD AUTO: 869 CELLS/UL (ref 850–3900)
LYMPHOCYTES NFR BLD AUTO: 21.2 %
MCH RBC QN AUTO: 34.6 PG (ref 27–33)
MCHC RBC AUTO-ENTMCNC: 34.9 G/DL (ref 32–36)
MCV RBC AUTO: 99.2 FL (ref 80–100)
MICROALBUMIN UR-MCNC: 0.8 MG/DL
MONOCYTES # BLD AUTO: 923 CELLS/UL (ref 200–950)
MONOCYTES NFR BLD AUTO: 22.5 %
NEUTROPHILS # BLD AUTO: 2198 CELLS/UL (ref 1500–7800)
NEUTROPHILS NFR BLD AUTO: 53.6 %
NITRITE UR QL STRIP: NEGATIVE
NONHDLC SERPL-MCNC: 97 MG/DL (CALC)
PH UR STRIP: 7 [PH] (ref 5–8)
PLATELET # BLD AUTO: 195 THOUSAND/UL (ref 140–400)
PMV BLD REES-ECKER: 11.4 FL (ref 7.5–12.5)
POTASSIUM SERPL-SCNC: 4.2 MMOL/L (ref 3.5–5.3)
PROT SERPL-MCNC: 6.7 G/DL (ref 6.1–8.1)
PROT UR QL STRIP: NEGATIVE
RBC # BLD AUTO: 3.55 MILLION/UL (ref 4.2–5.8)
RBC #/AREA URNS HPF: NORMAL /HPF
SERVICE CMNT-IMP: NORMAL
SODIUM SERPL-SCNC: 135 MMOL/L (ref 135–146)
SP GR UR STRIP: 1.01 (ref 1–1.03)
SQUAMOUS #/AREA URNS HPF: NORMAL /HPF
TRIGL SERPL-MCNC: 94 MG/DL
TSH SERPL-ACNC: 4.14 MIU/L (ref 0.4–4.5)
WBC # BLD AUTO: 4.1 THOUSAND/UL (ref 3.8–10.8)
WBC #/AREA URNS HPF: NORMAL /HPF

## 2023-02-15 ENCOUNTER — OFFICE VISIT (OUTPATIENT)
Dept: FAMILY MEDICINE | Facility: CLINIC | Age: 88
End: 2023-02-15
Payer: MEDICARE

## 2023-02-15 VITALS
HEART RATE: 60 BPM | DIASTOLIC BLOOD PRESSURE: 80 MMHG | HEIGHT: 68 IN | BODY MASS INDEX: 24.86 KG/M2 | WEIGHT: 164 LBS | SYSTOLIC BLOOD PRESSURE: 114 MMHG

## 2023-02-15 DIAGNOSIS — R97.20 ELEVATED PSA: ICD-10-CM

## 2023-02-15 DIAGNOSIS — J43.1 PANLOBULAR EMPHYSEMA: ICD-10-CM

## 2023-02-15 DIAGNOSIS — R09.89 RIGHT CAROTID BRUIT: Primary | ICD-10-CM

## 2023-02-15 DIAGNOSIS — Z85.46 HISTORY OF PROSTATE CANCER: ICD-10-CM

## 2023-02-15 DIAGNOSIS — S42.411D CLOSED SUPRACONDYLAR FRACTURE OF RIGHT HUMERUS WITH ROUTINE HEALING: ICD-10-CM

## 2023-02-15 DIAGNOSIS — I10 ESSENTIAL HYPERTENSION: ICD-10-CM

## 2023-02-15 PROCEDURE — 1159F MED LIST DOCD IN RCRD: CPT | Mod: CPTII,S$GLB,, | Performed by: FAMILY MEDICINE

## 2023-02-15 PROCEDURE — 1159F PR MEDICATION LIST DOCUMENTED IN MEDICAL RECORD: ICD-10-PCS | Mod: CPTII,S$GLB,, | Performed by: FAMILY MEDICINE

## 2023-02-15 PROCEDURE — 99214 OFFICE O/P EST MOD 30 MIN: CPT | Mod: S$GLB,,, | Performed by: FAMILY MEDICINE

## 2023-02-15 PROCEDURE — 99214 PR OFFICE/OUTPT VISIT, EST, LEVL IV, 30-39 MIN: ICD-10-PCS | Mod: S$GLB,,, | Performed by: FAMILY MEDICINE

## 2023-02-18 PROBLEM — S42.411D CLOSED SUPRACONDYLAR FRACTURE OF RIGHT HUMERUS WITH ROUTINE HEALING: Status: ACTIVE | Noted: 2023-02-18

## 2023-02-18 NOTE — PROGRESS NOTES
SUBJECTIVE:    Patient ID: Brit Jennings is a 87 y.o. male.    Chief Complaint: Arm Pain (Right arm, no bottles, pt has some questions, declined flu  vaccine, Lab-results,abc )    87-year-old male here for six-month checkup.  Two weeks ago he fell at home.  He was seen by Dr. Kearney orthopedics and diagnosed with a nondisplaced fracture of the proximal head of the right humerus.  Currently is on a shoulder immobilizer but not on any pain medications.  Fortunately he is left handed.  Once this humerus fracture is healed in approximately 6 weeks he will be a candidate for physical therapy.    COPD-maintained on Symbicort inhaler    Insomnia-temazepam helps him sleep    He is dizzy when arising but does not have any vertigo.    2019-prostate cancer diagnosed, status post radiation treatments, follows up with Dr. Andrade for PSA in March.          Office Visit on 08/24/2022   Component Date Value Ref Range Status    WBC 02/10/2023 4.1  3.8 - 10.8 Thousand/uL Final    RBC 02/10/2023 3.55 (L)  4.20 - 5.80 Million/uL Final    Hemoglobin 02/10/2023 12.3 (L)  13.2 - 17.1 g/dL Final    Hematocrit 02/10/2023 35.2 (L)  38.5 - 50.0 % Final    MCV 02/10/2023 99.2  80.0 - 100.0 fL Final    MCH 02/10/2023 34.6 (H)  27.0 - 33.0 pg Final    MCHC 02/10/2023 34.9  32.0 - 36.0 g/dL Final    RDW 02/10/2023 11.8  11.0 - 15.0 % Final    Platelets 02/10/2023 195  140 - 400 Thousand/uL Final    MPV 02/10/2023 11.4  7.5 - 12.5 fL Final    Neutrophils, Abs 02/10/2023 2,198  1,500 - 7,800 cells/uL Final    Lymph # 02/10/2023 869  850 - 3,900 cells/uL Final    Mono # 02/10/2023 923  200 - 950 cells/uL Final    Eos # 02/10/2023 82  15 - 500 cells/uL Final    Baso # 02/10/2023 29  0 - 200 cells/uL Final    Neutrophils Relative 02/10/2023 53.6  % Final    Lymph % 02/10/2023 21.2  % Final    Mono % 02/10/2023 22.5  % Final    Eosinophil % 02/10/2023 2.0  % Final    Basophil % 02/10/2023 0.7  % Final    Glucose 02/10/2023 91  65 - 99  mg/dL Final    BUN 02/10/2023 11  7 - 25 mg/dL Final    Creatinine 02/10/2023 0.95  0.70 - 1.22 mg/dL Final    eGFR 02/10/2023 77  > OR = 60 mL/min/1.73m2 Final    BUN/Creatinine Ratio 02/10/2023 NOT APPLICABLE  6 - 22 (calc) Final    Sodium 02/10/2023 135  135 - 146 mmol/L Final    Potassium 02/10/2023 4.2  3.5 - 5.3 mmol/L Final    Chloride 02/10/2023 97 (L)  98 - 110 mmol/L Final    CO2 02/10/2023 29  20 - 32 mmol/L Final    Calcium 02/10/2023 9.9  8.6 - 10.3 mg/dL Final    Total Protein 02/10/2023 6.7  6.1 - 8.1 g/dL Final    Albumin 02/10/2023 4.1  3.6 - 5.1 g/dL Final    Globulin, Total 02/10/2023 2.6  1.9 - 3.7 g/dL (calc) Final    Albumin/Globulin Ratio 02/10/2023 1.6  1.0 - 2.5 (calc) Final    Total Bilirubin 02/10/2023 0.8  0.2 - 1.2 mg/dL Final    Alkaline Phosphatase 02/10/2023 57  35 - 144 U/L Final    AST 02/10/2023 19  10 - 35 U/L Final    ALT 02/10/2023 11  9 - 46 U/L Final    Cholesterol 02/10/2023 127  <200 mg/dL Final    HDL 02/10/2023 30 (L)  > OR = 40 mg/dL Final    Triglycerides 02/10/2023 94  <150 mg/dL Final    LDL Cholesterol 02/10/2023 79  mg/dL (calc) Final    HDL/Cholesterol Ratio 02/10/2023 4.2  <5.0 (calc) Final    Non HDL Chol. (LDL+VLDL) 02/10/2023 97  <130 mg/dL (calc) Final    Creatinine, Urine 02/10/2023 86  20 - 320 mg/dL Final    Microalb, Ur 02/10/2023 0.8  See Note: mg/dL Final    Microalb/Creat Ratio 02/10/2023 9  <30 mcg/mg creat Final    TSH w/reflex to FT4 02/10/2023 4.14  0.40 - 4.50 mIU/L Final    Color, UA 02/10/2023 DARK YELLOW  YELLOW Final    Appearance, UA 02/10/2023 CLEAR  CLEAR Final    Specific Gravity, UA 02/10/2023 1.011  1.001 - 1.035 Final    pH, UA 02/10/2023 7.0  5.0 - 8.0 Final    Glucose, UA 02/10/2023 NEGATIVE  NEGATIVE Final    Bilirubin, UA 02/10/2023 NEGATIVE  NEGATIVE Final    Ketones, UA 02/10/2023 NEGATIVE  NEGATIVE Final    Occult Blood UA 02/10/2023 NEGATIVE  NEGATIVE Final    Protein, UA 02/10/2023 NEGATIVE  NEGATIVE Final    Nitrite, UA  02/10/2023 NEGATIVE  NEGATIVE Final    Leukocytes, UA 02/10/2023 NEGATIVE  NEGATIVE Final    WBC Casts, UA 02/10/2023 NONE SEEN  < OR = 5 /HPF Final    RBC Casts, UA 02/10/2023 NONE SEEN  < OR = 2 /HPF Final    Squam Epithel, UA 02/10/2023 NONE SEEN  < OR = 5 /HPF Final    Bacteria, UA 02/10/2023 NONE SEEN  NONE SEEN /HPF Final    Hyaline Casts, UA 02/10/2023 NONE SEEN  NONE SEEN /LPF Final    Service Cmt: 02/10/2023    Final    Reflexive Urine Culture 02/10/2023    Final       Past Medical History:   Diagnosis Date    Basal cell carcinoma 2020    L temple / radiation therapy    Cataract     Hypertension      Social History     Socioeconomic History    Marital status:    Tobacco Use    Smoking status: Never    Smokeless tobacco: Never     Past Surgical History:   Procedure Laterality Date    APPENDECTOMY      EYE SURGERY      Moh's surgery on right temple area Right 2015    Cancerous spot    PROSTATE BIOPSY      TONSILLECTOMY       Family History   Problem Relation Age of Onset    Melanoma Neg Hx     Psoriasis Neg Hx     Lupus Neg Hx     Eczema Neg Hx        Review of patient's allergies indicates:   Allergen Reactions    Pollen extracts Other (See Comments)       Current Outpatient Medications:     acetaminophen (TYLENOL) 500 MG tablet, Take 2 tablets (1,000 mg total) by mouth every 6 (six) hours as needed for Pain., Disp: , Rfl: 0    amLODIPine (NORVASC) 5 MG tablet, Take 1 tablet (5 mg total) by mouth once daily., Disp: 90 tablet, Rfl: 3    ascorbic acid, vitamin C, (VITAMIN C) 500 MG tablet, TAKE TWO TABLETS BY MOUTH EVERY DAY, Disp: , Rfl:     aspirin 81 MG Chew, Take 81 mg by mouth once daily., Disp: , Rfl:     hydrocortisone 2.5 % cream, Thin film to AA onface daily PRN flare; use short term only, Disp: 28 g, Rfl: 1    ketoconazole (NIZORAL) 2 % cream, Thin film to red scaly rash on face twice daily as needed for flare, Disp: 60 g, Rfl: 3    lisinopriL (PRINIVIL,ZESTRIL) 20 MG tablet, Take 1 tablet (20  mg total) by mouth once daily., Disp: 90 tablet, Rfl: 3    solifenacin (VESICARE) 10 MG tablet, Take 10 mg by mouth once daily., Disp: , Rfl:     SYMBICORT 160-4.5 mcg/actuation HFAA, Inhale 2 puffs into the lungs every 12 (twelve) hours., Disp: 30 g, Rfl: 4    tamsulosin (FLOMAX) 0.4 mg Cap, Take 1 capsule (0.4 mg total) by mouth once daily., Disp: 30 capsule, Rfl: 3    temazepam (RESTORIL) 30 mg capsule, Take 1 capsule (30 mg total) by mouth nightly as needed for Insomnia., Disp: 90 capsule, Rfl: 1    Current Facility-Administered Medications:     albuterol inhaler 2 puff, 2 puff, Inhalation, Q20 Min PRN, Farhana Riggins, NP    diphenhydrAMINE injection 25 mg, 25 mg, Intravenous, Once PRN, Farhana Riggins NP    EPINEPHrine (EPIPEN) 0.3 mg/0.3 mL pen injection 0.3 mg, 0.3 mg, Intramuscular, PRN, Farhana Riggins, BARI    methylPREDNISolone sodium succinate injection 40 mg, 40 mg, Intravenous, Once PRN, Farhana Riggins NP    ondansetron disintegrating tablet 4 mg, 4 mg, Oral, Once PRN, Farhana Riggins NP    sodium chloride 0.9% 500 mL flush bag, , Intravenous, PRN, Farhana Riggins NP, Stopped at 12/15/21 1455    sodium chloride 0.9% flush 10 mL, 10 mL, Intravenous, PRN, Farhana Riggins, NP    Review of Systems   Constitutional:  Negative for appetite change, chills, fatigue, fever and unexpected weight change.   HENT:  Negative for congestion, ear pain and trouble swallowing.    Eyes:  Negative for pain, discharge and visual disturbance.   Respiratory:  Negative for apnea, cough, shortness of breath and wheezing.    Cardiovascular:  Negative for chest pain and leg swelling.   Gastrointestinal:  Negative for abdominal pain, blood in stool, constipation, diarrhea, nausea and vomiting.   Endocrine: Negative for cold intolerance, heat intolerance and polydipsia.   Genitourinary:  Negative for dysuria, hematuria, testicular pain and urgency.   Musculoskeletal:  Positive for arthralgias (right humerus  "fracture, in a mobilizer at this time). Negative for gait problem, joint swelling and myalgias.   Neurological:  Negative for dizziness, seizures and numbness.   Psychiatric/Behavioral:  Negative for agitation, behavioral problems and hallucinations. The patient is not nervous/anxious.         Objective:      Vitals:    02/15/23 1056   BP: 114/80   Pulse: 60   Weight: 74.4 kg (164 lb)   Height: 5' 8" (1.727 m)     Physical Exam  Vitals and nursing note reviewed.   Constitutional:       General: He is not in acute distress.     Appearance: Normal appearance. He is well-developed. He is not toxic-appearing.   HENT:      Head: Normocephalic and atraumatic.      Right Ear: Tympanic membrane and external ear normal.      Left Ear: Tympanic membrane and external ear normal.      Nose: Nose normal.      Mouth/Throat:      Pharynx: Oropharynx is clear.   Eyes:      Pupils: Pupils are equal, round, and reactive to light.   Neck:      Thyroid: No thyromegaly.      Vascular: Carotid bruit (right carotid bruit is present) present.   Cardiovascular:      Rate and Rhythm: Normal rate and regular rhythm.      Heart sounds: Normal heart sounds. No murmur heard.  Pulmonary:      Effort: Pulmonary effort is normal.      Breath sounds: Normal breath sounds. No wheezing or rales.   Abdominal:      General: Bowel sounds are normal. There is no distension.      Palpations: Abdomen is soft.      Tenderness: There is no abdominal tenderness.   Musculoskeletal:         General: No tenderness or deformity. Normal range of motion.      Cervical back: Normal range of motion and neck supple.      Lumbar back: Normal. No spasms.      Comments: Bends 90 degrees at  waist, right shoulder in shoulder immobilizer.  Left shoulder good range of motion knees good range of motion.  Trace edema to both ankles.   Lymphadenopathy:      Cervical: No cervical adenopathy.   Skin:     General: Skin is warm and dry.      Findings: No rash.   Neurological:      " Mental Status: He is alert and oriented to person, place, and time. Mental status is at baseline.      Cranial Nerves: No cranial nerve deficit.      Coordination: Coordination normal.   Psychiatric:         Mood and Affect: Mood normal.         Behavior: Behavior normal.         Thought Content: Thought content normal.         Judgment: Judgment normal.         Assessment:       1. Right carotid bruit    2. Panlobular emphysema    3. Essential hypertension    4. History of prostate cancer    5. Elevated PSA    6. Closed supracondylar fracture of right humerus with routine healing           Plan:       Right carotid bruit  -     US Carotid Bilateral; Future; Expected date: 02/15/2023  Cholesterol 127 HDL 30 TG 94 LDL 79 excellent lipid panel.  Ultrasound of carotids ordered  Panlobular emphysema  Stable at this time  Essential hypertension  Blood pressure doing well  History of prostate cancer  PSA due in March  Elevated PSA    Closed supracondylar fracture of right humerus with routine healing  Dr. Kearney monitoring    Follow up in about 6 months (around 8/15/2023), or carotid bruit.        2/18/2023 Jian Alcazar

## 2023-02-27 ENCOUNTER — PATIENT MESSAGE (OUTPATIENT)
Dept: DERMATOLOGY | Facility: CLINIC | Age: 88
End: 2023-02-27
Payer: MEDICARE

## 2023-03-01 ENCOUNTER — HOSPITAL ENCOUNTER (OUTPATIENT)
Dept: RADIOLOGY | Facility: HOSPITAL | Age: 88
Discharge: HOME | End: 2023-03-01
Attending: FAMILY MEDICINE
Payer: MEDICARE

## 2023-03-01 DIAGNOSIS — R09.89 RIGHT CAROTID BRUIT: ICD-10-CM

## 2023-03-01 PROCEDURE — 93880 EXTRACRANIAL BILAT STUDY: CPT | Mod: TC,PO

## 2023-03-06 ENCOUNTER — TELEPHONE (OUTPATIENT)
Dept: FAMILY MEDICINE | Facility: CLINIC | Age: 88
End: 2023-03-06

## 2023-03-06 DIAGNOSIS — I65.29 OCCLUSION AND STENOSIS OF UNSPECIFIED CAROTID ARTERY: Primary | ICD-10-CM

## 2023-03-06 NOTE — TELEPHONE ENCOUNTER
Tried calling patient - he was not able to hear me and there was a lot of static in the phone. Will try again tomorrow.

## 2023-03-06 NOTE — TELEPHONE ENCOUNTER
----- Message from Jian Alcazar MD sent at 3/5/2023  8:59 PM CST -----  Call patient.  Carotid ultrasound does show narrowing on the right carotid and the 50-69% stenosis range.  Also the left-sided carotid shows possibility of significant stenosis.  Radiologist recommends a CT angiogram of the neck.  We can set this up for you

## 2023-03-06 NOTE — PROGRESS NOTES
Call patient.  Carotid ultrasound does show narrowing on the right carotid and the 50-69% stenosis range.  Also the left-sided carotid shows possibility of significant stenosis.  Radiologist recommends a CT angiogram of the neck.  We can set this up for you

## 2023-03-07 ENCOUNTER — PATIENT MESSAGE (OUTPATIENT)
Dept: FAMILY MEDICINE | Facility: CLINIC | Age: 88
End: 2023-03-07

## 2023-03-07 DIAGNOSIS — F51.01 PRIMARY INSOMNIA: ICD-10-CM

## 2023-03-07 RX ORDER — TEMAZEPAM 30 MG/1
30 CAPSULE ORAL NIGHTLY PRN
Qty: 90 CAPSULE | Refills: 1 | Status: SHIPPED | OUTPATIENT
Start: 2023-03-07 | End: 2023-10-26 | Stop reason: SDUPTHER

## 2023-03-15 ENCOUNTER — HOSPITAL ENCOUNTER (OUTPATIENT)
Dept: RADIOLOGY | Facility: HOSPITAL | Age: 88
Discharge: HOME | End: 2023-03-15
Attending: FAMILY MEDICINE
Payer: MEDICARE

## 2023-03-15 DIAGNOSIS — I65.29 OCCLUSION AND STENOSIS OF UNSPECIFIED CAROTID ARTERY: ICD-10-CM

## 2023-03-15 LAB
CREAT SERPL-MCNC: 1 MG/DL (ref 0.5–1.4)
SAMPLE: NORMAL

## 2023-03-15 PROCEDURE — 25500020 PHARM REV CODE 255: Mod: PO | Performed by: FAMILY MEDICINE

## 2023-03-15 PROCEDURE — 82565 ASSAY OF CREATININE: CPT | Mod: PO

## 2023-03-15 RX ADMIN — IOHEXOL 100 ML: 350 INJECTION, SOLUTION INTRAVENOUS at 03:03

## 2023-03-19 NOTE — PROGRESS NOTES
Call patient.  CTA of the neck shows an 80% blockage on his left carotid artery.  100% blockage in the left subclavian artery going to his left arm, right carotid artery does not have any significant blockages to note.  I recommend a referral to Dr. Davy Mendoza a vascular surgeon to see if stenting or surgery would be advisable to prevent stroke.

## 2023-03-20 ENCOUNTER — PATIENT MESSAGE (OUTPATIENT)
Dept: FAMILY MEDICINE | Facility: CLINIC | Age: 88
End: 2023-03-20

## 2023-03-20 ENCOUNTER — TELEPHONE (OUTPATIENT)
Dept: FAMILY MEDICINE | Facility: CLINIC | Age: 88
End: 2023-03-20

## 2023-03-20 DIAGNOSIS — R09.89 RIGHT CAROTID BRUIT: Primary | ICD-10-CM

## 2023-03-20 DIAGNOSIS — I65.29 OCCLUSION AND STENOSIS OF UNSPECIFIED CAROTID ARTERY: ICD-10-CM

## 2023-03-20 NOTE — TELEPHONE ENCOUNTER
Spoke to patient with result verbatim per Dr Alcazar. Gave him Dr Mendoza name and number. Referral pended. Remind me created. Then I guess this needs to go to [facility] for auth? He's PHN

## 2023-03-20 NOTE — TELEPHONE ENCOUNTER
----- Message from Jian Alcazar MD sent at 3/19/2023  5:36 PM CDT -----  Call patient.  CTA of the neck shows an 80% blockage on his left carotid artery.  100% blockage in the left subclavian artery going to his left arm, right carotid artery does not have any significant blockages to note.  I recommend a referral to Dr. Davy Mendoza a vascular surgeon to see if stenting or surgery would be advisable to prevent stroke.

## 2023-03-21 ENCOUNTER — TELEPHONE (OUTPATIENT)
Dept: FAMILY MEDICINE | Facility: CLINIC | Age: 88
End: 2023-03-21

## 2023-03-21 NOTE — TELEPHONE ENCOUNTER
----- Message from Becky Mathew sent at 3/21/2023  9:48 AM CDT -----  Dr Mendoza off called and would like to get clinic notes medication list and testing and labs. ([place]) Fax [phone]

## 2023-04-14 ENCOUNTER — HOSPITAL ENCOUNTER (OUTPATIENT)
Facility: HOSPITAL | Age: 88
Discharge: HOME | End: 2023-04-15
Attending: EMERGENCY MEDICINE
Payer: MEDICARE

## 2023-04-14 DIAGNOSIS — R42 DIZZINESS: ICD-10-CM

## 2023-04-14 DIAGNOSIS — I65.22 CAROTID ARTERY STENOSIS, SYMPTOMATIC, LEFT: Primary | Chronic | ICD-10-CM

## 2023-04-14 DIAGNOSIS — U07.1 COVID-19 VIRUS INFECTION: ICD-10-CM

## 2023-04-14 DIAGNOSIS — I63.9 STROKE: ICD-10-CM

## 2023-04-14 DIAGNOSIS — Q24.9 CARDIAC ABNORMALITY: ICD-10-CM

## 2023-04-14 PROBLEM — I10 ESSENTIAL HYPERTENSION: Chronic | Status: ACTIVE | Noted: 2019-10-02

## 2023-04-14 PROBLEM — G45.9 TIA (TRANSIENT ISCHEMIC ATTACK): Status: ACTIVE | Noted: 2023-04-14

## 2023-04-14 LAB
ABO + RH BLD: NORMAL
ALBUMIN SERPL BCP-MCNC: 4.4 G/DL (ref 3.5–5.2)
ALP SERPL-CCNC: 61 U/L (ref 55–135)
ALT SERPL W/O P-5'-P-CCNC: 19 U/L (ref 10–44)
AMPHET+METHAMPHET UR QL: NEGATIVE
ANION GAP SERPL CALC-SCNC: 8 MMOL/L (ref 8–16)
APTT PPP: 29.1 SEC (ref 21–32)
AST SERPL-CCNC: 29 U/L (ref 10–40)
BARBITURATES UR QL SCN>200 NG/ML: NEGATIVE
BASOPHILS # BLD AUTO: 0.02 K/UL (ref 0–0.2)
BASOPHILS NFR BLD: 0.5 % (ref 0–1.9)
BENZODIAZ UR QL SCN>200 NG/ML: ABNORMAL
BILIRUB SERPL-MCNC: 0.6 MG/DL (ref 0.1–1)
BILIRUB UR QL STRIP: NEGATIVE
BLD GP AB SCN CELLS X3 SERPL QL: NORMAL
BUN SERPL-MCNC: 13 MG/DL (ref 8–23)
BZE UR QL SCN: NEGATIVE
CALCIUM SERPL-MCNC: 9.6 MG/DL (ref 8.7–10.5)
CANNABINOIDS UR QL SCN: NEGATIVE
CHLORIDE SERPL-SCNC: 96 MMOL/L (ref 95–110)
CLARITY UR: CLEAR
CO2 SERPL-SCNC: 23 MMOL/L (ref 23–29)
COLOR UR: COLORLESS
CREAT SERPL-MCNC: 0.8 MG/DL (ref 0.5–1.4)
CREAT SERPL-MCNC: 0.9 MG/DL (ref 0.5–1.4)
CREAT UR-MCNC: <10 MG/DL (ref 23–375)
DIFFERENTIAL METHOD: ABNORMAL
EOSINOPHIL # BLD AUTO: 0.1 K/UL (ref 0–0.5)
EOSINOPHIL NFR BLD: 3.4 % (ref 0–8)
ERYTHROCYTE [DISTWIDTH] IN BLOOD BY AUTOMATED COUNT: 12.3 % (ref 11.5–14.5)
EST. GFR  (NO RACE VARIABLE): >60 ML/MIN/1.73 M^2
GLUCOSE SERPL-MCNC: 109 MG/DL (ref 70–110)
GLUCOSE SERPL-MCNC: 99 MG/DL (ref 70–110)
GLUCOSE UR QL STRIP: NEGATIVE
HCT VFR BLD AUTO: 36.6 % (ref 40–54)
HGB BLD-MCNC: 12.9 G/DL (ref 14–18)
HGB UR QL STRIP: NEGATIVE
IMM GRANULOCYTES # BLD AUTO: 0.01 K/UL (ref 0–0.04)
IMM GRANULOCYTES NFR BLD AUTO: 0.3 % (ref 0–0.5)
INR PPP: 1 (ref 0.8–1.2)
INR PPP: 1 (ref 0.8–1.2)
KETONES UR QL STRIP: NEGATIVE
LEUKOCYTE ESTERASE UR QL STRIP: NEGATIVE
LYMPHOCYTES # BLD AUTO: 1.5 K/UL (ref 1–4.8)
LYMPHOCYTES NFR BLD: 39.1 % (ref 18–48)
MAGNESIUM SERPL-MCNC: 2.1 MG/DL (ref 1.6–2.6)
MCH RBC QN AUTO: 34.5 PG (ref 27–31)
MCHC RBC AUTO-ENTMCNC: 35.2 G/DL (ref 32–36)
MCV RBC AUTO: 98 FL (ref 82–98)
MONOCYTES # BLD AUTO: 0.8 K/UL (ref 0.3–1)
MONOCYTES NFR BLD: 20.7 % (ref 4–15)
NEUTROPHILS # BLD AUTO: 1.4 K/UL (ref 1.8–7.7)
NEUTROPHILS NFR BLD: 36 % (ref 38–73)
NITRITE UR QL STRIP: NEGATIVE
NRBC BLD-RTO: 0 /100 WBC
OPIATES UR QL SCN: NEGATIVE
PCP UR QL SCN>25 NG/ML: NEGATIVE
PH UR STRIP: 8 [PH] (ref 5–8)
PLATELET # BLD AUTO: 164 K/UL (ref 150–450)
PMV BLD AUTO: 11.4 FL (ref 9.2–12.9)
POTASSIUM SERPL-SCNC: 3.7 MMOL/L (ref 3.5–5.1)
PROT SERPL-MCNC: 7.9 G/DL (ref 6–8.4)
PROT UR QL STRIP: NEGATIVE
PROTHROMBIN TIME: 11.3 SEC (ref 9–12.5)
PROTHROMBIN TIME: 11.3 SEC (ref 9–12.5)
RBC # BLD AUTO: 3.74 M/UL (ref 4.6–6.2)
SAMPLE: NORMAL
SODIUM SERPL-SCNC: 127 MMOL/L (ref 136–145)
SP GR UR STRIP: 1.01 (ref 1–1.03)
SPECIMEN OUTDATE: NORMAL
TOXICOLOGY INFORMATION: ABNORMAL
TROPONIN I SERPL HS-MCNC: 9.2 PG/ML (ref 0–14.9)
TSH SERPL DL<=0.005 MIU/L-ACNC: 3.14 UIU/ML (ref 0.34–5.6)
URN SPEC COLLECT METH UR: ABNORMAL
UROBILINOGEN UR STRIP-ACNC: NEGATIVE EU/DL
WBC # BLD AUTO: 3.81 K/UL (ref 3.9–12.7)

## 2023-04-14 PROCEDURE — G0378 HOSPITAL OBSERVATION PER HR: HCPCS

## 2023-04-14 PROCEDURE — 84484 ASSAY OF TROPONIN QUANT: CPT | Mod: 91 | Performed by: EMERGENCY MEDICINE

## 2023-04-14 PROCEDURE — 84484 ASSAY OF TROPONIN QUANT: CPT | Performed by: NURSE PRACTITIONER

## 2023-04-14 PROCEDURE — 85025 COMPLETE CBC W/AUTO DIFF WBC: CPT | Performed by: NURSE PRACTITIONER

## 2023-04-14 PROCEDURE — 25000003 PHARM REV CODE 250: Performed by: EMERGENCY MEDICINE

## 2023-04-14 PROCEDURE — 82962 GLUCOSE BLOOD TEST: CPT

## 2023-04-14 PROCEDURE — 81003 URINALYSIS AUTO W/O SCOPE: CPT | Performed by: EMERGENCY MEDICINE

## 2023-04-14 PROCEDURE — 85610 PROTHROMBIN TIME: CPT | Performed by: NURSE PRACTITIONER

## 2023-04-14 PROCEDURE — 83735 ASSAY OF MAGNESIUM: CPT | Performed by: EMERGENCY MEDICINE

## 2023-04-14 PROCEDURE — 86900 BLOOD TYPING SEROLOGIC ABO: CPT | Performed by: EMERGENCY MEDICINE

## 2023-04-14 PROCEDURE — 99285 EMERGENCY DEPT VISIT HI MDM: CPT | Mod: 25

## 2023-04-14 PROCEDURE — 93005 ELECTROCARDIOGRAM TRACING: CPT | Performed by: SPECIALIST

## 2023-04-14 PROCEDURE — 93010 EKG 12-LEAD: ICD-10-PCS | Mod: ,,, | Performed by: SPECIALIST

## 2023-04-14 PROCEDURE — 85730 THROMBOPLASTIN TIME PARTIAL: CPT | Performed by: EMERGENCY MEDICINE

## 2023-04-14 PROCEDURE — 93010 ELECTROCARDIOGRAM REPORT: CPT | Mod: ,,, | Performed by: SPECIALIST

## 2023-04-14 PROCEDURE — 25500020 PHARM REV CODE 255: Performed by: EMERGENCY MEDICINE

## 2023-04-14 PROCEDURE — 80307 DRUG TEST PRSMV CHEM ANLYZR: CPT | Performed by: EMERGENCY MEDICINE

## 2023-04-14 PROCEDURE — 84443 ASSAY THYROID STIM HORMONE: CPT | Performed by: EMERGENCY MEDICINE

## 2023-04-14 PROCEDURE — 80053 COMPREHEN METABOLIC PANEL: CPT | Performed by: NURSE PRACTITIONER

## 2023-04-14 RX ORDER — ONDANSETRON 2 MG/ML
4 INJECTION INTRAMUSCULAR; INTRAVENOUS EVERY 8 HOURS PRN
Status: DISCONTINUED | OUTPATIENT
Start: 2023-04-14 | End: 2023-04-15 | Stop reason: HOSPADM

## 2023-04-14 RX ORDER — NAPROXEN SODIUM 220 MG/1
324 TABLET, FILM COATED ORAL
Status: COMPLETED | OUTPATIENT
Start: 2023-04-14 | End: 2023-04-14

## 2023-04-14 RX ORDER — PROCHLORPERAZINE EDISYLATE 5 MG/ML
5 INJECTION INTRAMUSCULAR; INTRAVENOUS EVERY 6 HOURS PRN
Status: DISCONTINUED | OUTPATIENT
Start: 2023-04-14 | End: 2023-04-15 | Stop reason: HOSPADM

## 2023-04-14 RX ORDER — CLOPIDOGREL BISULFATE 75 MG/1
75 TABLET ORAL DAILY
Status: DISCONTINUED | OUTPATIENT
Start: 2023-04-15 | End: 2023-04-15 | Stop reason: HOSPADM

## 2023-04-14 RX ORDER — ATORVASTATIN CALCIUM 40 MG/1
80 TABLET, FILM COATED ORAL ONCE
Status: COMPLETED | OUTPATIENT
Start: 2023-04-14 | End: 2023-04-14

## 2023-04-14 RX ORDER — MECLIZINE HCL 12.5 MG 12.5 MG/1
12.5 TABLET ORAL
Status: DISPENSED | OUTPATIENT
Start: 2023-04-14 | End: 2023-04-15

## 2023-04-14 RX ORDER — SODIUM CHLORIDE 0.9 % (FLUSH) 0.9 %
10 SYRINGE (ML) INJECTION
Status: DISCONTINUED | OUTPATIENT
Start: 2023-04-14 | End: 2023-04-15 | Stop reason: HOSPADM

## 2023-04-14 RX ORDER — LABETALOL HYDROCHLORIDE 5 MG/ML
10 INJECTION, SOLUTION INTRAVENOUS EVERY 4 HOURS PRN
Status: DISCONTINUED | OUTPATIENT
Start: 2023-04-14 | End: 2023-04-15 | Stop reason: HOSPADM

## 2023-04-14 RX ORDER — ATORVASTATIN CALCIUM 40 MG/1
80 TABLET, FILM COATED ORAL DAILY
Status: DISCONTINUED | OUTPATIENT
Start: 2023-04-15 | End: 2023-04-15 | Stop reason: HOSPADM

## 2023-04-14 RX ORDER — CLOPIDOGREL BISULFATE 75 MG/1
75 TABLET ORAL
Status: COMPLETED | OUTPATIENT
Start: 2023-04-14 | End: 2023-04-14

## 2023-04-14 RX ADMIN — ATORVASTATIN CALCIUM 80 MG: 40 TABLET, FILM COATED ORAL at 09:04

## 2023-04-14 RX ADMIN — IOHEXOL 100 ML: 350 INJECTION, SOLUTION INTRAVENOUS at 05:04

## 2023-04-14 RX ADMIN — CLOPIDOGREL BISULFATE 75 MG: 75 TABLET, FILM COATED ORAL at 09:04

## 2023-04-14 RX ADMIN — ASPIRIN 81 MG 324 MG: 81 TABLET ORAL at 08:04

## 2023-04-15 VITALS
HEIGHT: 68 IN | DIASTOLIC BLOOD PRESSURE: 77 MMHG | SYSTOLIC BLOOD PRESSURE: 122 MMHG | BODY MASS INDEX: 24.26 KG/M2 | HEART RATE: 62 BPM | WEIGHT: 160.06 LBS | RESPIRATION RATE: 18 BRPM | OXYGEN SATURATION: 96 % | TEMPERATURE: 98 F

## 2023-04-15 LAB
ALBUMIN SERPL BCP-MCNC: 3.9 G/DL (ref 3.5–5.2)
ALP SERPL-CCNC: 57 U/L (ref 55–135)
ALT SERPL W/O P-5'-P-CCNC: 18 U/L (ref 10–44)
ANION GAP SERPL CALC-SCNC: 12 MMOL/L (ref 8–16)
AST SERPL-CCNC: 27 U/L (ref 10–40)
BASOPHILS # BLD AUTO: 0.03 K/UL (ref 0–0.2)
BASOPHILS NFR BLD: 1 % (ref 0–1.9)
BILIRUB SERPL-MCNC: 1 MG/DL (ref 0.1–1)
BUN SERPL-MCNC: 12 MG/DL (ref 8–23)
CALCIUM SERPL-MCNC: 8.9 MG/DL (ref 8.7–10.5)
CHLORIDE SERPL-SCNC: 98 MMOL/L (ref 95–110)
CHOLEST SERPL-MCNC: 136 MG/DL (ref 120–199)
CHOLEST/HDLC SERPL: 4.4 {RATIO} (ref 2–5)
CK MB SERPL-MCNC: 2.9 NG/ML (ref 0.1–6.5)
CO2 SERPL-SCNC: 23 MMOL/L (ref 23–29)
CREAT SERPL-MCNC: 0.8 MG/DL (ref 0.5–1.4)
DIFFERENTIAL METHOD: ABNORMAL
EOSINOPHIL # BLD AUTO: 0.1 K/UL (ref 0–0.5)
EOSINOPHIL NFR BLD: 4 % (ref 0–8)
ERYTHROCYTE [DISTWIDTH] IN BLOOD BY AUTOMATED COUNT: 12.4 % (ref 11.5–14.5)
EST. GFR  (NO RACE VARIABLE): >60 ML/MIN/1.73 M^2
GLUCOSE SERPL-MCNC: 100 MG/DL (ref 70–110)
HCT VFR BLD AUTO: 36.2 % (ref 40–54)
HDLC SERPL-MCNC: 31 MG/DL (ref 40–75)
HDLC SERPL: 22.8 % (ref 20–50)
HGB BLD-MCNC: 12.5 G/DL (ref 14–18)
IMM GRANULOCYTES # BLD AUTO: 0.02 K/UL (ref 0–0.04)
IMM GRANULOCYTES NFR BLD AUTO: 0.7 % (ref 0–0.5)
LDLC SERPL CALC-MCNC: 90.8 MG/DL (ref 63–159)
LYMPHOCYTES # BLD AUTO: 0.9 K/UL (ref 1–4.8)
LYMPHOCYTES NFR BLD: 28.7 % (ref 18–48)
MAGNESIUM SERPL-MCNC: 2.3 MG/DL (ref 1.6–2.6)
MCH RBC QN AUTO: 34 PG (ref 27–31)
MCHC RBC AUTO-ENTMCNC: 34.5 G/DL (ref 32–36)
MCV RBC AUTO: 98 FL (ref 82–98)
MONOCYTES # BLD AUTO: 0.8 K/UL (ref 0.3–1)
MONOCYTES NFR BLD: 25.7 % (ref 4–15)
NEUTROPHILS # BLD AUTO: 1.2 K/UL (ref 1.8–7.7)
NEUTROPHILS NFR BLD: 39.9 % (ref 38–73)
NONHDLC SERPL-MCNC: 105 MG/DL
NRBC BLD-RTO: 0 /100 WBC
PLATELET # BLD AUTO: 144 K/UL (ref 150–450)
PMV BLD AUTO: 11.2 FL (ref 9.2–12.9)
POTASSIUM SERPL-SCNC: 3.5 MMOL/L (ref 3.5–5.1)
PROT SERPL-MCNC: 6.7 G/DL (ref 6–8.4)
RBC # BLD AUTO: 3.68 M/UL (ref 4.6–6.2)
SODIUM SERPL-SCNC: 133 MMOL/L (ref 136–145)
TRIGL SERPL-MCNC: 71 MG/DL (ref 30–150)
TROPONIN I SERPL HS-MCNC: 11.1 PG/ML (ref 0–14.9)
TROPONIN I SERPL HS-MCNC: 12.1 PG/ML (ref 0–14.9)
WBC # BLD AUTO: 3.03 K/UL (ref 3.9–12.7)

## 2023-04-15 PROCEDURE — G0378 HOSPITAL OBSERVATION PER HR: HCPCS

## 2023-04-15 PROCEDURE — 83735 ASSAY OF MAGNESIUM: CPT | Performed by: NURSE PRACTITIONER

## 2023-04-15 PROCEDURE — 25000242 PHARM REV CODE 250 ALT 637 W/ HCPCS: Performed by: INTERNAL MEDICINE

## 2023-04-15 PROCEDURE — 92610 EVALUATE SWALLOWING FUNCTION: CPT

## 2023-04-15 PROCEDURE — 94799 UNLISTED PULMONARY SVC/PX: CPT

## 2023-04-15 PROCEDURE — 80061 LIPID PANEL: CPT | Performed by: INTERNAL MEDICINE

## 2023-04-15 PROCEDURE — 94761 N-INVAS EAR/PLS OXIMETRY MLT: CPT

## 2023-04-15 PROCEDURE — 92523 SPEECH SOUND LANG COMPREHEN: CPT

## 2023-04-15 PROCEDURE — 99900035 HC TECH TIME PER 15 MIN (STAT)

## 2023-04-15 PROCEDURE — 82553 CREATINE MB FRACTION: CPT | Performed by: NURSE PRACTITIONER

## 2023-04-15 PROCEDURE — 80053 COMPREHEN METABOLIC PANEL: CPT | Performed by: NURSE PRACTITIONER

## 2023-04-15 PROCEDURE — 36415 COLL VENOUS BLD VENIPUNCTURE: CPT | Performed by: NURSE PRACTITIONER

## 2023-04-15 PROCEDURE — 94640 AIRWAY INHALATION TREATMENT: CPT

## 2023-04-15 PROCEDURE — 97162 PT EVAL MOD COMPLEX 30 MIN: CPT

## 2023-04-15 PROCEDURE — 97166 OT EVAL MOD COMPLEX 45 MIN: CPT

## 2023-04-15 PROCEDURE — 25000003 PHARM REV CODE 250: Performed by: INTERNAL MEDICINE

## 2023-04-15 PROCEDURE — 84484 ASSAY OF TROPONIN QUANT: CPT | Performed by: NURSE PRACTITIONER

## 2023-04-15 PROCEDURE — 25000003 PHARM REV CODE 250: Performed by: NURSE PRACTITIONER

## 2023-04-15 PROCEDURE — 85025 COMPLETE CBC W/AUTO DIFF WBC: CPT | Performed by: NURSE PRACTITIONER

## 2023-04-15 PROCEDURE — 99900031 HC PATIENT EDUCATION (STAT)

## 2023-04-15 PROCEDURE — 36415 COLL VENOUS BLD VENIPUNCTURE: CPT | Performed by: INTERNAL MEDICINE

## 2023-04-15 PROCEDURE — 97116 GAIT TRAINING THERAPY: CPT

## 2023-04-15 RX ORDER — BUDESONIDE 0.5 MG/2ML
0.5 INHALANT ORAL EVERY 12 HOURS
Status: DISCONTINUED | OUTPATIENT
Start: 2023-04-15 | End: 2023-04-15 | Stop reason: HOSPADM

## 2023-04-15 RX ORDER — AMLODIPINE BESYLATE 5 MG/1
5 TABLET ORAL DAILY
Status: DISCONTINUED | OUTPATIENT
Start: 2023-04-15 | End: 2023-04-15 | Stop reason: HOSPADM

## 2023-04-15 RX ORDER — ALBUTEROL SULFATE 0.83 MG/ML
2.5 SOLUTION RESPIRATORY (INHALATION)
Status: DISCONTINUED | OUTPATIENT
Start: 2023-04-15 | End: 2023-04-15 | Stop reason: HOSPADM

## 2023-04-15 RX ORDER — CLOPIDOGREL BISULFATE 75 MG/1
75 TABLET ORAL DAILY
Qty: 30 TABLET | Refills: 0 | Status: SHIPPED | OUTPATIENT
Start: 2023-04-16 | End: 2023-04-15 | Stop reason: HOSPADM

## 2023-04-15 RX ORDER — OXYBUTYNIN CHLORIDE 5 MG/1
10 TABLET, EXTENDED RELEASE ORAL DAILY
Status: DISCONTINUED | OUTPATIENT
Start: 2023-04-15 | End: 2023-04-15 | Stop reason: HOSPADM

## 2023-04-15 RX ORDER — NAPROXEN SODIUM 220 MG/1
81 TABLET, FILM COATED ORAL DAILY
Status: DISCONTINUED | OUTPATIENT
Start: 2023-04-15 | End: 2023-04-15 | Stop reason: HOSPADM

## 2023-04-15 RX ORDER — ALBUTEROL SULFATE 90 UG/1
2 AEROSOL, METERED RESPIRATORY (INHALATION)
Status: DISCONTINUED | OUTPATIENT
Start: 2023-04-15 | End: 2023-04-15

## 2023-04-15 RX ORDER — ARFORMOTEROL TARTRATE 15 UG/2ML
15 SOLUTION RESPIRATORY (INHALATION) 2 TIMES DAILY
Status: DISCONTINUED | OUTPATIENT
Start: 2023-04-15 | End: 2023-04-15 | Stop reason: HOSPADM

## 2023-04-15 RX ORDER — BUDESONIDE AND FORMOTEROL FUMARATE DIHYDRATE 160; 4.5 UG/1; UG/1
2 AEROSOL RESPIRATORY (INHALATION) EVERY 12 HOURS
Status: DISCONTINUED | OUTPATIENT
Start: 2023-04-15 | End: 2023-04-15

## 2023-04-15 RX ORDER — TAMSULOSIN HYDROCHLORIDE 0.4 MG/1
0.4 CAPSULE ORAL DAILY
Status: DISCONTINUED | OUTPATIENT
Start: 2023-04-15 | End: 2023-04-15 | Stop reason: HOSPADM

## 2023-04-15 RX ORDER — ASCORBIC ACID 500 MG
1000 TABLET ORAL DAILY
Status: DISCONTINUED | OUTPATIENT
Start: 2023-04-15 | End: 2023-04-15 | Stop reason: HOSPADM

## 2023-04-15 RX ORDER — ATORVASTATIN CALCIUM 80 MG/1
80 TABLET, FILM COATED ORAL DAILY
Qty: 30 TABLET | Refills: 0 | Status: SHIPPED | OUTPATIENT
Start: 2023-04-15 | End: 2023-05-02 | Stop reason: SDUPTHER

## 2023-04-15 RX ORDER — ALPRAZOLAM 0.25 MG/1
0.25 TABLET ORAL NIGHTLY PRN
Status: DISCONTINUED | OUTPATIENT
Start: 2023-04-15 | End: 2023-04-15 | Stop reason: HOSPADM

## 2023-04-15 RX ORDER — LISINOPRIL 20 MG/1
20 TABLET ORAL DAILY
Status: DISCONTINUED | OUTPATIENT
Start: 2023-04-15 | End: 2023-04-15 | Stop reason: HOSPADM

## 2023-04-15 RX ADMIN — LISINOPRIL 20 MG: 20 TABLET ORAL at 08:04

## 2023-04-15 RX ADMIN — ALPRAZOLAM 0.25 MG: 0.25 TABLET ORAL at 04:04

## 2023-04-15 RX ADMIN — OXYBUTYNIN CHLORIDE 10 MG: 5 TABLET, EXTENDED RELEASE ORAL at 08:04

## 2023-04-15 RX ADMIN — ASPIRIN 81 MG 81 MG: 81 TABLET ORAL at 08:04

## 2023-04-15 RX ADMIN — OXYCODONE HYDROCHLORIDE AND ACETAMINOPHEN 1000 MG: 500 TABLET ORAL at 08:04

## 2023-04-15 RX ADMIN — ATORVASTATIN CALCIUM 80 MG: 40 TABLET, FILM COATED ORAL at 08:04

## 2023-04-15 RX ADMIN — CLOPIDOGREL BISULFATE 75 MG: 75 TABLET, FILM COATED ORAL at 08:04

## 2023-04-15 RX ADMIN — BUDESONIDE INHALATION 0.5 MG: 0.5 SUSPENSION RESPIRATORY (INHALATION) at 07:04

## 2023-04-15 RX ADMIN — ARFORMOTEROL TARTRATE 15 MCG: 15 SOLUTION RESPIRATORY (INHALATION) at 07:04

## 2023-04-15 RX ADMIN — TAMSULOSIN HYDROCHLORIDE 0.4 MG: 0.4 CAPSULE ORAL at 08:04

## 2023-04-15 NOTE — ASSESSMENT & PLAN NOTE
4/3/23-Evaluated by Cardiothoracic Surgeon Dr. Mendoza medical recommendation management with Plavix and statin versus intervention..    **Plan  80% stenosis of the left common carotid artery origin. Currently asymptomatic. Based on the anatomy stenting the origin of the common carotid may result in embolization of the innominate since the stair a common trunk.**     Carotid ultrasound  Continue neuro checks  Aspirin, Plavix, statin initiated in ED

## 2023-04-15 NOTE — PLAN OF CARE
Goals to be met by: 5/15/23     Patient will increase functional independence with mobility by performin. Sit to stand transfer with Supervision  2. Bed to chair transfer with Supervision   3. Gait  x 250 feet with Supervision with no LOB

## 2023-04-15 NOTE — PT/OT/SLP EVAL
Speech Language Pathology Evaluation  Cognitive/Bedside Swallow    Patient Name:  Brit Jennings   MRN:  [id number]  Admitting Diagnosis: TIA (transient ischemic attack)    Recommendations:                  General Recommendations:   follow-up regarding diet tolerance and to assure use of standard swallow safety precautions ; consider vision exam (pt not clear as to whether he is experiencing change in vision; pt is still driving); further assessment of problem solving  Diet recommendations:  as tolerated,  Regular, Other (Comment) (with dentures in place, fully alert at 90 degrees), Thin liquid  Aspiration Precautions:  standard precautions, Avoid talking while eating, Eliminate distractions, Feed only when awake/alert, and HOB to 90 degrees ; notify nursing of any difficulty swallowing meds; no more than 1 pill in mouth at a time; try to avoid tilting head back (pt indicated he likes to tilt head back for pills)  General Precautions: Standard, fall, hearing impaired (bilateral hearing aids)  Communication strategies:  hearing aids and reduce distractions    History:     87 year old male with hx of HTN came to ED after reporting dizziness.  He currently denies dizziness, pain; reports symptoms have largely resolved.  He feels that he is 70-80% back to baseline but did not identify areas in which he feels different.  Significant other is at bedside.    Nursing and pt deny difficulty with current regular diet consistency.  Pt denies any hx of swallow difficulty.        Past Medical History:   Diagnosis Date    Basal cell carcinoma 2020    L temple / radiation therapy    Cataract     Hypertension        Past Surgical History:   Procedure Laterality Date    APPENDECTOMY      EYE SURGERY      Moh's surgery on right temple area Right 2015    Cancerous spot    PROSTATE BIOPSY      TONSILLECTOMY         Social History: Patient lives with spouse in [place]; he continues to work part-time .    Prior Intubation HX:  not  "associated with this admission    Modified Barium Swallow: not reported or located in epic    Chest X-Rays: reviewed    Prior diet: no restriction reported.    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    Subjective     "No problem." (With swallow)  Patient goals: no goals stated     Pain/Comfort:  Pain Rating 1: 0/10    Respiratory Status: Room air    Objective:     Cognitive Status: In functional limits;   Informal assessment revealed mild errors in response to immediate memory tasks for for new information; however pt is hearing impaired (even with aids in place, may still have had difficulty hearing clinician).  Alert, oriented x 4; left/right intact;  Following 3 step spoken instructions;  Answering basic yes/no questions;  Participating in conversation.  Repeated/recalled 5 numerals immediately after hearing clinician read numerals to him; one error each on repetition of 6 and 7 digit numerals.  Basic math skills were intact.  Further assessment of problem solving/insight recommended (PT reports pt not aware of gait deficits).     Receptive Language: In functional limits; bilateral hearing aids (with aids in place, still needs some repetition of questions)  Comprehended two and 3-step spoken instructions and responded to yes/no questions at basic level.      Pragmatics:  in functional limits  Expressive face; turn-takes in conversation; appropriate.    Expressive Language:  in functional limits  Named 10 animals fairly easily;  Named common objects;  Speaking in complete sentences; participating in conversation.  Picture description normal.        Motor Speech: In functional/normal limits  No dysarthria noted. Fully intelligible.      Voice: in functional limits; dry and clear voice      Visual-Spatial: does not wear glasses; reports having had lasik surgery; functional for locating larger items in different areas of room; able to locate " numerals, letters in different areas of room.  May benefit from vision exam by MD due to pt not sure as to whether he has had some vision change since yesterday.      Reading: able to decode 8 single words in vertical list; orally read functional sentences x 2 and demonstrated comprehension of content.  No change in reading skill reported.      Written Expression: deferred; no issues reported      Oral Musculature Evaluation  Oral Musculature: WFL  Dentition: upper and lower dentures  Secretion Management: adequate  Mucosal Quality: adequate  Mandibular Strength and Mobility: WFL  Oral Labial Strength and Mobility: WFL  Lingual Strength and Mobility: WFL; midline on protrusion; faintly right of center on protrusion; functional lateralization  Velar Elevation: other (see comments) (did not get good view but there was some elevation)  Buccal Strength and Mobility: WFL  Volitional Cough: demonstrated  Volitional Swallow: upward laryngeal movement palpated  Voice Prior to PO Intake: clear and dry before and after trials    Bedside Swallow Eval:   Consistencies Assessed:  Thin liquids (thin water by cup and straw sip x multiple sips each)  Puree (apple sauce x 3 tsp)  Soft solids (chopped peaches x 2 tsp)  Solids (rose cracker)      Oral Phase:   WFL with dentures in place; no anterior loss or residue; efficiency appeared normal limits for age    Pharyngeal Phase:   no overt clinical signs/symptoms of aspiration  no overt clinical signs/symptoms of pharyngeal dysphagia    Compensatory Strategies  None    Treatment: pt educated regarding swallow safety precautions and evaluation results; spouse verbalized understanding    Assessment:     Brit Jennings is a 87 y.o. male with medical dx of TIA, referred to speech pathology for assessment.  He demonstrated functional oral stage of swallow with no sign of aspiration at bedside.  Cognitive communication assessment suggested possible mild deficits in immediate memory,  but errors (minimal) demonstrated today may be related to hearing impairment (although he was wearing bilateral aids).  Communication skills otherwise WFL.  PT reported pt is not aware of current impairment in gait/endurance.  Will continue therapy for further assessment of problem solving skills, insight.   May benefit from vision exam.  Will follow. Results of visit shared with nursing.    Goals:   Multidisciplinary Problems       SLP Goals          Problem: SLP    Goal Priority Disciplines Outcome   SLP Goal     SLP Ongoing, Progressing        Maintain tolerance of regular consistency diet using swallow safety precautions successfully at 80% accuracy independently.  Pt will participate in further assessment of problem solving/insight.               Plan:     Patient to be seen:  2 x/week   Plan of Care expires:     Plan of Care reviewed with:  patient, spouse   SLP Follow-Up:  Yes (likely x 1 and discharge)       Discharge recommendations:  Discharge Facility/Level of Care Needs: home   Barriers to Discharge:   to be determined    Time Tracking:     SLP Treatment Date:   04/15/23  Speech Start Time:  0949  Speech Stop Time:  1015     Speech Total Time (min):  26 min    Billable Minutes: Eval 15  and Eval Swallow and Oral Function 11    04/15/2023

## 2023-04-15 NOTE — SUBJECTIVE & OBJECTIVE
Past Medical History:   Diagnosis Date    Basal cell carcinoma 2020    L temple / radiation therapy    Cataract     Hypertension        Past Surgical History:   Procedure Laterality Date    APPENDECTOMY      EYE SURGERY      Moh's surgery on right temple area Right 2015    Cancerous spot    PROSTATE BIOPSY      TONSILLECTOMY         Review of patient's allergies indicates:   Allergen Reactions    Pollen extracts Other (See Comments)       Current Facility-Administered Medications on File Prior to Encounter   Medication    albuterol inhaler 2 puff    diphenhydrAMINE injection 25 mg    EPINEPHrine (EPIPEN) 0.3 mg/0.3 mL pen injection 0.3 mg    methylPREDNISolone sodium succinate injection 40 mg    ondansetron disintegrating tablet 4 mg    sodium chloride 0.9% 500 mL flush bag    sodium chloride 0.9% flush 10 mL     Current Outpatient Medications on File Prior to Encounter   Medication Sig    amLODIPine (NORVASC) 5 MG tablet Take 1 tablet (5 mg total) by mouth once daily.    ascorbic acid, vitamin C, (VITAMIN C) 500 MG tablet Take 1,000 mg by mouth once daily.    aspirin 81 MG Chew Take 81 mg by mouth once daily.    lisinopriL (PRINIVIL,ZESTRIL) 20 MG tablet Take 1 tablet (20 mg total) by mouth once daily.    solifenacin (VESICARE) 10 MG tablet Take 10 mg by mouth once daily.    SYMBICORT 160-4.5 mcg/actuation HFAA Inhale 2 puffs into the lungs every 12 (twelve) hours.    tamsulosin (FLOMAX) 0.4 mg Cap Take 1 capsule (0.4 mg total) by mouth once daily.    temazepam (RESTORIL) 30 mg capsule Take 1 capsule (30 mg total) by mouth nightly as needed for Insomnia.    acetaminophen (TYLENOL) 500 MG tablet Take 2 tablets (1,000 mg total) by mouth every 6 (six) hours as needed for Pain.    hydrocortisone 2.5 % cream Thin film to AA onface daily PRN flare; use short term only    ketoconazole (NIZORAL) 2 % cream Thin film to red scaly rash on face twice daily as needed for flare     Family History    None       Tobacco Use     Smoking status: Never    Smokeless tobacco: Never   Substance and Sexual Activity    Alcohol use: Not Currently    Drug use: Not on file    Sexual activity: Not Currently     Review of Systems   Constitutional:  Negative for activity change, appetite change, chills, diaphoresis, fatigue, fever and unexpected weight change.   HENT:  Negative for congestion, dental problem, facial swelling, nosebleeds, sinus pressure, sinus pain, sore throat and trouble swallowing.    Eyes:  Negative for pain and redness.   Respiratory:  Negative for apnea, cough, chest tightness, shortness of breath and wheezing.    Cardiovascular:  Positive for chest pain. Negative for palpitations and leg swelling.   Gastrointestinal:  Negative for abdominal distention, abdominal pain, anal bleeding, blood in stool, constipation, diarrhea, nausea and vomiting.   Endocrine: Negative for polyphagia and polyuria.   Genitourinary:  Negative for decreased urine volume, difficulty urinating, dysuria, frequency, hematuria and urgency.   Musculoskeletal:  Negative for back pain, gait problem, joint swelling, myalgias, neck pain and neck stiffness.   Skin:  Negative for color change, pallor, rash and wound.   Neurological:  Positive for dizziness and numbness. Negative for seizures, syncope, facial asymmetry, speech difficulty, weakness, light-headedness and headaches.   Psychiatric/Behavioral:  Negative for agitation, behavioral problems, confusion, hallucinations, sleep disturbance and suicidal ideas.    Objective:     Vital Signs (Most Recent):  Temp: 98.3 °F (36.8 °C) (04/14/23 1738)  Pulse: 72 (04/14/23 1958)  Resp: 16 (04/14/23 1933)  BP: 137/74 (04/14/23 1958)  SpO2: 97 % (04/14/23 1958) Vital Signs (24h Range):  Temp:  [98.3 °F (36.8 °C)] 98.3 °F (36.8 °C)  Pulse:  [60-72] 72  Resp:  [14-16] 16  SpO2:  [95 %-98 %] 97 %  BP: (118-141)/(69-95) 137/74     Weight: 72.6 kg (160 lb)  Body mass index is 24.33 kg/m².    Physical Exam  Vitals reviewed.    Constitutional:       General: He is not in acute distress.     Appearance: Normal appearance. He is not ill-appearing, toxic-appearing or diaphoretic.   HENT:      Head: Normocephalic.      Right Ear: External ear normal.      Left Ear: External ear normal.      Nose: No congestion or rhinorrhea.      Mouth/Throat:      Mouth: Mucous membranes are moist.      Pharynx: Oropharynx is clear. No oropharyngeal exudate or posterior oropharyngeal erythema.   Eyes:      Extraocular Movements: Extraocular movements intact.      Conjunctiva/sclera: Conjunctivae normal.      Pupils: Pupils are equal, round, and reactive to light.   Cardiovascular:      Rate and Rhythm: Normal rate and regular rhythm.      Pulses: Normal pulses.      Heart sounds: Normal heart sounds.   Pulmonary:      Effort: Pulmonary effort is normal.      Breath sounds: Normal breath sounds.   Abdominal:      General: Abdomen is flat. Bowel sounds are normal.      Palpations: Abdomen is soft.   Genitourinary:     Rectum: Normal.   Musculoskeletal:         General: Normal range of motion.      Cervical back: Normal range of motion and neck supple.   Skin:     General: Skin is warm and dry.      Capillary Refill: Capillary refill takes less than 2 seconds.   Neurological:      Mental Status: He is alert and oriented to person, place, and time.   Psychiatric:         Mood and Affect: Mood normal.         CRANIAL NERVES     CN III, IV, VI   Pupils are equal, round, and reactive to light.     Significant Labs: All pertinent labs within the past 24 hours have been reviewed.  Recent Lab Results  (Last 5 results in the past 24 hours)        04/14/23  1904   04/14/23  1902   04/14/23  1757   04/14/23  1755   04/14/23  1754        Benzodiazepines Presumptive Positive               Phencyclidine Negative               Albumin     4.4           Alkaline Phosphatase     61           ALT     19           Amphetamine Screen, Ur Negative               Anion Gap     8            Appearance, UA Clear               aPTT   29.1  Comment: aPTT therapeutic range = 39-69 seconds             AST     29           Barbiturate Screen, Ur Negative               Baso #     0.02           Basophil %     0.5           Bilirubin (UA) Negative               BILIRUBIN TOTAL     0.6  Comment: For infants and newborns, interpretation of results should be based  on gestational age, weight and in agreement with clinical  observations.    Premature Infant recommended reference ranges:  Up to 24 hours.............<8.0 mg/dL  Up to 48 hours............<12.0 mg/dL  3-5 days..................<15.0 mg/dL  6-29 days.................<15.0 mg/dL             BUN     13           Calcium     9.6           Chloride     96           CO2     23           Cocaine (Metab.) Negative               Color, UA Colorless               Creatinine     0.8           Creatinine, Urine <10.0  Comment: The random urine reference ranges provided were established   for 24 hour urine collections.  No reference ranges exist for  random urine specimens.  Correlate clinically.                 Differential Method     Automated           eGFR     >60.0           Eos #     0.1           Eosinophil %     3.4           Glucose     99           Glucose, UA Negative               Gran # (ANC)     1.4           Gran %     36.0           Group & Rh       O POS         Hematocrit     36.6           Hemoglobin     12.9           Immature Grans (Abs)     0.01  Comment: Mild elevation in immature granulocytes is non specific and   can be seen in a variety of conditions including stress response,   acute inflammation, trauma and pregnancy. Correlation with other   laboratory and clinical findings is essential.             Immature Granulocytes     0.3           INDIRECT DEBBY       NEG         INR   1.0  Comment: Coumadin Therapy:  2.0 - 3.0 for INR for all indicators except mechanical heart valves  and antiphospholipid syndromes which should use 2.5  - 3.5.                  1.0  Comment: Coumadin Therapy:  2.0 - 3.0 for INR for all indicators except mechanical heart valves  and antiphospholipid syndromes which should use 2.5 - 3.5.               Ketones, UA Negative               Leukocytes, UA Negative               Lymph #     1.5           Lymph %     39.1           Magnesium     2.1           MCH     34.5           MCHC     35.2           MCV     98           Mono #     0.8           Mono %     20.7           MPV     11.4           NITRITE UA Negative               nRBC     0           Occult Blood UA Negative               Opiate Scrn, Ur Negative               pH, UA 8.0               Platelets     164           POC Creatinine         0.9       Potassium     3.7           PROTEIN TOTAL     7.9           Protein, UA Negative  Comment: Recommend a 24 hour urine protein or a urine   protein/creatinine ratio if globulin induced proteinuria is  clinically suspected.                 Protime   11.3                11.3             RBC     3.74           RDW     12.3           Sample         VENOUS       Sodium     127           Specific [place], UA 1.010               Specimen Outdate       04/17/2023 23:59         Specimen UA Urine, Clean Catch               Marijuana (THC) Metabolite Negative               Toxicology Information SEE COMMENT  Comment: This screen includes the following classes of drugs at the   listed cut-off:    Benzodiazepines                  200 ng/ml  Cocaine metabolite               300 ng/ml  Opiates                          300 ng/ml  Barbiturates                     200 ng/ml  Amphetamines                    1000 ng/ml  Marijuana metabs (THC)            50 ng/ml  Phencyclidine (PCP)               25 ng/ml    High concentrations of Methylenedioxymethamphetamine (MDMA aka  Ectasy) and other structurally similar compounds may cross-   react with the Amphetamine/Methamphetamine screening   immunoassay giving a false positive result.    Note:  This exception list includes only more common   interferants in toxicology screen testing.  Because of many   cross-reactantspositive results on toxicology drug screens   should be confirmed whenever results do not correlate with   clinical presentation.    This report is intended for use in clinical monitoring and  management of patients. It is not intended for use in   employment related drug testing.    Because of any cross-reactants, positive results on toxicology  drug screens should be confirmed whenever results do not  correlate with clinical presentation.    Presumptive positive results are unconfirmed and may be used   only for medical purposes.                 Troponin I High Sensitivity     9.2  Comment: Troponin results differ between methods. Do not use   results between Troponin methods interchangeably.    Alkaline Phospatase levels above 400 U/L may   cause false positive results.    Access hsTnI should not be used for patients taking   Asfotase cristhian (Strensiq).             TSH     3.140           UROBILINOGEN UA Negative               WBC     3.81                                  Significant Imaging: I have reviewed all pertinent imaging results/findings within the past 24 hours.

## 2023-04-15 NOTE — CONSULTS
[facility]  Department of Neurology  Neurology Consultation Note        PATIENT NAME: Brit Jennings  MRN: [id number]  CSN: [id number]      TODAY'S DATE: 04/15/2023  ADMIT DATE: 4/14/2023                            CONSULTING PROVIDER: Forrest Brannon MD  CONSULT REQUESTED BY: Gamal Reynolds MD      Reason for consult: R/o CVA       History obtained from chart review and the patient.    HPI per EMR: Brit Jennings is a 87 y.o. male with a history of hypertension, H/O prostate CA and radiation therapy 2019.  He presents to the ED with complaints of dizziness, and feet numbness, flushed started around 4:00 p.m. today. He reports he drove herself to emergency department and symptoms had resolved.  Reported mild left-sided cardiac nonradiating chest pain denies 30 minutes.  He denied headache, nausea, vomiting, fever, chills.  He was evaluated ED negative CT, MRI, and positive CTA showing extensive left-sided carotid stenosis.     04/03/2023 Cardiothoracic Surgeon evaluation  **He was evaluated by Dr. Mendoza regarding stenosis left carotid artery who recommended medical management Plavix, statin versus intervention due asymptomatic. Patient 80% stenosis of the left common carotid artery origin. Currently asymptomatic. 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 There is high-grade stenosis at the origin of the left vertebral artery which places the patient at high risk for vertebrobasilar insufficiency given that the right vertebral artery appears to provide very little, if any blood supply to the basilar artery. There is  approximately 80% stenosis in the proximal right internal carotid artery. No large vessel intracranial stenoses or occlusions are  identified. See above discussion.     CT of head 04/14/2023  IMPRESSION:   Moderate cortical atrophy and evidence of moderate small vessel chronic ischemic change as described. No acute intracranial abnormality is identified.     MRI head 04/14/2023  IMPRESSION:   Moderate cortical atrophy and evidence of moderate small vessel chronic ischemic change as described. Otherwise unremarkable MRI of the brain. No acute infarct or hemorrhage is currently identified.       Neurology consult:  Patient was seen and examined by me this morning.  He states that yesterday he felt like pain on the left side of his chest with associated feeling of unusual illness which he is not able to describe properly.  He states that he felt flushed however denied any dizziness or lightheadedness or nausea or vomiting.  He also denied any vision trouble or unilateral weakness or sensory changes.  He thought he might be having a stroke for which he presented to the hospital.  In the ER, he had a CT of the head which was negative for acute pathology, CT angiogram head and neck showed right ICA 80% stenosis and left subclavian artery occlusion with subclavian steal syndrome.    Patient is currently back to his baseline denies any symptoms at this point.    PREVIOUS MEDICAL HISTORY:  Past Medical History:   Diagnosis Date    Basal cell carcinoma 2020    L temple / radiation therapy    Cataract     Hypertension      PREVIOUS SURGICAL HISTORY:  Past Surgical History:   Procedure Laterality Date    APPENDECTOMY      EYE SURGERY      Moh's surgery on right temple area Right 2015    Cancerous spot    PROSTATE BIOPSY      TONSILLECTOMY       FAMILY MEDICAL HISTORY:  Family History   Problem Relation Age of Onset    Melanoma Neg Hx     Psoriasis Neg Hx     Lupus Neg Hx     Eczema Neg Hx      SOCIAL HISTORY:  Social History     Tobacco Use    Smoking status: Never    Smokeless tobacco: Never   Substance Use Topics    Alcohol use: Not Currently      ALLERGIES:  Review of patient's allergies indicates:   Allergen Reactions    Pollen extracts Other (See Comments)     HOME MEDICATIONS:  Prior to Admission medications    Medication Sig Start Date End Date Taking? Authorizing Provider   amLODIPine (NORVASC) 5 MG tablet Take 1 tablet (5 mg total) by mouth once daily. 2/11/23 5/12/23 Yes Farhana Riggins NP   ascorbic acid, vitamin C, (VITAMIN C) 500 MG tablet Take 1,000 mg by mouth once daily.   Yes Historical Provider   aspirin 81 MG Chew Take 81 mg by mouth once daily.   Yes Historical Provider   lisinopriL (PRINIVIL,ZESTRIL) 20 MG tablet Take 1 tablet (20 mg total) by mouth once daily. 2/11/23 5/12/23 Yes Farhana Riggins NP   solifenacin (VESICARE) 10 MG tablet Take 10 mg by mouth once daily.   Yes Historical Provider   SYMBICORT 160-4.5 mcg/actuation HFAA Inhale 2 puffs into the lungs every 12 (twelve) hours. 2/23/22  Yes Jian Alcazar MD   tamsulosin (FLOMAX) 0.4 mg Cap Take 1 capsule (0.4 mg total) by mouth once daily. 1/6/23 1/6/24 Yes JOSE Clark   temazepam (RESTORIL) 30 mg capsule Take 1 capsule (30 mg total) by mouth nightly as needed for Insomnia. 3/7/23  Yes Jian Alcazar MD   acetaminophen (TYLENOL) 500 MG tablet Take 2 tablets (1,000 mg total) by mouth every 6 (six) hours as needed for Pain. 2/1/23   Jaskaran Enriquez MD   hydrocortisone 2.5 % cream Thin film to AA onface daily PRN flare; use short term only 5/17/22   Tova Miles MD   ketoconazole (NIZORAL) 2 % cream Thin film to red scaly rash on face twice daily as needed for flare 5/17/22   Tova Miles MD     CURRENT SCHEDULED MEDICATIONS:   amLODIPine  5 mg Oral Daily    arformoteroL  15 mcg Nebulization BID    ascorbic acid (vitamin C)  1,000 mg Oral Daily    aspirin  81 mg Oral Daily    atorvastatin  80 mg Per NG tube Daily    budesonide  0.5 mg Nebulization Q12H    clopidogreL  75 mg Oral Daily    lisinopriL  20 mg Oral Daily    oxybutynin  10 mg Oral Daily  "   tamsulosin  0.4 mg Oral Daily     CURRENT INFUSIONS:    CURRENT PRN MEDICATIONS:  albuterol sulfate, ALPRAZolam, labetalol, ondansetron, prochlorperazine, sodium chloride 0.9%    REVIEW OF SYSTEMS:  Please refer to the HPI for all pertinent positive and negative findings. A comprehensive review of all other systems was negative.       PHYSICAL EXAM:  Patient Vitals for the past 24 hrs:   BP Temp Temp src Pulse Resp SpO2 Height Weight   04/15/23 0732 (!) 166/81 97.9 °F (36.6 °C) Oral (!) 51 18 97 % -- --   04/15/23 0322 (!) 159/83 97.1 °F (36.2 °C) Oral (!) 56 18 97 % -- --   04/14/23 2225 (!) 177/91 98.1 °F (36.7 °C) Oral (!) 59 18 97 % 5' 8" (1.727 m) 72.6 kg (160 lb 0.9 oz)   04/14/23 1958 137/74 -- -- 72 -- 97 % -- --   04/14/23 1933 -- -- -- -- 16 95 % -- --   04/14/23 1928 118/69 -- -- 60 -- -- -- --   04/14/23 1903 133/76 -- -- 64 16 98 % -- --   04/14/23 1738 (!) 141/95 98.3 °F (36.8 °C) Oral 67 14 96 % 5' 8" (1.727 m) 72.6 kg (160 lb)       GENERAL APPEARANCE: Alert, well-developed, well-nourished male in no acute distress.  HEENT: Normocephalic and atraumatic. PERRL. Oropharynx unremarkable.  PULM: Normal respiratory effort. No accessory muscle use.  CV: RRR.  ABDOMEN: Soft, nontender.  EXTREMITIES: No obvious signs of vascular compromise. Pulses present. No cyanosis, clubbing or edema.  SKIN: Clear; no rashes, lesions or skin breaks in exposed areas.    NEURO:  MENTAL STATUS:   , Patient awake and oriented to time, place, and person, recent/remote memory normal, attention span/concentration normal, speech fluent without paraphasic errors, good comprehension with appropriate thought content, and fund of knowledge appropriate for patient's level of education.  Affect euthymic.    CRANIAL NERVES:  CN I: Not tested.  CN II: Fundoscopic exam deferred.  CN III, IV, VI: Pupils equal, round and reactive to light.  Extraocular movements full and intact.  CN V: Facial sensation normal.  CN VII: Facial asymmetry " absent.  CN VIII: Hearing grossly normal and equal bilaterally.  No skew deviation or pathologic nystagmus.  CN IX, X: Palate elevates symmetrically. Speech/articulation is clear without dysarthria.  CN XI: Shoulder shrug and chin rotation equal with good strength.  CN XII: Tongue protrusion midline.    MOTOR:  Bulk normal. Tone normal and symmetric throughout.  Abnormal movements absent.  Tremor: none present.  Strength 5/5 throughout.    REFLEXES:  DTRs 2+ throughout.  Plantar response downgoing bilaterally.  SENSATION: grossly intact throughout.  COORDINATION: normal finger-to-nose.  STATION: not tested.  GAIT: not tested.      NIHSS:  1a      Level of Consciousness (alert, drowsy, etc.):   0=alert; keenly responsive  1b.     Level of Consciousness Questions (month, age): 0= able to answer both questions  1c.     Level of Consciousness Commands (open, close eyes, make fist, let go):  0=Answers both tasks correctly  2.      Best Gaze (eyes open - patient follows examiner's finger or face):      0=normal  3.      Visual (introduce visual stimulus/threat to patient's visual field quadrants):  0=No visual loss  4.      Facial Palsy (show teeth, raise eyebrows and squeeze eyes shut):        0=Normal symmetric movement  5a.     Motor Arm - Left (elevate extremity 90 degrees and score drift/movement):       0=No drift, limb holds 90 (or 45) degrees for full 10 seconds  5b.     Motor Arm - Right (elevate extremity 90 degrees and score drift/movement):      0=No drift, limb holds 90 (or 45) degrees for full 10 seconds  6a.     Motor Leg - Left (elevate extremity 30 degrees and score drift/movement):       0=No drift, limb holds 90 (or 45) degrees for full 10 seconds  6b      Motor Leg - Right (elevate extremity 30 degrees and score drift/movement):      0=No drift, limb holds 90 (or 45) degrees for full 10 seconds  7.      Limb Ataxia (finger-nose, heel down shin):      0=Absent  8.      Sensory (pin prick to face, arm,  trunk, and leg - compare side to side):        0=Normal; no sensory loss  9.      Best Language (name items, describe a picture and read sentences):      0=No aphasia, normal  10.     Dysarthria (evaluate speech clarity by patient repeating listed words): 0=Normal  11.     Extinction and Inattention (use prior testing to identify neglect or double simultaneous stimuli testing):      0=No abnormality          NIH Stroke Scale Total:         0      Labs:  Recent Labs   Lab 04/14/23  1757 04/15/23  0508   * 133*   K 3.7 3.5   CL 96 98   CO2 23 23   BUN 13 12   CREATININE 0.8 0.8   GLU 99 100   CALCIUM 9.6 8.9   MG 2.1 2.3     Recent Labs   Lab 04/14/23  1757 04/15/23  0508   WBC 3.81* 3.03*   HGB 12.9* 12.5*   HCT 36.6* 36.2*    144*     Recent Labs   Lab 04/14/23  1757 04/15/23  0508   ALBUMIN 4.4 3.9   PROT 7.9 6.7   BILITOT 0.6 1.0   ALKPHOS 61 57   ALT 19 18   AST 29 27     Lab Results   Component Value Date    INR 1.0 04/14/2023    INR 1.0 04/14/2023     Lab Results   Component Value Date    TRIG 94 02/10/2023    HDL 30 (L) 02/10/2023    CHOLHDL 4.2 02/10/2023     No results found for: HGBA1C  No results found for: PROTEINCSF, GLUCCSF, WBCCSF    Imaging:  I have reviewed and interpreted the pertinent imaging and lab results.      US Carotid Bilateral  EXAM:  US Duplex Bilateral Extracranial Arteries    CLINICAL HISTORY:  The patient is 87 years old and is Male; TIA/stroke    TECHNIQUE:  Real-time duplex ultrasound scan of the extracranial arteries integrating B-mode two-dimensional vascular structure, Doppler spectral analysis and color flow Doppler imaging.    COMPARISON:  US Duplex Carotid dated 3/1/2023    FINDINGS:  Grayscale images demonstrate bilateral common carotid artery intimal wall thickening and scattered plaque worse at the carotid bulbs.    RIGHT COMMON CAROTID ARTERY:  Peak systolic velocity in the right common carotid artery (CCA) is 99.0 cm/s.  No occlusion or significant stenosis on  color flow and spectral Doppler imaging.  RIGHT INTERNAL CAROTID ARTERY:  Peak systolic velocity in the right proximal internal carotid artery (ICA) is 205.9 cm/s.  Peak systolic velocity in the right mid internal carotid artery (ICA) is 130.7 cm/s.  No occlusion.  RIGHT EXTERNAL CAROTID ARTERY:  Peak systolic velocity in the right external carotid artery (ECA) is 208.7 cm/s consistent with an ECA stenosis due to atherosclerotic plaque.  This is of questionable clinical significance.  RIGHT VERTEBRAL ARTERY: Antegrade flow.  Peak systolic velocity in the right vertebral artery is 86.5 cm/s.  RIGHT ICA/CCA RATIO:  Unremarkable.  The ICA/CCA peak systolic velocity ratio is 1.3 on the right.    LEFT COMMON CAROTID ARTERY:  Peak systolic velocity in the left common carotid artery (CCA) is 40.9 cm/s.  LEFT INTERNAL CAROTID ARTERY:  Peak systolic velocity in the left internal carotid artery (ICA) is 39.1 cm/s. No occlusion or significant stenosis on color flow and spectral Doppler imaging.  LEFT EXTERNAL CAROTID ARTERY:  Unremarkable.  No occlusion or significant stenosis on color flow and spectral Doppler imaging.  Peak systolic velocity in the left external carotid artery (ECA) is 54.9 cm/s.  LEFT VERTEBRAL ARTERY: Antegrade flow .  Peak systolic velocity in the left vertebral artery is 34.1 cm/s.  LEFT ICA/CCA RATIO:  Unremarkable.  The ICA/CCA peak systolic velocity ratio is 1.0 on the left.    Left-sided velocities are diffusely diminished which may indicate an upstream stenosis.    LYMPH NODES:  Unremarkable.  No lymphadenopathy.    CAROTID STENOSIS REFERENCE USING SRU CRITERIA:  Mild - <50% stenosis. ICA PSV is less than 125 cm/second and plaque or intimal thickening is visible.  Moderate - 50-69% stenosis. ICA PSV is 125 to 230 cm/second and plaque is visible.  Severe - 70-94% stenosis. ICA PSV is more than 230 cm/second and visible plaque with lumen narrowing is seen.  Near occlusion - 95-99% stenosis. ICA PSV is  variable and significant plaque with luminal narrowing is seen.  Occluded - 100% stenosis. No flow identified.    IMPRESSION:  1.  Moderate 50-69% stenosis in the right proximal and mid internal carotid artery.  2.  Diffusely diminished left-sided velocities which may indicate upstream stenosis. Similar findings were present on prior exam.    Electronically signed by:  Jessica Ndiaye MD  4/15/2023 12:00 AM CDT Workstation: [id number]         ASSESSMENT & PLAN:        R/o CVA  Carotid stenosis  Subclavian steal syndrome    Plan  Patient presented to the hospital with nonspecific symptoms which do not fit well with TIA or stroke.  MRI brain was done which was negative for acute pathology.  CT angiogram showed right carotid artery stenosis and left subclavian artery occlusion with left vertebral artery stenosis.  Patient did recently follow-up with vascular surgeon and currently no surgical intervention was recommended.  Continue with aspirin, Lipitor for stroke prevention  Follow-up with vascular surgery for carotid stenosis and subclavian steal syndrome.    Physical and occupational therapy evaluate and treat  No further neuro workup is needed at this time.  Follow-up in Neurology Clinic.       [phone] days from discharge.     Stroke education was provided including stroke risk factors modification and any acute neurological changes including weakness, confusion, visual changes to come straight to the ER.     All questions were answered.                               Thank you kindly for including us in the care of this patient. Please do not hesitate to contact us with any questions.          Forrest Brannon MD  Neurology/vascular Neurology  Date of Service: 04/15/2023  10:32  AM    --------------------------------------------------------------------------------------------------------------------------------------------------------------------------------------------------------------------------------------------------------------  Please note: This note was transcribed using voice recognition software. Because of this technology there are often uinintended grammatical, spelling, and other transcription errors. Please disregard these errors.

## 2023-04-15 NOTE — NURSING
Discharge instructions reviewed with pt and wife.  Pt voice understanding.  Medication called in to [facility] pharmacy provided. Peripheral IV x 2 removed, pressure applied.  Telemetry monitor removed.  Pt assisted to private vehicle via wheelchair.

## 2023-04-15 NOTE — PLAN OF CARE
04/15/23 1229   GARZA Message   Medicare Outpatient and Observation Notification regarding financial responsibility Given to patient/caregiver;Signed/date by patient/caregiver   Date GARZA was signed 04/15/23   Time GARZA was signed 1100

## 2023-04-15 NOTE — HPI
Patient is a 70-year-old  female with history of hypertension, H/O prostate CA and radiation therapy 2019.  He presents to the ED with complaints of dizziness, and feet numbness, flushed started around 4:00 p.m. today. He reports he drove herself to emergency department and symptoms had resolved.  Reported mild left-sided cardiac nonradiating chest pain denies 30 minutes.  He denied headache, nausea, vomiting, fever, chills.  He was evaluated ED negative CT, MRI, and positive CTA showing extensive left-sided carotid stenosis.     Dr. Rodrigues consulted Neurology Dr. Godinez who recommended admission, plavix and atorvastatin, and carotid ultrasound, neurology consult..     04/03/2023 Cardiothoracic Surgeon evaluation  **He was evaluated by Dr. Mendoza regarding stenosis left carotid artery who recommended medical management Plavix, statin versus intervention due asymptomatic. Patient 80% stenosis of the left common carotid artery origin. Currently asymptomatic. 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 There is high-grade stenosis at the origin of the left vertebral artery which places the patient at high risk for vertebrobasilar insufficiency given that the right vertebral artery appears to provide very little, if any blood supply to the basilar artery. There is  approximately 80% stenosis in the proximal right internal carotid artery. No large vessel intracranial stenoses or occlusions are identified. See above discussion.    CT of head 04/14/2023  IMPRESSION:   Moderate cortical atrophy and evidence of moderate small vessel chronic ischemic change as described. No acute intracranial abnormality is identified.    MRI head 04/14/2023  IMPRESSION:   Moderate  cortical atrophy and evidence of moderate small vessel chronic ischemic change as described. Otherwise unremarkable MRI of the brain. No acute infarct or hemorrhage is currently identified.

## 2023-04-15 NOTE — PLAN OF CARE
04/15/23 1412   Final Note   Assessment Type Final Discharge Note   Anticipated Discharge Disposition Home   Hospital Resources/Appts/Education Provided Provided patient/caregiver with written discharge plan information  (patient will call and make 1 week fooolwoup with Dr. Ottoniel CM unable to make on weekend.)

## 2023-04-15 NOTE — PT/OT/SLP EVAL
"Physical Therapy Evaluation    Patient Name:  Brit Jennings   MRN:  [id number]    Recommendations:     Discharge Recommendations: home, outpatient PT (Home with close Sup for mobility & expand OP PT to include eval/tx for gait instability/balance.)   Discharge Equipment Recommendations: rollator (TBD)   Barriers to discharge:  PT advised pt's wife to supervise pt closely at home.    Assessment:     Brit Jennings is a 87 y.o. male admitted with a medical diagnosis of TIA (transient ischemic attack).  He presents with the following impairments/functional limitations: gait instability, impaired endurance, decreased safety awareness, impaired cardiopulmonary response to activity.    Pt found HOB elevated with wife present and pt agreeable to working with PT. Pt A & O x  4 and has the following co-morbidities: HTN, CA Stenosis.  Pt tolerated session fairly with complaints of his "eyes felling sleepy"(PT took that to mean persistent blurry vision and pt with impaired insight) and required CGA > close SBA for safe mobilization without his shoes on during session today. Pt would benefit from acute PT during hospitalization to increase strength, endurance and safety with mobility and would benefit from close Supervision at home and OP PT for gait/balance training upon discharge home.      Rehab Prognosis: Good and Fair; patient would benefit from acute skilled PT services to address these deficits and reach maximum level of function.    Recent Surgery: * No surgery found *      Plan:     During this hospitalization, patient to be seen daily to address the identified rehab impairments via gait training, therapeutic activities, therapeutic exercises and progress toward the following goals:    Plan of Care Expires:  05/15/23    Subjective     Chief Complaint: "my eyes feel sleepy."   Patient/Family Comments/goals: home with wife for close supervision and OP PT for gait/balance training.  Pain/Comfort:  Pain Rating 1: " "1/10  Location - Side 1: Right  Location - Orientation 1: upper  Location 1: arm  Pain Addressed 1: Reposition, Distraction  Pain Rating Post-Intervention 1: 1/10    Patients cultural, spiritual, [employer] conflicts given the current situation:      Living Environment:  Pt lives with his wife and adult child/fly in a H w/ TOMMIE  Prior to admission, patients level of function was independent.  Equipment used at home: none.  DME owned (not currently used): none.  Upon discharge, patient will have assistance from his family.    Objective:     Communicated with LAURA Saucedo prior to session.  Patient found HOB elevated with bed alarm, peripheral IV, telemetry  upon PT entry to room.    General Precautions: Standard, fall, hearing impaired, vision impaired  Orthopedic Precautions:N/A   Braces: N/A  Respiratory Status: Room air    Exams:  Cognitive Exam:  Patient is oriented to Person, Place, Time, and Situation  RLE ROM: WFL  RLE Strength: grossly 4+/5  LLE ROM: WFL  LLE Strength: grossly 4+/5    Functional Mobility:  Bed Mobility:     Scooting: independence  Supine to Sit: independence  Sit to Supine: independence  Transfers:     Sit to Stand:  stand by assistance, contact guard assistance, and with pt initially leaning posteriorly with no AD  Gait: x 200' with no AD with CGA > SBA for instability with pt noted to not use his toes for balance assist/toe off due to "calluses" (After gt trng pt's wife told PT that pt always wears his shoes when at home).       AM-PAC 6 CLICK MOBILITY  Total Score:20       Treatment & Education:  Pt was educated on the following: call light use, importance of OOB activity and functional mobility to negate the negative effects of prolonged bed rest during this hospitalization, safe transfers/ambulation and discharge planning recommendations/options.      Patient left HOB elevated with all lines intact, call button in reach, bed alarm on, RN & extender notified, and pt's wife " present.    GOALS:   Multidisciplinary Problems       Physical Therapy Goals          Problem: Physical Therapy    Goal Priority Disciplines Outcome Goal Variances Interventions   Physical Therapy Goal     PT, PT/OT      Description: Goals to be met by: 5/15/23     Patient will increase functional independence with mobility by performin. Sit to stand transfer with Supervision  2. Bed to chair transfer with Supervision   3. Gait  x 250 feet with Supervision with no LOB                             History:     Past Medical History:   Diagnosis Date    Basal cell carcinoma     L temple / radiation therapy    Cataract     Hypertension        Past Surgical History:   Procedure Laterality Date    APPENDECTOMY      EYE SURGERY      Moh's surgery on right temple area Right 2015    Cancerous spot    PROSTATE BIOPSY      TONSILLECTOMY         Time Tracking:     PT Received On: 04/15/23  PT Start Time: 913     PT Stop Time: 939  PT Total Time (min): 26 min     Billable Minutes: Evaluation 10 and Gait Training 16      04/15/2023

## 2023-04-15 NOTE — PROGRESS NOTES
Automatic Inhaler to Nebulizer Interchange    budesonide/formoterol (Symbicort) 320 mcg/18 mcg changed to budesonide 0.5 mg twice daily AND arformoterol 15 mcg twice daily per [facility] Automatic Therapeutic Substitutions Protocol.    Please contact pharmacy at extension [phone] with any questions.     Thank you,   Amarjit Cuenca

## 2023-04-15 NOTE — DISCHARGE SUMMARY
[facility]  Discharge Summary  Patient Name: Brit Jennings MRN: [id number]   Patient Class: OP- Observation  Length of Stay: 0   Admission Date: 4/14/2023  5:34 PM Attending Physician: Conrad Mena MD   Primary Care Provider: Jian Alcazar MD Face-to-Face encounter date: 04/15/2023   Chief Complaint: Dizziness (States at 1600 sudden loss of coordination and blurred vision)    Date of Discharge: 4/15/2023  Discharge Disposition:  Home or Self Care  Condition: Stable       Reason for Hospitalization   TIA      Patient Active Problem List   Diagnosis    Squamous cell carcinoma, scalp/neck    Squamous cell carcinoma, scalp/neck    Squamous cell carcinoma, scalp/neck    Squamous cell carcinoma, scalp/neck    Squamous cell carcinoma of skin of nose    Squamous cell carcinoma, scalp/neck    Insomnia    Essential hypertension    Panlobular emphysema    Elevated PSA    Malignant neoplasm of prostate    Nummular eczematous dermatitis    Prostate cancer    Hot flashes    Stricture of artery    History of prostate cancer    Bigeminy    Closed nondisplaced fracture of styloid process of left ulna    Closed fracture of left distal radius    Closed supracondylar fracture of right humerus with routine healing    TIA (transient ischemic attack)    Carotid artery stenosis, symptomatic, left       Brief History of Present Illness      HPI: Patient is a 70-year-old  female with history of hypertension, H/O prostate CA and radiation therapy 2019.  He presents to the ED with complaints of dizziness, and feet numbness, flushed started around 4:00 p.m. today. He reports he drove herself to emergency department and symptoms had resolved.  Reported mild left-sided cardiac nonradiating chest pain denies 30 minutes.  He denied headache, nausea, vomiting, fever, chills.  He was evaluated ED negative CT, MRI, and positive CTA showing extensive left-sided carotid stenosis.      Dr. Rodrigues consulted Neurology   Herbert who recommended admission, plavix and atorvastatin, and carotid ultrasound, neurology consult..      04/03/2023 Cardiothoracic Surgeon evaluation  **He was evaluated by Dr. Mendoza regarding stenosis left carotid artery who recommended medical management Plavix, statin versus intervention due asymptomatic. Patient 80% stenosis of the left common carotid artery origin. Currently asymptomatic. 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 There is high-grade stenosis at the origin of the left vertebral artery which places the patient at high risk for vertebrobasilar insufficiency given that the right vertebral artery appears to provide very little, if any blood supply to the basilar artery. There is  approximately 80% stenosis in the proximal right internal carotid artery. No large vessel intracranial stenoses or occlusions are identified. See above discussion.     CT of head 04/14/2023  IMPRESSION:   Moderate cortical atrophy and evidence of moderate small vessel chronic ischemic change as described. No acute intracranial abnormality is identified.     MRI head 04/14/2023  IMPRESSION:   Moderate cortical atrophy and evidence of moderate small vessel chronic ischemic change as described. Otherwise unremarkable MRI of the brain. No acute infarct or hemorrhage is currently identified.    Hospital Course By Problem with Pertinent Findings     Patient was admitted after having symptoms of dizziness and numbness of the feet..  His symptoms have completely resolved.  MRI showed evidence of chronic ischemic disease but no acute infarct.  Patient will continue aspirin and statin as per Neurology.  No indication for Plavix at this time as per Neurology.  Furthermore,  "patient does have history of carotid artery stenosis.  He will follow-up with vascular surgery for this.  Referral has been sent.      Patient was seen and examined on the date of discharge and determined to be suitable for discharge.    Physical Exam  /77 (BP Location: Right leg, Patient Position: Lying)   Pulse 62   Temp 98.2 °F (36.8 °C) (Oral)   Resp 18   Ht 5' 8" (1.727 m)   Wt 72.6 kg (160 lb 0.9 oz)   SpO2 96%   BMI 24.34 kg/m²   Vitals reviewed.    Constitutional: No distress.   HENT: Atraumatic.   Cardiovascular: Normal rate, regular rhythm and normal heart sounds.   Pulmonary/Chest: Effort normal. Clear to auscultation bilaterally. No wheezes.   Abdominal: Soft. Bowel sounds are normal. Exhibits no distension and no mass. No tenderness  Neurological: Alert.   Skin: Skin is warm and dry.     Following labs were Reviewed   Recent Labs   Lab 04/15/23  0508   WBC 3.03*   HGB 12.5*   HCT 36.2*   *   CALCIUM 8.9   ALBUMIN 3.9   PROT 6.7   *   K 3.5   CO2 23   CL 98   BUN 12   CREATININE 0.8   ALKPHOS 57   ALT 18   AST 27   BILITOT 1.0         Microbiology Results (last 7 days)       ** No results found for the last 168 hours. **          US Carotid Bilateral   Final Result      MRI Brain Without Contrast   Final Result      CTA Head and Neck (xpd)   Final Result      X-Ray Chest AP Portable   Final Result      CT HEAD FOR STROKE   Final Result          No results found for this or any previous visit.      Consultants and Procedures   Consultants:  Consults (From admission, onward)          Status Ordering Provider     IP consult to case management/social work  Once        Provider:  (Not yet assigned)    Acknowledged LANI MOREL     Inpatient consult to Neurology  Once        Provider:  Karni Patricia MD    Acknowledged SHUBHAM SHARMA     Consult to Telemedicine - Acute Stroke  Once        Provider:  (Not yet assigned)    Acknowledged SHUBHAM SHARMA      "         Discharge Information:   Diet:  Cardiac diet    Physical Activity:  Activity as tolerated    Instructions:  1. Take all medications as prescribed  2. Keep all follow-up appointments      Follow-Up Appointments:  Please call your primary care physician to schedule an appointment in 1 week time.            Pending laboratory work/Tests to be performed/followed by the Primary care Physician:    The patient was discharged in the care of her parents//wife/family/caregiver, with discharge instructions were reviewed in written and verbal form. All pertinent questions were discussed and prescriptions were provided. The importance of making follow up appointments and compliance of medications has been stressed repeatedly. The patient will follow up in 1 week or sooner as needed with the PCP, and the patient is on board with the plan. Upon discharge, patient needs to be on following medications.    Discharge Medications:     Medication List        START taking these medications      atorvastatin 80 MG tablet  Commonly known as: LIPITOR  Take 1 tablet (80 mg total) by mouth once daily.     clopidogreL 75 mg tablet  Commonly known as: PLAVIX  Take 1 tablet (75 mg total) by mouth once daily.  Start taking on: April 16, 2023            CONTINUE taking these medications      acetaminophen 500 MG tablet  Commonly known as: TYLENOL  Take 2 tablets (1,000 mg total) by mouth every 6 (six) hours as needed for Pain.     amLODIPine 5 MG tablet  Commonly known as: NORVASC  Take 1 tablet (5 mg total) by mouth once daily.     ascorbic acid (vitamin C) 500 MG tablet  Commonly known as: VITAMIN C     aspirin 81 MG Chew     hydrocortisone 2.5 % cream  Thin film to AA onface daily PRN flare; use short term only     ketoconazole 2 % cream  Commonly known as: NIZORAL  Thin film to red scaly rash on face twice daily as needed for flare     lisinopriL 20 MG tablet  Commonly known as: PRINIVIL,ZESTRIL  Take 1 tablet (20 mg total) by  mouth once daily.     solifenacin 10 MG tablet  Commonly known as: VESICARE     SYMBICORT 160-4.5 mcg/actuation Hfaa  Generic drug: budesonide-formoterol 160-4.5 mcg  Inhale 2 puffs into the lungs every 12 (twelve) hours.     tamsulosin 0.4 mg Cap  Commonly known as: FLOMAX  Take 1 capsule (0.4 mg total) by mouth once daily.     temazepam 30 mg capsule  Commonly known as: RESTORIL  Take 1 capsule (30 mg total) by mouth nightly as needed for Insomnia.               Where to Get Your Medications        These medications were sent to [facility] ([facility]) - [place] [id number] [facility]  [address] [address]      Phone: [phone]   atorvastatin 80 MG tablet  clopidogreL 75 mg tablet           I spent 45  minutes preparing the discharge including reviewing records from previous encounters, preparation of discharge summary, assessing and final examination of the patient, discharge medicine reconciliation, discussing plan of care, follow up and education and prescriptions.       Conrad Rudd  [facility] Hospitalist  04/15/2023

## 2023-04-15 NOTE — ASSESSMENT & PLAN NOTE
Started 4:00 p.m./symptoms resolved  Antithrombotics for secondary stroke prevention: Antiplatelets: Aspirin: 81 mg daily  Clopidogrel: 75 mg daily    Statins for secondary stroke prevention and hyperlipidemia, if present:   Statins: Atorvastatin- 80 mg daily    Aggressive risk factor modification: HTN     Rehab efforts: The patient has been evaluated by a stroke team provider and the therapy needs have been fully considered based off the presenting complaints and exam findings. The following therapy evaluations are needed: PT evaluate and treat, OT evaluate and treat, SLP evaluate and treat    Diagnostics ordered/pending: Carotid ultrasound to assess vasculature, CT scan of head without contrast to asses brain parenchyma, CTA Head to assess vasculature , CTA Neck/Arch to assess vasculature    VTE prophylaxis: Mechanical prophylaxis: Place SCDs    BP parameters: TIA: SBP <220 until imaging confirmation of no infarct      Neuro checks q.4  Fall risk precaution  Tele monitoring  Carotid ultrasound  consult neurology

## 2023-04-15 NOTE — PLAN OF CARE
[facility]  Initial Discharge Assessment       Primary Care Provider: Jian Alcazar MD    Admission Diagnosis: Stroke [I63.9]    Admission Date: 4/14/2023  Expected Discharge Date: 4/15/2023    Discharge Barriers Identified: None    Payor: [employer]ARE / Plan: [employer] 65 / Product Type: Medicare Advantage /     Extended Emergency Contact Information  Primary Emergency Contact: Neisha Jennings  Address: [address] [place] [address] [place]  Mobile Phone: [phone]  Relation: Spouse  Preferred language: English   needed? No    Discharge Plan A: Home with family  Discharge Plan B: Home Health      [facility] - [place], [place] - [address] [facility]  [address] [address]  Phone: [phone] Fax: [phone]    [facility] [address] [place], [place] - [address] [facility]  [address] [facility] [address]  Phone: [phone] Fax: [phone]      Initial Assessment (most recent)       Adult Discharge Assessment - 04/15/23 1237          Discharge Assessment    Assessment Type Discharge Planning Assessment                              [facility]  Discharge Assessment    Primary Care Provider: Jian Alcazar MD           CM met with patient/spouse art bedside, observation status explained and GARZA letter signed and copy to patient. Patient reports living with spouse, independent with ADL's no dme or HH and plans to return home with no needs. Neisha, spouse is POA.

## 2023-04-15 NOTE — CARE UPDATE
04/15/23 0732   Patient Assessment/Suction   Level of Consciousness (AVPU) alert   Respiratory Effort Unlabored   Expansion/Accessory Muscles/Retractions expansion symmetric   All Lung Fields Breath Sounds Anterior:;Lateral:;clear   Rhythm/Pattern, Respiratory depth regular;pattern regular;unlabored   Cough Frequency infrequent   PRE-TX-O2   Device (Oxygen Therapy) room air   SpO2 97 %   Pulse Oximetry Type Intermittent   $ Pulse Oximetry - Multiple Charge Pulse Oximetry - Multiple   Pulse (!) 51   Resp 18   Temp 97.9 °F (36.6 °C)   BP (!) 166/81   Aerosol Therapy   $ Aerosol Therapy Charges Aerosol Treatment   Daily Review of Necessity (SVN) completed   Respiratory Treatment Status (SVN) given   Treatment Route (SVN) mask   Patient Position (SVN) HOB elevated   Post Treatment Assessment (SVN) breath sounds unchanged   Signs of Intolerance (SVN) none   Breath Sounds Post-Respiratory Treatment   Throughout All Fields Post-Treatment All Fields   Throughout All Fields Post-Treatment aeration increased   Post-treatment Heart Rate (beats/min) 52   Post-treatment Resp Rate (breaths/min) 16   Education   $ Education Bronchodilator;15 min   Respiratory Evaluation   $ Care Plan Tech Time 15 min   $ Eval/Re-eval Charges Evaluation

## 2023-04-15 NOTE — PT/OT/SLP EVAL
Occupational Therapy   Evaluation    Name: Brit Jennings  MRN: [id number]  Admitting Diagnosis: TIA (transient ischemic attack)  Recent Surgery: * No surgery found *      Recommendations:     Discharge Recommendations: outpatient OT  Discharge Equipment Recommendations:  rollator  Barriers to discharge:  None    Assessment:     Brit Jennings is a 87 y.o. male with a medical diagnosis of TIA (transient ischemic attack).  Performance deficits affecting function: impaired self care skills, gait instability, decreased upper extremity function, impaired cardiopulmonary response to activity.      Rehab Prognosis: Good; patient would benefit from acute skilled OT services to address these deficits and reach maximum level of function.       Plan:     Patient to be seen 3 x/week to address the above listed problems via self-care/home management, therapeutic activities, therapeutic exercises  Plan of Care Expires: 05/15/23  Plan of Care Reviewed with: patient, spouse    Subjective     Chief Complaint: no complaints  Patient/Family Comments/goals: return home    Occupational Profile:  Living Environment: lives with spouse in one story home  Previous level of function: independent with self care and driving  Equipment Used at Home: none  Assistance upon Discharge: spouse is available for assistance    Pain/Comfort:  Pain Rating 1: 0/10  Pain Rating Post-Intervention 1: 0/10    Patients cultural, spiritual, [employer]ology conflicts given the current situation: no    Objective:     Communicated with: nurse prior to session.  Patient found HOB elevated with bed alarm, peripheral IV, telemetry upon OT entry to room.    General Precautions: Standard, fall, hearing impaired, vision impaired  Orthopedic Precautions: N/A  Braces: N/A  Respiratory Status: Room air    Occupational Performance:    Bed Mobility:    Patient completed Rolling/Turning to Right with contact guard assistance  Patient completed Scooting/Bridging with  contact guard assistance  Patient completed Supine to Sit with contact guard assistance      Activities of Daily Living:  Feeding:  stand by assistance for feeding self  Grooming: stand by assistance for washing hands    Cognitive/Visual Perceptual:  Cognitive/Psychosocial Skills:     -       Oriented to: Person, Place, Time, and Situation   -       Follows Commands/attention:Follows multistep  commands  -       Communication: clear/fluent  -       Memory: No Deficits noted  -       Safety awareness/insight to disability: intact   -       Mood/Affect/Coping skills/emotional control: Appropriate to situation and Cooperative    Physical Exam:  Upper Extremity Range of Motion:     -       Right Upper Extremity: WFL  -       Left Upper Extremity: WFL  Upper Extremity Strength:    -       Right Upper Extremity: WFL (recent right humeral fracture)  -       Left Upper Extremity: WFL   Strength:    -       Right Upper Extremity: WFL  -       Left Upper Extremity: WFL  Fine Motor Coordination:    -       Intact    AMPAC 6 Click ADL:  AMPAC Total Score: 22    Treatment & Education:  Patient and spouse were educated on role of OT/POC, importance of activity and getting out of bed with assistance, discharge planning and equipment needs, and reviewed use of call bell for assistance.     Patient left HOB elevated with all lines intact and call button in reach, spouse present    GOALS:   Multidisciplinary Problems       Occupational Therapy Goals          Problem: Occupational Therapy    Goal Priority Disciplines Outcome Interventions   Occupational Therapy Goal     OT, PT/OT Ongoing, Progressing    Description: Goals to be met by: 5/15/2023     Patient will increase functional independence with ADLs by performing:    UE Dressing with Supervision.  LE Dressing with Supervision.  Grooming while standing with Supervision.  Toileting from toilet with Supervision for hygiene and clothing management.   Toilet transfer to toilet  with Supervision.                         History:     Past Medical History:   Diagnosis Date    Basal cell carcinoma 2020    L temple / radiation therapy    Cataract     Hypertension          Past Surgical History:   Procedure Laterality Date    APPENDECTOMY      EYE SURGERY      Moh's surgery on right temple area Right 2015    Cancerous spot    PROSTATE BIOPSY      TONSILLECTOMY         Time Tracking:     OT Date of Treatment: 04/15/23  OT Start Time: 1000  OT Stop Time: 1018  OT Total Time (min): 18 min    Billable Minutes:Evaluation 18    4/15/2023

## 2023-04-15 NOTE — H&P
[facility] [place] - Emergency Dept  Hospital Medicine  History & Physical    Patient Name: Brit Jennings  MRN: [id number]  Patient Class: OP- Observation  Admission Date: 4/14/2023  Attending Physician: Olivia Dinh MD   Primary Care Provider: Jian Alcazar MD         Patient information was obtained from patient and ER records.     Subjective:     Principal Problem:TIA (transient ischemic attack)    Chief Complaint:   Chief Complaint   Patient presents with    Dizziness     States at 1600 sudden loss of coordination and blurred vision        HPI: Patient is a 70-year-old  female with history of hypertension, H/O prostate CA and radiation therapy 2019.  He presents to the ED with complaints of dizziness, and feet numbness, flushed started around 4:00 p.m. today. He reports he drove herself to emergency department and symptoms had resolved.  Reported mild left-sided cardiac nonradiating chest pain denies 30 minutes.  He denied headache, nausea, vomiting, fever, chills.  He was evaluated ED negative CT, MRI, and positive CTA showing extensive left-sided carotid stenosis.     Dr. Rodrigues consulted Neurology Dr. Godinez who recommended admission, plavix and atorvastatin, and carotid ultrasound, neurology consult..     04/03/2023 Cardiothoracic Surgeon evaluation  **He was evaluated by Dr. Mendoza regarding stenosis left carotid artery who recommended medical management Plavix, statin versus intervention due asymptomatic. Patient 80% stenosis of the left common carotid artery origin. Currently asymptomatic. 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 There is high-grade stenosis at the origin of the left vertebral artery which places the patient at  high risk for vertebrobasilar insufficiency given that the right vertebral artery appears to provide very little, if any blood supply to the basilar artery. There is  approximately 80% stenosis in the proximal right internal carotid artery. No large vessel intracranial stenoses or occlusions are identified. See above discussion.    CT of head 04/14/2023  IMPRESSION:   Moderate cortical atrophy and evidence of moderate small vessel chronic ischemic change as described. No acute intracranial abnormality is identified.    MRI head 04/14/2023  IMPRESSION:   Moderate cortical atrophy and evidence of moderate small vessel chronic ischemic change as described. Otherwise unremarkable MRI of the brain. No acute infarct or hemorrhage is currently identified.         Past Medical History:   Diagnosis Date    Basal cell carcinoma 2020    L temple / radiation therapy    Cataract     Hypertension        Past Surgical History:   Procedure Laterality Date    APPENDECTOMY      EYE SURGERY      Moh's surgery on right temple area Right 2015    Cancerous spot    PROSTATE BIOPSY      TONSILLECTOMY         Review of patient's allergies indicates:   Allergen Reactions    Pollen extracts Other (See Comments)       Current Facility-Administered Medications on File Prior to Encounter   Medication    albuterol inhaler 2 puff    diphenhydrAMINE injection 25 mg    EPINEPHrine (EPIPEN) 0.3 mg/0.3 mL pen injection 0.3 mg    methylPREDNISolone sodium succinate injection 40 mg    ondansetron disintegrating tablet 4 mg    sodium chloride 0.9% 500 mL flush bag    sodium chloride 0.9% flush 10 mL     Current Outpatient Medications on File Prior to Encounter   Medication Sig    amLODIPine (NORVASC) 5 MG tablet Take 1 tablet (5 mg total) by mouth once daily.    ascorbic acid, vitamin C, (VITAMIN C) 500 MG tablet Take 1,000 mg by mouth once daily.    aspirin 81 MG Chew Take 81 mg by mouth once daily.    lisinopriL (PRINIVIL,ZESTRIL)  20 MG tablet Take 1 tablet (20 mg total) by mouth once daily.    solifenacin (VESICARE) 10 MG tablet Take 10 mg by mouth once daily.    SYMBICORT 160-4.5 mcg/actuation HFAA Inhale 2 puffs into the lungs every 12 (twelve) hours.    tamsulosin (FLOMAX) 0.4 mg Cap Take 1 capsule (0.4 mg total) by mouth once daily.    temazepam (RESTORIL) 30 mg capsule Take 1 capsule (30 mg total) by mouth nightly as needed for Insomnia.    acetaminophen (TYLENOL) 500 MG tablet Take 2 tablets (1,000 mg total) by mouth every 6 (six) hours as needed for Pain.    hydrocortisone 2.5 % cream Thin film to AA onface daily PRN flare; use short term only    ketoconazole (NIZORAL) 2 % cream Thin film to red scaly rash on face twice daily as needed for flare     Family History    None       Tobacco Use    Smoking status: Never    Smokeless tobacco: Never   Substance and Sexual Activity    Alcohol use: Not Currently    Drug use: Not on file    Sexual activity: Not Currently     Review of Systems   Constitutional:  Negative for activity change, appetite change, chills, diaphoresis, fatigue, fever and unexpected weight change.   HENT:  Negative for congestion, dental problem, facial swelling, nosebleeds, sinus pressure, sinus pain, sore throat and trouble swallowing.    Eyes:  Negative for pain and redness.   Respiratory:  Negative for apnea, cough, chest tightness, shortness of breath and wheezing.    Cardiovascular:  Positive for chest pain. Negative for palpitations and leg swelling.   Gastrointestinal:  Negative for abdominal distention, abdominal pain, anal bleeding, blood in stool, constipation, diarrhea, nausea and vomiting.   Endocrine: Negative for polyphagia and polyuria.   Genitourinary:  Negative for decreased urine volume, difficulty urinating, dysuria, frequency, hematuria and urgency.   Musculoskeletal:  Negative for back pain, gait problem, joint swelling, myalgias, neck pain and neck stiffness.   Skin:  Negative for color  change, pallor, rash and wound.   Neurological:  Positive for dizziness and numbness. Negative for seizures, syncope, facial asymmetry, speech difficulty, weakness, light-headedness and headaches.   Psychiatric/Behavioral:  Negative for agitation, behavioral problems, confusion, hallucinations, sleep disturbance and suicidal ideas.    Objective:     Vital Signs (Most Recent):  Temp: 98.3 °F (36.8 °C) (04/14/23 1738)  Pulse: 72 (04/14/23 1958)  Resp: 16 (04/14/23 1933)  BP: 137/74 (04/14/23 1958)  SpO2: 97 % (04/14/23 1958) Vital Signs (24h Range):  Temp:  [98.3 °F (36.8 °C)] 98.3 °F (36.8 °C)  Pulse:  [60-72] 72  Resp:  [14-16] 16  SpO2:  [95 %-98 %] 97 %  BP: (118-141)/(69-95) 137/74     Weight: 72.6 kg (160 lb)  Body mass index is 24.33 kg/m².    Physical Exam  Vitals reviewed.   Constitutional:       General: He is not in acute distress.     Appearance: Normal appearance. He is not ill-appearing, toxic-appearing or diaphoretic.   HENT:      Head: Normocephalic.      Right Ear: External ear normal.      Left Ear: External ear normal.      Nose: No congestion or rhinorrhea.      Mouth/Throat:      Mouth: Mucous membranes are moist.      Pharynx: Oropharynx is clear. No oropharyngeal exudate or posterior oropharyngeal erythema.   Eyes:      Extraocular Movements: Extraocular movements intact.      Conjunctiva/sclera: Conjunctivae normal.      Pupils: Pupils are equal, round, and reactive to light.   Cardiovascular:      Rate and Rhythm: Normal rate and regular rhythm.      Pulses: Normal pulses.      Heart sounds: Normal heart sounds.   Pulmonary:      Effort: Pulmonary effort is normal.      Breath sounds: Normal breath sounds.   Abdominal:      General: Abdomen is flat. Bowel sounds are normal.      Palpations: Abdomen is soft.   Genitourinary:     Rectum: Normal.   Musculoskeletal:         General: Normal range of motion.      Cervical back: Normal range of motion and neck supple.   Skin:     General: Skin is  warm and dry.      Capillary Refill: Capillary refill takes less than 2 seconds.   Neurological:      Mental Status: He is alert and oriented to person, place, and time.   Psychiatric:         Mood and Affect: Mood normal.         CRANIAL NERVES     CN III, IV, VI   Pupils are equal, round, and reactive to light.     Significant Labs: All pertinent labs within the past 24 hours have been reviewed.  Recent Lab Results  (Last 5 results in the past 24 hours)        04/14/23  1904   04/14/23  1902   04/14/23  1757   04/14/23  1755   04/14/23  1754        Benzodiazepines Presumptive Positive               Phencyclidine Negative               Albumin     4.4           Alkaline Phosphatase     61           ALT     19           Amphetamine Screen, Ur Negative               Anion Gap     8           Appearance, UA Clear               aPTT   29.1  Comment: aPTT therapeutic range = 39-69 seconds             AST     29           Barbiturate Screen, Ur Negative               Baso #     0.02           Basophil %     0.5           Bilirubin (UA) Negative               BILIRUBIN TOTAL     0.6  Comment: For infants and newborns, interpretation of results should be based  on gestational age, weight and in agreement with clinical  observations.    Premature Infant recommended reference ranges:  Up to 24 hours.............<8.0 mg/dL  Up to 48 hours............<12.0 mg/dL  3-5 days..................<15.0 mg/dL  6-29 days.................<15.0 mg/dL             BUN     13           Calcium     9.6           Chloride     96           CO2     23           Cocaine (Metab.) Negative               Color, UA Colorless               Creatinine     0.8           Creatinine, Urine <10.0  Comment: The random urine reference ranges provided were established   for 24 hour urine collections.  No reference ranges exist for  random urine specimens.  Correlate clinically.                 Differential Method     Automated           eGFR     >60.0            Eos #     0.1           Eosinophil %     3.4           Glucose     99           Glucose, UA Negative               Gran # (ANC)     1.4           Gran %     36.0           Group & Rh       O POS         Hematocrit     36.6           Hemoglobin     12.9           Immature Grans (Abs)     0.01  Comment: Mild elevation in immature granulocytes is non specific and   can be seen in a variety of conditions including stress response,   acute inflammation, trauma and pregnancy. Correlation with other   laboratory and clinical findings is essential.             Immature Granulocytes     0.3           INDIRECT DEBBY       NEG         INR   1.0  Comment: Coumadin Therapy:  2.0 - 3.0 for INR for all indicators except mechanical heart valves  and antiphospholipid syndromes which should use 2.5 - 3.5.                  1.0  Comment: Coumadin Therapy:  2.0 - 3.0 for INR for all indicators except mechanical heart valves  and antiphospholipid syndromes which should use 2.5 - 3.5.               Ketones, UA Negative               Leukocytes, UA Negative               Lymph #     1.5           Lymph %     39.1           Magnesium     2.1           MCH     34.5           MCHC     35.2           MCV     98           Mono #     0.8           Mono %     20.7           MPV     11.4           NITRITE UA Negative               nRBC     0           Occult Blood UA Negative               Opiate Scrn, Ur Negative               pH, UA 8.0               Platelets     164           POC Creatinine         0.9       Potassium     3.7           PROTEIN TOTAL     7.9           Protein, UA Negative  Comment: Recommend a 24 hour urine protein or a urine   protein/creatinine ratio if globulin induced proteinuria is  clinically suspected.                 Protime   11.3                11.3             RBC     3.74           RDW     12.3           Sample         VENOUS       Sodium     127           Specific [place], UA 1.010               Specimen  Outdate       04/17/2023 23:59         Specimen UA Urine, Clean Catch               Marijuana (THC) Metabolite Negative               Toxicology Information SEE COMMENT  Comment: This screen includes the following classes of drugs at the   listed cut-off:    Benzodiazepines                  200 ng/ml  Cocaine metabolite               300 ng/ml  Opiates                          300 ng/ml  Barbiturates                     200 ng/ml  Amphetamines                    1000 ng/ml  Marijuana metabs (THC)            50 ng/ml  Phencyclidine (PCP)               25 ng/ml    High concentrations of Methylenedioxymethamphetamine (MDMA aka  Ectasy) and other structurally similar compounds may cross-   react with the Amphetamine/Methamphetamine screening   immunoassay giving a false positive result.    Note: This exception list includes only more common   interferants in toxicology screen testing.  Because of many   cross-reactantspositive results on toxicology drug screens   should be confirmed whenever results do not correlate with   clinical presentation.    This report is intended for use in clinical monitoring and  management of patients. It is not intended for use in   employment related drug testing.    Because of any cross-reactants, positive results on toxicology  drug screens should be confirmed whenever results do not  correlate with clinical presentation.    Presumptive positive results are unconfirmed and may be used   only for medical purposes.                 Troponin I High Sensitivity     9.2  Comment: Troponin results differ between methods. Do not use   results between Troponin methods interchangeably.    Alkaline Phospatase levels above 400 U/L may   cause false positive results.    Access hsTnI should not be used for patients taking   Asfotase cristhian (Strensiq).             TSH     3.140           UROBILINOGEN UA Negative               WBC     3.81                                  Significant Imaging: I have  reviewed all pertinent imaging results/findings within the past 24 hours.    Assessment/Plan:     * TIA (transient ischemic attack)  Started 4:00 p.m./symptoms resolved  Antithrombotics for secondary stroke prevention: Antiplatelets: Aspirin: 81 mg daily  Clopidogrel: 75 mg daily    Statins for secondary stroke prevention and hyperlipidemia, if present:   Statins: Atorvastatin- 80 mg daily    Aggressive risk factor modification: HTN     Rehab efforts: The patient has been evaluated by a stroke team provider and the therapy needs have been fully considered based off the presenting complaints and exam findings. The following therapy evaluations are needed: PT evaluate and treat, OT evaluate and treat, SLP evaluate and treat    Diagnostics ordered/pending: Carotid ultrasound to assess vasculature, CT scan of head without contrast to asses brain parenchyma, CTA Head to assess vasculature , CTA Neck/Arch to assess vasculature    VTE prophylaxis: Mechanical prophylaxis: Place SCDs    BP parameters: TIA: SBP <220 until imaging confirmation of no infarct      Neuro checks q.4  Fall risk precaution  Tele monitoring  Carotid ultrasound  consult neurology      Carotid artery stenosis, symptomatic, left    4/3/23-Evaluated by Cardiothoracic Surgeon Dr. Mendoza medical recommendation management with Plavix and statin versus intervention..    **Plan  80% stenosis of the left common carotid artery origin. Currently asymptomatic. Based on the anatomy stenting the origin of the common carotid may result in embolization of the innominate since the stair a common trunk.**     Carotid ultrasound  Continue neuro checks  Aspirin, Plavix, statin initiated in ED    Essential hypertension  Hold beta blockers/ home meds for now          VTE Risk Mitigation (From admission, onward)         Ordered     IP VTE HIGH RISK PATIENT  Once         04/14/23 2052     Place sequential compression device  Until discontinued         04/14/23 2052                      On 04/14/2023, patient should be placed in hospital observation services under my care in collaboration with [facility].      KELSIE Ramirez  Department of Hospital Medicine  [facility] - Emergency Dept

## 2023-04-15 NOTE — ED PROVIDER NOTES
Encounter Date: 4/14/2023       History     Chief Complaint   Patient presents with    Dizziness     States at 1600 sudden loss of coordination and blurred vision     Patient reports at 4:00 p.m. approximally 2 hours prior to arrival he had acute onset of spinning sensation.  He felt like his vision was somewhat off.  He denies any visual feel cuts.  Patient reports he has no symptoms now.  Patient was at work.  Patient was able to drive home.  Symptoms continued so was brought here by family member.  No history of CVA.  There is no headache.  No recent illness.  No similar symptoms in the past.    Review of patient's allergies indicates:   Allergen Reactions    Pollen extracts Other (See Comments)     Past Medical History:   Diagnosis Date    Basal cell carcinoma 2020    L temple / radiation therapy    Cataract     Hypertension      Past Surgical History:   Procedure Laterality Date    APPENDECTOMY      EYE SURGERY      Moh's surgery on right temple area Right 2015    Cancerous spot    PROSTATE BIOPSY      TONSILLECTOMY       Family History   Problem Relation Age of Onset    Melanoma Neg Hx     Psoriasis Neg Hx     Lupus Neg Hx     Eczema Neg Hx      Social History     Tobacco Use    Smoking status: Never    Smokeless tobacco: Never   Substance Use Topics    Alcohol use: Not Currently     Review of Systems   Constitutional:  Negative for chills and fever.   HENT:  Negative for sore throat.    Eyes:  Negative for photophobia.   Respiratory:  Negative for shortness of breath.    Cardiovascular:  Negative for chest pain.   Gastrointestinal:  Negative for abdominal pain and vomiting.   Genitourinary:  Negative for dysuria.   Musculoskeletal:  Negative for joint swelling.   Skin:  Negative for rash.   Neurological:  Negative for weakness and headaches.   Psychiatric/Behavioral:  Negative for confusion.      Physical Exam     Initial Vitals [04/14/23 1738]   BP Pulse Resp Temp SpO2   (!) 141/95 67 14 98.3 °F (36.8 °C) 96  %      MAP       --         Physical Exam    Nursing note and vitals reviewed.  Constitutional: He is not diaphoretic. No distress.   HENT:   Head: Normocephalic and atraumatic.   Eyes: Conjunctivae and EOM are normal.   No nystagmus   Neck:   Normal range of motion.  Cardiovascular:  Regular rhythm.           Pulmonary/Chest: Breath sounds normal.   Abdominal: Abdomen is soft. There is no abdominal tenderness.   Musculoskeletal:         General: Normal range of motion.      Cervical back: Normal range of motion.     Neurological: He is alert and oriented to person, place, and time. He has normal strength. No cranial nerve deficit or sensory deficit.   Finger-to-nose normal   Skin: No rash noted.   Psychiatric: He has a normal mood and affect.       ED Course   Procedures  Labs Reviewed   CBC W/ AUTO DIFFERENTIAL - Abnormal; Notable for the following components:       Result Value    WBC 3.81 (*)     RBC 3.74 (*)     Hemoglobin 12.9 (*)     Hematocrit 36.6 (*)     MCH 34.5 (*)     Gran # (ANC) 1.4 (*)     Gran % 36.0 (*)     Mono % 20.7 (*)     All other components within normal limits   COMPREHENSIVE METABOLIC PANEL - Abnormal; Notable for the following components:    Sodium 127 (*)     All other components within normal limits   DRUG SCREEN PANEL, URINE EMERGENCY - Abnormal; Notable for the following components:    Benzodiazepines Presumptive Positive (*)     Creatinine, Urine <10.0 (*)     All other components within normal limits    Narrative:     Specimen Source->Urine   URINALYSIS, REFLEX TO URINE CULTURE - Abnormal; Notable for the following components:    Color, UA Colorless (*)     All other components within normal limits    Narrative:     Specimen Source->Urine   PROTIME-INR   PROTIME-INR   APTT   TSH   TROPONIN I HIGH SENSITIVITY   MAGNESIUM   LIPID PANEL   URINALYSIS, REFLEX TO URINE CULTURE   TROPONIN I HIGH SENSITIVITY   TYPE & SCREEN   POCT GLUCOSE   ISTAT CREATININE   POCT GLUCOSE MONITORING  CONTINUOUS   POCT GLUCOSE MONITORING CONTINUOUS          Imaging Results              US Carotid Bilateral (Final result)  Result time 04/15/23 00:00:16      Final result by Jessica Ndiaye MD (04/15/23 00:00:16)                   Narrative:    EXAM:  US Duplex Bilateral Extracranial Arteries    CLINICAL HISTORY:  The patient is 87 years old and is Male; TIA/stroke    TECHNIQUE:  Real-time duplex ultrasound scan of the extracranial arteries integrating B-mode two-dimensional vascular structure, Doppler spectral analysis and color flow Doppler imaging.    COMPARISON:  US Duplex Carotid dated 3/1/2023    FINDINGS:  Grayscale images demonstrate bilateral common carotid artery intimal wall thickening and scattered plaque worse at the carotid bulbs.    RIGHT COMMON CAROTID ARTERY:  Peak systolic velocity in the right common carotid artery (CCA) is 99.0 cm/s.  No occlusion or significant stenosis on color flow and spectral Doppler imaging.  RIGHT INTERNAL CAROTID ARTERY:  Peak systolic velocity in the right proximal internal carotid artery (ICA) is 205.9 cm/s.  Peak systolic velocity in the right mid internal carotid artery (ICA) is 130.7 cm/s.  No occlusion.  RIGHT EXTERNAL CAROTID ARTERY:  Peak systolic velocity in the right external carotid artery (ECA) is 208.7 cm/s consistent with an ECA stenosis due to atherosclerotic plaque.  This is of questionable clinical significance.  RIGHT VERTEBRAL ARTERY: Antegrade flow.  Peak systolic velocity in the right vertebral artery is 86.5 cm/s.  RIGHT ICA/CCA RATIO:  Unremarkable.  The ICA/CCA peak systolic velocity ratio is 1.3 on the right.    LEFT COMMON CAROTID ARTERY:  Peak systolic velocity in the left common carotid artery (CCA) is 40.9 cm/s.  LEFT INTERNAL CAROTID ARTERY:  Peak systolic velocity in the left internal carotid artery (ICA) is 39.1 cm/s. No occlusion or significant stenosis on color flow and spectral Doppler imaging.  LEFT EXTERNAL CAROTID ARTERY:   Unremarkable.  No occlusion or significant stenosis on color flow and spectral Doppler imaging.  Peak systolic velocity in the left external carotid artery (ECA) is 54.9 cm/s.  LEFT VERTEBRAL ARTERY: Antegrade flow .  Peak systolic velocity in the left vertebral artery is 34.1 cm/s.  LEFT ICA/CCA RATIO:  Unremarkable.  The ICA/CCA peak systolic velocity ratio is 1.0 on the left.    Left-sided velocities are diffusely diminished which may indicate an upstream stenosis.    LYMPH NODES:  Unremarkable.  No lymphadenopathy.    CAROTID STENOSIS REFERENCE USING SRU CRITERIA:  Mild - <50% stenosis. ICA PSV is less than 125 cm/second and plaque or intimal thickening is visible.  Moderate - 50-69% stenosis. ICA PSV is 125 to 230 cm/second and plaque is visible.  Severe - 70-94% stenosis. ICA PSV is more than 230 cm/second and visible plaque with lumen narrowing is seen.  Near occlusion - 95-99% stenosis. ICA PSV is variable and significant plaque with luminal narrowing is seen.  Occluded - 100% stenosis. No flow identified.    IMPRESSION:  1.  Moderate 50-69% stenosis in the right proximal and mid internal carotid artery.  2.  Diffusely diminished left-sided velocities which may indicate upstream stenosis. Similar findings were present on prior exam.    Electronically signed by:  Jessica Ndiaye MD  4/15/2023 12:00 AM CDT Workstation: [id number]                                     MRI Brain Without Contrast (Final result)  Result time 04/14/23 20:11:30      Final result by Nahum Coreas MD (04/14/23 20:11:30)                   Narrative:    EXAM: MRI BRAIN WITHOUT CONTRAST    HISTORY: Dizziness, persistent/recurrent, cardiac or vascular cause suspected    COMPARISON:CTA of the head and neck performed earlier today.    TECHNIQUE: Multiple axial T1, T2, gradient echo and diffusion images of the brain were obtained. Sagittal T1-weighted images were also acquired.    Unless otherwise stated, incidental findings do not  require dedicated follow-up imaging.    FINDINGS: There is moderate diffuse cortical atrophy. There are numerous patchy nodular foci of abnormal T2 hyperintensity scattered throughout the white matter of both cerebral hemispheres and also within the marin are consistent with sequela of moderate small vessel chronic ischemic change. No foci of restricted diffusion are currently identified within the brain or brainstem. There is no evidence of acute infarct or hemorrhage. There is no cerebral edema. There is no mass effect. There are no discrete intracranial masses. Normal flow voids are observed in the major vascular structures. The paranasal sinuses are well aerated.    IMPRESSION:   Moderate cortical atrophy and evidence of moderate small vessel chronic ischemic change as described. Otherwise unremarkable MRI of the brain. No acute infarct or hemorrhage is currently identified.    Electronically signed by:  Davy Coreas MD  4/14/2023 8:11 PM CDT Workstation: [id number]                                     CTA Head and Neck (xpd) (Final result)  Result time 04/14/23 18:40:25      Final result by Nahum Coreas MD (04/14/23 18:40:25)                   Narrative:      EXAM: CTA HEAD AND NECK (XPD)    HISTORY: Ataxia, stroke suspected; Dizziness, persistent/recurrent, cardiac or vascular cause suspected    COMPARISON: None    TECHNIQUE: Spiral CT images were obtained through the neck and head during rapid IV injection of contrast and were reconstructed in 1 mm thick axial slices. Multiple coronal and sagittal MIP reconstructions were produced.    This exam was performed according to our departmental dose-optimization program, which includes automated exposure control, adjustment of the mA and/or kV according to patient size and/or use of iterative reconstruction technique.    Unless otherwise stated, incidental findings do not require dedicated follow-up imaging.    FINDINGS: There is very extensive dense  calcification at the origin of the left subclavian artery producing chronic occlusion. Calcified plaque at the origin of the left common carotid artery also likely results in very high-grade stenosis, near occlusion. The innominate artery is widely patent. The left vertebral artery is dominant and likely provides the primary blood supply to the left subclavian artery. However there is a focal calcified plaque at its origin and that appears to produce high-grade stenosis. Regardless, the patient is at risk for subclavian steal syndrome symptomology. Both vertebral arteries are otherwise widely patent throughout the course in the neck. However, the nondominant right vertebral artery terminates as the posterior inferior cerebellar artery. Only the left vertebral artery connects to the basilar artery. Note that only very tiny patent right P-comm can be identified. A larger patent left posterior communicating artery is evident although the P1 segment of the left posterior cerebral artery appears atretic. Thus, the patient is at very high risk for vertebrobasilar insufficiency. The right common carotid artery is widely patent throughout its course. No additional stenoses are evident within the left common carotid artery. Calcified plaque at the right carotid bifurcation extends into the origin of the internal carotid artery were not produces moderate approximately 80% stenosis. Much less extensive plaque at the left carotid bifurcation results in no luminal narrowing. No large vessel intracranial occlusions or significant stenoses are identified. A anterior communicating artery is present, likely providing the predominant blood supply to the left cerebral circulation, given the high-grade proximal stenosis at the origin of the left common carotid artery.    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 There is high-grade stenosis at the origin of the  left vertebral artery which places the patient at high risk for vertebrobasilar insufficiency given that the right vertebral artery appears to provide very little, if any blood supply to the basilar artery. There is  approximately 80% stenosis in the proximal right internal carotid artery. No large vessel intracranial stenoses or occlusions are identified. See above discussion.    Electronically signed by:  Davy Coreas MD  4/14/2023 6:40 PM CDT Workstation: [id number]                                     X-Ray Chest AP Portable (Final result)  Result time 04/14/23 18:42:57      Final result by Nahum Coreas MD (04/14/23 18:42:57)                   Narrative:      EXAM: XR CHEST AP PORTABLE    HISTORY: Dizziness cough.    COMPARISON:Chest x-ray dated 2/3/2022.    FINDINGS: The lungs are well-expanded and are free of infiltrates. There are no pleural effusions. The heart is within normal limits in size. The thoracic aorta is calcified and ectatic and tortuous.    IMPRESSION:   No evidence of acute disease within the chest.    Electronically signed by:  Davy Coreas MD  4/14/2023 6:42 PM CDT Workstation: [id number]                                     CT HEAD FOR STROKE (Final result)  Result time 04/14/23 18:08:11   Procedure changed from CT Head Without Contrast     Final result by Nahum Coreas MD (04/14/23 18:08:11)                   Narrative:      EXAM: CT HEAD FOR STROKE    HISTORY: Neuro deficit, acute, stroke suspected. Blurred vision. Sudden loss of coordination.    COMPARISON: None    TECHNIQUE: Multiple axial 3 mm thick images were acquired from the base of the skull to the vertex without IV contrast.    This exam was performed according to our departmental dose-optimization program, which includes automated exposure control, adjustment of the mA and/or kV according to patient size and/or use of iterative reconstruction technique.    Unless otherwise stated, incidental findings do not require  dedicated follow-up imaging.    FINDINGS: There is moderate diffuse cortical atrophy. There is no evidence of acute or chronic infarction. No intracranial hemorrhage is identified. Incidental note is made of calcification within the basal ganglia bilaterally. There is mild ill-defined diminished attenuation in the white matter of both cerebral hemispheres that is consistent with sequela of mild to moderate small vessel chronic ischemic change. There is no mass effect. The paranasal sinuses and mastoid air cells and middle ear cavities are well aerated.    IMPRESSION:   Moderate cortical atrophy and evidence of moderate small vessel chronic ischemic change as described. No acute intracranial abnormality is identified.    Electronically signed by:  Davy Coreas MD  4/14/2023 6:08 PM CDT Workstation: [id number]                                     Medications   meclizine tablet 12.5 mg (12.5 mg Oral Not Given 4/14/23 7262)   sodium chloride 0.9% flush 10 mL (has no administration in time range)   labetaloL injection 10 mg (has no administration in time range)   ondansetron injection 4 mg (has no administration in time range)   prochlorperazine injection Soln 5 mg (has no administration in time range)   atorvastatin tablet 80 mg (has no administration in time range)   clopidogreL tablet 75 mg (has no administration in time range)   amLODIPine tablet 5 mg (has no administration in time range)   ascorbic acid (vitamin C) tablet 1,000 mg (has no administration in time range)   aspirin chewable tablet 81 mg (has no administration in time range)   lisinopriL tablet 20 mg (has no administration in time range)   oxybutynin 24 hr tablet 10 mg (has no administration in time range)   tamsulosin 24 hr capsule 0.4 mg (has no administration in time range)   ALPRAZolam tablet 0.25 mg (0.25 mg Oral Given 4/15/23 1445)   albuterol nebulizer solution 2.5 mg (has no administration in time range)   budesonide nebulizer solution 0.5 mg  (has no administration in time range)   arformoteroL nebulizer solution 15 mcg (has no administration in time range)   iohexoL (OMNIPAQUE 350) injection 100 mL (100 mLs Intravenous Given 4/14/23 1757)   clopidogreL tablet 75 mg (75 mg Oral Given 4/14/23 2107)   atorvastatin tablet 80 mg (80 mg Oral Given 4/14/23 2107)   aspirin chewable tablet 324 mg (324 mg Oral Given 4/14/23 2059)     Medical Decision Making:   History:   I obtained history from: someone other than patient.       <> Summary of History: Family members at bedside confirmed history  Old Medical Records: I decided to obtain old medical records.  Old Records Summarized: records from clinic visits and records from previous admission(s).  Differential Diagnosis:   Peripheral vertigo, CVA, intercerebral hemorrhage  Independently Interpreted Test(s):   I have ordered and independently interpreted X-rays - see summary below.       <> Summary of X-Ray Reading(s): No acute cardiopulmonary process  I have ordered and independently interpreted EKG Reading(s) - see summary below       <> Summary of EKG Reading(s): Normal sinus rhythm  Clinical Tests:   Lab Tests: Reviewed  Radiological Study: Reviewed  Medical Tests: Reviewed  ED Management:  Patient presents with acute onset of some vague visual changes in vertigo.  Stroke activation initiated.  Patient CT and CTA obtained.  Patient with significant stenosis of subclavian artery, carotids and vertebral arteries.  Patient is at high risk for vertebral insufficiency.  MRI obtained with no acute stroke.  Discussed with Dr. Granados with Neurology.  She desires admission.  Begin aspirin, atorvastatin, Plavix.  Admit for further evaluation.  Hospitalist consulted for admission.                        Clinical Impression:   Final diagnoses:  [I63.9] Stroke  [R42] Dizziness        ED Disposition Condition    Observation                 Manjeet Rodrigues MD  04/15/23 9689

## 2023-04-15 NOTE — PROGRESS NOTES
Automatic Inhaler to Nebulizer Interchange    albuterol (Ventolin, ProAir, or Proventil)  mcg given multiple times per day changed to albuterol 2.5 mg Q20 mins PRN Wheezing. for wheezing, dyspnea, bronchospasm  per [facility] Automatic Therapeutic Substitutions Protocol.    Please contact pharmacy at extension [phone] with any questions.     Thank you,   Amarjit Cuenca

## 2023-04-15 NOTE — PLAN OF CARE
Maintain tolerance of regular consistency diet, using standard swallow safety precautions with 80% accuracy independently.  Participate in further assessment of problem solving, insight.    Problem: SLP  Goal: SLP Goal  Outcome: Ongoing, Progressing

## 2023-04-21 ENCOUNTER — TELEPHONE (OUTPATIENT)
Dept: FAMILY MEDICINE | Facility: CLINIC | Age: 88
End: 2023-04-21

## 2023-04-21 NOTE — TELEPHONE ENCOUNTER
Spoke with patient who states his leg is swollen and painful when he stands on it. He states it is red and warm to touch. He is worried about infection where they venkat blood - notified we close in 30 minutes and have no way to see him, he is going to the urgent care now so they can rule out any infection or get abx if needed. He agrees to call back on Monday to be seen if needed

## 2023-04-21 NOTE — TELEPHONE ENCOUNTER
----- Message from Jessie Fung sent at 4/21/2023 10:13 AM CDT -----   9:39   The patient was admitted to [facility] last Friday. He was discharged on Saturday afternoon.They told him to call in a week and make an appointment. He had blood drawn out of his leg. That spot is now infected..It hurts when he stands on it.  What should he do? Pt's # [phone] GH

## 2023-04-24 ENCOUNTER — TELEPHONE (OUTPATIENT)
Dept: FAMILY MEDICINE | Facility: CLINIC | Age: 88
End: 2023-04-24

## 2023-04-24 NOTE — TELEPHONE ENCOUNTER
----- Message from Jessika Chase sent at 4/24/2023  2:27 PM CDT -----  - pt sent a message over the weekend and would like to talk to dr. Alcazar   [phone]

## 2023-04-24 NOTE — TELEPHONE ENCOUNTER
Spoke with Pt. He went to ER for leg swelling. States he was advised to follow up with Cardiology and PCP. Pt states he seen PCP last week and now wants follow up appt with Dr. Alcazar. Pt scheduled for an appt. States swelling has started to resolve. Dr. Mendoza aware of everything going on. Pt would like to keep Dr. Alcazar up to date.

## 2023-04-26 ENCOUNTER — OFFICE VISIT (OUTPATIENT)
Dept: FAMILY MEDICINE | Facility: CLINIC | Age: 88
End: 2023-04-26
Attending: FAMILY MEDICINE
Payer: MEDICARE

## 2023-04-26 VITALS
HEART RATE: 63 BPM | DIASTOLIC BLOOD PRESSURE: 80 MMHG | BODY MASS INDEX: 25.01 KG/M2 | WEIGHT: 165 LBS | HEIGHT: 68 IN | SYSTOLIC BLOOD PRESSURE: 128 MMHG

## 2023-04-26 DIAGNOSIS — L03.115 CELLULITIS OF RIGHT LOWER EXTREMITY: ICD-10-CM

## 2023-04-26 DIAGNOSIS — G45.9 TIA (TRANSIENT ISCHEMIC ATTACK): ICD-10-CM

## 2023-04-26 DIAGNOSIS — Z85.46 HISTORY OF PROSTATE CANCER: ICD-10-CM

## 2023-04-26 DIAGNOSIS — I65.21 STENOSIS OF RIGHT CAROTID ARTERY: ICD-10-CM

## 2023-04-26 DIAGNOSIS — I10 ESSENTIAL HYPERTENSION: Chronic | ICD-10-CM

## 2023-04-26 DIAGNOSIS — Z09 HOSPITAL DISCHARGE FOLLOW-UP: ICD-10-CM

## 2023-04-26 DIAGNOSIS — I65.22 CAROTID ARTERY STENOSIS, SYMPTOMATIC, LEFT: Primary | Chronic | ICD-10-CM

## 2023-04-26 DIAGNOSIS — J43.1 PANLOBULAR EMPHYSEMA: ICD-10-CM

## 2023-04-26 DIAGNOSIS — I77.1 STRICTURE OF ARTERY: ICD-10-CM

## 2023-04-26 PROCEDURE — 99214 OFFICE O/P EST MOD 30 MIN: CPT | Mod: S$GLB,,, | Performed by: FAMILY MEDICINE

## 2023-04-26 PROCEDURE — 3288F PR FALLS RISK ASSESSMENT DOCUMENTED: ICD-10-PCS | Mod: CPTII,S$GLB,, | Performed by: FAMILY MEDICINE

## 2023-04-26 PROCEDURE — 1159F MED LIST DOCD IN RCRD: CPT | Mod: CPTII,S$GLB,, | Performed by: FAMILY MEDICINE

## 2023-04-26 PROCEDURE — 3288F FALL RISK ASSESSMENT DOCD: CPT | Mod: CPTII,S$GLB,, | Performed by: FAMILY MEDICINE

## 2023-04-26 PROCEDURE — 1101F PR PT FALLS ASSESS DOC 0-1 FALLS W/OUT INJ PAST YR: ICD-10-PCS | Mod: CPTII,S$GLB,, | Performed by: FAMILY MEDICINE

## 2023-04-26 PROCEDURE — 99214 PR OFFICE/OUTPT VISIT, EST, LEVL IV, 30-39 MIN: ICD-10-PCS | Mod: S$GLB,,, | Performed by: FAMILY MEDICINE

## 2023-04-26 PROCEDURE — 1159F PR MEDICATION LIST DOCUMENTED IN MEDICAL RECORD: ICD-10-PCS | Mod: CPTII,S$GLB,, | Performed by: FAMILY MEDICINE

## 2023-04-26 PROCEDURE — 1101F PT FALLS ASSESS-DOCD LE1/YR: CPT | Mod: CPTII,S$GLB,, | Performed by: FAMILY MEDICINE

## 2023-04-26 RX ORDER — DOXYCYCLINE 100 MG/1
100 CAPSULE ORAL 2 TIMES DAILY
Qty: 20 CAPSULE | Refills: 0 | Status: SHIPPED | OUTPATIENT
Start: 2023-04-26 | End: 2023-05-18

## 2023-04-26 RX ORDER — CLOPIDOGREL BISULFATE 75 MG/1
75 TABLET ORAL DAILY
Qty: 30 TABLET | Refills: 2 | Status: ON HOLD | OUTPATIENT
Start: 2023-04-26 | End: 2023-06-27 | Stop reason: HOSPADM

## 2023-04-26 NOTE — PROGRESS NOTES
SUBJECTIVE:    Patient ID: Brit Jennings is a 87 y.o. male.    Chief Complaint: Hospital Follow Up (No bottles, weakness, dizziness, discuss about stenosis left carotid artery , abc )    87-year-old male had a recent hospital admission for rule out stroke.  He was dizzy and felt off balance and presented to the emergency room.  And full neuro workup was performed.    HPI: Patient is a 70-year-old  female with history of hypertension, H/O prostate CA and radiation therapy 2019.  He presents to the ED with complaints of dizziness, and feet numbness, flushed started around 4:00 p.m. today. He reports he drove herself to emergency department and symptoms had resolved.  Reported mild left-sided cardiac nonradiating chest pain denies 30 minutes.  He denied headache, nausea, vomiting, fever, chills.  He was evaluated ED negative CT, MRI, and positive CTA showing extensive left-sided carotid stenosis.      Dr. Rodrigues consulted Neurology Dr. Godinez who recommended admission, plavix and atorvastatin, and carotid ultrasound, neurology consult..      04/03/2023 Cardiothoracic Surgeon evaluation  **He was evaluated by Dr. Mendoza regarding stenosis left carotid artery who recommended medical management Plavix, statin versus intervention due asymptomatic. Patient 80% stenosis of the left common carotid artery origin. Currently asymptomatic. 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 There is high-grade stenosis at the origin of the left vertebral artery which places the patient at high risk for vertebrobasilar insufficiency given that the right vertebral artery appears to provide very little, if any blood supply to the basilar artery. There is  approximately  80% stenosis in the proximal right internal carotid artery. No large vessel intracranial stenoses or occlusions are identified. See above discussion.     CT of head 04/14/2023  IMPRESSION:   Moderate cortical atrophy and evidence of moderate small vessel chronic ischemic change as described. No acute intracranial abnormality is identified.     MRI head 04/14/2023  IMPRESSION:   Moderate cortical atrophy and evidence of moderate small vessel chronic ischemic change as described. Otherwise unremarkable MRI of the brain. No acute infarct or hemorrhage is currently identified.     Hospital Course By Problem with Pertinent Findings     Patient was admitted after having symptoms of dizziness and numbness of the feet..  His symptoms have completely resolved.  MRI showed evidence of chronic ischemic disease but no acute infarct.  Patient will continue aspirin and statin as per Neurology.  No indication for Plavix at this time as per Neurology.  Furthermore, patient does have history of carotid artery stenosis.  He will follow-up with vascular surgery for this.  Referral has been sent.  Patient and wife were told that surgery would not be recommended for carotid by Dr. Davy Mendoza.  Sent home on aspirin 81 mg daily alone.  They are interested in having a 2nd opinion.      Admit Date: 4/14/23   Discharge Date: 4/15/23  Discharge Facility: Hospital    Medication Reconciliation:  No medication changes.   New Prescriptions filled after discharge: not applicable  Discharge summary reviewed:  yes  Pending test results at discharge reviewed:   no  Follow up appointments scheduled:  yes              Vascular surgery  Follow up labs/tests ordered:   no  Home Health ordered on discharge:   no  Home Health company name:   DME ordered at discharge:   no  How patient is feeling since discharge from the hospital?  Patient is asymptomatic at this time.     Patient follow up phone call documented on separate encounter.      Admission on  04/14/2023, Discharged on 04/15/2023   Component Date Value Ref Range Status    WBC 04/14/2023 3.81 (L)  3.90 - 12.70 K/uL Final    RBC 04/14/2023 3.74 (L)  4.60 - 6.20 M/uL Final    Hemoglobin 04/14/2023 12.9 (L)  14.0 - 18.0 g/dL Final    Hematocrit 04/14/2023 36.6 (L)  40.0 - 54.0 % Final    MCV 04/14/2023 98  82 - 98 fL Final    MCH 04/14/2023 34.5 (H)  27.0 - 31.0 pg Final    MCHC 04/14/2023 35.2  32.0 - 36.0 g/dL Final    RDW 04/14/2023 12.3  11.5 - 14.5 % Final    Platelets 04/14/2023 164  150 - 450 K/uL Final    MPV 04/14/2023 11.4  9.2 - 12.9 fL Final    Immature Granulocytes 04/14/2023 0.3  0.0 - 0.5 % Final    Gran # (ANC) 04/14/2023 1.4 (L)  1.8 - 7.7 K/uL Final    Immature Grans (Abs) 04/14/2023 0.01  0.00 - 0.04 K/uL Final    Lymph # 04/14/2023 1.5  1.0 - 4.8 K/uL Final    Mono # 04/14/2023 0.8  0.3 - 1.0 K/uL Final    Eos # 04/14/2023 0.1  0.0 - 0.5 K/uL Final    Baso # 04/14/2023 0.02  0.00 - 0.20 K/uL Final    nRBC 04/14/2023 0  0 /100 WBC Final    Gran % 04/14/2023 36.0 (L)  38.0 - 73.0 % Final    Lymph % 04/14/2023 39.1  18.0 - 48.0 % Final    Mono % 04/14/2023 20.7 (H)  4.0 - 15.0 % Final    Eosinophil % 04/14/2023 3.4  0.0 - 8.0 % Final    Basophil % 04/14/2023 0.5  0.0 - 1.9 % Final    Differential Method 04/14/2023 Automated   Final    Sodium 04/14/2023 127 (L)  136 - 145 mmol/L Final    Potassium 04/14/2023 3.7  3.5 - 5.1 mmol/L Final    Chloride 04/14/2023 96  95 - 110 mmol/L Final    CO2 04/14/2023 23  23 - 29 mmol/L Final    Glucose 04/14/2023 99  70 - 110 mg/dL Final    BUN 04/14/2023 13  8 - 23 mg/dL Final    Creatinine 04/14/2023 0.8  0.5 - 1.4 mg/dL Final    Calcium 04/14/2023 9.6  8.7 - 10.5 mg/dL Final    Total Protein 04/14/2023 7.9  6.0 - 8.4 g/dL Final    Albumin 04/14/2023 4.4  3.5 - 5.2 g/dL Final    Total Bilirubin 04/14/2023 0.6  0.1 - 1.0 mg/dL Final    Alkaline Phosphatase 04/14/2023 61  55 - 135 U/L Final    AST 04/14/2023 29  10 - 40 U/L Final    ALT 04/14/2023 19  10 - 44  U/L Final    Anion Gap 04/14/2023 8  8 - 16 mmol/L Final    eGFR 04/14/2023 >60.0  >60 mL/min/1.73 m^2 Final    Prothrombin Time 04/14/2023 11.3  9.0 - 12.5 sec Final    INR 04/14/2023 1.0  0.8 - 1.2 Final    Benzodiazepines 04/14/2023 Presumptive Positive (A)  Negative Final    Cocaine (Metab.) 04/14/2023 Negative  Negative Final    Opiate Scrn, Ur 04/14/2023 Negative  Negative Final    Barbiturate Screen, Ur 04/14/2023 Negative  Negative Final    Amphetamine Screen, Ur 04/14/2023 Negative  Negative Final    THC 04/14/2023 Negative  Negative Final    Phencyclidine 04/14/2023 Negative  Negative Final    Creatinine, Urine 04/14/2023 <10.0 (L)  23.0 - 375.0 mg/dL Final    Toxicology Information 04/14/2023 SEE COMMENT   Final    Prothrombin Time 04/14/2023 11.3  9.0 - 12.5 sec Final    INR 04/14/2023 1.0  0.8 - 1.2 Final    aPTT 04/14/2023 29.1  21.0 - 32.0 sec Final    TSH 04/14/2023 3.140  0.340 - 5.600 uIU/mL Final    Troponin I High Sensitivity 04/14/2023 9.2  0.0 - 14.9 pg/mL Final    Magnesium 04/14/2023 2.1  1.6 - 2.6 mg/dL Final    Group & Rh 04/14/2023 O POS   Final    Indirect [place] 04/14/2023 NEG   Final    Specimen Outdate 04/14/2023 04/17/2023 23:59   Final    Specimen UA 04/14/2023 Urine, Clean Catch   Final    Color, UA 04/14/2023 Colorless (A)  Yellow, Straw, [place] Final    Appearance, UA 04/14/2023 Clear  Clear Final    pH, UA 04/14/2023 8.0  5.0 - 8.0 Final    Specific Gravity, UA 04/14/2023 1.010  1.005 - 1.030 Final    Protein, UA 04/14/2023 Negative  Negative Final    Glucose, UA 04/14/2023 Negative  Negative Final    Ketones, UA 04/14/2023 Negative  Negative Final    Bilirubin (UA) 04/14/2023 Negative  Negative Final    Occult Blood UA 04/14/2023 Negative  Negative Final    Nitrite, UA 04/14/2023 Negative  Negative Final    Urobilinogen, UA 04/14/2023 Negative  Negative EU/dL Final    Leukocytes, UA 04/14/2023 Negative  Negative Final    POC Glucose 04/14/2023 109  70 - 110 Final    POC  Creatinine 04/14/2023 0.9  0.5 - 1.4 mg/dL Final    Sample 04/14/2023 VENOUS   Final    Troponin I High Sensitivity 04/14/2023 12.1  0.0 - 14.9 pg/mL Final    Troponin I High Sensitivity 04/15/2023 11.1  0.0 - 14.9 pg/mL Final    Sodium 04/15/2023 133 (L)  136 - 145 mmol/L Final    Potassium 04/15/2023 3.5  3.5 - 5.1 mmol/L Final    Chloride 04/15/2023 98  95 - 110 mmol/L Final    CO2 04/15/2023 23  23 - 29 mmol/L Final    Glucose 04/15/2023 100  70 - 110 mg/dL Final    BUN 04/15/2023 12  8 - 23 mg/dL Final    Creatinine 04/15/2023 0.8  0.5 - 1.4 mg/dL Final    Calcium 04/15/2023 8.9  8.7 - 10.5 mg/dL Final    Total Protein 04/15/2023 6.7  6.0 - 8.4 g/dL Final    Albumin 04/15/2023 3.9  3.5 - 5.2 g/dL Final    Total Bilirubin 04/15/2023 1.0  0.1 - 1.0 mg/dL Final    Alkaline Phosphatase 04/15/2023 57  55 - 135 U/L Final    AST 04/15/2023 27  10 - 40 U/L Final    ALT 04/15/2023 18  10 - 44 U/L Final    Anion Gap 04/15/2023 12  8 - 16 mmol/L Final    eGFR 04/15/2023 >60.0  >60 mL/min/1.73 m^2 Final    Magnesium 04/15/2023 2.3  1.6 - 2.6 mg/dL Final    WBC 04/15/2023 3.03 (L)  3.90 - 12.70 K/uL Final    RBC 04/15/2023 3.68 (L)  4.60 - 6.20 M/uL Final    Hemoglobin 04/15/2023 12.5 (L)  14.0 - 18.0 g/dL Final    Hematocrit 04/15/2023 36.2 (L)  40.0 - 54.0 % Final    MCV 04/15/2023 98  82 - 98 fL Final    MCH 04/15/2023 34.0 (H)  27.0 - 31.0 pg Final    MCHC 04/15/2023 34.5  32.0 - 36.0 g/dL Final    RDW 04/15/2023 12.4  11.5 - 14.5 % Final    Platelets 04/15/2023 144 (L)  150 - 450 K/uL Final    MPV 04/15/2023 11.2  9.2 - 12.9 fL Final    Immature Granulocytes 04/15/2023 0.7 (H)  0.0 - 0.5 % Final    Gran # (ANC) 04/15/2023 1.2 (L)  1.8 - 7.7 K/uL Final    Immature Grans (Abs) 04/15/2023 0.02  0.00 - 0.04 K/uL Final    Lymph # 04/15/2023 0.9 (L)  1.0 - 4.8 K/uL Final    Mono # 04/15/2023 0.8  0.3 - 1.0 K/uL Final    Eos # 04/15/2023 0.1  0.0 - 0.5 K/uL Final    Baso # 04/15/2023 0.03  0.00 - 0.20 K/uL Final    nRBC  04/15/2023 0  0 /100 WBC Final    Gran % 04/15/2023 39.9  38.0 - 73.0 % Final    Lymph % 04/15/2023 28.7  18.0 - 48.0 % Final    Mono % 04/15/2023 25.7 (H)  4.0 - 15.0 % Final    Eosinophil % 04/15/2023 4.0  0.0 - 8.0 % Final    Basophil % 04/15/2023 1.0  0.0 - 1.9 % Final    Differential Method 04/15/2023 Automated   Final    CPK MB 04/15/2023 2.9  0.1 - 6.5 ng/mL Final    Cholesterol 04/15/2023 136  120 - 199 mg/dL Final    Triglycerides 04/15/2023 71  30 - 150 mg/dL Final    HDL 04/15/2023 31 (L)  40 - 75 mg/dL Final    LDL Cholesterol 04/15/2023 90.8  63.0 - 159.0 mg/dL Final    HDL/Cholesterol Ratio 04/15/2023 22.8  20.0 - 50.0 % Final    Total Cholesterol/HDL Ratio 04/15/2023 4.4  2.0 - 5.0 Final    Non-HDL Cholesterol 04/15/2023 105  mg/dL Final   Hospital Outpatient Visit on 03/15/2023   Component Date Value Ref Range Status    POC Creatinine 03/15/2023 1.0  0.5 - 1.4 mg/dL Final    Sample 03/15/2023 VENOUS   Final       Past Medical History:   Diagnosis Date    Basal cell carcinoma 2020    L temple / radiation therapy    Cataract     Hypertension      Past Surgical History:   Procedure Laterality Date    APPENDECTOMY      EYE SURGERY      Moh's surgery on right temple area Right 2015    Cancerous spot    PROSTATE BIOPSY      TONSILLECTOMY       Family History   Problem Relation Age of Onset    Melanoma Neg Hx     Psoriasis Neg Hx     Lupus Neg Hx     Eczema Neg Hx        Marital Status:   Alcohol History:  reports that he does not currently use alcohol.  Tobacco History:  reports that he has never smoked. He has never used smokeless tobacco.  Drug History:  has no history on file for drug use.    Review of patient's allergies indicates:   Allergen Reactions    Pollen extracts Other (See Comments)       Current Outpatient Medications:     acetaminophen (TYLENOL) 500 MG tablet, Take 2 tablets (1,000 mg total) by mouth every 6 (six) hours as needed for Pain., Disp: , Rfl: 0    ascorbic acid, vitamin C,  (VITAMIN C) 500 MG tablet, Take 1,000 mg by mouth once daily., Disp: , Rfl:     aspirin 81 MG Chew, Take 81 mg by mouth once daily., Disp: , Rfl:     atorvastatin (LIPITOR) 80 MG tablet, Take 1 tablet (80 mg total) by mouth once daily., Disp: 30 tablet, Rfl: 0    lisinopriL (PRINIVIL,ZESTRIL) 20 MG tablet, Take 1 tablet (20 mg total) by mouth once daily., Disp: 90 tablet, Rfl: 3    solifenacin (VESICARE) 10 MG tablet, Take 10 mg by mouth once daily., Disp: , Rfl:     SYMBICORT 160-4.5 mcg/actuation HFAA, Inhale 2 puffs into the lungs every 12 (twelve) hours., Disp: 30 g, Rfl: 4    tamsulosin (FLOMAX) 0.4 mg Cap, Take 1 capsule (0.4 mg total) by mouth once daily., Disp: 30 capsule, Rfl: 3    temazepam (RESTORIL) 30 mg capsule, Take 1 capsule (30 mg total) by mouth nightly as needed for Insomnia., Disp: 90 capsule, Rfl: 1    amLODIPine (NORVASC) 5 MG tablet, Take 1 tablet (5 mg total) by mouth once daily. (Patient not taking: Reported on 4/26/2023), Disp: 90 tablet, Rfl: 3    clopidogreL (PLAVIX) 75 mg tablet, Take 1 tablet (75 mg total) by mouth once daily., Disp: 30 tablet, Rfl: 2    doxycycline (MONODOX) 100 MG capsule, Take 1 capsule (100 mg total) by mouth 2 (two) times daily., Disp: 20 capsule, Rfl: 0    hydrocortisone 2.5 % cream, Thin film to AA onface daily PRN flare; use short term only, Disp: 28 g, Rfl: 1    ketoconazole (NIZORAL) 2 % cream, Thin film to red scaly rash on face twice daily as needed for flare, Disp: 60 g, Rfl: 3    Current Facility-Administered Medications:     albuterol inhaler 2 puff, 2 puff, Inhalation, Q20 Min PRN, Farhana Riggins, NP    diphenhydrAMINE injection 25 mg, 25 mg, Intravenous, Once PRN, Farhana Riggins NP    EPINEPHrine (EPIPEN) 0.3 mg/0.3 mL pen injection 0.3 mg, 0.3 mg, Intramuscular, PRN, Farhana Riggins, BARI    methylPREDNISolone sodium succinate injection 40 mg, 40 mg, Intravenous, Once PRN, Farhana Riggins, NP    ondansetron disintegrating tablet 4 mg, 4 mg,  "Oral, Once PRN, Farhana Riggins, BARI    sodium chloride 0.9% 500 mL flush bag, , Intravenous, PRN, Farhana Riggins NP, Stopped at 12/15/21 1455    sodium chloride 0.9% flush 10 mL, 10 mL, Intravenous, PRN, Farhana Riggins NP    Review of Systems   Constitutional:  Negative for appetite change, chills, fatigue, fever and unexpected weight change.   HENT:  Negative for ear pain and trouble swallowing.    Eyes:  Negative for pain, discharge and visual disturbance.   Respiratory:  Negative for apnea, cough, shortness of breath and wheezing.    Cardiovascular:  Positive for leg swelling (Right leg edema due to amlodipine). Negative for chest pain.   Gastrointestinal:  Negative for abdominal pain, blood in stool, constipation, diarrhea, nausea, vomiting and reflux.   Endocrine: Negative for cold intolerance, heat intolerance and polydipsia.   Genitourinary:  Negative for bladder incontinence, dysuria, erectile dysfunction, frequency, hematuria, testicular pain and urgency.   Musculoskeletal:  Negative for gait problem, joint swelling and myalgias.   Integumentary:  Positive for wound (scaly lesion right lower leg at the ankle, some cellulitis).   Neurological:  Negative for dizziness, seizures and numbness.   Psychiatric/Behavioral:  Negative for agitation, behavioral problems and hallucinations. The patient is not nervous/anxious.        Objective:      Vitals:    04/26/23 1418   BP: 128/80   Pulse: 63   Weight: 74.8 kg (165 lb)   Height: 5' 8" (1.727 m)     Physical Exam  Vitals and nursing note reviewed.   Constitutional:       General: He is not in acute distress.     Appearance: Normal appearance. He is well-developed. He is not toxic-appearing.   HENT:      Head: Normocephalic and atraumatic.      Right Ear: Tympanic membrane and external ear normal.      Left Ear: Tympanic membrane and external ear normal.      Nose: Nose normal.      Mouth/Throat:      Pharynx: Oropharynx is clear.   Eyes:      Pupils: " Pupils are equal, round, and reactive to light.   Neck:      Thyroid: No thyromegaly.      Vascular: Carotid bruit (right greater than left-sided carotid artery bruits) present.   Cardiovascular:      Rate and Rhythm: Normal rate and regular rhythm.      Heart sounds: Normal heart sounds. No murmur heard.  Pulmonary:      Effort: Pulmonary effort is normal.      Breath sounds: Normal breath sounds. No wheezing or rales.   Abdominal:      General: Bowel sounds are normal. There is no distension.      Palpations: Abdomen is soft.      Tenderness: There is no abdominal tenderness.   Musculoskeletal:         General: No tenderness or deformity. Normal range of motion.      Cervical back: Normal range of motion and neck supple.      Lumbar back: Normal. No spasms.      Comments: Bends 90 degrees at  waist   Lymphadenopathy:      Cervical: No cervical adenopathy.   Skin:     General: Skin is warm and dry.      Findings: Erythema (cellulitis around the scaly dry lesion around the right medial ankle) present. No rash.   Neurological:      Mental Status: He is alert and oriented to person, place, and time.      Cranial Nerves: No cranial nerve deficit.      Coordination: Coordination normal.      Gait: Gait normal.   Psychiatric:         Mood and Affect: Mood normal.         Behavior: Behavior normal.         Thought Content: Thought content normal.         Judgment: Judgment normal.       Assessment:       1. Carotid artery stenosis, symptomatic, left    2. TIA (transient ischemic attack)    3. Stenosis of right carotid artery    4. Cellulitis of right lower extremity    5. Panlobular emphysema    6. Essential hypertension    7. Stricture of artery    8. History of prostate cancer    9. Hospital discharge follow-up         Plan:       Carotid artery stenosis, symptomatic, left  Patient has high-grade stenosis at the origin of the left carotid, also 80% stenosis of the right carotid artery, also has left vertebral artery  stenosis present.  Complicated picture.  Will refer to [facility] cardiovascular surgeons.  May be amenable to interventional radiology also.  TIA (transient ischemic attack)  -     clopidogreL (PLAVIX) 75 mg tablet; Take 1 tablet (75 mg total) by mouth once daily.  Dispense: 30 tablet; Refill: 2  Will add clopidogrel 75 mg to his 81 mg aspirin daily  Stenosis of right carotid artery  -     Ambulatory referral/consult to Cardiovascular Surgery; Future; Expected date: 05/03/2023  Refer to Dr. Best  Cellulitis of right lower extremity  -     doxycycline (MONODOX) 100 MG capsule; Take 1 capsule (100 mg total) by mouth 2 (two) times daily.  Dispense: 20 capsule; Refill: 0  Doxycycline for cellulitis of the leg /cleaned leg daily and add mupirocin 2% cream  Panlobular emphysema    Essential hypertension  Blood pressure well controlled off of the amlodipine  Stricture of artery    History of prostate cancer    Hospital discharge follow-up  Hospital medications reconciled home medications    Follow up in about 6 months (around 10/26/2023), or Carotid stenosis.

## 2023-05-01 ENCOUNTER — PATIENT MESSAGE (OUTPATIENT)
Dept: FAMILY MEDICINE | Facility: CLINIC | Age: 88
End: 2023-05-01

## 2023-05-01 DIAGNOSIS — F51.01 PRIMARY INSOMNIA: ICD-10-CM

## 2023-05-01 RX ORDER — TEMAZEPAM 30 MG/1
30 CAPSULE ORAL NIGHTLY PRN
Qty: 90 CAPSULE | Refills: 1 | Status: CANCELLED | OUTPATIENT
Start: 2023-05-01

## 2023-05-02 ENCOUNTER — PATIENT MESSAGE (OUTPATIENT)
Dept: FAMILY MEDICINE | Facility: CLINIC | Age: 88
End: 2023-05-02

## 2023-05-02 ENCOUNTER — TELEPHONE (OUTPATIENT)
Dept: FAMILY MEDICINE | Facility: CLINIC | Age: 88
End: 2023-05-02

## 2023-05-02 RX ORDER — ATORVASTATIN CALCIUM 80 MG/1
80 TABLET, FILM COATED ORAL DAILY
Qty: 30 TABLET | Refills: 5 | Status: SHIPPED | OUTPATIENT
Start: 2023-05-02 | End: 2023-06-22 | Stop reason: CLARIF

## 2023-05-02 NOTE — TELEPHONE ENCOUNTER
----- Message from Izabella Louis MA sent at 5/2/2023 11:44 AM CDT -----    ----- Message -----  From: Mayra Alvarez  Sent: 5/2/2023  11:42 AM CDT  To: [facility] Staff    Patient called and stated that he was calling Sofie joy please give him a call at [phone]

## 2023-05-02 NOTE — TELEPHONE ENCOUNTER
Spoke with pt already in regards to medication refill request. Please see other telephone encounter for today.

## 2023-05-02 NOTE — TELEPHONE ENCOUNTER
Pt is needing his temazepam refilled. Last office visit 04/26/2023. Next office visit 08/17/2023.    New prescription for Temazepam was sent to pt's pharmacy on 03/07/2032, for a 90 day supply with 1 extra refill.

## 2023-05-02 NOTE — TELEPHONE ENCOUNTER
Left message on voice mail for the pt to call back in regards to recent medication refill request. I need to verify pt's pharmacy.

## 2023-05-02 NOTE — TELEPHONE ENCOUNTER
Spoke with pt in regards to recent message sent Pt states that he would like for his prescription for the [facility]azepam [facility] Verbalized to the pt that it looks [facility] and 1 extra refill. Stated that he should be able to call his pharmacy for this prescription. Pt acknowledged understanding.

## 2023-05-18 ENCOUNTER — OFFICE VISIT (OUTPATIENT)
Dept: DERMATOLOGY | Facility: CLINIC | Age: 88
End: 2023-05-18
Payer: MEDICARE

## 2023-05-18 DIAGNOSIS — L30.9 HAND DERMATITIS: ICD-10-CM

## 2023-05-18 DIAGNOSIS — K13.0 ANGULAR CHEILITIS: ICD-10-CM

## 2023-05-18 DIAGNOSIS — L82.1 SEBORRHEIC KERATOSES: ICD-10-CM

## 2023-05-18 DIAGNOSIS — D48.5 NEOPLASM OF UNCERTAIN BEHAVIOR OF SKIN: Primary | ICD-10-CM

## 2023-05-18 DIAGNOSIS — L21.9 SEBORRHEIC DERMATITIS: ICD-10-CM

## 2023-05-18 DIAGNOSIS — L57.0 ACTINIC KERATOSES: ICD-10-CM

## 2023-05-18 DIAGNOSIS — Z85.828 HISTORY OF NONMELANOMA SKIN CANCER: ICD-10-CM

## 2023-05-18 PROCEDURE — 88305 TISSUE EXAM BY PATHOLOGIST: CPT | Performed by: DERMATOLOGY

## 2023-05-18 PROCEDURE — 11102 PR TANGENTIAL BIOPSY, SKIN, SINGLE LESION: ICD-10-PCS | Mod: S$GLB,,, | Performed by: DERMATOLOGY

## 2023-05-18 PROCEDURE — 11102 TANGNTL BX SKIN SINGLE LES: CPT | Mod: S$GLB,,, | Performed by: DERMATOLOGY

## 2023-05-18 PROCEDURE — 3288F PR FALLS RISK ASSESSMENT DOCUMENTED: ICD-10-PCS | Mod: CPTII,S$GLB,, | Performed by: DERMATOLOGY

## 2023-05-18 PROCEDURE — 99999 PR PBB SHADOW E&M-EST. PATIENT-LVL II: ICD-10-PCS | Mod: PBBFAC,,, | Performed by: DERMATOLOGY

## 2023-05-18 PROCEDURE — 88305 TISSUE EXAM BY PATHOLOGIST: ICD-10-PCS | Mod: 26,,, | Performed by: DERMATOLOGY

## 2023-05-18 PROCEDURE — 1101F PT FALLS ASSESS-DOCD LE1/YR: CPT | Mod: CPTII,S$GLB,, | Performed by: DERMATOLOGY

## 2023-05-18 PROCEDURE — 88305 TISSUE EXAM BY PATHOLOGIST: CPT | Mod: 26,,, | Performed by: DERMATOLOGY

## 2023-05-18 PROCEDURE — 88342 IMHCHEM/IMCYTCHM 1ST ANTB: CPT | Mod: 26,,, | Performed by: DERMATOLOGY

## 2023-05-18 PROCEDURE — 1101F PR PT FALLS ASSESS DOC 0-1 FALLS W/OUT INJ PAST YR: ICD-10-PCS | Mod: CPTII,S$GLB,, | Performed by: DERMATOLOGY

## 2023-05-18 PROCEDURE — 1160F RVW MEDS BY RX/DR IN RCRD: CPT | Mod: CPTII,S$GLB,, | Performed by: DERMATOLOGY

## 2023-05-18 PROCEDURE — 1160F PR REVIEW ALL MEDS BY PRESCRIBER/CLIN PHARMACIST DOCUMENTED: ICD-10-PCS | Mod: CPTII,S$GLB,, | Performed by: DERMATOLOGY

## 2023-05-18 PROCEDURE — 99999 PR PBB SHADOW E&M-EST. PATIENT-LVL II: CPT | Mod: PBBFAC,,, | Performed by: DERMATOLOGY

## 2023-05-18 PROCEDURE — 17003 DESTRUCTION, PREMALIGNANT LESIONS; SECOND THROUGH 14 LESIONS: ICD-10-PCS | Mod: S$GLB,,, | Performed by: DERMATOLOGY

## 2023-05-18 PROCEDURE — 1126F AMNT PAIN NOTED NONE PRSNT: CPT | Mod: CPTII,S$GLB,, | Performed by: DERMATOLOGY

## 2023-05-18 PROCEDURE — 1159F PR MEDICATION LIST DOCUMENTED IN MEDICAL RECORD: ICD-10-PCS | Mod: CPTII,S$GLB,, | Performed by: DERMATOLOGY

## 2023-05-18 PROCEDURE — 99214 OFFICE O/P EST MOD 30 MIN: CPT | Mod: 25,S$GLB,, | Performed by: DERMATOLOGY

## 2023-05-18 PROCEDURE — 88342 CHG IMMUNOCYTOCHEMISTRY: ICD-10-PCS | Mod: 26,,, | Performed by: DERMATOLOGY

## 2023-05-18 PROCEDURE — 99212 OFFICE O/P EST SF 10 MIN: CPT | Mod: PO | Performed by: DERMATOLOGY

## 2023-05-18 PROCEDURE — 1159F MED LIST DOCD IN RCRD: CPT | Mod: CPTII,S$GLB,, | Performed by: DERMATOLOGY

## 2023-05-18 PROCEDURE — 1126F PR PAIN SEVERITY QUANTIFIED, NO PAIN PRESENT: ICD-10-PCS | Mod: CPTII,S$GLB,, | Performed by: DERMATOLOGY

## 2023-05-18 PROCEDURE — 17000 PR DESTRUCTION(LASER SURGERY,CRYOSURGERY,CHEMOSURGERY),PREMALIGNANT LESIONS,FIRST LESION: ICD-10-PCS | Mod: XS,S$GLB,, | Performed by: DERMATOLOGY

## 2023-05-18 PROCEDURE — 99214 PR OFFICE/OUTPT VISIT, EST, LEVL IV, 30-39 MIN: ICD-10-PCS | Mod: 25,S$GLB,, | Performed by: DERMATOLOGY

## 2023-05-18 PROCEDURE — 17000 DESTRUCT PREMALG LESION: CPT | Mod: XS,S$GLB,, | Performed by: DERMATOLOGY

## 2023-05-18 PROCEDURE — 3288F FALL RISK ASSESSMENT DOCD: CPT | Mod: CPTII,S$GLB,, | Performed by: DERMATOLOGY

## 2023-05-18 PROCEDURE — 17003 DESTRUCT PREMALG LES 2-14: CPT | Mod: S$GLB,,, | Performed by: DERMATOLOGY

## 2023-05-18 PROCEDURE — 88342 IMHCHEM/IMCYTCHM 1ST ANTB: CPT | Performed by: DERMATOLOGY

## 2023-05-18 RX ORDER — KETOCONAZOLE 20 MG/G
CREAM TOPICAL
Qty: 60 G | Refills: 3 | Status: SHIPPED | OUTPATIENT
Start: 2023-05-18 | End: 2023-11-27

## 2023-05-18 NOTE — PROGRESS NOTES
Subjective:      Patient ID:  Brit Jennings is a 87 y.o. male who presents for   Chief Complaint   Patient presents with    Spot     Multiple on scalp     LOV 11/17/22 - AK, SK, Scar, Hx of NMSC    Patient here today for skin check UBSE  Patient states he has multiple spots on scalp  States one has bled, he may have scratched it in his sleep     Derm hx  H/o Mohs on R temple, [facility]  BCC on R temple, radiation therapy 08/2020 Dr Dewitt  Past xrt on scalp, nose, R eyebrow   L neck BCC 08/2020 [facility]      Current Outpatient Medications:   ·  acetaminophen (TYLENOL) 500 MG tablet, Take 2 tablets (1,000 mg total) by mouth every 6 (six) hours as needed for Pain., Disp: , Rfl: 0  ·  ascorbic acid, vitamin C, (VITAMIN C) 500 MG tablet, Take 1,000 mg by mouth once daily., Disp: , Rfl:   ·  aspirin 81 MG Chew, Take 81 mg by mouth once daily., Disp: , Rfl:   ·  atorvastatin (LIPITOR) 80 MG tablet, Take 1 tablet (80 mg total) by mouth once daily., Disp: 30 tablet, Rfl: 5  ·  clopidogreL (PLAVIX) 75 mg tablet, Take 1 tablet (75 mg total) by mouth once daily., Disp: 30 tablet, Rfl: 2  ·  solifenacin (VESICARE) 10 MG tablet, Take 10 mg by mouth once daily., Disp: , Rfl:   ·  SYMBICORT 160-4.5 mcg/actuation HFAA, Inhale 2 puffs into the lungs every 12 (twelve) hours., Disp: 30 g, Rfl: 4  ·  tamsulosin (FLOMAX) 0.4 mg Cap, Take 1 capsule (0.4 mg total) by mouth once daily., Disp: 30 capsule, Rfl: 3  ·  temazepam (RESTORIL) 30 mg capsule, Take 1 capsule (30 mg total) by mouth nightly as needed for Insomnia., Disp: 90 capsule, Rfl: 1  ·  lisinopriL (PRINIVIL,ZESTRIL) 20 MG tablet, Take 1 tablet (20 mg total) by mouth once daily., Disp: 90 tablet, Rfl: 3        Review of Systems   Constitutional:  Negative for fever, chills and fatigue.   Respiratory:  Negative for cough and shortness of breath.    Skin:  Positive for dry skin, activity-related sunscreen use and wears hat. Negative for daily sunscreen use.    Hematologic/Lymphatic: Bruises/bleeds easily.     Objective:   Physical Exam   Constitutional: He appears well-developed and well-nourished. No distress.   Neurological: He is alert and oriented to person, place, and time. He is not disoriented.   Psychiatric: He has a normal mood and affect.   Skin:   Areas Examined (abnormalities noted in diagram):   Scalp / Hair Palpated and Inspected  Head / Face Inspection Performed  Neck Inspection Performed  RUE Inspected  LUE Inspection Performed                   Diagram Legend     Erythematous scaling macule/papule c/w actinic keratosis       Vascular papule c/w angioma      Pigmented verrucoid papule/plaque c/w seborrheic keratosis      Yellow umbilicated papule c/w sebaceous hyperplasia      Irregularly shaped tan macule c/w lentigo     1-2 mm smooth white papules consistent with Milia      Movable subcutaneous cyst with punctum c/w epidermal inclusion cyst      Subcutaneous movable cyst c/w pilar cyst      Firm pink to brown papule c/w dermatofibroma      Pedunculated fleshy papule(s) c/w skin tag(s)      Evenly pigmented macule c/w junctional nevus     Mildly variegated pigmented, slightly irregular-bordered macule c/w mildly atypical nevus      Flesh colored to evenly pigmented papule c/w intradermal nevus       Pink pearly papule/plaque c/w basal cell carcinoma      Erythematous hyperkeratotic cursted plaque c/w SCC      Surgical scar with no sign of skin cancer recurrence      Open and closed comedones      Inflammatory papules and pustules      Verrucoid papule consistent consistent with wart     Erythematous eczematous patches and plaques     Dystrophic onycholytic nail with subungual debris c/w onychomycosis     Umbilicated papule    Erythematous-base heme-crusted tan verrucoid plaque consistent with inflamed seborrheic keratosis     Erythematous Silvery Scaling Plaque c/w Psoriasis     See annotation        Assessment / Plan:      Pathology Orders:        Normal Orders This Visit    Specimen to Pathology, Dermatology     Questions:    Procedure Type: Dermatology and skin neoplasms    Number of Specimens: 1    ------------------------: -------------------------    Spec 1 Procedure: Biopsy    Spec 1 Clinical Impression: scc vs other    Spec 1 Source: left lateral upper forehead    Release to patient:           Neoplasm of uncertain behavior of skin  -     Specimen to Pathology, Dermatology  Shave biopsy procedure note:    Shave biopsy performed after verbal consent including risk of infection, scar, recurrence, need for additional treatment of site. Area prepped with alcohol, anesthetized with approximately 1.0cc of 1% lidocaine with epinephrine. Lesional tissue shaved with razor blade. Hemostasis achieved with application of aluminum chloride followed by hyfrecation. No complications. Dressing applied. Wound care explained.    Actinic keratoses  Cryosurgery Procedure Note    Verbal consent from the patient is obtained and the patient is aware of the precancerous quality and need for treatment of these lesions. Liquid nitrogen cryosurgery is applied to the 8 actinic keratoses, as detailed in the physical exam, to produce a freeze injury. The patient is aware that blisters may form and is instructed on wound care with gentle cleansing and use of vaseline ointment to keep moist until healed. The patient is supplied a handout on cryosurgery and is instructed to call if lesions do not completely resolve.    History of nonmelanoma skin cancer  Area of previous NMSC (multiple) examined. Sites well healed with no signs of recurrence.  Upper body skin examination performed today including at least 9 points as noted in physical examination. 1 lesions suspicious for malignancy noted.    Seborrheic keratoses  These are benign inherited growths without a malignant potential. Reassurance given to patient. No treatment is necessary.     Seborrheic dermatitis  Angular  cheilitis  Moderate severe around mouth and oral commissure  Keto cream BID, rx sent    Hand dermatitis  Improved with topical steroids.  Continue as needed  Thick hand cream nightly             Follow up in about 6 months (around 11/18/2023).

## 2023-05-18 NOTE — PATIENT INSTRUCTIONS
Shave Biopsy Wound Care    Your doctor has performed a shave biopsy today.  A band aid and vaseline ointment has been placed over the site.  This should remain in place for 24 hours.  It is recommended that you keep the area dry for the first 24 hours.  After 24 hours, you may remove the band aid and wash the area with warm soap and water and apply Vaseline jelly.  Many patients prefer to use Neosporin or Bacitracin ointment.  This is acceptable; however, know that you can develop an allergy to this medication even if you have used it safely for years.  It is important to keep the area moist.  Letting it dry out and get air slows healing time, and will worsen the scar.  Band aid is optional after first 24 hours.      If you notice increasing redness, tenderness, pain, or yellow drainage at the biopsy site, please notify your doctor.  These are signs of an infection.    If your biopsy site is bleeding, apply firm pressure for 15 minutes straight.  Repeat for another 15 minutes, if it is still bleeding.   If the surgical site continues to bleed, then please contact your doctor.       [facility] DERMATOLOGY  [address] [place] [address], [address] [address]  Dept: [phone]      CRYOSURGERY      Your doctor has used a method called cryosurgery to treat your skin condition. Cryosurgery refers to the use of very cold substances to treat a variety of skin conditions such as warts, pre-skin cancers, molluscum contagiosum, sun spots, and several benign growths. The substance we use in cryosurgery is liquid nitrogen and is so cold (-195 degrees Celsius) that is burns when administered.     Following treatment in the office, the skin may immediately burn and become red. You may find the area around the lesion is affected as well. It is sometimes necessary to treat not only the lesion, but a small area of the surrounding normal skin to achieve a good response.     A blister, and even a blood  filled blister, may form after treatment.   This is a normal response. If the blister is painful, it is acceptable to sterilize a needle and with rubbing alcohol and gently pop the blister. It is important that you gently wash the area with soap and warm water as the blister fluid may contain wart virus if a wart was treated. Do no remove the roof of the blister.     The area treated can take anywhere from 1-3 weeks to heal. Healing time depends on the kind skin lesion treated, the location, and how aggressively the lesion was treated. It is recommended that the areas treated are covered with Vaseline or bacitracin ointment and a band-aid. If a band-aid is not practical, just ointment applied several times per day will do. Keeping these areas moist will speed the healing time.    Treatment with liquid nitrogen can leave a scar. In dark skin, it may be a light or dark scar, in light skin it may be a white or pink scar. These will generally fade with time, but may never go away completely.     If you have any concerns after your treatment, please feel free to call the office.         [facility] - DERMATOLOGY  [address] [place] [id number]  Dept: [phone]

## 2023-05-29 ENCOUNTER — PATIENT MESSAGE (OUTPATIENT)
Dept: FAMILY MEDICINE | Facility: CLINIC | Age: 88
End: 2023-05-29

## 2023-05-31 LAB
FINAL PATHOLOGIC DIAGNOSIS: NORMAL
GROSS: NORMAL
Lab: NORMAL
MICROSCOPIC EXAM: NORMAL
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
## 2023-06-22 ENCOUNTER — HOSPITAL ENCOUNTER (EMERGENCY)
Facility: HOSPITAL | Age: 88
Discharge: ANOTHER HOSPITAL | End: 2023-06-22
Attending: EMERGENCY MEDICINE
Payer: MEDICARE

## 2023-06-22 VITALS
TEMPERATURE: 99 F | OXYGEN SATURATION: 98 % | BODY MASS INDEX: 24.25 KG/M2 | DIASTOLIC BLOOD PRESSURE: 79 MMHG | SYSTOLIC BLOOD PRESSURE: 137 MMHG | HEIGHT: 68 IN | HEART RATE: 61 BPM | RESPIRATION RATE: 16 BRPM | WEIGHT: 160 LBS

## 2023-06-22 DIAGNOSIS — S62.101A CLOSED FRACTURE OF RIGHT WRIST, INITIAL ENCOUNTER: ICD-10-CM
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
## 2023-06-22 DIAGNOSIS — S06.5XAA SUBDURAL HEMATOMA: ICD-10-CM

## 2023-06-22 DIAGNOSIS — I61.9 INTRAPARENCHYMAL HEMORRHAGE OF BRAIN: Primary | ICD-10-CM

## 2023-06-22 LAB
ABO + RH BLD: NORMAL
ALBUMIN SERPL BCP-MCNC: 4.3 G/DL (ref 3.5–5.2)
ALP SERPL-CCNC: 60 U/L (ref 55–135)
ALT SERPL W/O P-5'-P-CCNC: 23 U/L (ref 10–44)
ANION GAP SERPL CALC-SCNC: 8 MMOL/L (ref 8–16)
AST SERPL-CCNC: 35 U/L (ref 10–40)
BASOPHILS # BLD AUTO: 0.01 K/UL (ref 0–0.2)
BASOPHILS NFR BLD: 0.3 % (ref 0–1.9)
BILIRUB SERPL-MCNC: 1 MG/DL (ref 0.1–1)
BLD GP AB SCN CELLS X3 SERPL QL: NORMAL
BLD PROD TYP BPU: NORMAL
BLOOD UNIT EXPIRATION DATE: NORMAL
BLOOD UNIT TYPE CODE: 1700
BLOOD UNIT TYPE: NORMAL
BNP SERPL-MCNC: 344 PG/ML (ref 0–99)
BUN SERPL-MCNC: 14 MG/DL (ref 8–23)
CALCIUM SERPL-MCNC: 9.4 MG/DL (ref 8.7–10.5)
CHLORIDE SERPL-SCNC: 96 MMOL/L (ref 95–110)
CHOLEST SERPL-MCNC: 67 MG/DL (ref 120–199)
CHOLEST/HDLC SERPL: 2.2 {RATIO} (ref 2–5)
CO2 SERPL-SCNC: 27 MMOL/L (ref 23–29)
CODING SYSTEM: NORMAL
CREAT SERPL-MCNC: 0.9 MG/DL (ref 0.5–1.4)
CROSSMATCH INTERPRETATION: NORMAL
DIFFERENTIAL METHOD: ABNORMAL
DISPENSE STATUS: NORMAL
EOSINOPHIL # BLD AUTO: 0 K/UL (ref 0–0.5)
EOSINOPHIL NFR BLD: 1 % (ref 0–8)
ERYTHROCYTE [DISTWIDTH] IN BLOOD BY AUTOMATED COUNT: 12.9 % (ref 11.5–14.5)
EST. GFR  (NO RACE VARIABLE): >60 ML/MIN/1.73 M^2
GLUCOSE SERPL-MCNC: 105 MG/DL (ref 70–110)
HCT VFR BLD AUTO: 37.7 % (ref 40–54)
HDLC SERPL-MCNC: 31 MG/DL (ref 40–75)
HDLC SERPL: 46.3 % (ref 20–50)
HGB BLD-MCNC: 13.2 G/DL (ref 14–18)
IMM GRANULOCYTES # BLD AUTO: 0.04 K/UL (ref 0–0.04)
IMM GRANULOCYTES NFR BLD AUTO: 1 % (ref 0–0.5)
INR PPP: 1 (ref 0.8–1.2)
LDLC SERPL CALC-MCNC: 29 MG/DL (ref 63–159)
LYMPHOCYTES # BLD AUTO: 0.6 K/UL (ref 1–4.8)
LYMPHOCYTES NFR BLD: 14.2 % (ref 18–48)
MCH RBC QN AUTO: 34.4 PG (ref 27–31)
MCHC RBC AUTO-ENTMCNC: 35 G/DL (ref 32–36)
MCV RBC AUTO: 98 FL (ref 82–98)
MONOCYTES # BLD AUTO: 0.7 K/UL (ref 0.3–1)
MONOCYTES NFR BLD: 17.8 % (ref 4–15)
NEUTROPHILS # BLD AUTO: 2.5 K/UL (ref 1.8–7.7)
NEUTROPHILS NFR BLD: 65.7 % (ref 38–73)
NONHDLC SERPL-MCNC: 36 MG/DL
NRBC BLD-RTO: 0 /100 WBC
PLATELET # BLD AUTO: 131 K/UL (ref 150–450)
PMV BLD AUTO: 11.7 FL (ref 9.2–12.9)
POTASSIUM SERPL-SCNC: 4 MMOL/L (ref 3.5–5.1)
PROT SERPL-MCNC: 7.1 G/DL (ref 6–8.4)
PROTHROMBIN TIME: 11.4 SEC (ref 9–12.5)
RBC # BLD AUTO: 3.84 M/UL (ref 4.6–6.2)
SODIUM SERPL-SCNC: 131 MMOL/L (ref 136–145)
SPECIMEN OUTDATE: NORMAL
TRIGL SERPL-MCNC: 35 MG/DL (ref 30–150)
TROPONIN I SERPL HS-MCNC: 12.6 PG/ML (ref 0–14.9)
TSH SERPL DL<=0.005 MIU/L-ACNC: 2.35 UIU/ML (ref 0.34–5.6)
UNIT NUMBER: NORMAL
WBC # BLD AUTO: 3.87 K/UL (ref 3.9–12.7)

## 2023-06-22 PROCEDURE — P9073 PLATELETS PHERESIS PATH REDU: HCPCS | Performed by: EMERGENCY MEDICINE

## 2023-06-22 PROCEDURE — 86900 BLOOD TYPING SEROLOGIC ABO: CPT | Mod: 91 | Performed by: EMERGENCY MEDICINE

## 2023-06-22 PROCEDURE — 93010 EKG 12-LEAD: ICD-10-PCS | Mod: ,,, | Performed by: SPECIALIST

## 2023-06-22 PROCEDURE — 99291 CRITICAL CARE FIRST HOUR: CPT | Mod: 25

## 2023-06-22 PROCEDURE — 84484 ASSAY OF TROPONIN QUANT: CPT | Performed by: EMERGENCY MEDICINE

## 2023-06-22 PROCEDURE — 93005 ELECTROCARDIOGRAM TRACING: CPT | Mod: 59 | Performed by: SPECIALIST

## 2023-06-22 PROCEDURE — 93010 ELECTROCARDIOGRAM REPORT: CPT | Mod: ,,, | Performed by: SPECIALIST

## 2023-06-22 PROCEDURE — 83880 ASSAY OF NATRIURETIC PEPTIDE: CPT | Performed by: EMERGENCY MEDICINE

## 2023-06-22 PROCEDURE — 80053 COMPREHEN METABOLIC PANEL: CPT | Mod: 91 | Performed by: EMERGENCY MEDICINE

## 2023-06-22 PROCEDURE — 85025 COMPLETE CBC W/AUTO DIFF WBC: CPT | Mod: 91 | Performed by: EMERGENCY MEDICINE

## 2023-06-22 PROCEDURE — 85610 PROTHROMBIN TIME: CPT | Mod: 91 | Performed by: EMERGENCY MEDICINE

## 2023-06-22 PROCEDURE — 36430 TRANSFUSION BLD/BLD COMPNT: CPT

## 2023-06-22 PROCEDURE — 80061 LIPID PANEL: CPT | Performed by: EMERGENCY MEDICINE

## 2023-06-22 PROCEDURE — 84443 ASSAY THYROID STIM HORMONE: CPT | Performed by: EMERGENCY MEDICINE

## 2023-06-22 PROCEDURE — 29125 APPL SHORT ARM SPLINT STATIC: CPT | Mod: RT

## 2023-06-22 RX ORDER — HYDROCODONE BITARTRATE AND ACETAMINOPHEN 500; 5 MG/1; MG/1
TABLET ORAL
Status: DISCONTINUED | OUTPATIENT
Start: 2023-06-22 | End: 2023-06-22 | Stop reason: HOSPADM

## 2023-06-22 NOTE — ED PROVIDER NOTES
Encounter Date: 6/22/2023       History     Chief Complaint   Patient presents with    Head Injury    Head Laceration     Pt had a fall this morning while using the rest room and has a laceration to his forehead and his right wrist is hurting.     Wrist Injury     Patient presents after trip and fall striking his head and injuring his right wrist.  He complains of hematoma and laceration to the forehead as well as pain and discomfort to the right wrist.  He did not lose consciousness.  He is on Plavix and aspirin.  At the worst symptoms are moderate.    Review of patient's allergies indicates:   Allergen Reactions    Pollen extracts Other (See Comments)     Past Medical History:   Diagnosis Date    Basal cell carcinoma 2020    L temple / radiation therapy    Cataract     Hypertension      Past Surgical History:   Procedure Laterality Date    APPENDECTOMY      EYE SURGERY      Moh's surgery on right temple area Right 2015    Cancerous spot    PROSTATE BIOPSY      TONSILLECTOMY       Family History   Problem Relation Age of Onset    Melanoma Neg Hx     Psoriasis Neg Hx     Lupus Neg Hx     Eczema Neg Hx      Social History     Tobacco Use    Smoking status: Never    Smokeless tobacco: Never   Substance Use Topics    Alcohol use: Not Currently     Review of Systems   All other systems reviewed and are negative.    Physical Exam     Initial Vitals [06/22/23 0659]   BP Pulse Resp Temp SpO2   133/80 62 18 97.9 °F (36.6 °C) 97 %      MAP       --         Physical Exam    Nursing note and vitals reviewed.  Constitutional: He is not diaphoretic. No distress.   Elderly, frail   HENT:   Hematoma is noted to the forehead with abrasion   Eyes: EOM are normal. Pupils are equal, round, and reactive to light.   Neck: Neck supple.   Normal range of motion.  Cardiovascular:  Intact distal pulses.           Pulmonary/Chest: Breath sounds normal. No respiratory distress.   Abdominal: Abdomen is soft.   Musculoskeletal:      Cervical  back: Normal range of motion and neck supple.      Comments: Edema and tenderness to the right wrist is present.  All other major joints show no obvious deformity or decreased range of motion.     Neurological: He is alert.   Vision-normal  Neglect-normal  Aphasia - normal  Pronator drift - normal  Cerebellum - normal   Skin: Skin is warm and dry.   Psychiatric: He has a normal mood and affect. His behavior is normal. Judgment and thought content normal.       ED Course   Procedures  Labs Reviewed   CBC W/ AUTO DIFFERENTIAL - Abnormal; Notable for the following components:       Result Value    WBC 3.87 (*)     RBC 3.84 (*)     Hemoglobin 13.2 (*)     Hematocrit 37.7 (*)     MCH 34.4 (*)     Platelets 131 (*)     Immature Granulocytes 1.0 (*)     Lymph # 0.6 (*)     Lymph % 14.2 (*)     Mono % 17.8 (*)     All other components within normal limits   COMPREHENSIVE METABOLIC PANEL   PROTIME-INR   TSH   LIPID PANEL   TROPONIN I HIGH SENSITIVITY   B-TYPE NATRIURETIC PEPTIDE   TYPE & SCREEN   PREPARE PLATELETS (DOSE) SOFT   PREPARE PLATELETS (DOSE) SOFT        ECG Results              ECG 12 lead (In process)  Result time 06/22/23 08:25:27      In process by Interface, Lab In [facility] (06/22/23 08:25:27)                   Narrative:    Test Reason : I63.9,    Vent. Rate : 065 BPM     Atrial Rate : 065 BPM     P-R Int : 298 ms          QRS Dur : 150 ms      QT Int : 436 ms       P-R-T Axes : 014 -46 019 degrees     QTc Int : 453 ms    Sinus rhythm with 1st degree A-V block with occasional Premature  ventricular complexes  Right bundle branch block  Left anterior fascicular block   Bifascicular block   Septal infarct (cited on or before 14-APR-2023)  Abnormal ECG  When compared with ECG of 14-APR-2023 18:59,  Premature ventricular complexes are now Present  Questionable change in initial forces of Septal leads    Referred By: [email or web address]   SELF           Confirmed By:                                   Imaging Results               X-Ray Chest AP Portable (In process)                      CT Head Without Contrast (Final result)  Result time 06/22/23 08:27:39      Final result by Tori Dawn MD (06/22/23 08:27:39)                   Narrative:    CMS MANDATED QUALITY DATA - CT RADIATION  436    All CT scans at this facility utilize dose modulation, iterative reconstruction, and/or weight based dosing when appropriate to reduce radiation dose to as low as reasonably achievable.  CT head without contrast    Clinical data: Seizure, fall    COMPARISON: 4/14/2023    FINDINGS: There is a intraparenchymal hemorrhage within the anterior medial right frontal lobe measuring (4.0 x 2.2 x 2.3 cm, AP, craniocaudal, transverse). Hemorrhage has heterogeneous attenuation suggestive of acute and subacute hemorrhage. There is a second 9 mm focus of hemorrhage superior to the larger hemorrhage in the right frontal lobe. There is mild surrounding vasogenic edema. There is no midline shift.    There is subdural hemorrhage along the falx and within the left temporal parietal region. The maximum thickness of the subdural hemorrhage in the left parietal region measures 12 mm. The maximum thickness along the left side of falx measures 6 mm.  There is no midline shift.    The ventricles and sulci are prominent compatible with the patient's age. There is periventricular low-attenuation consistent with chronic small vessel disease.  There are calcifications in the basal ganglia. The cerebellum and brainstem are normal.    There is soft tissue swelling over the right frontal bone. There is no fracture. The paranasal sinuses and mastoid air cells are clear.    IMPRESSION: 4.0 x 2.2 x 2.3 cm heterogeneous intraparenchymal hemorrhage in the right frontal lobe with mild vasogenic edema. There is a second 9 mm focus of hemorrhage more superiorly in the medial right frontal lobe. There is soft tissue swelling over the right frontal bone and findings are consistent  with recent trauma.    Left temporal parietal subdural hemorrhage as well as subdural hemorrhage along the left side of the falx with a maximum thickness in the left parietal region measuring 12 mm.    There is no midline shift    Generalized cerebral atrophy with periventricular small vessel disease    These findings were called to Dr. Apple in the emergency department at 8:20 AM    Electronically signed by:  Tori Dawn MD  6/22/2023 8:27 AM CDT Workstation: [id number]                                     X-Ray Wrist Complete Right (Final result)  Result time 06/22/23 08:01:54      Final result by Dontrell Bosch MD (06/22/23 08:01:54)                   Narrative:    Reason: Fall on outstretched right hand, right wrist swelling and bruising    FINDINGS:  3 views of right wrist acute comminuted distal right radius metaphyseal fracture with intra-articular extension into the radiocarpal joint demonstrating mild apex volar angulation and impaction dorsally. Additional acute slightly comminuted fractures affect the ulnar styloid and ulnar aspect of distal ulna just proximal to the ulnar styloid, minimally displaced. There is resulting positive ulnar variance.    Mild right first CMC joint osteoarthrosis is present. Bones appear diffusely demineralized. Geodes occur in the trapezium and scaphoid.    Distal right forearm and wrist soft tissue swelling is present. Minimal arterial vascular calcifications are evident.    IMPRESSION:    1. Acute displaced right distal radius intra-articular fracture.  2. Acute slightly comminuted right distal ulna fracture as described.    Electronically signed by:  Dontrell Bosch MD  6/22/2023 8:01 AM CDT Workstation: [id number]                                     Medications   0.9%  NaCl infusion (for blood administration) (has no administration in time range)   0.9%  NaCl infusion (for blood administration) (has no administration in time range)     Medical Decision Making:   Initial  Assessment:   No apparent distress  Differential Diagnosis:   Subdural hematoma, intraparenchymal hemorrhage, wrist fracture, electrolyte abnormalities, dehydration, acute kidney injury  Clinical Tests:   Lab Tests: Reviewed and Ordered  Radiological Study: Ordered and Reviewed  Medical Tests: Reviewed and Ordered  ED Management:  MDM    Patient presents for emergent evaluation of acute fall that poses a possible threat to life and/or bodily function.    In the ED patient found to have acute subdural hematoma and intraparenchymal hemorrhage, wrist fracture.   I ordered labs and personally reviewed them.  Labs significant for normal platelets.    I ordered X-rays and personally reviewed them and reviewed the radiologist interpretation.  Xray significant for pending.    I ordered EKG and personally reviewed it.  EKG significant for PVCs are present, bifascicular block, regular, no ST elevation.    I ordered CT scan and personally reviewed it and reviewed the radiologist interpretation.  CT significant for subdural hematoma, intraparenchymal hemorrhage.      Transfer [facility]  I discussed the patient presentation labs, ekg, X-rays, CT findings with the consultant(s) Dr. Thompson with Neurosurgery recommends transfer for neuro ICU care and repeat head CT in 4 hours   Patient was managed in the ED with IV platelets.    The response to treatment was unchanged.    Patient required emergent consultation to transfer center for transfer.    Attending Critical Care:   Critical Care Times:   Direct Patient Care (initial evaluation, reassessments, and time considering the case)................................................................10 minutes.   Additional History from reviewing old medical records or taking additional history from the family, EMS, PCP, etc.......................10 minutes.   Ordering, Reviewing, and Interpreting Diagnostic  Studies...............................................................................................................10 minutes.   Documentation..................................................................................................................................................................................5 minutes.   ==============================================================  · Total Critical Care Time - exclusive of procedural time: 35 minutes.  ==============================================================  Critical care was necessary to treat or prevent imminent or life-threatening deterioration of the following conditions:  Subdural hematoma, intraparenchymal hemorrhage.   Critical care was time spent personally by me on the following activities: obtaining history from patient or relative, examination of patient, review of x-rays / CT sent with the patient, ordering lab, x-rays, and/or EKG, development of treatment plan with patient or relative, ordering and performing treatments and interventions, interpretation of cardiac measurements and re-evaluation of patient's conition.   Critical Care Condition: potentially life-threatening                         Clinical Impression:   Final diagnoses:  [W19.XXXA] Fall  [I61.9] Intraparenchymal hemorrhage of brain (Primary)  [S06.5XAA] Subdural hematoma  [S62.101A] Closed fracture of right wrist, initial encounter        ED Disposition Condition    Transfer to Another Facility Stable                Josse Apple MD  06/22/23 0900

## 2023-06-23 DIAGNOSIS — I62.00 NONTRAUMATIC SUBDURAL HEMORRHAGE, UNSPECIFIED: Primary | ICD-10-CM

## 2023-06-23 PROBLEM — S52.501A CLOSED FRACTURE OF RIGHT DISTAL RADIUS: Status: ACTIVE | Noted: 2023-06-23

## 2023-06-23 PROBLEM — S06.33AA INTRAPARENCHYMAL HEMATOMA OF BRAIN: Status: ACTIVE | Noted: 2023-06-23

## 2023-06-23 PROBLEM — E87.1 HYPONATREMIA: Status: ACTIVE | Noted: 2023-06-23

## 2023-06-27 ENCOUNTER — TELEPHONE (OUTPATIENT)
Dept: FAMILY MEDICINE | Facility: CLINIC | Age: 88
End: 2023-06-27

## 2023-06-27 NOTE — TELEPHONE ENCOUNTER
----- Message from Frahana Thornton sent at 6/27/2023 10:54 AM CDT -----  Contact: [facility]  Pt will be DC'D today from [facility] and needs a hospital FU visit. Pt #[phone], Neisha @[phone]

## 2023-06-29 ENCOUNTER — TELEPHONE (OUTPATIENT)
Dept: FAMILY MEDICINE | Facility: CLINIC | Age: 88
End: 2023-06-29

## 2023-06-29 NOTE — TELEPHONE ENCOUNTER
----- Message from Izabella Louis MA sent at 6/27/2023 11:00 AM CDT -----  ----- Message from Farhana Thornton sent at 6/27/2023 10:54 AM CDT -----  Contact: [facility]  Pt will be DC'D today from [facility] and needs a hospital FU visit. Pt #[phone], Neisha @[phone]

## 2023-07-03 ENCOUNTER — PATIENT OUTREACH (OUTPATIENT)
Dept: FAMILY MEDICINE | Facility: CLINIC | Age: 88
End: 2023-07-03

## 2023-07-03 NOTE — TELEPHONE ENCOUNTER
Spoke with patient who states he is still hurting since he got out of the hospital. States he has already seen ortho but he is also scheduled again to see them this week. He is scheduled with the neurosurgeon but not until August. He is aware of the appointment with Mike scheduled on 7/11 and will be there. Agrees to call if he needs anything before this visit.

## 2023-07-03 NOTE — PROGRESS NOTES
Discharge Information     Discharge Date:   6/27    Primary Discharge Diagnosis:  fx    Discharge Summary:  Reviewed      Medication & Order Review     Were medication changes made or new medications added?   No    If so, has the patient filled the prescriptions?  No     Was Home Health ordered? No    If so, has Home Health contacted patient and/or initiated services?  No    Name of Home Health Agency? N/A    Durable Medical Equipment ordered?  No     If so, has the DME provider contacted patient and delivered equipment?  N/A    Follow Up               Any problems since discharge? No    How is the patient feeling since returning home?      Have you set up recommended follow up appointments?  (cardiology, surgery, etc.)    Schedule Hospital Follow-up appointment within 7-14 days (preferably 7).      Notes:   Spoke with patient who states he is still hurting since he got out of the hospital. States he has already seen ortho but he is also scheduled again to see them this week. He is scheduled with the neurosurgeon but not until August. He is aware of the appointment with Mike scheduled on 7/11 and will be there. Agrees to call if he needs anything before this visit.       Izabella Louis

## 2023-07-10 ENCOUNTER — TELEPHONE (OUTPATIENT)
Dept: FAMILY MEDICINE | Facility: CLINIC | Age: 88
End: 2023-07-10

## 2023-07-10 NOTE — TELEPHONE ENCOUNTER
----- Message from Izabella Louis MA sent at 7/10/2023 12:03 PM CDT -----    ----- Message -----  From: Jessika Chase  Sent: 7/10/2023  11:59 AM CDT  To: Jian Alcazar [facility]- 11:55-bakari with home health is calling and he has a wrist fracture and unsteady gait. Needs a right platform walker to be used for physical therapy. If in agreement he will be in [facility]  [phone] fax   Bakari [phone]

## 2023-07-11 ENCOUNTER — OFFICE VISIT (OUTPATIENT)
Dept: FAMILY MEDICINE | Facility: CLINIC | Age: 88
End: 2023-07-11
Payer: MEDICARE

## 2023-07-11 VITALS
DIASTOLIC BLOOD PRESSURE: 72 MMHG | SYSTOLIC BLOOD PRESSURE: 118 MMHG | HEART RATE: 64 BPM | WEIGHT: 147 LBS | BODY MASS INDEX: 22.28 KG/M2 | HEIGHT: 68 IN

## 2023-07-11 DIAGNOSIS — G45.9 TIA (TRANSIENT ISCHEMIC ATTACK): Primary | ICD-10-CM

## 2023-07-11 DIAGNOSIS — R53.81 PHYSICAL DECONDITIONING: ICD-10-CM

## 2023-07-11 DIAGNOSIS — S06.5XAA SUBDURAL HEMATOMA: ICD-10-CM

## 2023-07-11 DIAGNOSIS — S52.501D CLOSED FRACTURE OF DISTAL END OF RIGHT RADIUS WITH ROUTINE HEALING, UNSPECIFIED FRACTURE MORPHOLOGY, SUBSEQUENT ENCOUNTER: ICD-10-CM

## 2023-07-11 DIAGNOSIS — Z09 HOSPITAL DISCHARGE FOLLOW-UP: ICD-10-CM

## 2023-07-11 PROCEDURE — 1159F PR MEDICATION LIST DOCUMENTED IN MEDICAL RECORD: ICD-10-PCS | Mod: CPTII,S$GLB,, | Performed by: PHYSICIAN ASSISTANT

## 2023-07-11 PROCEDURE — 3288F PR FALLS RISK ASSESSMENT DOCUMENTED: ICD-10-PCS | Mod: CPTII,S$GLB,, | Performed by: PHYSICIAN ASSISTANT

## 2023-07-11 PROCEDURE — 1111F PR DISCHARGE MEDS RECONCILED W/ CURRENT OUTPATIENT MED LIST: ICD-10-PCS | Mod: CPTII,S$GLB,, | Performed by: PHYSICIAN ASSISTANT

## 2023-07-11 PROCEDURE — 99215 PR OFFICE/OUTPT VISIT, EST, LEVL V, 40-54 MIN: ICD-10-PCS | Mod: S$GLB,,, | Performed by: PHYSICIAN ASSISTANT

## 2023-07-11 PROCEDURE — 1100F PTFALLS ASSESS-DOCD GE2>/YR: CPT | Mod: CPTII,S$GLB,, | Performed by: PHYSICIAN ASSISTANT

## 2023-07-11 PROCEDURE — 99215 OFFICE O/P EST HI 40 MIN: CPT | Mod: S$GLB,,, | Performed by: PHYSICIAN ASSISTANT

## 2023-07-11 PROCEDURE — 1111F DSCHRG MED/CURRENT MED MERGE: CPT | Mod: CPTII,S$GLB,, | Performed by: PHYSICIAN ASSISTANT

## 2023-07-11 PROCEDURE — 1100F PR PT FALLS ASSESS DOC 2+ FALLS/FALL W/INJURY/YR: ICD-10-PCS | Mod: CPTII,S$GLB,, | Performed by: PHYSICIAN ASSISTANT

## 2023-07-11 PROCEDURE — 1159F MED LIST DOCD IN RCRD: CPT | Mod: CPTII,S$GLB,, | Performed by: PHYSICIAN ASSISTANT

## 2023-07-11 PROCEDURE — 3288F FALL RISK ASSESSMENT DOCD: CPT | Mod: CPTII,S$GLB,, | Performed by: PHYSICIAN ASSISTANT

## 2023-07-11 NOTE — PROGRESS NOTES
SUBJECTIVE:    Patient ID: Brit Jennings is a 87 y.o. male.    Chief Complaint: Hospital Follow Up (Went over meds verbally// SW)    This is an 87-year-old male who presents today for hospital discharge follow-up.  Officially he has already been seen by someone from the transitional care team.  I work closely with his primary care provider Dr. Jian Alcazar.  He was admitted after intraparenchymal hemorrhage secondary to fall.  Discharge summary is pasted below:    Hospital Course:   87-year-old male with history of hypertension, COPD initially presents to outside facility with intraparenchymal hemorrhage and subdural hematoma as well as right distal radius and ulnar fracture.  Patient was initially admitted to the intensive care unit by neurocritical care with consultation to Neurosurgery and Orthopedic surgery.  Antiplatelets were placed on hold.  Neurosurgery did not recommend any intervention.  CT head remained stable.  He was subsequently transferred out of the intensive care unit in stable condition.  Orthopedic surgery has recommended continuation of splint to right upper extremity with outpatient follow-up.  Sodium has remained low despite multiple doses of 3% saline and tolvaptan.  Patient remains asymptomatic at this time and it is chronic in nature.  Nephrology has cleared the patient for discharge home with salt tabs b.i.d. and close outpatient follow-up.    Today he is present with family. [facility] is in place. PT/OT and nursing. He is getting stronger. Rt distal radial fracture is being splinted now. Trying to hold off on surgery. Does need orders for a platform walker, right sided. Wellness PT is recommended to transition. Appetite is getting better      No results displayed because visit has over 200 results.      Admission on 06/22/2023, Discharged on 06/22/2023   Component Date Value Ref Range Status    WBC 06/22/2023 3.87 (L)  3.90 - 12.70 K/uL Final    RBC 06/22/2023 3.84 (L)  4.60 -  6.20 M/uL Final    Hemoglobin 06/22/2023 13.2 (L)  14.0 - 18.0 g/dL Final    Hematocrit 06/22/2023 37.7 (L)  40.0 - 54.0 % Final    MCV 06/22/2023 98  82 - 98 fL Final    MCH 06/22/2023 34.4 (H)  27.0 - 31.0 pg Final    MCHC 06/22/2023 35.0  32.0 - 36.0 g/dL Final    RDW 06/22/2023 12.9  11.5 - 14.5 % Final    Platelets 06/22/2023 131 (L)  150 - 450 K/uL Final    MPV 06/22/2023 11.7  9.2 - 12.9 fL Final    Immature Granulocytes 06/22/2023 1.0 (H)  0.0 - 0.5 % Final    Gran # (ANC) 06/22/2023 2.5  1.8 - 7.7 K/uL Final    Immature Grans (Abs) 06/22/2023 0.04  0.00 - 0.04 K/uL Final    Lymph # 06/22/2023 0.6 (L)  1.0 - 4.8 K/uL Final    Mono # 06/22/2023 0.7  0.3 - 1.0 K/uL Final    Eos # 06/22/2023 0.0  0.0 - 0.5 K/uL Final    Baso # 06/22/2023 0.01  0.00 - 0.20 K/uL Final    nRBC 06/22/2023 0  0 /100 WBC Final    Gran % 06/22/2023 65.7  38.0 - 73.0 % Final    Lymph % 06/22/2023 14.2 (L)  18.0 - 48.0 % Final    Mono % 06/22/2023 17.8 (H)  4.0 - 15.0 % Final    Eosinophil % 06/22/2023 1.0  0.0 - 8.0 % Final    Basophil % 06/22/2023 0.3  0.0 - 1.9 % Final    Differential Method 06/22/2023 Automated   Final    Sodium 06/22/2023 131 (L)  136 - 145 mmol/L Final    Potassium 06/22/2023 4.0  3.5 - 5.1 mmol/L Final    Chloride 06/22/2023 96  95 - 110 mmol/L Final    CO2 06/22/2023 27  23 - 29 mmol/L Final    Glucose 06/22/2023 105  70 - 110 mg/dL Final    BUN 06/22/2023 14  8 - 23 mg/dL Final    Creatinine 06/22/2023 0.9  0.5 - 1.4 mg/dL Final    Calcium 06/22/2023 9.4  8.7 - 10.5 mg/dL Final    Total Protein 06/22/2023 7.1  6.0 - 8.4 g/dL Final    Albumin 06/22/2023 4.3  3.5 - 5.2 g/dL Final    Total Bilirubin 06/22/2023 1.0  0.1 - 1.0 mg/dL Final    Alkaline Phosphatase 06/22/2023 60  55 - 135 U/L Final    AST 06/22/2023 35  10 - 40 U/L Final    ALT 06/22/2023 23  10 - 44 U/L Final    eGFR 06/22/2023 >60.0  >60 mL/min/1.73 m^2 Final    Anion Gap 06/22/2023 8  8 - 16 mmol/L Final     Prothrombin Time 06/22/2023 11.4  9.0 - 12.5 sec Final    INR 06/22/2023 1.0  0.8 - 1.2 Final    TSH 06/22/2023 2.350  0.340 - 5.600 uIU/mL Final    Cholesterol 06/22/2023 67 (L)  120 - 199 mg/dL Final    Triglycerides 06/22/2023 35  30 - 150 mg/dL Final    HDL 06/22/2023 31 (L)  40 - 75 mg/dL Final    LDL Cholesterol 06/22/2023 29.0 (L)  63.0 - 159.0 mg/dL Final    HDL/Cholesterol Ratio 06/22/2023 46.3  20.0 - 50.0 % Final    Total Cholesterol/HDL Ratio 06/22/2023 2.2  2.0 - 5.0 Final    Non-HDL Cholesterol 06/22/2023 36  mg/dL Final    Troponin I High Sensitivity 06/22/2023 12.6  0.0 - 14.9 pg/mL Final    BNP 06/22/2023 344 (H)  0 - 99 pg/mL Final    Group & Rh 06/22/2023 O POS   Final    Indirect Chucho 06/22/2023 NEG   Final    Specimen Outdate 06/22/2023 06/25/2023 23:59   Final    UNIT NUMBER 06/22/2023 [id number]   Final    Product Code 06/22/2023 [id number]   Final    DISPENSE STATUS 06/22/2023 TRANSFUSED   Final    CODING SYSTEM 06/22/2023 [id number]   Final    Unit Blood Type Code 06/22/2023 1700   Final    Unit Blood Type 06/22/2023 B NEG   Final    Unit Expiration 06/22/2023 [id number]   Final    CROSSMATCH INTERPRETATION 06/22/2023 Not Required   Final   Office Visit on 05/18/2023   Component Date Value Ref Range Status    Final Pathologic Diagnosis 05/18/2023    Final                    Value:Skin, left upper lateral forehead, shave biopsy:  -CRATERIFORM SQUAMOPROLIFERATIVE LESION, see comment    Comment:  This lesion is cystic with underlying inflammation and is reminiscent of a resolving keratoacanthoma.   There are no  dermal islands of atypical squamous cells.  There is prominent solar elastosis.  There are atypical keratinocytes at the base   of the epidermis.  This could also represent a hyperkeratotic actinic keratosis.  The lesion is transected the base.  Correlate with clinical findings.      Gross 05/18/2023    Final                    Value:Container Label: Clinic  Number/AP Number:  [id number] / [id number], and &quot;left lateral upper forehead&quot;    Received in formalin is a 7 x 7 x 1 mm shave biopsy fragment of thickened, tan-white skin.  Specimen is inked, trisected, and entirely submitted in VBL--1-A.    DENNY Haywood        Microscopic Exam 05/18/2023    Final                    Value:Sections of skin show an invaginated cystic lesion with an undulating epidermis with underlying chronic inflammation and prominent solar elastosis .  The epidermis shows eosinophilic keratinocytes with atypia of the basal layer. Several deeper levels   were examined.  Ki-67 immunohistochemical stain highlights increased proliferative activity basal layer to mid epidermal keratinocytes.  Immunohistochemical stain was reviewed in conjunction with adequate positive control.       Disclaimer 05/18/2023 Unless the case is a 'gross only' or additional testing only, the final diagnosis for each specimen is based on a microscopic examination of appropriate tissue sections.   Final   Admission on 04/14/2023, Discharged on 04/15/2023   Component Date Value Ref Range Status    WBC 04/14/2023 3.81 (L)  3.90 - 12.70 K/uL Final    RBC 04/14/2023 3.74 (L)  4.60 - 6.20 M/uL Final    Hemoglobin 04/14/2023 12.9 (L)  14.0 - 18.0 g/dL Final    Hematocrit 04/14/2023 36.6 (L)  40.0 - 54.0 % Final    MCV 04/14/2023 98  82 - 98 fL Final    MCH 04/14/2023 34.5 (H)  27.0 - 31.0 pg Final    MCHC 04/14/2023 35.2  32.0 - 36.0 g/dL Final    RDW 04/14/2023 12.3  11.5 - 14.5 % Final    Platelets 04/14/2023 164  150 - 450 K/uL Final    MPV 04/14/2023 11.4  9.2 - 12.9 fL Final    Immature Granulocytes 04/14/2023 0.3  0.0 - 0.5 % Final    Gran # (ANC) 04/14/2023 1.4 (L)  1.8 - 7.7 K/uL Final    Immature Grans (Abs) 04/14/2023 0.01  0.00 - 0.04 K/uL Final    Lymph # 04/14/2023 1.5  1.0 - 4.8 K/uL Final    Mono # 04/14/2023 0.8  0.3 - 1.0 K/uL Final    Eos # 04/14/2023 0.1  0.0 - 0.5 K/uL Final    Baso #  04/14/2023 0.02  0.00 - 0.20 K/uL Final    nRBC 04/14/2023 0  0 /100 WBC Final    Gran % 04/14/2023 36.0 (L)  38.0 - 73.0 % Final    Lymph % 04/14/2023 39.1  18.0 - 48.0 % Final    Mono % 04/14/2023 20.7 (H)  4.0 - 15.0 % Final    Eosinophil % 04/14/2023 3.4  0.0 - 8.0 % Final    Basophil % 04/14/2023 0.5  0.0 - 1.9 % Final    Differential Method 04/14/2023 Automated   Final    Sodium 04/14/2023 127 (L)  136 - 145 mmol/L Final    Potassium 04/14/2023 3.7  3.5 - 5.1 mmol/L Final    Chloride 04/14/2023 96  95 - 110 mmol/L Final    CO2 04/14/2023 23  23 - 29 mmol/L Final    Glucose 04/14/2023 99  70 - 110 mg/dL Final    BUN 04/14/2023 13  8 - 23 mg/dL Final    Creatinine 04/14/2023 0.8  0.5 - 1.4 mg/dL Final    Calcium 04/14/2023 9.6  8.7 - 10.5 mg/dL Final    Total Protein 04/14/2023 7.9  6.0 - 8.4 g/dL Final    Albumin 04/14/2023 4.4  3.5 - 5.2 g/dL Final    Total Bilirubin 04/14/2023 0.6  0.1 - 1.0 mg/dL Final    Alkaline Phosphatase 04/14/2023 61  55 - 135 U/L Final    AST 04/14/2023 29  10 - 40 U/L Final    ALT 04/14/2023 19  10 - 44 U/L Final    Anion Gap 04/14/2023 8  8 - 16 mmol/L Final    eGFR 04/14/2023 >60.0  >60 mL/min/1.73 m^2 Final    Prothrombin Time 04/14/2023 11.3  9.0 - 12.5 sec Final    INR 04/14/2023 1.0  0.8 - 1.2 Final    Benzodiazepines 04/14/2023 Presumptive Positive (A)  Negative Final    Cocaine (Metab.) 04/14/2023 Negative  Negative Final    Opiate Scrn, Ur 04/14/2023 Negative  Negative Final    Barbiturate Screen, Ur 04/14/2023 Negative  Negative Final    Amphetamine Screen, Ur 04/14/2023 Negative  Negative Final    THC 04/14/2023 Negative  Negative Final    Phencyclidine 04/14/2023 Negative  Negative Final    Creatinine, Urine 04/14/2023 <10.0 (L)  23.0 - 375.0 mg/dL Final    Toxicology Information 04/14/2023 SEE COMMENT   Final    Prothrombin Time 04/14/2023 11.3  9.0 - 12.5 sec Final    INR 04/14/2023 1.0  0.8 - 1.2 Final    aPTT 04/14/2023 29.1  21.0  - 32.0 sec Final    TSH 04/14/2023 3.140  0.340 - 5.600 uIU/mL Final    Troponin I High Sensitivity 04/14/2023 9.2  0.0 - 14.9 pg/mL Final    Magnesium 04/14/2023 2.1  1.6 - 2.6 mg/dL Final    Group & Rh 04/14/2023 O POS   Final    Indirect Chucho 04/14/2023 NEG   Final    Specimen Outdate 04/14/2023 04/17/2023 23:59   Final    Specimen UA 04/14/2023 Urine, Clean Catch   Final    Color, UA 04/14/2023 Colorless (A)  Yellow, Straw, [place] Final    Appearance, UA 04/14/2023 Clear  Clear Final    pH, UA 04/14/2023 8.0  5.0 - 8.0 Final    Specific Gravity, UA 04/14/2023 1.010  1.005 - 1.030 Final    Protein, UA 04/14/2023 Negative  Negative Final    Glucose, UA 04/14/2023 Negative  Negative Final    Ketones, UA 04/14/2023 Negative  Negative Final    Bilirubin (UA) 04/14/2023 Negative  Negative Final    Occult Blood UA 04/14/2023 Negative  Negative Final    Nitrite, UA 04/14/2023 Negative  Negative Final    Urobilinogen, UA 04/14/2023 Negative  Negative EU/dL Final    Leukocytes, UA 04/14/2023 Negative  Negative Final    POC Glucose 04/14/2023 109  70 - 110 Final    POC Creatinine 04/14/2023 0.9  0.5 - 1.4 mg/dL Final    Sample 04/14/2023 VENOUS   Final    Troponin I High Sensitivity 04/14/2023 12.1  0.0 - 14.9 pg/mL Final    Troponin I High Sensitivity 04/15/2023 11.1  0.0 - 14.9 pg/mL Final    Sodium 04/15/2023 133 (L)  136 - 145 mmol/L Final    Potassium 04/15/2023 3.5  3.5 - 5.1 mmol/L Final    Chloride 04/15/2023 98  95 - 110 mmol/L Final    CO2 04/15/2023 23  23 - 29 mmol/L Final    Glucose 04/15/2023 100  70 - 110 mg/dL Final    BUN 04/15/2023 12  8 - 23 mg/dL Final    Creatinine 04/15/2023 0.8  0.5 - 1.4 mg/dL Final    Calcium 04/15/2023 8.9  8.7 - 10.5 mg/dL Final    Total Protein 04/15/2023 6.7  6.0 - 8.4 g/dL Final    Albumin 04/15/2023 3.9  3.5 - 5.2 g/dL Final    Total Bilirubin 04/15/2023 1.0  0.1 - 1.0 mg/dL Final    Alkaline Phosphatase 04/15/2023 57  55 - 135 U/L Final     AST 04/15/2023 27  10 - 40 U/L Final    ALT 04/15/2023 18  10 - 44 U/L Final    Anion Gap 04/15/2023 12  8 - 16 mmol/L Final    eGFR 04/15/2023 >60.0  >60 mL/min/1.73 m^2 Final    Magnesium 04/15/2023 2.3  1.6 - 2.6 mg/dL Final    WBC 04/15/2023 3.03 (L)  3.90 - 12.70 K/uL Final    RBC 04/15/2023 3.68 (L)  4.60 - 6.20 M/uL Final    Hemoglobin 04/15/2023 12.5 (L)  14.0 - 18.0 g/dL Final    Hematocrit 04/15/2023 36.2 (L)  40.0 - 54.0 % Final    MCV 04/15/2023 98  82 - 98 fL Final    MCH 04/15/2023 34.0 (H)  27.0 - 31.0 pg Final    MCHC 04/15/2023 34.5  32.0 - 36.0 g/dL Final    RDW 04/15/2023 12.4  11.5 - 14.5 % Final    Platelets 04/15/2023 144 (L)  150 - 450 K/uL Final    MPV 04/15/2023 11.2  9.2 - 12.9 fL Final    Immature Granulocytes 04/15/2023 0.7 (H)  0.0 - 0.5 % Final    Gran # (ANC) 04/15/2023 1.2 (L)  1.8 - 7.7 K/uL Final    Immature Grans (Abs) 04/15/2023 0.02  0.00 - 0.04 K/uL Final    Lymph # 04/15/2023 0.9 (L)  1.0 - 4.8 K/uL Final    Mono # 04/15/2023 0.8  0.3 - 1.0 K/uL Final    Eos # 04/15/2023 0.1  0.0 - 0.5 K/uL Final    Baso # 04/15/2023 0.03  0.00 - 0.20 K/uL Final    nRBC 04/15/2023 0  0 /100 WBC Final    Gran % 04/15/2023 39.9  38.0 - 73.0 % Final    Lymph % 04/15/2023 28.7  18.0 - 48.0 % Final    Mono % 04/15/2023 25.7 (H)  4.0 - 15.0 % Final    Eosinophil % 04/15/2023 4.0  0.0 - 8.0 % Final    Basophil % 04/15/2023 1.0  0.0 - 1.9 % Final    Differential Method 04/15/2023 Automated   Final    CPK MB 04/15/2023 2.9  0.1 - 6.5 ng/mL Final    Cholesterol 04/15/2023 136  120 - 199 mg/dL Final    Triglycerides 04/15/2023 71  30 - 150 mg/dL Final    HDL 04/15/2023 31 (L)  40 - 75 mg/dL Final    LDL Cholesterol 04/15/2023 90.8  63.0 - 159.0 mg/dL Final    HDL/Cholesterol Ratio 04/15/2023 22.8  20.0 - 50.0 % Final    Total Cholesterol/HDL Ratio 04/15/2023 4.4  2.0 - 5.0 Final    Non-HDL Cholesterol 04/15/2023 105  mg/dL Final   Hospital Outpatient Visit on  03/15/2023   Component Date Value Ref Range Status    POC Creatinine 03/15/2023 1.0  0.5 - 1.4 mg/dL Final    Sample 03/15/2023 VENOUS   Final       Past Medical History:   Diagnosis Date    Basal cell carcinoma 2020    L temple / radiation therapy    Cataract     Hypertension      Past Surgical History:   Procedure Laterality Date    APPENDECTOMY      EYE SURGERY      Moh's surgery on right temple area Right 2015    Cancerous spot    PROSTATE BIOPSY      TONSILLECTOMY       Family History   Problem Relation Age of Onset    Melanoma Neg Hx     Psoriasis Neg Hx     Lupus Neg Hx     Eczema Neg Hx        Marital Status:   Alcohol History:  reports that he does not currently use alcohol.  Tobacco History:  reports that he has never smoked. He has never used smokeless tobacco.  Drug History:  has no history on file for drug use.    Review of patient's allergies indicates:   Allergen Reactions    Pollen extracts Other (See Comments)       Current Outpatient Medications:     acetaminophen (TYLENOL) 500 MG tablet, Take 2 tablets (1,000 mg total) by mouth every 6 (six) hours as needed for Pain., Disp: , Rfl: 0    albuterol (PROVENTIL/VENTOLIN HFA) 90 mcg/actuation inhaler, Inhale 1-2 puffs into the lungs daily as needed for Shortness of Breath. Rescue, Disp: , Rfl:     ascorbic acid (VITAMIN C ORAL), Take 1 tablet by mouth 2 (two) times a day., Disp: , Rfl:     atorvastatin (LIPITOR) 80 MG tablet, Take 80 mg by mouth once daily., Disp: , Rfl:     cholecalciferol, vitamin D3, 125 mcg (5,000 unit) capsule, Take 5,000 Units by mouth once daily., Disp: , Rfl:     cyanocobalamin, vitamin B-12, 2,500 mcg Tab, Take 2,500 mcg by mouth once daily., Disp: , Rfl:     ketoconazole (NIZORAL) 2 % cream, AAA around mouth bid (Patient taking differently: Apply topically daily as needed (crack/dry corners of mouth).), Disp: 60 g, Rfl: 3    levETIRAcetam (KEPPRA) 500 MG Tab, Take 1 tablet (500 mg total) by mouth  2 (two) times daily., Disp: 60 tablet, Rfl: 0    lisinopriL (PRINIVIL,ZESTRIL) 20 MG tablet, Take 20 mg by mouth once daily., Disp: , Rfl:     mupirocin (BACTROBAN) 2 % ointment, Apply topically daily as needed (infection to right ankle)., Disp: , Rfl:     sodium chloride 1 gram tablet, Take 1 tablet (1 g total) by mouth 2 (two) times a day., Disp: 60 tablet, Rfl: 0    solifenacin (VESICARE) 10 MG tablet, Take 10 mg by mouth every evening., Disp: , Rfl:     SYMBICORT 160-4.5 mcg/actuation HFAA, Inhale 2 puffs into the lungs every 12 (twelve) hours. (Patient taking differently: Inhale 1-2 puffs into the lungs every evening.), Disp: 30 g, Rfl: 4    temazepam (RESTORIL) 30 mg capsule, Take 1 capsule (30 mg total) by mouth nightly as needed for Insomnia. (Patient taking differently: Take 30 mg by mouth every evening.), Disp: 90 capsule, Rfl: 1    triamcinolone acetonide 0.1% (KENALOG) 0.1 % cream, Apply topically daily as needed (rash)., Disp: , Rfl:     UNABLE TO FIND, Place 5 mLs under the tongue once daily. medication name: EDTA, Disp: , Rfl:     UNABLE TO FIND, Take 4 tablets by mouth every evening. medication name: Multi Mineral, Disp: , Rfl:     Current Facility-Administered Medications:     albuterol inhaler 2 puff, 2 puff, Inhalation, Q20 Min PRN, Farhana Riggins NP    diphenhydrAMINE injection 25 mg, 25 mg, Intravenous, Once PRN, Farhana Riggins NP    EPINEPHrine (EPIPEN) 0.3 mg/0.3 mL pen injection 0.3 mg, 0.3 mg, Intramuscular, PRN, Farhana Riggins NP    methylPREDNISolone sodium succinate injection 40 mg, 40 mg, Intravenous, Once PRN, Farhana Riggins NP    ondansetron disintegrating tablet 4 mg, 4 mg, Oral, Once PRN, Farhana Riggins NP    sodium chloride 0.9% 500 mL flush bag, , Intravenous, PRN, Farhana Riggins, NP, Stopped at 12/15/21 1455    sodium chloride 0.9% flush 10 mL, 10 mL, Intravenous, PRN, Farhana Riggins, BARI    Review of Systems   Constitutional:  Negative for  "activity change, appetite change, chills, diaphoresis, fatigue, fever and unexpected weight change.   HENT:  Negative for congestion, facial swelling and trouble swallowing.    Eyes:  Negative for pain, discharge, redness and visual disturbance.   Respiratory:  Negative for cough, shortness of breath and wheezing.    Cardiovascular:  Negative for chest pain, palpitations and leg swelling.   Gastrointestinal:  Negative for abdominal distention, abdominal pain, constipation and diarrhea.   Endocrine: Negative for cold intolerance and heat intolerance.   Genitourinary:  Negative for decreased urine volume, difficulty urinating, dysuria, flank pain, frequency and urgency.   Musculoskeletal:  Positive for arthralgias, joint swelling and myalgias. Negative for neck pain.   Skin:  Negative for color change.   Allergic/Immunologic: Negative for immunocompromised state.   Neurological:  Positive for dizziness and weakness. Negative for tremors, syncope, speech difficulty, light-headedness and numbness.   Hematological:  Negative for adenopathy.   Psychiatric/Behavioral:  Negative for agitation, confusion, decreased concentration and dysphoric mood.         Objective:      Vitals:    07/11/23 1354   BP: 118/72   Pulse: 64   Weight: 66.7 kg (147 lb)   Height: 5' 8" (1.727 m)     Physical Exam  Vitals and nursing note reviewed.   Constitutional:       Appearance: He is well-developed.   HENT:      Head: Normocephalic and atraumatic.   Eyes:      Conjunctiva/sclera: Conjunctivae normal.      Pupils: Pupils are equal, round, and reactive to light.   Cardiovascular:      Rate and Rhythm: Normal rate and regular rhythm.      Heart sounds: Normal heart sounds.   Pulmonary:      Effort: Pulmonary effort is normal.      Breath sounds: Normal breath sounds.   Abdominal:      General: Bowel sounds are normal.      Palpations: Abdomen is soft.   Musculoskeletal:      Cervical back: Neck supple.      Comments: Right arm cast   Skin:     " General: Skin is warm and dry.   Neurological:      Mental Status: He is alert and oriented to person, place, and time.      Deep Tendon Reflexes: Reflexes are normal and symmetric.   Psychiatric:         Behavior: Behavior normal.         Assessment:       1. TIA (transient ischemic attack)    2. Subdural hematoma    3. Hospital discharge follow-up    4. Closed fracture of distal end of right radius with routine healing, unspecified fracture morphology, subsequent encounter    5. Physical deconditioning         Plan:       TIA (transient ischemic attack)  Comments:  possibly the cause of his fall. Will be meeting with vascular surgery to discuss further options. Med management recommended to continue after hematoma healed.    Subdural hematoma  Comments:  Improving. will be seeing Neurosurgery again in 3 weeks. recheck CT then.    Hospital discharge follow-up  Comments:  meds reconciled and reviewed.    Closed fracture of distal end of right radius with routine healing, unspecified fracture morphology, subsequent encounter  Comments:  DR. Kearney to see again mid August. for now remain in splint.  Orders:  -     WALKER FOR HOME USE    Physical deconditioning  Comments:  focus on strengthening legs and muscle tone. ensure enlive recommended. PT to start back.  Orders:  -     Ambulatory referral/consult to Physical/Occupational Therapy; Future; Expected date: 07/11/2023      Follow up if symptoms worsen or fail to improve, for as scheduled.        7/11/2023 Harsh Rubalcava PA-C

## 2023-07-17 PROBLEM — G45.9 TIA (TRANSIENT ISCHEMIC ATTACK): Status: RESOLVED | Noted: 2023-04-14 | Resolved: 2023-07-17

## 2023-08-03 ENCOUNTER — PATIENT MESSAGE (OUTPATIENT)
Dept: FAMILY MEDICINE | Facility: CLINIC | Age: 88
End: 2023-08-03

## 2023-08-03 ENCOUNTER — TELEPHONE (OUTPATIENT)
Dept: VASCULAR SURGERY | Facility: CLINIC | Age: 88
End: 2023-08-03
Payer: MEDICARE

## 2023-08-03 NOTE — TELEPHONE ENCOUNTER
----- Message from Ángela Rocha sent at 8/3/2023  1:59 PM CDT -----  Contact: Patient  Type:  Sooner Appointment Request    Caller is requesting a sooner appointment.  Caller declined first available appointment listed below.  Caller will not accept being placed on the waitlist and is requesting a message be sent to doctor.    Name of Caller:  Patient  When is the first available appointment?  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 Thank you!

## 2023-08-04 ENCOUNTER — OFFICE VISIT (OUTPATIENT)
Dept: NEUROSURGERY | Facility: CLINIC | Age: 88
End: 2023-08-04
Payer: MEDICARE

## 2023-08-04 ENCOUNTER — HOSPITAL ENCOUNTER (OUTPATIENT)
Dept: RADIOLOGY | Facility: HOSPITAL | Age: 88
Discharge: HOME | End: 2023-08-04
Attending: PHYSICIAN ASSISTANT
Payer: MEDICARE

## 2023-08-04 VITALS
SYSTOLIC BLOOD PRESSURE: 112 MMHG | HEIGHT: 68 IN | HEART RATE: 58 BPM | RESPIRATION RATE: 18 BRPM | WEIGHT: 147 LBS | DIASTOLIC BLOOD PRESSURE: 78 MMHG | BODY MASS INDEX: 22.28 KG/M2

## 2023-08-04 DIAGNOSIS — S06.5XAA SDH (SUBDURAL HEMATOMA): Primary | ICD-10-CM

## 2023-08-04 DIAGNOSIS — I62.00 NONTRAUMATIC SUBDURAL HEMORRHAGE, UNSPECIFIED: ICD-10-CM

## 2023-08-04 PROCEDURE — 1159F MED LIST DOCD IN RCRD: CPT | Mod: CPTII,S$GLB,, | Performed by: PHYSICIAN ASSISTANT

## 2023-08-04 PROCEDURE — 1126F AMNT PAIN NOTED NONE PRSNT: CPT | Mod: CPTII,S$GLB,, | Performed by: PHYSICIAN ASSISTANT

## 2023-08-04 PROCEDURE — 1160F PR REVIEW ALL MEDS BY PRESCRIBER/CLIN PHARMACIST DOCUMENTED: ICD-10-PCS | Mod: CPTII,S$GLB,, | Performed by: PHYSICIAN ASSISTANT

## 2023-08-04 PROCEDURE — 3288F PR FALLS RISK ASSESSMENT DOCUMENTED: ICD-10-PCS | Mod: CPTII,S$GLB,, | Performed by: PHYSICIAN ASSISTANT

## 2023-08-04 PROCEDURE — 99213 OFFICE O/P EST LOW 20 MIN: CPT | Mod: S$GLB,,, | Performed by: PHYSICIAN ASSISTANT

## 2023-08-04 PROCEDURE — 1101F PT FALLS ASSESS-DOCD LE1/YR: CPT | Mod: CPTII,S$GLB,, | Performed by: PHYSICIAN ASSISTANT

## 2023-08-04 PROCEDURE — 70450 CT HEAD/BRAIN W/O DYE: CPT | Mod: TC,PO

## 2023-08-04 PROCEDURE — 1126F PR PAIN SEVERITY QUANTIFIED, NO PAIN PRESENT: ICD-10-PCS | Mod: CPTII,S$GLB,, | Performed by: PHYSICIAN ASSISTANT

## 2023-08-04 PROCEDURE — 1101F PR PT FALLS ASSESS DOC 0-1 FALLS W/OUT INJ PAST YR: ICD-10-PCS | Mod: CPTII,S$GLB,, | Performed by: PHYSICIAN ASSISTANT

## 2023-08-04 PROCEDURE — 70450 CT HEAD/BRAIN W/O DYE: CPT | Mod: 26,,, | Performed by: RADIOLOGY

## 2023-08-04 PROCEDURE — 1160F RVW MEDS BY RX/DR IN RCRD: CPT | Mod: CPTII,S$GLB,, | Performed by: PHYSICIAN ASSISTANT

## 2023-08-04 PROCEDURE — 1159F PR MEDICATION LIST DOCUMENTED IN MEDICAL RECORD: ICD-10-PCS | Mod: CPTII,S$GLB,, | Performed by: PHYSICIAN ASSISTANT

## 2023-08-04 PROCEDURE — 99213 PR OFFICE/OUTPT VISIT, EST, LEVL III, 20-29 MIN: ICD-10-PCS | Mod: S$GLB,,, | Performed by: PHYSICIAN ASSISTANT

## 2023-08-04 PROCEDURE — 3288F FALL RISK ASSESSMENT DOCD: CPT | Mod: CPTII,S$GLB,, | Performed by: PHYSICIAN ASSISTANT

## 2023-08-04 PROCEDURE — 70450 CT HEAD WITHOUT CONTRAST: ICD-10-PCS | Mod: 26,,, | Performed by: RADIOLOGY

## 2023-08-04 NOTE — PROGRESS NOTES
[facility] Neurosurgery - [facility]  Clinic Progress Note       PCP: Jian Alcazar MD    SUBJECTIVE:     Chief Complaint:   Chief Complaint   Patient presents with    Follow-up     Patient present to clinic today as 4 week follow up with CT. States of feeling well; has been off blood thinners       History of Present Illness:  Brit Jennings is a 88 y.o. male who presents for hospital follow up. He reports he is doing well. He experiences mild to moderate intermittent headaches. He is not taking ASA/Plavix.     Pertinent and recent history, provider evaluations, imaging and data reviewed in [facility]       HOSPITAL HISTORY 6/22/23: Brit Jennings is a 87 y.o. male transferred for neurosurgical evaluation. Patient sustained a ground level fall resulting in intracranial hemorrhage and right wrist fracture. CT head revealed bilateral subdural hematoma, parafalcine subdural hematoma and right front intraparenchymal hemorrhage. He is maintained on ASA/Plavix. Patient complains of wrist pain currently.       Past Medical History:   Diagnosis Date    Basal cell carcinoma 2020    L temple / radiation therapy    Cataract     Hypertension      Past Surgical History:   Procedure Laterality Date    APPENDECTOMY      EYE SURGERY      Moh's surgery on right temple area Right 2015    Cancerous spot    PROSTATE BIOPSY      TONSILLECTOMY       Family History   Problem Relation Age of Onset    Melanoma Neg Hx     Psoriasis Neg Hx     Lupus Neg Hx     Eczema Neg Hx      Social History     Tobacco Use    Smoking status: Never    Smokeless tobacco: Never   Substance Use Topics    Alcohol use: Not Currently      Review of patient's allergies indicates:   Allergen Reactions    Pollen extracts Other (See Comments)       Current Outpatient Medications:     acetaminophen (TYLENOL) 500 MG tablet, Take 2 tablets (1,000 mg total) by mouth every 6 (six) hours as needed for Pain., Disp: , Rfl: 0    albuterol  (PROVENTIL/VENTOLIN HFA) 90 mcg/actuation inhaler, Inhale 1-2 puffs into the lungs daily as needed for Shortness of Breath. Rescue, Disp: , Rfl:     ascorbic acid (VITAMIN C ORAL), Take 1 tablet by mouth 2 (two) times a day., Disp: , Rfl:     atorvastatin (LIPITOR) 80 MG tablet, Take 80 mg by mouth once daily., Disp: , Rfl:     cholecalciferol, vitamin D3, 125 mcg (5,000 unit) capsule, Take 5,000 Units by mouth once daily., Disp: , Rfl:     cyanocobalamin, vitamin B-12, 2,500 mcg Tab, Take 2,500 mcg by mouth once daily., Disp: , Rfl:     ketoconazole (NIZORAL) 2 % cream, AAA around mouth bid (Patient taking differently: Apply topically daily as needed (crack/dry corners of mouth).), Disp: 60 g, Rfl: 3    levETIRAcetam (KEPPRA) 500 MG Tab, Take 1 tablet (500 mg total) by mouth 2 (two) times daily., Disp: 60 tablet, Rfl: 0    lisinopriL (PRINIVIL,ZESTRIL) 20 MG tablet, Take 20 mg by mouth once daily., Disp: , Rfl:     mupirocin (BACTROBAN) 2 % ointment, Apply topically daily as needed (infection to right ankle)., Disp: , Rfl:     sodium chloride 1 gram tablet, Take 1 tablet (1 g total) by mouth 2 (two) times a day., Disp: 60 tablet, Rfl: 0    solifenacin (VESICARE) 10 MG tablet, Take 10 mg by mouth every evening., Disp: , Rfl:     SYMBICORT 160-4.5 mcg/actuation HFAA, Inhale 2 puffs into the lungs every 12 (twelve) hours. (Patient taking differently: Inhale 1-2 puffs into the lungs every evening.), Disp: 30 g, Rfl: 4    temazepam (RESTORIL) 30 mg capsule, Take 1 capsule (30 mg total) by mouth nightly as needed for Insomnia. (Patient taking differently: Take 30 mg by mouth every evening.), Disp: 90 capsule, Rfl: 1    triamcinolone acetonide 0.1% (KENALOG) 0.1 % cream, Apply topically daily as needed (rash)., Disp: , Rfl:     UNABLE TO FIND, Place 5 mLs under the tongue once daily. medication name: EDTA, Disp: , Rfl:     UNABLE TO FIND, Take 4 tablets by mouth every evening. medication name: Multi Mineral, Disp: , Rfl:  "    Current Facility-Administered Medications:     albuterol inhaler 2 puff, 2 puff, Inhalation, Q20 Min PRN, Farhana Riggins, NP    diphenhydrAMINE injection 25 mg, 25 mg, Intravenous, Once PRN, Farhana Riggins, NP    EPINEPHrine (EPIPEN) 0.3 mg/0.3 mL pen injection 0.3 mg, 0.3 mg, Intramuscular, PRN, Farhana Riggins, NP    methylPREDNISolone sodium succinate injection 40 mg, 40 mg, Intravenous, Once PRN, Farhana Riggins, NP    ondansetron disintegrating tablet 4 mg, 4 mg, Oral, Once PRN, Farhana Riggins, NP    sodium chloride 0.9% 500 mL flush bag, , Intravenous, PRN, Farhana Riggins, NP, Stopped at 12/15/21 1455    sodium chloride 0.9% flush 10 mL, 10 mL, Intravenous, PRN, Farhana Riggins, NP    Review of Systems:   Constitutional: no fever, chills or night sweats. No changes in weight   Eyes: no visual changes   ENT: no nasal congestion or sore throat   Respiratory: no cough or shortness of breath   Cardiovascular: no chest pain or palpitations   Gastrointestinal: no nausea or vomiting   Genitourinary: no hematuria or dysuria   Musculoskeletal: no arthralgias or myalgias  Neurological: no seizures or tremors           OBJECTIVE:     Vital Signs (Most Recent):  Pulse: (!) 58 (08/04/23 1007)  Resp: 18 (08/04/23 1007)  BP: 112/78 (08/04/23 1007)  Estimated body mass index is 22.35 kg/m² as calculated from the following:    Height as of this encounter: 5' 8" (1.727 m).    Weight as of this encounter: 66.7 kg (147 lb).    Physical Exam:   General: well developed, well nourished, no distress   Neurologic: Alert and oriented. Thought content appropriate. GCS 15.   Language: No aphasia  Speech: No dysarthria  Cranial nerves: face symmetric, tongue midline, CN II-XII grossly intact  No drift  Finger to nose intact  Head: normocephalic, atraumatic  Eyes: pupils equal, round, reactive to light with accomodation, EOMI  Neck: trachea midline, no JVD   Cardiovascular: no LE edema  Pulmonary: normal " respirations, no signs of respiratory distress  Skin: Skin is warm, dry and intact  Motor Strength: Moves all extremities spontaneously with good tone. No abnormal movements seen.   Gait: normal        Diagnostic Results:  I have independently reviewed the following imaging:  CT head reveals resolution of previously noted SDH, SAH, and IPH     ASSESSMENT/PLAN:     SDH (subdural hematoma)        Brit Jennings is a 88 y.o. male who is now approximately 6 weeks s/p fall resulting in ICH. CT head reveals resolution of acute bleed. He may restart blood thinners at this time if cardiology deems necessary. No follow up needed     Patient verbalized understanding of plan. Encouraged to call with any questions or concerns.     This note was partially dictated using voice recognition software, so please excuse any errors that were not corrected.

## 2023-08-07 ENCOUNTER — OFFICE VISIT (OUTPATIENT)
Dept: DERMATOLOGY | Facility: CLINIC | Age: 88
End: 2023-08-07
Payer: MEDICARE

## 2023-08-07 VITALS — WEIGHT: 147 LBS | BODY MASS INDEX: 22.28 KG/M2 | HEIGHT: 68 IN

## 2023-08-07 DIAGNOSIS — C44.92 SQUAMOUS CELL CARCINOMA OF SKIN: Primary | ICD-10-CM

## 2023-08-07 DIAGNOSIS — L57.0 ACTINIC KERATOSES: ICD-10-CM

## 2023-08-07 PROCEDURE — 17000 DESTRUCT PREMALG LESION: CPT | Mod: S$GLB,,, | Performed by: DERMATOLOGY

## 2023-08-07 PROCEDURE — 1160F RVW MEDS BY RX/DR IN RCRD: CPT | Mod: CPTII,S$GLB,, | Performed by: DERMATOLOGY

## 2023-08-07 PROCEDURE — 3288F FALL RISK ASSESSMENT DOCD: CPT | Mod: CPTII,S$GLB,, | Performed by: DERMATOLOGY

## 2023-08-07 PROCEDURE — 1100F PTFALLS ASSESS-DOCD GE2>/YR: CPT | Mod: CPTII,S$GLB,, | Performed by: DERMATOLOGY

## 2023-08-07 PROCEDURE — 3288F PR FALLS RISK ASSESSMENT DOCUMENTED: ICD-10-PCS | Mod: CPTII,S$GLB,, | Performed by: DERMATOLOGY

## 2023-08-07 PROCEDURE — 1159F PR MEDICATION LIST DOCUMENTED IN MEDICAL RECORD: ICD-10-PCS | Mod: CPTII,S$GLB,, | Performed by: DERMATOLOGY

## 2023-08-07 PROCEDURE — 17000 PR DESTRUCTION(LASER SURGERY,CRYOSURGERY,CHEMOSURGERY),PREMALIGNANT LESIONS,FIRST LESION: ICD-10-PCS | Mod: S$GLB,,, | Performed by: DERMATOLOGY

## 2023-08-07 PROCEDURE — 99213 PR OFFICE/OUTPT VISIT, EST, LEVL III, 20-29 MIN: ICD-10-PCS | Mod: 25,S$GLB,, | Performed by: DERMATOLOGY

## 2023-08-07 PROCEDURE — 99213 OFFICE O/P EST LOW 20 MIN: CPT | Mod: 25,S$GLB,, | Performed by: DERMATOLOGY

## 2023-08-07 PROCEDURE — 1160F PR REVIEW ALL MEDS BY PRESCRIBER/CLIN PHARMACIST DOCUMENTED: ICD-10-PCS | Mod: CPTII,S$GLB,, | Performed by: DERMATOLOGY

## 2023-08-07 PROCEDURE — 1159F MED LIST DOCD IN RCRD: CPT | Mod: CPTII,S$GLB,, | Performed by: DERMATOLOGY

## 2023-08-07 PROCEDURE — 1100F PR PT FALLS ASSESS DOC 2+ FALLS/FALL W/INJURY/YR: ICD-10-PCS | Mod: CPTII,S$GLB,, | Performed by: DERMATOLOGY

## 2023-08-07 NOTE — PATIENT INSTRUCTIONS

## 2023-08-07 NOTE — PROGRESS NOTES
Subjective:      Patient ID:  Brit Jennings is a 88 y.o. male who presents for   Chief Complaint   Patient presents with    Follow-up     BX site      LOV 5/18/23- NUB    Skin, left upper lateral forehead, shave biopsy:   -CRATERIFORM SQUAMOPROLIFERATIVE LESION, see comment   Comment:  This lesion is cystic with underlying inflammation and is reminiscent of a resolving keratoacanthoma.   There are no  dermal islands of atypical squamous cells.  There is prominent solar elastosis.  There are atypical keratinocytes at the base of the epidermis.  This could also represent a hyperkeratotic actinic keratosis. The lesion is transected the base.  Correlate with clinical findings    Patient here today for F/U on bx site, no new concerns.    Derm HX:  Crateriform squamo proliferative lesion- 5/2023   H/o Mohs on R temple[facility]  BCC on R temple, radiation therapy 08/2020 Dr Dewitt  Past xrt on scalp, nose, R eyebrow   L neck BCC 08/2020 [facility]        Review of Systems   Constitutional:  Negative for fever, chills and fatigue.   Respiratory:  Negative for cough and shortness of breath.    Skin:  Positive for dry skin, activity-related sunscreen use and wears hat. Negative for daily sunscreen use.   Hematologic/Lymphatic: Bruises/bleeds easily.       Objective:   Physical Exam   Constitutional: He appears well-developed and well-nourished. No distress.   Neurological: He is alert and oriented to person, place, and time. He is not disoriented.   Psychiatric: He has a normal mood and affect.   Skin:   Areas Examined (abnormalities noted in diagram):   Scalp / Hair Palpated and Inspected  Head / Face Inspection Performed  Neck Inspection Performed            Diagram Legend     Erythematous scaling macule/papule c/w actinic keratosis       Vascular papule c/w angioma      Pigmented verrucoid papule/plaque c/w seborrheic keratosis      Yellow umbilicated papule c/w sebaceous hyperplasia       Irregularly shaped tan macule c/w lentigo     1-2 mm smooth white papules consistent with Milia      Movable subcutaneous cyst with punctum c/w epidermal inclusion cyst      Subcutaneous movable cyst c/w pilar cyst      Firm pink to brown papule c/w dermatofibroma      Pedunculated fleshy papule(s) c/w skin tag(s)      Evenly pigmented macule c/w junctional nevus     Mildly variegated pigmented, slightly irregular-bordered macule c/w mildly atypical nevus      Flesh colored to evenly pigmented papule c/w intradermal nevus       Pink pearly papule/plaque c/w basal cell carcinoma      Erythematous hyperkeratotic cursted plaque c/w SCC      Surgical scar with no sign of skin cancer recurrence      Open and closed comedones      Inflammatory papules and pustules      Verrucoid papule consistent consistent with wart     Erythematous eczematous patches and plaques     Dystrophic onycholytic nail with subungual debris c/w onychomycosis     Umbilicated papule    Erythematous-base heme-crusted tan verrucoid plaque consistent with inflamed seborrheic keratosis     Erythematous Silvery Scaling Plaque c/w Psoriasis     See annotation        Assessment / Plan:        Squamous cell carcinoma of skin, left upper forehead  Scar, NER, continue to monitor  strict sun protection  Patient instructed in importance in daily broad spectrum sun protection of at least spf 30. Mineral sunscreen ingredients preferred (Zinc +/- Titanium) and can be found OTC.   Recommend Elta MD for daily use on face and neck.  Patient encouraged to wear hat for all outdoor exposure.   Also discussed sun avoidance and use of protective clothing.      Actinic keratoses  Cryosurgery Procedure Note    Verbal consent from the patient is obtained and the patient is aware of the precancerous quality and need for treatment of these lesions. Liquid nitrogen cryosurgery is applied to the 1 actinic keratoses, as detailed in the physical exam, to produce a freeze injury.  The patient is aware that blisters may form and is instructed on wound care with gentle cleansing and use of vaseline ointment to keep moist until healed. The patient is supplied a handout on cryosurgery and is instructed to call if lesions do not completely resolve.             Follow up in about 6 months (around 2/7/2024).

## 2023-08-09 ENCOUNTER — OFFICE VISIT (OUTPATIENT)
Dept: VASCULAR SURGERY | Facility: CLINIC | Age: 88
End: 2023-08-09
Payer: MEDICARE

## 2023-08-09 VITALS
SYSTOLIC BLOOD PRESSURE: 191 MMHG | HEIGHT: 68 IN | HEART RATE: 56 BPM | BODY MASS INDEX: 22.35 KG/M2 | DIASTOLIC BLOOD PRESSURE: 86 MMHG

## 2023-08-09 DIAGNOSIS — I65.23 BILATERAL CAROTID ARTERY STENOSIS: ICD-10-CM

## 2023-08-09 DIAGNOSIS — I70.8 LEFT SUBCLAVIAN ARTERY OCCLUSION: ICD-10-CM

## 2023-08-09 PROCEDURE — 3288F FALL RISK ASSESSMENT DOCD: CPT | Mod: CPTII,S$GLB,, | Performed by: THORACIC SURGERY (CARDIOTHORACIC VASCULAR SURGERY)

## 2023-08-09 PROCEDURE — 1100F PTFALLS ASSESS-DOCD GE2>/YR: CPT | Mod: CPTII,S$GLB,, | Performed by: THORACIC SURGERY (CARDIOTHORACIC VASCULAR SURGERY)

## 2023-08-09 PROCEDURE — 1159F MED LIST DOCD IN RCRD: CPT | Mod: CPTII,S$GLB,, | Performed by: THORACIC SURGERY (CARDIOTHORACIC VASCULAR SURGERY)

## 2023-08-09 PROCEDURE — 1160F RVW MEDS BY RX/DR IN RCRD: CPT | Mod: CPTII,S$GLB,, | Performed by: THORACIC SURGERY (CARDIOTHORACIC VASCULAR SURGERY)

## 2023-08-09 PROCEDURE — 99204 OFFICE O/P NEW MOD 45 MIN: CPT | Mod: S$GLB,,, | Performed by: THORACIC SURGERY (CARDIOTHORACIC VASCULAR SURGERY)

## 2023-08-09 PROCEDURE — 99204 PR OFFICE/OUTPT VISIT, NEW, LEVL IV, 45-59 MIN: ICD-10-PCS | Mod: S$GLB,,, | Performed by: THORACIC SURGERY (CARDIOTHORACIC VASCULAR SURGERY)

## 2023-08-09 PROCEDURE — 1159F PR MEDICATION LIST DOCUMENTED IN MEDICAL RECORD: ICD-10-PCS | Mod: CPTII,S$GLB,, | Performed by: THORACIC SURGERY (CARDIOTHORACIC VASCULAR SURGERY)

## 2023-08-09 PROCEDURE — 1160F PR REVIEW ALL MEDS BY PRESCRIBER/CLIN PHARMACIST DOCUMENTED: ICD-10-PCS | Mod: CPTII,S$GLB,, | Performed by: THORACIC SURGERY (CARDIOTHORACIC VASCULAR SURGERY)

## 2023-08-09 PROCEDURE — 99999 PR PBB SHADOW E&M-EST. PATIENT-LVL III: CPT | Mod: PBBFAC,,, | Performed by: THORACIC SURGERY (CARDIOTHORACIC VASCULAR SURGERY)

## 2023-08-09 PROCEDURE — 3288F PR FALLS RISK ASSESSMENT DOCUMENTED: ICD-10-PCS | Mod: CPTII,S$GLB,, | Performed by: THORACIC SURGERY (CARDIOTHORACIC VASCULAR SURGERY)

## 2023-08-09 PROCEDURE — 99999 PR PBB SHADOW E&M-EST. PATIENT-LVL III: ICD-10-PCS | Mod: PBBFAC,,, | Performed by: THORACIC SURGERY (CARDIOTHORACIC VASCULAR SURGERY)

## 2023-08-09 PROCEDURE — 1100F PR PT FALLS ASSESS DOC 2+ FALLS/FALL W/INJURY/YR: ICD-10-PCS | Mod: CPTII,S$GLB,, | Performed by: THORACIC SURGERY (CARDIOTHORACIC VASCULAR SURGERY)

## 2023-08-09 NOTE — PROGRESS NOTES
This patient has carotid occlusive disease.  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  He has had at least 2 episodes of falling and more recently fractured his right wrist.  He is being treated by the orthopedist for this.    He denies any history of active coronary artery disease.  Medicines are noted and part of the epic record.  He denies any major significant surgical history.  Family history is not significant at this time.    Medicines are part of the epic record.  On exam vital signs are stable.  Pupils are equal and round reactive to light.  Neck is supple.  Chest is equal breath sounds.  Heart is in a regular rate and rhythm.  Abdomen is benign.  Perfusion to the legs and feet seems to be satisfactory.    Left radial artery pulses are diminished compared to the right.    The recent CTA is noted.  He has an 80% stenosis of the origin of the right carotid artery.  He has a 70% right vertebral artery stenosis his left subclavian artery is occluded.    Options would include medical management versus surgical intervention.  I explained both to the patient wife.  They seemed to be understanding.    If he decides to have surgery I think extra-anatomic bypass to the left common carotid and left subclavian arteries would be helpful.  He will think this over and decide about further management.

## 2023-08-17 ENCOUNTER — TELEPHONE (OUTPATIENT)
Dept: FAMILY MEDICINE | Facility: CLINIC | Age: 88
End: 2023-08-17

## 2023-08-17 ENCOUNTER — OFFICE VISIT (OUTPATIENT)
Dept: FAMILY MEDICINE | Facility: CLINIC | Age: 88
End: 2023-08-17
Payer: MEDICARE

## 2023-08-17 DIAGNOSIS — S52.502A CLOSED FRACTURE OF DISTAL END OF LEFT RADIUS, UNSPECIFIED FRACTURE MORPHOLOGY, INITIAL ENCOUNTER: ICD-10-CM

## 2023-08-17 DIAGNOSIS — I65.21 RIGHT-SIDED EXTRACRANIAL CAROTID ARTERY STENOSIS: ICD-10-CM

## 2023-08-17 DIAGNOSIS — I10 ESSENTIAL HYPERTENSION: Chronic | ICD-10-CM

## 2023-08-17 DIAGNOSIS — S52.501S CLOSED FRACTURE OF DISTAL END OF RIGHT RADIUS, UNSPECIFIED FRACTURE MORPHOLOGY, SEQUELA: ICD-10-CM

## 2023-08-17 DIAGNOSIS — S52.615A CLOSED NONDISPLACED FRACTURE OF STYLOID PROCESS OF LEFT ULNA, INITIAL ENCOUNTER: ICD-10-CM

## 2023-08-17 DIAGNOSIS — I77.1 STRICTURE OF ARTERY: ICD-10-CM

## 2023-08-17 DIAGNOSIS — S06.5X0S: Primary | ICD-10-CM

## 2023-08-17 DIAGNOSIS — F51.01 PRIMARY INSOMNIA: ICD-10-CM

## 2023-08-17 DIAGNOSIS — J43.1 PANLOBULAR EMPHYSEMA: ICD-10-CM

## 2023-08-17 DIAGNOSIS — S06.330S: ICD-10-CM

## 2023-08-17 DIAGNOSIS — Z85.46 HISTORY OF PROSTATE CANCER: ICD-10-CM

## 2023-08-17 PROCEDURE — 3288F PR FALLS RISK ASSESSMENT DOCUMENTED: ICD-10-PCS | Mod: CPTII,S$GLB,, | Performed by: FAMILY MEDICINE

## 2023-08-17 PROCEDURE — 99214 PR OFFICE/OUTPT VISIT, EST, LEVL IV, 30-39 MIN: ICD-10-PCS | Mod: S$GLB,,, | Performed by: FAMILY MEDICINE

## 2023-08-17 PROCEDURE — 99214 OFFICE O/P EST MOD 30 MIN: CPT | Mod: S$GLB,,, | Performed by: FAMILY MEDICINE

## 2023-08-17 PROCEDURE — 3288F FALL RISK ASSESSMENT DOCD: CPT | Mod: CPTII,S$GLB,, | Performed by: FAMILY MEDICINE

## 2023-08-17 PROCEDURE — 1100F PR PT FALLS ASSESS DOC 2+ FALLS/FALL W/INJURY/YR: ICD-10-PCS | Mod: CPTII,S$GLB,, | Performed by: FAMILY MEDICINE

## 2023-08-17 PROCEDURE — 1159F PR MEDICATION LIST DOCUMENTED IN MEDICAL RECORD: ICD-10-PCS | Mod: CPTII,S$GLB,, | Performed by: FAMILY MEDICINE

## 2023-08-17 PROCEDURE — 1159F MED LIST DOCD IN RCRD: CPT | Mod: CPTII,S$GLB,, | Performed by: FAMILY MEDICINE

## 2023-08-17 PROCEDURE — 1100F PTFALLS ASSESS-DOCD GE2>/YR: CPT | Mod: CPTII,S$GLB,, | Performed by: FAMILY MEDICINE

## 2023-08-17 NOTE — TELEPHONE ENCOUNTER
----- Message from Stephanie Babin sent at 8/17/2023 12:18 PM CDT -----  Pt needs to schedule a 3 month f/u. Pt prefers morning appointments.  [phone]

## 2023-08-18 ENCOUNTER — PATIENT MESSAGE (OUTPATIENT)
Dept: FAMILY MEDICINE | Facility: CLINIC | Age: 88
End: 2023-08-18

## 2023-08-19 VITALS
HEIGHT: 68 IN | BODY MASS INDEX: 22.43 KG/M2 | DIASTOLIC BLOOD PRESSURE: 70 MMHG | HEART RATE: 56 BPM | SYSTOLIC BLOOD PRESSURE: 100 MMHG | WEIGHT: 148 LBS

## 2023-08-19 PROBLEM — I65.21 RIGHT-SIDED EXTRACRANIAL CAROTID ARTERY STENOSIS: Status: ACTIVE | Noted: 2023-04-14

## 2023-08-19 PROBLEM — I65.22 CAROTID ARTERY STENOSIS, SYMPTOMATIC, LEFT: Chronic | Status: RESOLVED | Noted: 2023-04-14 | Resolved: 2023-08-19

## 2023-08-19 NOTE — PROGRESS NOTES
SUBJECTIVE:    Patient ID: Brit Jennings is a 88 y.o. male.    Chief Complaint: Follow-up (Fall 2 months ago, right wrist pain, no bottles, abc )    88-year-old male weeks ago gained a fall admitted [facility].  There he was diagnosed with intraparenchymal hemorrhage and subdural hematoma.  Also was a right distal radius fracture and ulnar fracture.  He had 2 episodes of falling.  He underwent rehab after this.  Currently followed by vascular surgeon Dr. Best for right carotid stenosis 69-70%.  Has a left subclavian obstruction also.  Currently they are considering bypass surgery for the subclavian and carotid.  Currently not on any blood thinners due to his falls.    Dr. [facility]-has patient in a right hand splint for the last 8 weeks.    He can walk Well without a cane.        No results displayed because visit has over 200 results.      Admission on 06/22/2023, Discharged on 06/22/2023   Component Date Value Ref Range Status    WBC 06/22/2023 3.87 (L)  3.90 - 12.70 K/uL Final    RBC 06/22/2023 3.84 (L)  4.60 - 6.20 M/uL Final    Hemoglobin 06/22/2023 13.2 (L)  14.0 - 18.0 g/dL Final    Hematocrit 06/22/2023 37.7 (L)  40.0 - 54.0 % Final    MCV 06/22/2023 98  82 - 98 fL Final    MCH 06/22/2023 34.4 (H)  27.0 - 31.0 pg Final    MCHC 06/22/2023 35.0  32.0 - 36.0 g/dL Final    RDW 06/22/2023 12.9  11.5 - 14.5 % Final    Platelets 06/22/2023 131 (L)  150 - 450 K/uL Final    MPV 06/22/2023 11.7  9.2 - 12.9 fL Final    Immature Granulocytes 06/22/2023 1.0 (H)  0.0 - 0.5 % Final    Gran # (ANC) 06/22/2023 2.5  1.8 - 7.7 K/uL Final    Immature Grans (Abs) 06/22/2023 0.04  0.00 - 0.04 K/uL Final    Lymph # 06/22/2023 0.6 (L)  1.0 - 4.8 K/uL Final    Mono # 06/22/2023 0.7  0.3 - 1.0 K/uL Final    Eos # 06/22/2023 0.0  0.0 - 0.5 K/uL Final    Baso # 06/22/2023 0.01  0.00 - 0.20 K/uL Final    nRBC 06/22/2023 0  0 /100 WBC Final    Gran % 06/22/2023 65.7  38.0 - 73.0 % Final    Lymph %  06/22/2023 14.2 (L)  18.0 - 48.0 % Final    Mono % 06/22/2023 17.8 (H)  4.0 - 15.0 % Final    Eosinophil % 06/22/2023 1.0  0.0 - 8.0 % Final    Basophil % 06/22/2023 0.3  0.0 - 1.9 % Final    Differential Method 06/22/2023 Automated   Final    Sodium 06/22/2023 131 (L)  136 - 145 mmol/L Final    Potassium 06/22/2023 4.0  3.5 - 5.1 mmol/L Final    Chloride 06/22/2023 96  95 - 110 mmol/L Final    CO2 06/22/2023 27  23 - 29 mmol/L Final    Glucose 06/22/2023 105  70 - 110 mg/dL Final    BUN 06/22/2023 14  8 - 23 mg/dL Final    Creatinine 06/22/2023 0.9  0.5 - 1.4 mg/dL Final    Calcium 06/22/2023 9.4  8.7 - 10.5 mg/dL Final    Total Protein 06/22/2023 7.1  6.0 - 8.4 g/dL Final    Albumin 06/22/2023 4.3  3.5 - 5.2 g/dL Final    Total Bilirubin 06/22/2023 1.0  0.1 - 1.0 mg/dL Final    Alkaline Phosphatase 06/22/2023 60  55 - 135 U/L Final    AST 06/22/2023 35  10 - 40 U/L Final    ALT 06/22/2023 23  10 - 44 U/L Final    eGFR 06/22/2023 >60.0  >60 mL/min/1.73 m^2 Final    Anion Gap 06/22/2023 8  8 - 16 mmol/L Final    Prothrombin Time 06/22/2023 11.4  9.0 - 12.5 sec Final    INR 06/22/2023 1.0  0.8 - 1.2 Final    TSH 06/22/2023 2.350  0.340 - 5.600 uIU/mL Final    Cholesterol 06/22/2023 67 (L)  120 - 199 mg/dL Final    Triglycerides 06/22/2023 35  30 - 150 mg/dL Final    HDL 06/22/2023 31 (L)  40 - 75 mg/dL Final    LDL Cholesterol 06/22/2023 29.0 (L)  63.0 - 159.0 mg/dL Final    HDL/Cholesterol Ratio 06/22/2023 46.3  20.0 - 50.0 % Final    Total Cholesterol/HDL Ratio 06/22/2023 2.2  2.0 - 5.0 Final    Non-HDL Cholesterol 06/22/2023 36  mg/dL Final    Troponin I High Sensitivity 06/22/2023 12.6  0.0 - 14.9 pg/mL Final    BNP 06/22/2023 344 (H)  0 - 99 pg/mL Final    Group & Rh 06/22/2023 O POS   Final    Indirect Chucho 06/22/2023 NEG   Final    Specimen Outdate 06/22/2023 06/25/2023 23:59   Final    UNIT NUMBER 06/22/2023 [id number]   Final    Product Code 06/22/2023 [id number]   Final    DISPENSE STATUS 06/22/2023  TRANSFUSED   Final    CODING SYSTEM 06/22/2023 [id number]   Final    Unit Blood Type Code 06/22/2023 1700   Final    Unit Blood Type 06/22/2023 B NEG   Final    Unit Expiration 06/22/2023 [id number]   Final    CROSSMATCH INTERPRETATION 06/22/2023 Not Required   Final   Office Visit on 05/18/2023   Component Date Value Ref Range Status    Final Pathologic Diagnosis 05/18/2023    Final                    Value:Skin, left upper lateral forehead, shave biopsy:  -CRATERIFORM SQUAMOPROLIFERATIVE LESION, see comment    Comment:  This lesion is cystic with underlying inflammation and is reminiscent of a resolving keratoacanthoma.   There are no  dermal islands of atypical squamous cells.  There is prominent solar elastosis.  There are atypical keratinocytes at the base   of the epidermis.  This could also represent a hyperkeratotic actinic keratosis.  The lesion is transected the base.  Correlate with clinical findings.      Gross 05/18/2023    Final                    Value:Container Label: Clinic Number/AP Number:  [id number] / [id number], and &quot;left lateral upper forehead&quot;    Received in formalin is a 7 x 7 x 1 mm shave biopsy fragment of thickened, tan-white skin.  Specimen is inked, trisected, and entirely submitted in [email or web address]--[id number].    DENNY Haywood        Microscopic Exam 05/18/2023    Final                    Value:Sections of skin show an invaginated cystic lesion with an undulating epidermis with underlying chronic inflammation and prominent solar elastosis .  The epidermis shows eosinophilic keratinocytes with atypia of the basal layer. Several deeper levels   were examined.  Ki-67 immunohistochemical stain highlights increased proliferative activity basal layer to mid epidermal keratinocytes.  Immunohistochemical stain was reviewed in conjunction with adequate positive control.       Disclaimer 05/18/2023 Unless the case is a 'gross only' or additional testing only, the final diagnosis for each specimen is  based on a microscopic examination of appropriate tissue sections.   Final   Admission on 04/14/2023, Discharged on 04/15/2023   Component Date Value Ref Range Status    WBC 04/14/2023 3.81 (L)  3.90 - 12.70 K/uL Final    RBC 04/14/2023 3.74 (L)  4.60 - 6.20 M/uL Final    Hemoglobin 04/14/2023 12.9 (L)  14.0 - 18.0 g/dL Final    Hematocrit 04/14/2023 36.6 (L)  40.0 - 54.0 % Final    MCV 04/14/2023 98  82 - 98 fL Final    MCH 04/14/2023 34.5 (H)  27.0 - 31.0 pg Final    MCHC 04/14/2023 35.2  32.0 - 36.0 g/dL Final    RDW 04/14/2023 12.3  11.5 - 14.5 % Final    Platelets 04/14/2023 164  150 - 450 K/uL Final    MPV 04/14/2023 11.4  9.2 - 12.9 fL Final    Immature Granulocytes 04/14/2023 0.3  0.0 - 0.5 % Final    Gran # (ANC) 04/14/2023 1.4 (L)  1.8 - 7.7 K/uL Final    Immature Grans (Abs) 04/14/2023 0.01  0.00 - 0.04 K/uL Final    Lymph # 04/14/2023 1.5  1.0 - 4.8 K/uL Final    Mono # 04/14/2023 0.8  0.3 - 1.0 K/uL Final    Eos # 04/14/2023 0.1  0.0 - 0.5 K/uL Final    Baso # 04/14/2023 0.02  0.00 - 0.20 K/uL Final    nRBC 04/14/2023 0  0 /100 WBC Final    Gran % 04/14/2023 36.0 (L)  38.0 - 73.0 % Final    Lymph % 04/14/2023 39.1  18.0 - 48.0 % Final    Mono % 04/14/2023 20.7 (H)  4.0 - 15.0 % Final    Eosinophil % 04/14/2023 3.4  0.0 - 8.0 % Final    Basophil % 04/14/2023 0.5  0.0 - 1.9 % Final    Differential Method 04/14/2023 Automated   Final    Sodium 04/14/2023 127 (L)  136 - 145 mmol/L Final    Potassium 04/14/2023 3.7  3.5 - 5.1 mmol/L Final    Chloride 04/14/2023 96  95 - 110 mmol/L Final    CO2 04/14/2023 23  23 - 29 mmol/L Final    Glucose 04/14/2023 99  70 - 110 mg/dL Final    BUN 04/14/2023 13  8 - 23 mg/dL Final    Creatinine 04/14/2023 0.8  0.5 - 1.4 mg/dL Final    Calcium 04/14/2023 9.6  8.7 - 10.5 mg/dL Final    Total Protein 04/14/2023 7.9  6.0 - 8.4 g/dL Final    Albumin 04/14/2023 4.4  3.5 - 5.2 g/dL Final    Total Bilirubin 04/14/2023 0.6  0.1 - 1.0 mg/dL Final    Alkaline Phosphatase 04/14/2023 61   55 - 135 U/L Final    AST 04/14/2023 29  10 - 40 U/L Final    ALT 04/14/2023 19  10 - 44 U/L Final    Anion Gap 04/14/2023 8  8 - 16 mmol/L Final    eGFR 04/14/2023 >60.0  >60 mL/min/1.73 m^2 Final    Prothrombin Time 04/14/2023 11.3  9.0 - 12.5 sec Final    INR 04/14/2023 1.0  0.8 - 1.2 Final    Benzodiazepines 04/14/2023 Presumptive Positive (A)  Negative Final    Cocaine (Metab.) 04/14/2023 Negative  Negative Final    Opiate Scrn, Ur 04/14/2023 Negative  Negative Final    Barbiturate Screen, Ur 04/14/2023 Negative  Negative Final    Amphetamine Screen, Ur 04/14/2023 Negative  Negative Final    THC 04/14/2023 Negative  Negative Final    Phencyclidine 04/14/2023 Negative  Negative Final    Creatinine, Urine 04/14/2023 <10.0 (L)  23.0 - 375.0 mg/dL Final    Toxicology Information 04/14/2023 SEE COMMENT   Final    Prothrombin Time 04/14/2023 11.3  9.0 - 12.5 sec Final    INR 04/14/2023 1.0  0.8 - 1.2 Final    aPTT 04/14/2023 29.1  21.0 - 32.0 sec Final    TSH 04/14/2023 3.140  0.340 - 5.600 uIU/mL Final    Troponin I High Sensitivity 04/14/2023 9.2  0.0 - 14.9 pg/mL Final    Magnesium 04/14/2023 2.1  1.6 - 2.6 mg/dL Final    Group & Rh 04/14/2023 O POS   Final    Indirect Chucho 04/14/2023 NEG   Final    Specimen Outdate 04/14/2023 04/17/2023 23:59   Final    Specimen UA 04/14/2023 Urine, Clean Catch   Final    Color, UA 04/14/2023 Colorless (A)  Yellow, Straw, [place] Final    Appearance, UA 04/14/2023 Clear  Clear Final    pH, UA 04/14/2023 8.0  5.0 - 8.0 Final    Specific Gravity, UA 04/14/2023 1.010  1.005 - 1.030 Final    Protein, UA 04/14/2023 Negative  Negative Final    Glucose, UA 04/14/2023 Negative  Negative Final    Ketones, UA 04/14/2023 Negative  Negative Final    Bilirubin (UA) 04/14/2023 Negative  Negative Final    Occult Blood UA 04/14/2023 Negative  Negative Final    Nitrite, UA 04/14/2023 Negative  Negative Final    Urobilinogen, UA 04/14/2023 Negative  Negative EU/dL Final    Leukocytes, UA  04/14/2023 Negative  Negative Final    POC Glucose 04/14/2023 109  70 - 110 Final    POC Creatinine 04/14/2023 0.9  0.5 - 1.4 mg/dL Final    Sample 04/14/2023 VENOUS   Final    Troponin I High Sensitivity 04/14/2023 12.1  0.0 - 14.9 pg/mL Final    Troponin I High Sensitivity 04/15/2023 11.1  0.0 - 14.9 pg/mL Final    Sodium 04/15/2023 133 (L)  136 - 145 mmol/L Final    Potassium 04/15/2023 3.5  3.5 - 5.1 mmol/L Final    Chloride 04/15/2023 98  95 - 110 mmol/L Final    CO2 04/15/2023 23  23 - 29 mmol/L Final    Glucose 04/15/2023 100  70 - 110 mg/dL Final    BUN 04/15/2023 12  8 - 23 mg/dL Final    Creatinine 04/15/2023 0.8  0.5 - 1.4 mg/dL Final    Calcium 04/15/2023 8.9  8.7 - 10.5 mg/dL Final    Total Protein 04/15/2023 6.7  6.0 - 8.4 g/dL Final    Albumin 04/15/2023 3.9  3.5 - 5.2 g/dL Final    Total Bilirubin 04/15/2023 1.0  0.1 - 1.0 mg/dL Final    Alkaline Phosphatase 04/15/2023 57  55 - 135 U/L Final    AST 04/15/2023 27  10 - 40 U/L Final    ALT 04/15/2023 18  10 - 44 U/L Final    Anion Gap 04/15/2023 12  8 - 16 mmol/L Final    eGFR 04/15/2023 >60.0  >60 mL/min/1.73 m^2 Final    Magnesium 04/15/2023 2.3  1.6 - 2.6 mg/dL Final    WBC 04/15/2023 3.03 (L)  3.90 - 12.70 K/uL Final    RBC 04/15/2023 3.68 (L)  4.60 - 6.20 M/uL Final    Hemoglobin 04/15/2023 12.5 (L)  14.0 - 18.0 g/dL Final    Hematocrit 04/15/2023 36.2 (L)  40.0 - 54.0 % Final    MCV 04/15/2023 98  82 - 98 fL Final    MCH 04/15/2023 34.0 (H)  27.0 - 31.0 pg Final    MCHC 04/15/2023 34.5  32.0 - 36.0 g/dL Final    RDW 04/15/2023 12.4  11.5 - 14.5 % Final    Platelets 04/15/2023 144 (L)  150 - 450 K/uL Final    MPV 04/15/2023 11.2  9.2 - 12.9 fL Final    Immature Granulocytes 04/15/2023 0.7 (H)  0.0 - 0.5 % Final    Gran # (ANC) 04/15/2023 1.2 (L)  1.8 - 7.7 K/uL Final    Immature Grans (Abs) 04/15/2023 0.02  0.00 - 0.04 K/uL Final    Lymph # 04/15/2023 0.9 (L)  1.0 - 4.8 K/uL Final    Mono # 04/15/2023 0.8  0.3 - 1.0 K/uL Final    Eos # 04/15/2023  0.1  0.0 - 0.5 K/uL Final    Baso # 04/15/2023 0.03  0.00 - 0.20 K/uL Final    nRBC 04/15/2023 0  0 /100 WBC Final    Gran % 04/15/2023 39.9  38.0 - 73.0 % Final    Lymph % 04/15/2023 28.7  18.0 - 48.0 % Final    Mono % 04/15/2023 25.7 (H)  4.0 - 15.0 % Final    Eosinophil % 04/15/2023 4.0  0.0 - 8.0 % Final    Basophil % 04/15/2023 1.0  0.0 - 1.9 % Final    Differential Method 04/15/2023 Automated   Final    CPK MB 04/15/2023 2.9  0.1 - 6.5 ng/mL Final    Cholesterol 04/15/2023 136  120 - 199 mg/dL Final    Triglycerides 04/15/2023 71  30 - 150 mg/dL Final    HDL 04/15/2023 31 (L)  40 - 75 mg/dL Final    LDL Cholesterol 04/15/2023 90.8  63.0 - 159.0 mg/dL Final    HDL/Cholesterol Ratio 04/15/2023 22.8  20.0 - 50.0 % Final    Total Cholesterol/HDL Ratio 04/15/2023 4.4  2.0 - 5.0 Final    Non-HDL Cholesterol 04/15/2023 105  mg/dL Final   Hospital Outpatient Visit on 03/15/2023   Component Date Value Ref Range Status    POC Creatinine 03/15/2023 1.0  0.5 - 1.4 mg/dL Final    Sample 03/15/2023 VENOUS   Final       Past Medical History:   Diagnosis Date    Basal cell carcinoma 2020    L temple / radiation therapy    Cataract     Hypertension     Squamous cell carcinoma of skin      Social History     Socioeconomic History    Marital status:    Tobacco Use    Smoking status: Never    Smokeless tobacco: Never   Substance and Sexual Activity    Alcohol use: Not Currently    Sexual activity: Not Currently     Social Determinants of Health     Financial Resource Strain: Low Risk  (8/15/2023)    Overall Financial Resource Strain (CARDIA)     Difficulty of Paying Living Expenses: Not hard at all   Food Insecurity: No Food Insecurity (8/15/2023)    Hunger Vital Sign     Worried About Running Out of Food in the Last Year: Never true     Ran Out of Food in the Last Year: Never true   Transportation Needs: No Transportation Needs (8/15/2023)    PRAPARE - Transportation     Lack of Transportation (Medical): No     Lack of  Transportation (Non-Medical): No   Physical Activity: Insufficiently Active (8/15/2023)    Exercise Vital Sign     Days of Exercise per Week: 2 days     Minutes of Exercise per Session: 20 min   Stress: No Stress Concern Present (8/15/2023)    [place] [place] [employer] - Occupational Stress Questionnaire     Feeling of Stress : Only a little   Social Connections: Socially Integrated (8/15/2023)    Social Connection and Isolation Panel [NHANES]     Frequency of Communication with Friends and Family: More than three times a week     Frequency of Social Gatherings with Friends and Family: More than three times a week     Attends Islam Services: More than 4 times per year     Active Member of Clubs or Organizations: Yes     Attends Club or Organization Meetings: More than 4 times per year     Marital Status:    Housing Stability: Low Risk  (8/15/2023)    Housing Stability Vital Sign     Unable to Pay for Housing in the Last Year: No     Number of Places Lived in the Last Year: 1     Unstable Housing in the Last Year: No     Past Surgical History:   Procedure Laterality Date    APPENDECTOMY      EYE SURGERY      Moh's surgery on right temple area Right 2015    Cancerous spot    PROSTATE BIOPSY      TONSILLECTOMY       Family History   Problem Relation Age of Onset    Melanoma Neg Hx     Psoriasis Neg Hx     Lupus Neg Hx     Eczema Neg Hx        The CVD Risk score (D'Agostino, et al., 2008) failed to calculate for the following reasons:    The 2008 CVD risk score is only valid for ages 30 to 74    The patient has a prior MI, stroke, CHF, or peripheral vascular disease diagnosis    All of your core healthy metrics are met.      Review of patient's allergies indicates:   Allergen Reactions    Pollen extracts Other (See Comments)       Current Outpatient Medications:     acetaminophen (TYLENOL) 500 MG tablet, Take 2 tablets (1,000 mg total) by mouth every 6 (six) hours as needed for Pain., Disp: ,  Rfl: 0    albuterol (PROVENTIL/VENTOLIN HFA) 90 mcg/actuation inhaler, Inhale 1-2 puffs into the lungs daily as needed for Shortness of Breath. Rescue, Disp: , Rfl:     ascorbic acid (VITAMIN C ORAL), Take 1 tablet by mouth 2 (two) times a day., Disp: , Rfl:     atorvastatin (LIPITOR) 80 MG tablet, Take 80 mg by mouth once daily., Disp: , Rfl:     cholecalciferol, vitamin D3, 125 mcg (5,000 unit) capsule, Take 5,000 Units by mouth once daily., Disp: , Rfl:     cyanocobalamin, vitamin B-12, 2,500 mcg Tab, Take 2,500 mcg by mouth once daily., Disp: , Rfl:     ketoconazole (NIZORAL) 2 % cream, AAA around mouth bid (Patient taking differently: Apply topically daily as needed (crack/dry corners of mouth).), Disp: 60 g, Rfl: 3    lisinopriL (PRINIVIL,ZESTRIL) 20 MG tablet, Take 20 mg by mouth once daily., Disp: , Rfl:     mupirocin (BACTROBAN) 2 % ointment, Apply topically daily as needed (infection to right ankle)., Disp: , Rfl:     solifenacin (VESICARE) 10 MG tablet, Take 10 mg by mouth every evening., Disp: , Rfl:     SYMBICORT 160-4.5 mcg/actuation HFAA, Inhale 2 puffs into the lungs every 12 (twelve) hours. (Patient taking differently: Inhale 1-2 puffs into the lungs every evening.), Disp: 30 g, Rfl: 4    temazepam (RESTORIL) 30 mg capsule, Take 1 capsule (30 mg total) by mouth nightly as needed for Insomnia. (Patient taking differently: Take 30 mg by mouth every evening.), Disp: 90 capsule, Rfl: 1    triamcinolone acetonide 0.1% (KENALOG) 0.1 % cream, Apply topically daily as needed (rash)., Disp: , Rfl:     UNABLE TO FIND, Place 5 mLs under the tongue once daily. medication name: EDTA, Disp: , Rfl:     UNABLE TO FIND, Take 4 tablets by mouth every evening. medication name: Multi Mineral, Disp: , Rfl:     Current Facility-Administered Medications:     albuterol inhaler 2 puff, 2 puff, Inhalation, Q20 Min PRN, Farhana Riggins, NP    diphenhydrAMINE injection 25 mg, 25 mg, Intravenous, Once PRN, Farhana Riggins  "BARI VENTURA    EPINEPHrine (EPIPEN) 0.3 mg/0.3 mL pen injection 0.3 mg, 0.3 mg, Intramuscular, PRN, Farhana Riggins, BARI    methylPREDNISolone sodium succinate injection 40 mg, 40 mg, Intravenous, Once PRN, Farhana Riggins, NP    ondansetron disintegrating tablet 4 mg, 4 mg, Oral, Once PRN, Farhana Riggins, NP    sodium chloride 0.9% 500 mL flush bag, , Intravenous, PRN, Farhana Riggins NP, Stopped at 12/15/21 1455    sodium chloride 0.9% flush 10 mL, 10 mL, Intravenous, PRN, Farhana Riggins NP    Review of Systems   Constitutional:  Negative for appetite change, chills, fatigue, fever and unexpected weight change.   HENT:  Negative for ear pain and trouble swallowing.    Eyes:  Negative for pain, discharge and visual disturbance.   Respiratory:  Negative for apnea, cough, shortness of breath and wheezing.    Cardiovascular:  Negative for chest pain and leg swelling.   Gastrointestinal:  Negative for abdominal pain, blood in stool, constipation, diarrhea, nausea, vomiting and reflux.   Endocrine: Negative for cold intolerance, heat intolerance and polydipsia.   Genitourinary:  Negative for bladder incontinence, dysuria, erectile dysfunction, frequency, hematuria, testicular pain and urgency.   Musculoskeletal:  Negative for gait problem, joint swelling and myalgias.   Neurological:  Negative for dizziness, seizures and numbness.   Psychiatric/Behavioral:  Negative for agitation, behavioral problems and hallucinations. The patient is not nervous/anxious.            Objective:      Vitals:    08/17/23 1116 08/17/23 1121 08/19/23 1736   BP: (!) 140/90 (!) 140/90 100/70   Pulse: (!) 56     Weight: 67.1 kg (148 lb)     Height: 5' 8" (1.727 m)       Physical Exam  Vitals and nursing note reviewed.   Constitutional:       General: He is not in acute distress.     Appearance: Normal appearance. He is well-developed. He is not toxic-appearing.      Comments: Frail elderly male in no apparent distress, walks " without a cane   HENT:      Head: Normocephalic and atraumatic.      Right Ear: External ear normal.      Left Ear: External ear normal.      Nose: Nose normal.   Eyes:      Pupils: Pupils are equal, round, and reactive to light.   Neck:      Thyroid: No thyromegaly.      Vascular: No carotid bruit.   Cardiovascular:      Rate and Rhythm: Normal rate and regular rhythm.      Heart sounds: Normal heart sounds. No murmur heard.  Pulmonary:      Effort: Pulmonary effort is normal.      Breath sounds: Normal breath sounds. No wheezing or rales.   Abdominal:      General: Bowel sounds are normal. There is no distension.      Palpations: Abdomen is soft.      Tenderness: There is no abdominal tenderness.   Musculoskeletal:         General: No tenderness or deformity. Normal range of motion.      Cervical back: Normal range of motion and neck supple.      Lumbar back: Normal. No spasms.      Comments: Bends 90 degrees at  waist right wrist still in a splint.  Knees have good range of motion without crepitance.   Lymphadenopathy:      Cervical: No cervical adenopathy.   Skin:     General: Skin is warm and dry.      Findings: No rash.   Neurological:      Mental Status: He is alert and oriented to person, place, and time.      Cranial Nerves: No cranial nerve deficit.      Coordination: Coordination normal.   Psychiatric:         Behavior: Behavior normal.         Thought Content: Thought content normal.         Judgment: Judgment normal.           Assessment:       1. Subdural hematoma without coma, without loss of consciousness, sequela    2. Intraparenchymal hematoma of brain without loss of consciousness, unspecified laterality, sequela    3. Panlobular emphysema    4. Essential hypertension    5. Stricture of artery    6. Right-sided extracranial carotid artery stenosis    7. History of prostate cancer    8. Closed nondisplaced fracture of styloid process of left ulna, initial encounter    9. Closed fracture of distal  end of left radius, unspecified fracture morphology, initial encounter    10. Closed fracture of distal end of right radius, unspecified fracture morphology, sequela    11. Primary insomnia         Plan:       Subdural hematoma without coma, without loss of consciousness, sequela  Resolving nicely.  Intraparenchymal hematoma of brain without loss of consciousness, unspecified laterality, sequela    Panlobular emphysema    Essential hypertension  Blood pressure well controlled  Stricture of artery  Left subclavian obstruction  Right-sided extracranial carotid artery stenosis  Follow-up in 6 weeks with  I am gone now to discuss CEA  History of prostate cancer    Closed nondisplaced fracture of styloid process of left ulna, initial encounter  Continue splint , see ortho  Closed fracture of distal end of left radius, unspecified fracture morphology, initial encounter    Closed fracture of distal end of right radius, unspecified fracture morphology, sequela    Primary insomnia      Follow up in about 3 months (around 11/17/2023), or Mike- CEA.        8/19/2023 Jian Alcazar

## 2023-10-06 DIAGNOSIS — J43.1 PANLOBULAR EMPHYSEMA: ICD-10-CM

## 2023-10-09 RX ORDER — BUDESONIDE AND FORMOTEROL FUMARATE DIHYDRATE 160; 4.5 UG/1; UG/1
2 AEROSOL RESPIRATORY (INHALATION) EVERY 12 HOURS
Qty: 30 G | Refills: 4 | Status: SHIPPED | OUTPATIENT
Start: 2023-10-09 | End: 2023-11-27 | Stop reason: SDUPTHER

## 2023-10-26 DIAGNOSIS — F51.01 PRIMARY INSOMNIA: ICD-10-CM

## 2023-10-26 RX ORDER — TEMAZEPAM 30 MG/1
30 CAPSULE ORAL NIGHTLY PRN
Qty: 90 CAPSULE | Refills: 1 | Status: SHIPPED | OUTPATIENT
Start: 2023-10-26 | End: 2024-02-01

## 2023-10-26 NOTE — TELEPHONE ENCOUNTER
----- Message from Jessie Fung sent at 10/26/2023  3:58 PM CDT -----  Refill Temazepam  [facility]. PT'S # [id number] GH

## 2023-11-15 ENCOUNTER — TELEPHONE (OUTPATIENT)
Dept: FAMILY MEDICINE | Facility: CLINIC | Age: 88
End: 2023-11-15

## 2023-11-15 ENCOUNTER — PATIENT MESSAGE (OUTPATIENT)
Dept: DERMATOLOGY | Facility: CLINIC | Age: 88
End: 2023-11-15
Payer: MEDICARE

## 2023-11-15 DIAGNOSIS — I10 ESSENTIAL HYPERTENSION: Primary | ICD-10-CM

## 2023-11-15 DIAGNOSIS — Z79.899 ENCOUNTER FOR LONG-TERM (CURRENT) USE OF OTHER MEDICATIONS: ICD-10-CM

## 2023-11-22 LAB
ALBUMIN SERPL-MCNC: 4.1 G/DL (ref 3.6–5.1)
ALBUMIN/GLOB SERPL: 1.6 (CALC) (ref 1–2.5)
ALP SERPL-CCNC: 66 U/L (ref 35–144)
ALT SERPL-CCNC: 14 U/L (ref 9–46)
AST SERPL-CCNC: 22 U/L (ref 10–35)
BILIRUB SERPL-MCNC: 0.7 MG/DL (ref 0.2–1.2)
BUN SERPL-MCNC: 14 MG/DL (ref 7–25)
BUN/CREAT SERPL: ABNORMAL (CALC) (ref 6–22)
CALCIUM SERPL-MCNC: 9.8 MG/DL (ref 8.6–10.3)
CHLORIDE SERPL-SCNC: 95 MMOL/L (ref 98–110)
CHOLEST SERPL-MCNC: 83 MG/DL
CHOLEST/HDLC SERPL: 2.1 (CALC)
CO2 SERPL-SCNC: 29 MMOL/L (ref 20–32)
CREAT SERPL-MCNC: 0.93 MG/DL (ref 0.7–1.22)
EGFR: 79 ML/MIN/1.73M2
GLOBULIN SER CALC-MCNC: 2.6 G/DL (CALC) (ref 1.9–3.7)
GLUCOSE SERPL-MCNC: 84 MG/DL (ref 65–99)
HDLC SERPL-MCNC: 39 MG/DL
LDLC SERPL CALC-MCNC: 30 MG/DL (CALC)
NONHDLC SERPL-MCNC: 44 MG/DL (CALC)
POTASSIUM SERPL-SCNC: 4.3 MMOL/L (ref 3.5–5.3)
PROT SERPL-MCNC: 6.7 G/DL (ref 6.1–8.1)
SODIUM SERPL-SCNC: 132 MMOL/L (ref 135–146)
TRIGL SERPL-MCNC: 59 MG/DL

## 2023-11-27 ENCOUNTER — OFFICE VISIT (OUTPATIENT)
Dept: FAMILY MEDICINE | Facility: CLINIC | Age: 88
End: 2023-11-27
Payer: MEDICARE

## 2023-11-27 VITALS
HEART RATE: 55 BPM | DIASTOLIC BLOOD PRESSURE: 80 MMHG | WEIGHT: 149 LBS | OXYGEN SATURATION: 97 % | SYSTOLIC BLOOD PRESSURE: 132 MMHG | BODY MASS INDEX: 22.58 KG/M2 | HEIGHT: 68 IN

## 2023-11-27 DIAGNOSIS — J43.1 PANLOBULAR EMPHYSEMA: ICD-10-CM

## 2023-11-27 DIAGNOSIS — I65.23 BILATERAL CAROTID ARTERY STENOSIS: ICD-10-CM

## 2023-11-27 DIAGNOSIS — S06.330S: Primary | ICD-10-CM

## 2023-11-27 DIAGNOSIS — E87.1 HYPONATREMIA: ICD-10-CM

## 2023-11-27 DIAGNOSIS — S52.501S CLOSED FRACTURE OF DISTAL END OF RIGHT RADIUS, UNSPECIFIED FRACTURE MORPHOLOGY, SEQUELA: ICD-10-CM

## 2023-11-27 PROCEDURE — 3288F PR FALLS RISK ASSESSMENT DOCUMENTED: ICD-10-PCS | Mod: CPTII,S$GLB,, | Performed by: PHYSICIAN ASSISTANT

## 2023-11-27 PROCEDURE — 1101F PT FALLS ASSESS-DOCD LE1/YR: CPT | Mod: CPTII,S$GLB,, | Performed by: PHYSICIAN ASSISTANT

## 2023-11-27 PROCEDURE — 1159F MED LIST DOCD IN RCRD: CPT | Mod: CPTII,S$GLB,, | Performed by: PHYSICIAN ASSISTANT

## 2023-11-27 PROCEDURE — 3288F FALL RISK ASSESSMENT DOCD: CPT | Mod: CPTII,S$GLB,, | Performed by: PHYSICIAN ASSISTANT

## 2023-11-27 PROCEDURE — 99214 PR OFFICE/OUTPT VISIT, EST, LEVL IV, 30-39 MIN: ICD-10-PCS | Mod: S$GLB,,, | Performed by: PHYSICIAN ASSISTANT

## 2023-11-27 PROCEDURE — 1101F PR PT FALLS ASSESS DOC 0-1 FALLS W/OUT INJ PAST YR: ICD-10-PCS | Mod: CPTII,S$GLB,, | Performed by: PHYSICIAN ASSISTANT

## 2023-11-27 PROCEDURE — 1159F PR MEDICATION LIST DOCUMENTED IN MEDICAL RECORD: ICD-10-PCS | Mod: CPTII,S$GLB,, | Performed by: PHYSICIAN ASSISTANT

## 2023-11-27 PROCEDURE — 99214 OFFICE O/P EST MOD 30 MIN: CPT | Mod: S$GLB,,, | Performed by: PHYSICIAN ASSISTANT

## 2023-11-27 RX ORDER — BUDESONIDE AND FORMOTEROL FUMARATE DIHYDRATE 160; 4.5 UG/1; UG/1
2 AEROSOL RESPIRATORY (INHALATION) EVERY 12 HOURS
Qty: 30 G | Refills: 4 | Status: SHIPPED | OUTPATIENT
Start: 2023-11-27

## 2023-11-27 NOTE — PROGRESS NOTES
SUBJECTIVE:    Patient ID: Brit Jennings is a 88 y.o. male.    Chief Complaint: Follow-up (No bottles. Follow up. Labs done 11/21/23. Has questions about labs. Declines flu vaccine./RW)    This is an 88-year-old male who presents today for regular follow-up.  Patient sustained subdural hematoma after recurrent fall.  Felt to be possibly TIA related after occlusion carotid artery was found.  Met with vascular surgery already, Dr. Best.  Offered surgical option versus medical management.  Has been off blood thinners since bleed.  Patient is working through physical therapy as ordered after radial fracture. Wellness.  Today patient present in clinic and reports. Reports that he did also meet with Dr. Mendoza who recommended not operating. He is still thinking on this ot decide whether he wishes to proceed.    Mentally and physically appears to be doing better. Stronger. Aware that he needs to get back to exercising regularly.         Telephone on 11/15/2023   Component Date Value Ref Range Status    Cholesterol 11/21/2023 83  <200 mg/dL Final    HDL 11/21/2023 39 (L)  > OR = 40 mg/dL Final    Triglycerides 11/21/2023 59  <150 mg/dL Final    LDL Cholesterol 11/21/2023 30  mg/dL (calc) Final    HDL/Cholesterol Ratio 11/21/2023 2.1  <5.0 (calc) Final    Non HDL Chol. (LDL+VLDL) 11/21/2023 44  <130 mg/dL (calc) Final    Glucose 11/21/2023 84  65 - 99 mg/dL Final    BUN 11/21/2023 14  7 - 25 mg/dL Final    Creatinine 11/21/2023 0.93  0.70 - 1.22 mg/dL Final    eGFR 11/21/2023 79  > OR = 60 mL/min/1.73m2 Final    BUN/Creatinine Ratio 11/21/2023 SEE NOTE:  6 - 22 (calc) Final    Sodium 11/21/2023 132 (L)  135 - 146 mmol/L Final    Potassium 11/21/2023 4.3  3.5 - 5.3 mmol/L Final    Chloride 11/21/2023 95 (L)  98 - 110 mmol/L Final    CO2 11/21/2023 29  20 - 32 mmol/L Final    Calcium 11/21/2023 9.8  8.6 - 10.3 mg/dL Final    Total Protein 11/21/2023 6.7  6.1 - 8.1 g/dL Final    Albumin 11/21/2023 4.1  3.6 - 5.1  g/dL Final    Globulin, Total 11/21/2023 2.6  1.9 - 3.7 g/dL (calc) Final    Albumin/Globulin Ratio 11/21/2023 1.6  1.0 - 2.5 (calc) Final    Total Bilirubin 11/21/2023 0.7  0.2 - 1.2 mg/dL Final    Alkaline Phosphatase 11/21/2023 66  35 - 144 U/L Final    AST 11/21/2023 22  10 - 35 U/L Final    ALT 11/21/2023 14  9 - 46 U/L Final   No results displayed because visit has over 200 results.      Admission on 06/22/2023, Discharged on 06/22/2023   Component Date Value Ref Range Status    WBC 06/22/2023 3.87 (L)  3.90 - 12.70 K/uL Final    RBC 06/22/2023 3.84 (L)  4.60 - 6.20 M/uL Final    Hemoglobin 06/22/2023 13.2 (L)  14.0 - 18.0 g/dL Final    Hematocrit 06/22/2023 37.7 (L)  40.0 - 54.0 % Final    MCV 06/22/2023 98  82 - 98 fL Final    MCH 06/22/2023 34.4 (H)  27.0 - 31.0 pg Final    MCHC 06/22/2023 35.0  32.0 - 36.0 g/dL Final    RDW 06/22/2023 12.9  11.5 - 14.5 % Final    Platelets 06/22/2023 131 (L)  150 - 450 K/uL Final    MPV 06/22/2023 11.7  9.2 - 12.9 fL Final    Immature Granulocytes 06/22/2023 1.0 (H)  0.0 - 0.5 % Final    Gran # (ANC) 06/22/2023 2.5  1.8 - 7.7 K/uL Final    Immature Grans (Abs) 06/22/2023 0.04  0.00 - 0.04 K/uL Final    Lymph # 06/22/2023 0.6 (L)  1.0 - 4.8 K/uL Final    Mono # 06/22/2023 0.7  0.3 - 1.0 K/uL Final    Eos # 06/22/2023 0.0  0.0 - 0.5 K/uL Final    Baso # 06/22/2023 0.01  0.00 - 0.20 K/uL Final    nRBC 06/22/2023 0  0 /100 WBC Final    Gran % 06/22/2023 65.7  38.0 - 73.0 % Final    Lymph % 06/22/2023 14.2 (L)  18.0 - 48.0 % Final    Mono % 06/22/2023 17.8 (H)  4.0 - 15.0 % Final    Eosinophil % 06/22/2023 1.0  0.0 - 8.0 % Final    Basophil % 06/22/2023 0.3  0.0 - 1.9 % Final    Differential Method 06/22/2023 Automated   Final    Sodium 06/22/2023 131 (L)  136 - 145 mmol/L Final    Potassium 06/22/2023 4.0  3.5 - 5.1 mmol/L Final    Chloride 06/22/2023 96  95 - 110 mmol/L Final    CO2 06/22/2023 27  23 - 29 mmol/L Final    Glucose 06/22/2023 105  70 - 110 mg/dL Final    BUN  06/22/2023 14  8 - 23 mg/dL Final    Creatinine 06/22/2023 0.9  0.5 - 1.4 mg/dL Final    Calcium 06/22/2023 9.4  8.7 - 10.5 mg/dL Final    Total Protein 06/22/2023 7.1  6.0 - 8.4 g/dL Final    Albumin 06/22/2023 4.3  3.5 - 5.2 g/dL Final    Total Bilirubin 06/22/2023 1.0  0.1 - 1.0 mg/dL Final    Alkaline Phosphatase 06/22/2023 60  55 - 135 U/L Final    AST 06/22/2023 35  10 - 40 U/L Final    ALT 06/22/2023 23  10 - 44 U/L Final    eGFR 06/22/2023 >60.0  >60 mL/min/1.73 m^2 Final    Anion Gap 06/22/2023 8  8 - 16 mmol/L Final    Prothrombin Time 06/22/2023 11.4  9.0 - 12.5 sec Final    INR 06/22/2023 1.0  0.8 - 1.2 Final    TSH 06/22/2023 2.350  0.340 - 5.600 uIU/mL Final    Cholesterol 06/22/2023 67 (L)  120 - 199 mg/dL Final    Triglycerides 06/22/2023 35  30 - 150 mg/dL Final    HDL 06/22/2023 31 (L)  40 - 75 mg/dL Final    LDL Cholesterol 06/22/2023 29.0 (L)  63.0 - 159.0 mg/dL Final    HDL/Cholesterol Ratio 06/22/2023 46.3  20.0 - 50.0 % Final    Total Cholesterol/HDL Ratio 06/22/2023 2.2  2.0 - 5.0 Final    Non-HDL Cholesterol 06/22/2023 36  mg/dL Final    Troponin I High Sensitivity 06/22/2023 12.6  0.0 - 14.9 pg/mL Final    BNP 06/22/2023 344 (H)  0 - 99 pg/mL Final    Group & Rh 06/22/2023 O POS   Final    Indirect Chucho 06/22/2023 NEG   Final    Specimen Outdate 06/22/2023 06/25/2023 23:59   Final    UNIT NUMBER 06/22/2023 [id number]   Final    Product Code 06/22/2023 [id number]   Final    DISPENSE STATUS 06/22/2023 TRANSFUSED   Final    CODING SYSTEM 06/22/2023 [id number]   Final    Unit Blood Type Code 06/22/2023 1700   Final    Unit Blood Type 06/22/2023 B NEG   Final    Unit Expiration 06/22/2023 [id number]   Final    CROSSMATCH INTERPRETATION 06/22/2023 Not Required   Final       Past Medical History:   Diagnosis Date    Basal cell carcinoma 2020    L temple / radiation therapy    Cataract     Hypertension     Squamous cell carcinoma of skin      Past Surgical History:   Procedure Laterality Date     APPENDECTOMY      EYE SURGERY      Moh's surgery on right temple area Right 2015    Cancerous spot    PROSTATE BIOPSY      TONSILLECTOMY       Family History   Problem Relation Age of Onset    Melanoma Neg Hx     Psoriasis Neg Hx     Lupus Neg Hx     Eczema Neg Hx        Marital Status:   Alcohol History:  reports that he does not currently use alcohol.  Tobacco History:  reports that he has never smoked. He has never used smokeless tobacco.  Drug History:  has no history on file for drug use.    Review of patient's allergies indicates:   Allergen Reactions    Pollen extracts Other (See Comments)       Current Outpatient Medications:     acetaminophen (TYLENOL) 500 MG tablet, Take 2 tablets (1,000 mg total) by mouth every 6 (six) hours as needed for Pain., Disp: , Rfl: 0    albuterol (PROVENTIL/VENTOLIN HFA) 90 mcg/actuation inhaler, Inhale 1-2 puffs into the lungs daily as needed for Shortness of Breath. Rescue, Disp: , Rfl:     lisinopriL (PRINIVIL,ZESTRIL) 20 MG tablet, Take 20 mg by mouth once daily., Disp: , Rfl:     solifenacin (VESICARE) 10 MG tablet, Take 10 mg by mouth every evening., Disp: , Rfl:     temazepam (RESTORIL) 30 mg capsule, Take 1 capsule (30 mg total) by mouth nightly as needed for Insomnia., Disp: 90 capsule, Rfl: 1    UNABLE TO FIND, Place 5 mLs under the tongue once daily. medication name: EDTA, Disp: , Rfl:     UNABLE TO FIND, Take 4 tablets by mouth every evening. medication name: Multi Mineral, Disp: , Rfl:     ascorbic acid (VITAMIN C ORAL), Take 1 tablet by mouth 2 (two) times a day., Disp: , Rfl:     atorvastatin (LIPITOR) 80 MG tablet, Take 80 mg by mouth once daily., Disp: , Rfl:     cholecalciferol, vitamin D3, 125 mcg (5,000 unit) capsule, Take 5,000 Units by mouth once daily., Disp: , Rfl:     cyanocobalamin, vitamin B-12, 2,500 mcg Tab, Take 2,500 mcg by mouth once daily., Disp: , Rfl:     SYMBICORT 160-4.5 mcg/actuation HFAA, Inhale 2 puffs into the lungs every 12  (twelve) hours., Disp: 30 g, Rfl: 4    triamcinolone acetonide 0.1% (KENALOG) 0.1 % cream, Apply topically daily as needed (rash)., Disp: , Rfl:     Current Facility-Administered Medications:     albuterol inhaler 2 puff, 2 puff, Inhalation, Q20 Min PRN, Farhana Riggins, NP    diphenhydrAMINE injection 25 mg, 25 mg, Intravenous, Once PRN, Farhana Riggins, NP    EPINEPHrine (EPIPEN) 0.3 mg/0.3 mL pen injection 0.3 mg, 0.3 mg, Intramuscular, PRN, Farhana Riggins, NP    methylPREDNISolone sodium succinate injection 40 mg, 40 mg, Intravenous, Once PRN, Farhana Riggins, NP    ondansetron disintegrating tablet 4 mg, 4 mg, Oral, Once PRN, Farhana Riggins, NP    sodium chloride 0.9% 500 mL flush bag, , Intravenous, PRN, Farhana Riggins, NP, Stopped at 12/15/21 1455    sodium chloride 0.9% flush 10 mL, 10 mL, Intravenous, PRN, Farhana Riggins, NP    Review of Systems   Constitutional:  Negative for activity change, appetite change, chills, diaphoresis, fatigue, fever and unexpected weight change.   HENT:  Negative for congestion, facial swelling and trouble swallowing.    Eyes:  Negative for pain, discharge, redness and visual disturbance.   Respiratory:  Negative for cough, shortness of breath and wheezing.    Cardiovascular:  Negative for chest pain, palpitations and leg swelling.   Gastrointestinal:  Negative for abdominal distention, abdominal pain, constipation and diarrhea.   Endocrine: Negative for cold intolerance and heat intolerance.   Genitourinary:  Negative for decreased urine volume, difficulty urinating, dysuria, flank pain, frequency and urgency.   Musculoskeletal:  Positive for arthralgias, joint swelling and myalgias. Negative for neck pain.   Skin:  Negative for color change.   Allergic/Immunologic: Negative for immunocompromised state.   Neurological:  Positive for dizziness and weakness. Negative for tremors, syncope, speech difficulty, light-headedness and numbness.   Hematological:  " Negative for adenopathy.   Psychiatric/Behavioral:  Negative for agitation, confusion, decreased concentration and dysphoric mood.           Objective:      Vitals:    11/27/23 1113   BP: 132/80   Pulse: (!) 55   SpO2: 97%   Weight: 67.6 kg (149 lb)   Height: 5' 8" (1.727 m)     Physical Exam  Vitals and nursing note reviewed.   Constitutional:       General: He is not in acute distress.     Appearance: Normal appearance. He is well-developed. He is not toxic-appearing.      Comments: Frail elderly male in no apparent distress, walks without a cane   HENT:      Head: Normocephalic and atraumatic.      Right Ear: External ear normal.      Left Ear: External ear normal.      Nose: Nose normal.   Eyes:      Pupils: Pupils are equal, round, and reactive to light.   Neck:      Thyroid: No thyromegaly.      Vascular: No carotid bruit.   Cardiovascular:      Rate and Rhythm: Normal rate and regular rhythm.      Heart sounds: Normal heart sounds. No murmur heard.  Pulmonary:      Effort: Pulmonary effort is normal.      Breath sounds: Normal breath sounds. No wheezing or rales.   Abdominal:      General: Bowel sounds are normal. There is no distension.      Palpations: Abdomen is soft.      Tenderness: There is no abdominal tenderness.   Musculoskeletal:         General: No tenderness or deformity. Normal range of motion.      Cervical back: Normal range of motion and neck supple.      Lumbar back: Normal. No spasms.      Comments: Bends 90 degrees at  waist right wrist still in a splint.  Knees have good range of motion without crepitance.   Lymphadenopathy:      Cervical: No cervical adenopathy.   Skin:     General: Skin is warm and dry.      Findings: No rash.   Neurological:      Mental Status: He is alert and oriented to person, place, and time.      Cranial Nerves: No cranial nerve deficit.      Coordination: Coordination normal.   Psychiatric:         Behavior: Behavior normal.         Thought Content: Thought " content normal.         Judgment: Judgment normal.           Assessment:       1. Intraparenchymal hematoma of brain without loss of consciousness, unspecified laterality, sequela    2. Panlobular emphysema    3. Bilateral carotid artery stenosis    4. Hyponatremia    5. Closed fracture of distal end of right radius, unspecified fracture morphology, sequela         Plan:       Intraparenchymal hematoma of brain without loss of consciousness, unspecified laterality, sequela  Comments:  cleared out on imaging. Has remained off asa and plavix since.    Panlobular emphysema  Comments:  refills as needed today. He reports just taking once per day. did discuss the full and proper dosing with patient.  Orders:  -     SYMBICORT 160-4.5 mcg/actuation HFAA; Inhale 2 puffs into the lungs every 12 (twelve) hours.  Dispense: 30 g; Refill: 4    Bilateral carotid artery stenosis  Comments:  reviewed notes from [facility] surgeon. will continue to think on this.. Lipids very well controlled. may need to start back on asa if opting not foir surgery.    Hyponatremia  Comments:  Na level looking much better. continue as is.    Closed fracture of distal end of right radius, unspecified fracture morphology, sequela  Comments:  healing. in brace still. Has PT.      Follow up in about 3 months (around 2/27/2024).        11/27/2023 Harsh Rubalcava PA-C

## 2023-12-12 ENCOUNTER — TELEPHONE (OUTPATIENT)
Dept: FAMILY MEDICINE | Facility: CLINIC | Age: 88
End: 2023-12-12

## 2023-12-12 NOTE — TELEPHONE ENCOUNTER
[place] [facility] [place] to [place].

## 2023-12-12 NOTE — TELEPHONE ENCOUNTER
----- Message from Izabella Louis MA sent at 12/12/2023  2:01 PM CST -----    ----- Message -----  From: Jessika Chase  Sent: 12/12/2023   2:00 PM CST  To: [facility] Staff    -1:55- samina with [facility] [place] is calling about the request they faxed on 10/4 and 11/3   [phone]

## 2023-12-28 ENCOUNTER — TELEPHONE (OUTPATIENT)
Dept: FAMILY MEDICINE | Facility: CLINIC | Age: 88
End: 2023-12-28
Payer: MEDICARE

## 2023-12-28 NOTE — TELEPHONE ENCOUNTER
----- Message from Norma Ramos LPN sent at 12/28/2023  9:52 AM CST -----    ----- Message -----  From: Jessie Fung  Sent: 12/28/2023   9:51 AM CST  To: [facility] Staff    The patient said he would like to do a virtual visit. I think the nurse needs to call him.  I am not sure why he needs to be seen. He sounded very confused. Pt's # [phone]

## 2024-01-04 ENCOUNTER — OFFICE VISIT (OUTPATIENT)
Dept: FAMILY MEDICINE | Facility: CLINIC | Age: 89
End: 2024-01-04
Payer: MEDICARE

## 2024-01-04 ENCOUNTER — HOSPITAL ENCOUNTER (OUTPATIENT)
Dept: RADIOLOGY | Facility: HOSPITAL | Age: 89
Discharge: HOME | End: 2024-01-04
Attending: PHYSICIAN ASSISTANT
Payer: MEDICARE

## 2024-01-04 ENCOUNTER — TELEPHONE (OUTPATIENT)
Dept: FAMILY MEDICINE | Facility: CLINIC | Age: 89
End: 2024-01-04

## 2024-01-04 VITALS
OXYGEN SATURATION: 99 % | SYSTOLIC BLOOD PRESSURE: 134 MMHG | BODY MASS INDEX: 21.82 KG/M2 | HEART RATE: 65 BPM | WEIGHT: 144 LBS | HEIGHT: 68 IN | DIASTOLIC BLOOD PRESSURE: 80 MMHG

## 2024-01-04 DIAGNOSIS — M54.50 ACUTE BILATERAL LOW BACK PAIN WITHOUT SCIATICA: ICD-10-CM

## 2024-01-04 DIAGNOSIS — M54.50 ACUTE BILATERAL LOW BACK PAIN WITHOUT SCIATICA: Primary | ICD-10-CM

## 2024-01-04 PROCEDURE — 72110 X-RAY EXAM L-2 SPINE 4/>VWS: CPT | Mod: TC,PO

## 2024-01-04 PROCEDURE — 99213 OFFICE O/P EST LOW 20 MIN: CPT | Mod: S$GLB,,, | Performed by: PHYSICIAN ASSISTANT

## 2024-01-04 NOTE — TELEPHONE ENCOUNTER
----- Message from Joesph Garcia sent at 1/4/2024  2:35 PM CST -----  Pt was seen today and wants to know if he should keep the March appt or does it needs to be pushed out a little further.  [phone]

## 2024-01-04 NOTE — PROGRESS NOTES
SUBJECTIVE:    Patient ID: Brit Jennings is a 88 y.o. male.    Chief Complaint: Back Pain (Low back pain x 1 week. Ignored it at first but now affecting his getting up and down, driving over here increased a lot. Worse with certain movements. Denies any urinary symptoms/hematuria. Drinking water and Gatorade but has not helped. Did have flu like sy last week. Never tested)    88-year-old male presents today for urgent evaluation of low back pain.  Reports pain present for 1 week now.  Limiting his movements.  Worse after sitting for long periods of time.  Denies other symptoms of hematuria,dysuria.  Did feel like he had flu-like upper respiratory symptoms last week but improved now    Back Pain        Telephone on 11/15/2023   Component Date Value Ref Range Status    Cholesterol 11/21/2023 83  <200 mg/dL Final    HDL 11/21/2023 39 (L)  > OR = 40 mg/dL Final    Triglycerides 11/21/2023 59  <150 mg/dL Final    LDL Cholesterol 11/21/2023 30  mg/dL (calc) Final    HDL/Cholesterol Ratio 11/21/2023 2.1  <5.0 (calc) Final    Non HDL Chol. (LDL+VLDL) 11/21/2023 44  <130 mg/dL (calc) Final    Glucose 11/21/2023 84  65 - 99 mg/dL Final    BUN 11/21/2023 14  7 - 25 mg/dL Final    Creatinine 11/21/2023 0.93  0.70 - 1.22 mg/dL Final    eGFR 11/21/2023 79  > OR = 60 mL/min/1.73m2 Final    BUN/Creatinine Ratio 11/21/2023 SEE NOTE:  6 - 22 (calc) Final    Sodium 11/21/2023 132 (L)  135 - 146 mmol/L Final    Potassium 11/21/2023 4.3  3.5 - 5.3 mmol/L Final    Chloride 11/21/2023 95 (L)  98 - 110 mmol/L Final    CO2 11/21/2023 29  20 - 32 mmol/L Final    Calcium 11/21/2023 9.8  8.6 - 10.3 mg/dL Final    Total Protein 11/21/2023 6.7  6.1 - 8.1 g/dL Final    Albumin 11/21/2023 4.1  3.6 - 5.1 g/dL Final    Globulin, Total 11/21/2023 2.6  1.9 - 3.7 g/dL (calc) Final    Albumin/Globulin Ratio 11/21/2023 1.6  1.0 - 2.5 (calc) Final    Total Bilirubin 11/21/2023 0.7  0.2 - 1.2 mg/dL Final    Alkaline Phosphatase 11/21/2023 66  35 -  144 U/L Final    AST 11/21/2023 22  10 - 35 U/L Final    ALT 11/21/2023 14  9 - 46 U/L Final       Past Medical History:   Diagnosis Date    Basal cell carcinoma 2020    L temple / radiation therapy    Cataract     Hypertension     Squamous cell carcinoma of skin      Past Surgical History:   Procedure Laterality Date    APPENDECTOMY      EYE SURGERY      Moh's surgery on right temple area Right 2015    Cancerous spot    PROSTATE BIOPSY      TONSILLECTOMY       Family History   Problem Relation Age of Onset    Melanoma Neg Hx     Psoriasis Neg Hx     Lupus Neg Hx     Eczema Neg Hx        Marital Status:   Alcohol History:  reports that he does not currently use alcohol.  Tobacco History:  reports that he has never smoked. He has never used smokeless tobacco.  Drug History:  has no history on file for drug use.    Review of patient's allergies indicates:   Allergen Reactions    Pollen extracts Other (See Comments)       Current Outpatient Medications:     acetaminophen (TYLENOL) 500 MG tablet, Take 2 tablets (1,000 mg total) by mouth every 6 (six) hours as needed for Pain., Disp: , Rfl: 0    albuterol (PROVENTIL/VENTOLIN HFA) 90 mcg/actuation inhaler, Inhale 1-2 puffs into the lungs daily as needed for Shortness of Breath. Rescue, Disp: , Rfl:     ascorbic acid (VITAMIN C ORAL), Take 1 tablet by mouth 2 (two) times a day., Disp: , Rfl:     atorvastatin (LIPITOR) 80 MG tablet, Take 80 mg by mouth once daily., Disp: , Rfl:     cholecalciferol, vitamin D3, 125 mcg (5,000 unit) capsule, Take 5,000 Units by mouth once daily., Disp: , Rfl:     cyanocobalamin, vitamin B-12, 2,500 mcg Tab, Take 2,500 mcg by mouth once daily., Disp: , Rfl:     lisinopriL (PRINIVIL,ZESTRIL) 20 MG tablet, Take 20 mg by mouth once daily., Disp: , Rfl:     SYMBICORT 160-4.5 mcg/actuation HFAA, Inhale 2 puffs into the lungs every 12 (twelve) hours., Disp: 30 g, Rfl: 4    temazepam (RESTORIL) 30 mg capsule, Take 1 capsule (30 mg total) by  "mouth nightly as needed for Insomnia., Disp: 90 capsule, Rfl: 1    triamcinolone acetonide 0.1% (KENALOG) 0.1 % cream, Apply topically daily as needed (rash)., Disp: , Rfl:     UNABLE TO FIND, Place 5 mLs under the tongue once daily. medication name: EDTA, Disp: , Rfl:     UNABLE TO FIND, Take 4 tablets by mouth every evening. medication name: Multi Mineral, Disp: , Rfl:     solifenacin (VESICARE) 10 MG tablet, Take 10 mg by mouth every evening., Disp: , Rfl:     Current Facility-Administered Medications:     albuterol inhaler 2 puff, 2 puff, Inhalation, Q20 Min PRN, Farhana Riggins, NP    diphenhydrAMINE injection 25 mg, 25 mg, Intravenous, Once PRN, Farhana Riggins, NP    EPINEPHrine (EPIPEN) 0.3 mg/0.3 mL pen injection 0.3 mg, 0.3 mg, Intramuscular, PRN, Farhana Riggins, NP    methylPREDNISolone sodium succinate injection 40 mg, 40 mg, Intravenous, Once PRN, Farhana Riggins TWendy, NP    ondansetron disintegrating tablet 4 mg, 4 mg, Oral, Once PRN, Farhana Riggins, NP    sodium chloride 0.9% 500 mL flush bag, , Intravenous, PRN, Julia Rigginsa T., NP, Stopped at 12/15/21 1455    sodium chloride 0.9% flush 10 mL, 10 mL, Intravenous, PRN, Julia Rigginsa T., NP    Review of Systems   Musculoskeletal:  Positive for back pain.          Objective:      Vitals:    01/04/24 1405   BP: 134/80   Pulse: 65   SpO2: 99%   Weight: 65.3 kg (144 lb)   Height: 5' 8" (1.727 m)     Physical Exam      Assessment:       1. Acute bilateral low back pain without sciatica         Plan:       Acute bilateral low back pain without sciatica  Comments:  check plain films just to rule out compression or other acute issue here. would like to get PT started asap and consider MRI if no improvement in the next month      No follow-ups on file.        1/4/2024 Harsh Rubalcava PA-C      "

## 2024-01-04 NOTE — PROGRESS NOTES
SUBJECTIVE:    Patient ID: Brit Jennings is a 88 y.o. male.    Chief Complaint: Back Pain (Low back pain x 1 week. Ignored it at first but now affecting his getting up and down, driving over here increased a lot. Worse with certain movements. Denies any urinary symptoms/hematuria. Drinking water and Gatorade but has not helped. Did have flu like sy last week. Never tested)    88-year-old male presents today for urgent evaluation of low back pain.  Reports pain present for 1 week now.  Limiting his movements.  Worse after sitting for long periods of time.  Denies other symptoms of hematuria,dysuria.  Did feel like he had flu-like upper respiratory symptoms last week but improved now. Was not officially tested anywhere. Those symptoms do appear to be doing better now. Back pain is persisting. Both sides of low back and can occasionally feel on spine. Using crutches has helped some. Has not had any imaging of the low back. No PT.    Back Pain  Pertinent negatives include no dysuria.       Telephone on 11/15/2023   Component Date Value Ref Range Status    Cholesterol 11/21/2023 83  <200 mg/dL Final    HDL 11/21/2023 39 (L)  > OR = 40 mg/dL Final    Triglycerides 11/21/2023 59  <150 mg/dL Final    LDL Cholesterol 11/21/2023 30  mg/dL (calc) Final    HDL/Cholesterol Ratio 11/21/2023 2.1  <5.0 (calc) Final    Non HDL Chol. (LDL+VLDL) 11/21/2023 44  <130 mg/dL (calc) Final    Glucose 11/21/2023 84  65 - 99 mg/dL Final    BUN 11/21/2023 14  7 - 25 mg/dL Final    Creatinine 11/21/2023 0.93  0.70 - 1.22 mg/dL Final    eGFR 11/21/2023 79  > OR = 60 mL/min/1.73m2 Final    BUN/Creatinine Ratio 11/21/2023 SEE NOTE:  6 - 22 (calc) Final    Sodium 11/21/2023 132 (L)  135 - 146 mmol/L Final    Potassium 11/21/2023 4.3  3.5 - 5.3 mmol/L Final    Chloride 11/21/2023 95 (L)  98 - 110 mmol/L Final    CO2 11/21/2023 29  20 - 32 mmol/L Final    Calcium 11/21/2023 9.8  8.6 - 10.3 mg/dL Final    Total Protein 11/21/2023 6.7  6.1 -  8.1 g/dL Final    Albumin 11/21/2023 4.1  3.6 - 5.1 g/dL Final    Globulin, Total 11/21/2023 2.6  1.9 - 3.7 g/dL (calc) Final    Albumin/Globulin Ratio 11/21/2023 1.6  1.0 - 2.5 (calc) Final    Total Bilirubin 11/21/2023 0.7  0.2 - 1.2 mg/dL Final    Alkaline Phosphatase 11/21/2023 66  35 - 144 U/L Final    AST 11/21/2023 22  10 - 35 U/L Final    ALT 11/21/2023 14  9 - 46 U/L Final       Past Medical History:   Diagnosis Date    Basal cell carcinoma 2020    L temple / radiation therapy    Cataract     Hypertension     Squamous cell carcinoma of skin      Past Surgical History:   Procedure Laterality Date    APPENDECTOMY      EYE SURGERY      Moh's surgery on right temple area Right 2015    Cancerous spot    PROSTATE BIOPSY      TONSILLECTOMY       Family History   Problem Relation Age of Onset    Melanoma Neg Hx     Psoriasis Neg Hx     Lupus Neg Hx     Eczema Neg Hx        Marital Status:   Alcohol History:  reports that he does not currently use alcohol.  Tobacco History:  reports that he has never smoked. He has never used smokeless tobacco.  Drug History:  has no history on file for drug use.    Review of patient's allergies indicates:   Allergen Reactions    Pollen extracts Other (See Comments)       Current Outpatient Medications:     acetaminophen (TYLENOL) 500 MG tablet, Take 2 tablets (1,000 mg total) by mouth every 6 (six) hours as needed for Pain., Disp: , Rfl: 0    albuterol (PROVENTIL/VENTOLIN HFA) 90 mcg/actuation inhaler, Inhale 1-2 puffs into the lungs daily as needed for Shortness of Breath. Rescue, Disp: , Rfl:     ascorbic acid (VITAMIN C ORAL), Take 1 tablet by mouth 2 (two) times a day., Disp: , Rfl:     atorvastatin (LIPITOR) 80 MG tablet, Take 80 mg by mouth once daily., Disp: , Rfl:     cholecalciferol, vitamin D3, 125 mcg (5,000 unit) capsule, Take 5,000 Units by mouth once daily., Disp: , Rfl:     cyanocobalamin, vitamin B-12, 2,500 mcg Tab, Take 2,500 mcg by mouth once daily., Disp:  ", Rfl:     lisinopriL (PRINIVIL,ZESTRIL) 20 MG tablet, Take 20 mg by mouth once daily., Disp: , Rfl:     SYMBICORT 160-4.5 mcg/actuation HFAA, Inhale 2 puffs into the lungs every 12 (twelve) hours., Disp: 30 g, Rfl: 4    temazepam (RESTORIL) 30 mg capsule, Take 1 capsule (30 mg total) by mouth nightly as needed for Insomnia., Disp: 90 capsule, Rfl: 1    triamcinolone acetonide 0.1% (KENALOG) 0.1 % cream, Apply topically daily as needed (rash)., Disp: , Rfl:     UNABLE TO FIND, Place 5 mLs under the tongue once daily. medication name: EDTA, Disp: , Rfl:     UNABLE TO FIND, Take 4 tablets by mouth every evening. medication name: Multi Mineral, Disp: , Rfl:     solifenacin (VESICARE) 10 MG tablet, Take 10 mg by mouth every evening., Disp: , Rfl:     Current Facility-Administered Medications:     albuterol inhaler 2 puff, 2 puff, Inhalation, Q20 Min PRN, Farhana Riggins, NP    diphenhydrAMINE injection 25 mg, 25 mg, Intravenous, Once PRN, Farhana Riggins, NP    EPINEPHrine (EPIPEN) 0.3 mg/0.3 mL pen injection 0.3 mg, 0.3 mg, Intramuscular, PRN, Farhana Riggins, NP    methylPREDNISolone sodium succinate injection 40 mg, 40 mg, Intravenous, Once PRN, Farhana Riggins, NP    ondansetron disintegrating tablet 4 mg, 4 mg, Oral, Once PRN, Farhana Riggins, NP    sodium chloride 0.9% 500 mL flush bag, , Intravenous, PRN, Farhana Riggins, NP, Stopped at 12/15/21 1455    sodium chloride 0.9% flush 10 mL, 10 mL, Intravenous, PRN, Farhana Riggins, NP    Review of Systems   Genitourinary:  Negative for dysuria, flank pain and urgency.   Musculoskeletal:  Positive for back pain.          Objective:      Vitals:    01/04/24 1405   BP: 134/80   Pulse: 65   SpO2: 99%   Weight: 65.3 kg (144 lb)   Height: 5' 8" (1.727 m)     Physical Exam  Vitals and nursing note reviewed.   Constitutional:       General: He is not in acute distress.     Appearance: Normal appearance. He is well-developed. He is not toxic-appearing.    "   Comments: Frail elderly male in no apparent distress, walks without a cane   HENT:      Head: Normocephalic and atraumatic.      Right Ear: External ear normal.      Left Ear: External ear normal.      Nose: Nose normal.   Eyes:      Pupils: Pupils are equal, round, and reactive to light.   Neck:      Thyroid: No thyromegaly.      Vascular: No carotid bruit.   Cardiovascular:      Rate and Rhythm: Normal rate and regular rhythm.      Heart sounds: Normal heart sounds. No murmur heard.  Pulmonary:      Effort: Pulmonary effort is normal.      Breath sounds: Normal breath sounds. No wheezing or rales.   Abdominal:      General: Bowel sounds are normal. There is no distension.      Palpations: Abdomen is soft.      Tenderness: There is no abdominal tenderness.   Musculoskeletal:         General: No deformity.      Cervical back: Normal range of motion and neck supple.      Lumbar back: Spasms and tenderness present. Decreased range of motion.      Comments: Bends 90 degrees at  waist right wrist still in a splint.  Knees have good range of motion without crepitance.   Lymphadenopathy:      Cervical: No cervical adenopathy.   Skin:     General: Skin is warm and dry.      Findings: No rash.   Neurological:      Mental Status: He is alert and oriented to person, place, and time.      Cranial Nerves: No cranial nerve deficit.      Coordination: Coordination normal.   Psychiatric:         Behavior: Behavior normal.         Thought Content: Thought content normal.         Judgment: Judgment normal.           Assessment:       1. Acute bilateral low back pain without sciatica         Plan:       Acute bilateral low back pain without sciatica  Comments:  check plain films just to rule out compression or other acute issue here. would like to get PT started asap and consider MRI if no improvement in the next month  Orders:  -     X-Ray Lumbar Spine 5 View; Future; Expected date: 01/04/2024  -     Ambulatory referral/consult to  Physical/Occupational Therapy; Future; Expected date: 01/11/2024    No follow-ups on file.        1/4/2024 Harsh Rubalcava PA-C

## 2024-01-08 ENCOUNTER — PATIENT MESSAGE (OUTPATIENT)
Dept: FAMILY MEDICINE | Facility: CLINIC | Age: 89
End: 2024-01-08
Payer: MEDICARE

## 2024-01-08 DIAGNOSIS — M54.50 ACUTE BILATERAL LOW BACK PAIN WITHOUT SCIATICA: Primary | ICD-10-CM

## 2024-01-08 RX ORDER — TIZANIDINE 4 MG/1
4 TABLET ORAL EVERY 6 HOURS PRN
Qty: 30 TABLET | Refills: 0 | Status: SHIPPED | OUTPATIENT
Start: 2024-01-08 | End: 2024-01-18

## 2024-01-08 NOTE — TELEPHONE ENCOUNTER
Aware of results, will start PT tomorrow. Fixed his constipation issues but still having some discomfort that moves around lower back, he would like to know if he can try a muscle relaxant?  If so I'll send him a message back to [facility] when sent per his request

## 2024-01-18 ENCOUNTER — TELEPHONE (OUTPATIENT)
Dept: FAMILY MEDICINE | Facility: CLINIC | Age: 89
End: 2024-01-18
Payer: MEDICARE

## 2024-01-18 DIAGNOSIS — G31.84 MILD COGNITIVE IMPAIRMENT: Primary | ICD-10-CM

## 2024-01-18 NOTE — TELEPHONE ENCOUNTER
We can refer to Dr. Andrade with neurology for further evaluation. Definitely agree that they are monitoring medication closely. If agreeable can place referral

## 2024-01-18 NOTE — TELEPHONE ENCOUNTER
Nurse Norma, I am Brit's wife, Neisha Jennings.  He told me has has not had a regular bowel movement since last week. I've started to give him Calm Magnesium at night.   Brit also had another fall, this week, in the early morning.  Upon finding him and lifting him, he told me he had mistakenly taken 2 of his sleeping pill med.  Weve taken that med from him and will give one, only, before bed.   His phys therapist suggest Brit may need to see a neurologist to check him or Dr Alcazar because of his fall in June and his current cond.  He is also sleeping most of the day.  He is sleeping now.  I checked his oxygen level and it is 94.   My phone number is [phone].  I believe Brit has placed me as his emergency contact.  Neisha Jennings       Left voice message.

## 2024-01-18 NOTE — TELEPHONE ENCOUNTER
Spoke with Neisha and states patient has been having more falls recently and concerned. Neisha is thinking is this a change in cognitive situation, thinking possibly depression. Been noticing change in cognitive even before the falls.   Patient had a forward fall on Tuesday around 2 am and looked at wife and asked where are the two men that lead him into the living room. Has a small scrap on his head. No other pain and or bruises. Pain in lower back from before, x-ray showed constipation. Bowel movement last week, taking magnesium. Make sure patient is staying hydrated and taking more electrolytes. Not eating as well and Neisha states he has lost a lot of muscle mass, making sure at least two to three meals a day.    Took all medications from patient now due to patient taking medications incorrectly. Patient is becoming more stubborn.

## 2024-01-18 NOTE — TELEPHONE ENCOUNTER
Spoke with patient's spouse and agrees with Physician Assistant Rubalcava about referral.     Referral pended for provider review.

## 2024-01-20 ENCOUNTER — CLINICAL SUPPORT (OUTPATIENT)
Dept: CARDIOLOGY | Facility: HOSPITAL | Age: 89
DRG: 083 | End: 2024-01-20
Attending: INTERNAL MEDICINE
Payer: MEDICARE

## 2024-01-20 ENCOUNTER — HOSPITAL ENCOUNTER (INPATIENT)
Facility: HOSPITAL | Age: 89
LOS: 1 days | Discharge: HOME | DRG: 083 | End: 2024-01-21
Attending: STUDENT IN AN ORGANIZED HEALTH CARE EDUCATION/TRAINING PROGRAM | Admitting: INTERNAL MEDICINE
Payer: MEDICARE

## 2024-01-20 VITALS — HEIGHT: 68 IN | BODY MASS INDEX: 21.81 KG/M2 | WEIGHT: 143.94 LBS

## 2024-01-20 DIAGNOSIS — S06.5XAA SUBDURAL HEMATOMA: Primary | ICD-10-CM

## 2024-01-20 DIAGNOSIS — S06.5XAA SDH (SUBDURAL HEMATOMA): ICD-10-CM

## 2024-01-20 DIAGNOSIS — I95.1 ORTHOSTATIC SYNCOPE: ICD-10-CM

## 2024-01-20 DIAGNOSIS — I50.9 CHF (CONGESTIVE HEART FAILURE): ICD-10-CM

## 2024-01-20 DIAGNOSIS — R55 SYNCOPE: ICD-10-CM

## 2024-01-20 PROBLEM — R61 NIGHT SWEATS: Status: ACTIVE | Noted: 2024-01-20

## 2024-01-20 LAB
ALBUMIN SERPL BCP-MCNC: 3.7 G/DL (ref 3.5–5.2)
ALP SERPL-CCNC: 86 U/L (ref 55–135)
ALT SERPL W/O P-5'-P-CCNC: 33 U/L (ref 10–44)
ANION GAP SERPL CALC-SCNC: 7 MMOL/L (ref 8–16)
AORTIC ROOT ANNULUS: 4.3 CM
AORTIC VALVE CUSP SEPERATION: 2.1 CM
AST SERPL-CCNC: 41 U/L (ref 10–40)
AV INDEX (PROSTH): 0.89
AV MEAN GRADIENT: 4 MMHG
AV PEAK GRADIENT: 7 MMHG
AV REGURGITATION PRESSURE HALF TIME: 491 MS
AV VALVE AREA BY VELOCITY RATIO: 3.78 CM²
AV VALVE AREA: 3.71 CM²
AV VELOCITY RATIO: 0.91
BASOPHILS # BLD AUTO: 0.03 K/UL (ref 0–0.2)
BASOPHILS NFR BLD: 0.3 % (ref 0–1.9)
BILIRUB SERPL-MCNC: 0.6 MG/DL (ref 0.1–1)
BILIRUB UR QL STRIP: NEGATIVE
BNP SERPL-MCNC: 554 PG/ML (ref 0–99)
BSA FOR ECHO PROCEDURE: 1.77 M2
BUN SERPL-MCNC: 14 MG/DL (ref 8–23)
CALCIUM SERPL-MCNC: 9.5 MG/DL (ref 8.7–10.5)
CHLORIDE SERPL-SCNC: 96 MMOL/L (ref 95–110)
CLARITY UR: ABNORMAL
CO2 SERPL-SCNC: 29 MMOL/L (ref 23–29)
COLOR UR: YELLOW
CREAT SERPL-MCNC: 0.9 MG/DL (ref 0.5–1.4)
CV ECHO LV RWT: 0.77 CM
DIFFERENTIAL METHOD BLD: ABNORMAL
DOP CALC AO PEAK VEL: 1.35 M/S
DOP CALC AO VTI: 26.4 CM
DOP CALC LVOT AREA: 4.2 CM2
DOP CALC LVOT DIAMETER: 2.3 CM
DOP CALC LVOT PEAK VEL: 1.23 M/S
DOP CALC LVOT STROKE VOLUME: 98 CM3
DOP CALC MV VTI: 29.9 CM
DOP CALCLVOT PEAK VEL VTI: 23.6 CM
E WAVE DECELERATION TIME: 254 MSEC
E/A RATIO: 0.53
ECHO LV POSTERIOR WALL: 1.46 CM (ref 0.6–1.1)
EOSINOPHIL # BLD AUTO: 0 K/UL (ref 0–0.5)
EOSINOPHIL NFR BLD: 0.4 % (ref 0–8)
ERYTHROCYTE [DISTWIDTH] IN BLOOD BY AUTOMATED COUNT: 12.7 % (ref 11.5–14.5)
EST. GFR  (NO RACE VARIABLE): >60 ML/MIN/1.73 M^2
FRACTIONAL SHORTENING: 28 % (ref 28–44)
GLUCOSE SERPL-MCNC: 114 MG/DL (ref 70–110)
GLUCOSE UR QL STRIP: NEGATIVE
HCT VFR BLD AUTO: 36.2 % (ref 40–54)
HGB BLD-MCNC: 12.2 G/DL (ref 14–18)
HGB UR QL STRIP: NEGATIVE
IMM GRANULOCYTES # BLD AUTO: 0.07 K/UL (ref 0–0.04)
IMM GRANULOCYTES NFR BLD AUTO: 0.7 % (ref 0–0.5)
INR PPP: 1.1 (ref 0.8–1.2)
INTERVENTRICULAR SEPTUM: 1.13 CM (ref 0.6–1.1)
IVC DIAMETER: 1.35 CM
KETONES UR QL STRIP: NEGATIVE
LEFT ATRIUM SIZE: 3.5 CM
LEFT INTERNAL DIMENSION IN SYSTOLE: 2.75 CM (ref 2.1–4)
LEFT VENTRICLE DIASTOLIC VOLUME INDEX: 35 ML/M2
LEFT VENTRICLE DIASTOLIC VOLUME: 62.3 ML
LEFT VENTRICLE MASS INDEX: 97 G/M2
LEFT VENTRICLE SYSTOLIC VOLUME INDEX: 15.9 ML/M2
LEFT VENTRICLE SYSTOLIC VOLUME: 28.3 ML
LEFT VENTRICULAR INTERNAL DIMENSION IN DIASTOLE: 3.81 CM (ref 3.5–6)
LEFT VENTRICULAR MASS: 172.69 G
LEUKOCYTE ESTERASE UR QL STRIP: NEGATIVE
LV LATERAL E/E' RATIO: 9.14 M/S
LVOT MG: 3 MMHG
LVOT MV: 0.81 CM/S
LYMPHOCYTES # BLD AUTO: 0.7 K/UL (ref 1–4.8)
LYMPHOCYTES NFR BLD: 6.5 % (ref 18–48)
MCH RBC QN AUTO: 33.4 PG (ref 27–31)
MCHC RBC AUTO-ENTMCNC: 33.7 G/DL (ref 32–36)
MCV RBC AUTO: 99 FL (ref 82–98)
MONOCYTES # BLD AUTO: 1.9 K/UL (ref 0.3–1)
MONOCYTES NFR BLD: 19.1 % (ref 4–15)
MV MEAN GRADIENT: 2 MMHG
MV PEAK A VEL: 1.21 M/S
MV PEAK E VEL: 0.64 M/S
MV PEAK GRADIENT: 6 MMHG
MV STENOSIS PRESSURE HALF TIME: 89 MS
MV VALVE AREA BY CONTINUITY EQUATION: 3.28 CM2
MV VALVE AREA P 1/2 METHOD: 2.47 CM2
NEUTROPHILS # BLD AUTO: 7.3 K/UL (ref 1.8–7.7)
NEUTROPHILS NFR BLD: 73 % (ref 38–73)
NITRITE UR QL STRIP: NEGATIVE
NRBC BLD-RTO: 0 /100 WBC
OHS LV EJECTION FRACTION SIMPSONS BIPLANE MOD: 58 %
PH UR STRIP: 8 [PH] (ref 5–8)
PISA AR MAX VEL: 4.26 M/S
PISA TR MAX VEL: 2.62 M/S
PLATELET # BLD AUTO: 159 K/UL (ref 150–450)
PMV BLD AUTO: 12.6 FL (ref 9.2–12.9)
POTASSIUM SERPL-SCNC: 3.9 MMOL/L (ref 3.5–5.1)
PROT SERPL-MCNC: 7 G/DL (ref 6–8.4)
PROT UR QL STRIP: NEGATIVE
PROTHROMBIN TIME: 12 SEC (ref 9–12.5)
PV MV: 0.69 M/S
PV PEAK GRADIENT: 5 MMHG
PV PEAK VELOCITY: 1.12 M/S
RA PRESSURE ESTIMATED: 3 MMHG
RBC # BLD AUTO: 3.65 M/UL (ref 4.6–6.2)
RIGHT VENTRICULAR END-DIASTOLIC DIMENSION: 2.89 CM
RV TB RVSP: 6 MMHG
RV TISSUE DOPPLER FREE WALL SYSTOLIC VELOCITY 1 (APICAL 4 CHAMBER VIEW): 18.4 CM/S
SODIUM SERPL-SCNC: 132 MMOL/L (ref 136–145)
SP GR UR STRIP: 1.01 (ref 1–1.03)
TDI LATERAL: 0.07 M/S
TR MAX PG: 27 MMHG
TRICUSPID ANNULAR PLANE SYSTOLIC EXCURSION: 2.36 CM
TROPONIN I SERPL HS-MCNC: 40.1 PG/ML (ref 0–14.9)
TV REST PULMONARY ARTERY PRESSURE: 30 MMHG
URN SPEC COLLECT METH UR: ABNORMAL
UROBILINOGEN UR STRIP-ACNC: NEGATIVE EU/DL
WBC # BLD AUTO: 10.03 K/UL (ref 3.9–12.7)
Z-SCORE OF LEFT VENTRICULAR DIMENSION IN END DIASTOLE: -2.56
Z-SCORE OF LEFT VENTRICULAR DIMENSION IN END SYSTOLE: -0.8

## 2024-01-20 PROCEDURE — 63600175 PHARM REV CODE 636 W HCPCS: Performed by: STUDENT IN AN ORGANIZED HEALTH CARE EDUCATION/TRAINING PROGRAM

## 2024-01-20 PROCEDURE — 25000242 PHARM REV CODE 250 ALT 637 W/ HCPCS: Performed by: INTERNAL MEDICINE

## 2024-01-20 PROCEDURE — 93005 ELECTROCARDIOGRAM TRACING: CPT | Performed by: INTERNAL MEDICINE

## 2024-01-20 PROCEDURE — 84484 ASSAY OF TROPONIN QUANT: CPT | Performed by: STUDENT IN AN ORGANIZED HEALTH CARE EDUCATION/TRAINING PROGRAM

## 2024-01-20 PROCEDURE — 85025 COMPLETE CBC W/AUTO DIFF WBC: CPT | Performed by: STUDENT IN AN ORGANIZED HEALTH CARE EDUCATION/TRAINING PROGRAM

## 2024-01-20 PROCEDURE — 93306 TTE W/DOPPLER COMPLETE: CPT

## 2024-01-20 PROCEDURE — 99900031 HC PATIENT EDUCATION (STAT)

## 2024-01-20 PROCEDURE — 25000003 PHARM REV CODE 250: Performed by: STUDENT IN AN ORGANIZED HEALTH CARE EDUCATION/TRAINING PROGRAM

## 2024-01-20 PROCEDURE — 99223 1ST HOSP IP/OBS HIGH 75: CPT | Mod: 25,,, | Performed by: INTERNAL MEDICINE

## 2024-01-20 PROCEDURE — 99900035 HC TECH TIME PER 15 MIN (STAT)

## 2024-01-20 PROCEDURE — 85610 PROTHROMBIN TIME: CPT | Performed by: STUDENT IN AN ORGANIZED HEALTH CARE EDUCATION/TRAINING PROGRAM

## 2024-01-20 PROCEDURE — 96374 THER/PROPH/DIAG INJ IV PUSH: CPT

## 2024-01-20 PROCEDURE — 94640 AIRWAY INHALATION TREATMENT: CPT

## 2024-01-20 PROCEDURE — 94761 N-INVAS EAR/PLS OXIMETRY MLT: CPT

## 2024-01-20 PROCEDURE — 21000000 HC CCU ICU ROOM CHARGE

## 2024-01-20 PROCEDURE — 99291 CRITICAL CARE FIRST HOUR: CPT

## 2024-01-20 PROCEDURE — 25000003 PHARM REV CODE 250: Performed by: INTERNAL MEDICINE

## 2024-01-20 PROCEDURE — 81003 URINALYSIS AUTO W/O SCOPE: CPT | Performed by: STUDENT IN AN ORGANIZED HEALTH CARE EDUCATION/TRAINING PROGRAM

## 2024-01-20 PROCEDURE — 93010 ELECTROCARDIOGRAM REPORT: CPT | Mod: ,,, | Performed by: INTERNAL MEDICINE

## 2024-01-20 PROCEDURE — 93306 TTE W/DOPPLER COMPLETE: CPT | Mod: 26,,, | Performed by: INTERNAL MEDICINE

## 2024-01-20 PROCEDURE — 83880 ASSAY OF NATRIURETIC PEPTIDE: CPT | Performed by: STUDENT IN AN ORGANIZED HEALTH CARE EDUCATION/TRAINING PROGRAM

## 2024-01-20 PROCEDURE — 96375 TX/PRO/DX INJ NEW DRUG ADDON: CPT

## 2024-01-20 PROCEDURE — 80053 COMPREHEN METABOLIC PANEL: CPT | Performed by: STUDENT IN AN ORGANIZED HEALTH CARE EDUCATION/TRAINING PROGRAM

## 2024-01-20 RX ORDER — ATORVASTATIN CALCIUM 40 MG/1
80 TABLET, FILM COATED ORAL DAILY
Status: DISCONTINUED | OUTPATIENT
Start: 2024-01-20 | End: 2024-01-21 | Stop reason: HOSPADM

## 2024-01-20 RX ORDER — ARFORMOTEROL TARTRATE 15 UG/2ML
15 SOLUTION RESPIRATORY (INHALATION) 2 TIMES DAILY
Status: DISCONTINUED | OUTPATIENT
Start: 2024-01-20 | End: 2024-01-21 | Stop reason: HOSPADM

## 2024-01-20 RX ORDER — ASCORBIC ACID 500 MG
500 TABLET ORAL 2 TIMES DAILY
Status: DISCONTINUED | OUTPATIENT
Start: 2024-01-20 | End: 2024-01-21 | Stop reason: HOSPADM

## 2024-01-20 RX ORDER — LISINOPRIL 10 MG/1
20 TABLET ORAL DAILY
Status: DISCONTINUED | OUTPATIENT
Start: 2024-01-20 | End: 2024-01-21 | Stop reason: HOSPADM

## 2024-01-20 RX ORDER — ENALAPRILAT 1.25 MG/ML
2.5 INJECTION INTRAVENOUS
Status: DISCONTINUED | OUTPATIENT
Start: 2024-01-20 | End: 2024-01-20

## 2024-01-20 RX ORDER — NICARDIPINE HYDROCHLORIDE 0.2 MG/ML
0-15 INJECTION INTRAVENOUS CONTINUOUS
Status: DISCONTINUED | OUTPATIENT
Start: 2024-01-20 | End: 2024-01-20

## 2024-01-20 RX ORDER — TALC
6 POWDER (GRAM) TOPICAL NIGHTLY PRN
Status: DISCONTINUED | OUTPATIENT
Start: 2024-01-20 | End: 2024-01-21 | Stop reason: HOSPADM

## 2024-01-20 RX ORDER — MORPHINE SULFATE 4 MG/ML
4 INJECTION, SOLUTION INTRAMUSCULAR; INTRAVENOUS EVERY 4 HOURS PRN
Status: DISCONTINUED | OUTPATIENT
Start: 2024-01-20 | End: 2024-01-21 | Stop reason: HOSPADM

## 2024-01-20 RX ORDER — NICARDIPINE HYDROCHLORIDE 0.2 MG/ML
0-15 INJECTION INTRAVENOUS CONTINUOUS
Status: DISCONTINUED | OUTPATIENT
Start: 2024-01-20 | End: 2024-01-21

## 2024-01-20 RX ORDER — ACETAMINOPHEN 325 MG/1
650 TABLET ORAL EVERY 8 HOURS PRN
Status: DISCONTINUED | OUTPATIENT
Start: 2024-01-20 | End: 2024-01-21 | Stop reason: HOSPADM

## 2024-01-20 RX ORDER — ALBUTEROL SULFATE 0.83 MG/ML
2.5 SOLUTION RESPIRATORY (INHALATION) EVERY 4 HOURS PRN
Status: DISCONTINUED | OUTPATIENT
Start: 2024-01-20 | End: 2024-01-21 | Stop reason: HOSPADM

## 2024-01-20 RX ORDER — LANOLIN ALCOHOL/MO/W.PET/CERES
2000 CREAM (GRAM) TOPICAL DAILY
Status: DISCONTINUED | OUTPATIENT
Start: 2024-01-20 | End: 2024-01-21 | Stop reason: HOSPADM

## 2024-01-20 RX ORDER — FLUTICASONE FUROATE AND VILANTEROL 100; 25 UG/1; UG/1
1 POWDER RESPIRATORY (INHALATION) DAILY
Status: DISCONTINUED | OUTPATIENT
Start: 2024-01-20 | End: 2024-01-20

## 2024-01-20 RX ORDER — SODIUM CHLORIDE 9 MG/ML
INJECTION, SOLUTION INTRAVENOUS CONTINUOUS
Status: DISCONTINUED | OUTPATIENT
Start: 2024-01-20 | End: 2024-01-21

## 2024-01-20 RX ORDER — BUDESONIDE 0.5 MG/2ML
0.5 INHALANT ORAL EVERY 12 HOURS
Status: DISCONTINUED | OUTPATIENT
Start: 2024-01-20 | End: 2024-01-21 | Stop reason: HOSPADM

## 2024-01-20 RX ORDER — HYDROCODONE BITARTRATE AND ACETAMINOPHEN 5; 325 MG/1; MG/1
1 TABLET ORAL EVERY 4 HOURS PRN
Status: DISCONTINUED | OUTPATIENT
Start: 2024-01-20 | End: 2024-01-21 | Stop reason: HOSPADM

## 2024-01-20 RX ORDER — ENALAPRILAT 1.25 MG/ML
2.5 INJECTION INTRAVENOUS
Status: COMPLETED | OUTPATIENT
Start: 2024-01-20 | End: 2024-01-20

## 2024-01-20 RX ORDER — OXYBUTYNIN CHLORIDE 5 MG/1
10 TABLET, EXTENDED RELEASE ORAL DAILY
Status: DISCONTINUED | OUTPATIENT
Start: 2024-01-20 | End: 2024-01-21 | Stop reason: HOSPADM

## 2024-01-20 RX ORDER — ONDANSETRON HYDROCHLORIDE 2 MG/ML
4 INJECTION, SOLUTION INTRAVENOUS EVERY 8 HOURS PRN
Status: DISCONTINUED | OUTPATIENT
Start: 2024-01-20 | End: 2024-01-21 | Stop reason: HOSPADM

## 2024-01-20 RX ORDER — VIBEGRON 75 MG/1
1 TABLET, FILM COATED ORAL DAILY
COMMUNITY
Start: 2024-01-15

## 2024-01-20 RX ADMIN — ARFORMOTEROL TARTRATE 15 MCG: 15 SOLUTION RESPIRATORY (INHALATION) at 07:01

## 2024-01-20 RX ADMIN — ATORVASTATIN CALCIUM 80 MG: 40 TABLET, FILM COATED ORAL at 10:01

## 2024-01-20 RX ADMIN — NICARDIPINE HYDROCHLORIDE 5 MG/HR: 0.2 INJECTION, SOLUTION INTRAVENOUS at 04:01

## 2024-01-20 RX ADMIN — LISINOPRIL 20 MG: 20 TABLET ORAL at 10:01

## 2024-01-20 RX ADMIN — SODIUM CHLORIDE 1000 ML: 0.9 INJECTION, SOLUTION INTRAVENOUS at 05:01

## 2024-01-20 RX ADMIN — OXYBUTYNIN CHLORIDE 10 MG: 5 TABLET, EXTENDED RELEASE ORAL at 10:01

## 2024-01-20 RX ADMIN — SODIUM CHLORIDE: 9 INJECTION, SOLUTION INTRAVENOUS at 04:01

## 2024-01-20 RX ADMIN — ENALAPRILAT 2.5 MG: 1.25 INJECTION INTRAVENOUS at 04:01

## 2024-01-20 RX ADMIN — BUDESONIDE INHALATION 0.5 MG: 0.5 SUSPENSION RESPIRATORY (INHALATION) at 07:01

## 2024-01-20 NOTE — PLAN OF CARE
[facility] - Emergency Dept  Initial Discharge Assessment       Primary Care Provider: Jian Alcazar MD    Admission Diagnosis: SDH (subdural hematoma) [S06.5XAA]    Admission Date: 1/20/2024  Expected Discharge Date:     Transition of Care Barriers: None    Payor: [employer] MGD [id number] [facility] / Plan: [employer] CHOICES / Product Type: Medicare Advantage /     Extended Emergency Contact Information  Primary Emergency Contact: Neisha Jennings  Address: [address] [place] [address] [place]  Mobile Phone: [phone]  Relation: Spouse  Preferred language: English   needed? No    Discharge Plan A: Home with family  Discharge Plan B: Home with family      [facility] - [place], [place] - [address]  [address] [facility] [address] [facility]  [address]  [facility] [address]  Phone: [phone] Fax: [phone]    [facility] [address] [place] [address] [address] [facility]  [address] [address]  Phone: [phone] Fax: [phone]      CM completed discharge assessment with patient. Pt AAOx4s. Pt lives spouse. Demographics, PCP, and insurance verified. No home health or DME. No LW or POA. No dialysis. Pt completes ADLs without assistance. Pt to discharge home via family transport. Pt has no other needs to be addressed at this time.    Initial Assessment (most recent)       Adult Discharge Assessment - 01/20/24 1616          Discharge Assessment    Assessment Type Discharge Planning Assessment     Confirmed/corrected address, phone number and insurance Yes     Confirmed Demographics Correct on Facesheet     Source of Information patient     When was your last doctors appointment? --   pt unsure    Communicated ELIESER with patient/caregiver Date not available/Unable to determine     Reason For Admission Subdural Hematoma     People in Home spouse     Do you expect to return to your current living situation? Yes     Do  you have help at home or someone to help you manage your care at home? Yes     Who are your caregiver(s) and their phone number(s)? Neisha Jennings (spouse) [phone]     Prior to hospitilization cognitive status: Alert/Oriented     Current cognitive status: Alert/Oriented     Walking or Climbing Stairs Difficulty no     Dressing/Bathing Difficulty no     Home Layout Able to live on 1st floor     Equipment Currently Used at Home none     Readmission within 30 days? No     Patient currently being followed by outpatient case management? No     Do you currently have service(s) that help you manage your care at home? No     Do you take prescription medications? Yes     Do you have prescription coverage? Yes     Coverage PHN     Do you have any problems affording any of your prescribed medications? No     Is the patient taking medications as prescribed? yes     Who is going to help you get home at discharge? Family     How do you get to doctors appointments? family or friend will provide;car, drives self     Are you on dialysis? No     Do you take coumadin? No     Discharge Plan A Home with family     Discharge Plan B Home with family     DME Needed Upon Discharge  none     Discharge Plan discussed with: Patient     Transition of Care Barriers None

## 2024-01-20 NOTE — CONSULTS
"[facility]  Department of Cardiology  Consult Note      PATIENT NAME: Brit Jennings  MRN: [id number]  TODAY'S DATE: 01/20/2024  ADMIT DATE: 1/20/2024                          CONSULT REQUESTED BY: Dez Nieto DO    SUBJECTIVE     PRINCIPAL PROBLEM: Subdural hematoma      REASON FOR CONSULT:  "Elevated BNP, with night sweats; s/p syncope resulting in subdural "      History of Present Illness: 88M with fall getting up from toilet. He struck his head on an unknown object and was found by family with a bleeding head wound over the left sided scalp. Patient is amnestic to event details but family does not think he had LOC. 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 He is not on anticoagulation or platelets. He was hypertensive on ED arrival but this was normalized with medication.         Review of patient's allergies indicates:   Allergen Reactions    Pollen extracts Other (See Comments)       Past Medical History:   Diagnosis Date    Basal cell carcinoma 2020    L temple / radiation therapy    Cataract     Hypertension     Squamous cell carcinoma of skin      Past Surgical History:   Procedure Laterality Date    APPENDECTOMY      EYE SURGERY      Moh's surgery on right temple area Right 2015    Cancerous spot    PROSTATE BIOPSY      TONSILLECTOMY       Social History     Tobacco Use    Smoking status: Never    Smokeless tobacco: Never   Substance Use Topics    Alcohol use: Not Currently        REVIEW OF SYSTEMS  Negative except as mentioned in HPI    OBJECTIVE     VITAL SIGNS (Most Recent)  Temp: 99.3 °F (37.4 °C) (01/20/24 0502)  Pulse: 70 (01/20/24 0945)  Resp: 19 (01/20/24 0945)  BP: 107/63 (01/20/24 0945)  SpO2: 96 % (01/20/24 0945)    VENTILATION STATUS  Resp: 19 (01/20/24 0945)  SpO2: 96 % (01/20/24 0945)           I & O (Last 24H):  Intake/Output Summary (Last 24 hours) at 1/20/2024 1104  Last data filed at " 1/20/2024 0800  Gross per 24 hour   Intake 1000 ml   Output 600 ml   Net 400 ml       WEIGHTS  Wt Readings from Last 3 Encounters:   01/20/24 0359 65.3 kg (144 lb)   01/20/24 1000 65.3 kg (143 lb 15.4 oz)   01/04/24 1405 65.3 kg (144 lb)       PHYSICAL EXAM    GENERAL:  Elderly gentleman resting on side of bed eating meal, breathing comfortably  HEENT:  Contusion and old bleeding to left facial region and laceration on top of scalp.    NECK: No JVD. Cervical collar in place.  CARDIAC: Regular rate and rhythm. Faint murmur audible  CHEST ANATOMY: normal.   LUNGS: Clear to auscultation. No wheezing or rhonchi..   ABDOMEN: Soft .  Normal bowel sounds.    EXTREMITIES: No edema  CENTRAL NERVOUS SYSTEM: AAO x 3      HOME MEDICATIONS:  Current Facility-Administered Medications on File Prior to Encounter   Medication Dose Route Frequency Provider Last Rate Last Admin    albuterol inhaler 2 puff  2 puff Inhalation Q20 Min PRN Farhana Riggins, NP        diphenhydrAMINE injection 25 mg  25 mg Intravenous Once PRN Farhana Riggins NP        EPINEPHrine (EPIPEN) 0.3 mg/0.3 mL pen injection 0.3 mg  0.3 mg Intramuscular PRN Farhana Riggins NP        methylPREDNISolone sodium succinate injection 40 mg  40 mg Intravenous Once PRN Farhana Riggins, BARI        ondansetron disintegrating tablet 4 mg  4 mg Oral Once PRN Farhana Riggins, NP        sodium chloride 0.9% 500 mL flush bag   Intravenous PRN Farhana Riggins NP   Stopped at 12/15/21 1455    sodium chloride 0.9% flush 10 mL  10 mL Intravenous PRN Farhana Riggins NP         Current Outpatient Medications on File Prior to Encounter   Medication Sig Dispense Refill    acetaminophen (TYLENOL) 500 MG tablet Take 2 tablets (1,000 mg total) by mouth every 6 (six) hours as needed for Pain.  0    albuterol (PROVENTIL/VENTOLIN HFA) 90 mcg/actuation inhaler Inhale 1-2 puffs into the lungs daily as needed for Shortness of Breath. Rescue      ascorbic acid (VITAMIN C  ORAL) Take 1 tablet by mouth 2 (two) times a day.      atorvastatin (LIPITOR) 80 MG tablet Take 80 mg by mouth once daily.      cholecalciferol, vitamin D3, 125 mcg (5,000 unit) capsule Take 5,000 Units by mouth once daily.      cyanocobalamin, vitamin B-12, 2,500 mcg Tab Take 2,500 mcg by mouth once daily.      lisinopriL (PRINIVIL,ZESTRIL) 20 MG tablet Take 20 mg by mouth once daily.      solifenacin (VESICARE) 10 MG tablet Take 10 mg by mouth every evening.      SYMBICORT 160-4.5 mcg/actuation HFAA Inhale 2 puffs into the lungs every 12 (twelve) hours. 30 g 4    temazepam (RESTORIL) 30 mg capsule Take 1 capsule (30 mg total) by mouth nightly as needed for Insomnia. 90 capsule 1    triamcinolone acetonide 0.1% (KENALOG) 0.1 % cream Apply topically daily as needed (rash).      UNABLE TO FIND Place 5 mLs under the tongue once daily. medication name: EDTA      UNABLE TO FIND Take 4 tablets by mouth every evening. medication name: Multi Mineral         SCHEDULED MEDS:   budesonide  0.5 mg Nebulization Q12H    And    arformoteroL  15 mcg Nebulization BID    ascorbic acid (vitamin C)  100 mg Oral BID    atorvastatin  80 mg Oral Daily    cholecalciferol (vitamin D3)  4,800 Units Oral Daily    cyanocobalamin (vitamin B-12)  2,500 mcg Oral Daily    lisinopriL  20 mg Oral Daily    oxybutynin  10 mg Oral Daily       CONTINUOUS INFUSIONS:   nicardipine Stopped (01/20/24 0645)       PRN MEDS:acetaminophen, albuterol, albuterol sulfate, diphenhydrAMINE, EPINEPHrine, HYDROcodone-acetaminophen, melatonin, methylPREDNISolone sodium succinate injection, morphine, ondansetron, ondansetron, sodium chloride 0.9% 500 mL flush bag, sodium chloride 0.9%    LABS AND DIAGNOSTICS     CBC LAST 3 DAYS  Recent Labs   Lab 01/20/24  0438   WBC 10.03   RBC 3.65*   HGB 12.2*   HCT 36.2*   MCV 99*   MCH 33.4*   MCHC 33.7   RDW 12.7      MPV 12.6   GRAN 73.0  7.3   LYMPH 6.5*  0.7*   MONO 19.1*  1.9*   BASO 0.03   NRBC 0       COAGULATION  "LAST 3 DAYS  Recent Labs   Lab 01/20/24  0438   INR 1.1       CHEMISTRY LAST 3 DAYS  Recent Labs   Lab 01/20/24  0438   *   K 3.9   CL 96   CO2 29   ANIONGAP 7*   BUN 14   CREATININE 0.9   *   CALCIUM 9.5   ALBUMIN 3.7   PROT 7.0   ALKPHOS 86   ALT 33   AST 41*   BILITOT 0.6       CARDIAC PROFILE LAST 3 DAYS  Recent Labs   Lab 01/20/24  0438   *       ENDOCRINE LAST 3 DAYS  No results for input(s): "TSH", "PROCAL" in the last 168 hours.    LAST ARTERIAL BLOOD GAS  ABG  No results for input(s): "PH", "PO2", "PCO2", "HCO3", "BE" in the last 168 hours.    LAST 7 DAYS MICROBIOLOGY   Microbiology Results (last 7 days)       ** No results found for the last 168 hours. **            MOST RECENT IMAGING  CT Head Without Contrast  CMS MANDATED QUALITY DATA - CT RADIATION 436    All CT scans at this facility utilize dose modulation, iterative reconstruction, and/or weight based dosing when appropriate to reduce radiation dose to as low as reasonably achievable.    CLINICAL HISTORY:  88 years (1935) Male Subdural hemorrhage    TECHNIQUE:  CT HEAD WITHOUT IV CONTRAST. Axial CT of the brain without contrast using soft tissue and bone algorithm. Please note in the acute setting if there is a clinical concern for an acute stroke MRI would be more sensitive/specific for evaluation of ischemia.    COMPARISON:  CT from 3:22 AM.    FINDINGS:  Curvilinear hyperattenuating collection along the anterior right frontal lobe measuring up to 4 mm in thickness (image 22), without adverse interval change. No hydrocephalus, herniation or midline shift and the basal/suprasellar cisterns are patent. No acute skull fracture is identified with mild soft tissue swelling over the left frontal region.    Overall there is mild diffuse cerebral and cerebellar atrophy when accounting for age with moderate periventricular deep cerebral white matter low attenuation in scattered vascular calcifications in the intracranial internal " carotid arteries suggesting chronic small vessel ischemic disease.    The orbits appear within normal limits noting likely bilateral lens replacement/cataract surgery. External auditory canals are unremarkable. There are air-fluid levels in the left frontal and anterior ethmoid air cells. The visualized mastoid air cells are clear.    IMPRESSION:  1. Unchanged small right frontal subdural hematoma.  2. Slight decrease in the soft tissue swelling over the left frontal region.  3. Partial opacification of the left frontal and anterior ethmoid air cells, similar to the previous exam, consider correlation for acute sinusitis.  4. Chronic/involutional findings, similar to the previous exam.    .    Electronically signed by:  Monster Grande MD  01/20/2024 10:35 AM CST Workstation: [id number]  CT Head Without Contrast  EXAM:  CT Head Without Intravenous Contrast    CLINICAL HISTORY:  The patient is 88 years old and is Male; Head trauma, intracranial venous injury suspected    TECHNIQUE:  Axial computed tomography images of the head/brain without intravenous contrast.  Sagittal and coronal reformatted images were created and reviewed.  This CT exam was performed using one or more of the following dose reduction techniques:  automated exposure control, adjustment of the mA and/or kV according to patient size, and/or use of iterative reconstruction technique.    COMPARISON:  CT Head dated 8/4/2023    FINDINGS:  BRAIN:  Generalized cerebral atrophy with commensurate ventricular dilatation.  Low attenuation in the periventricular, subcortical, and deep white matter nonspecific but likely secondary to chronic small vessel ischemic disease.  Small right frontal subdural hematoma measuring up to 4 mm exerting minimal mass effect on the subjacent cerebral cortex. No significant midline shift.  VENTRICLES:  See above.  BONES/JOINTS:  Unremarkable.  No acute fracture.  SOFT TISSUES:  Left frontal scalp swelling/hematoma.  SINUSES:   Air-fluid level and mucosal thickening in the left frontal sinus with mucosal thickening/fluid in the left ethmoid air cells. Minimal mucosal thickening in the right ethmoid air cells and left maxillary sinus.  MASTOID AIR CELLS:  Unremarkable as visualized.  No mastoid effusion.    IMPRESSION:  Small right frontal subdural hematoma measuring up to 4 mm exerting minimal mass effect on the subjacent cerebral cortex. No significant midline shift.    Left frontal scalp swelling/hematoma.    Paranasal sinus disease as described above. Clinical correlation for symptoms of acute sinusitis recommended.    THIS REPORT CONTAINS FINDINGS THAT MAY BE CRITICAL TO PATIENT CARE: The findings were verbally discussed via telephone conference with Dr. HERIBERTO SALGADO  on 1/20/2024 3:47 AM CST .The results were acknowledged and understood.    Electronically signed by:  Jessica Ndiaye MD  01/20/2024 03:49 AM CST Workstation: [id number]      ECHOCARDIOGRAM RESULTS (last 5)  No results found for this or any previous visit.      CURRENT/PREVIOUS VISIT EKG  Results for orders placed or performed during the hospital encounter of 01/20/24   EKG 12-lead    Collection Time: 01/20/24  4:19 AM    Narrative    Test Reason : R55,    Vent. Rate : 087 BPM     Atrial Rate : 087 BPM     P-R Int : 218 ms          QRS Dur : 146 ms      QT Int : 388 ms       P-R-T Axes : -23 -55 057 degrees     QTc Int : 466 ms    Sinus rhythm with 1st degree A-V block with occasional Premature  ventricular complexes  Right bundle branch block  Left anterior fascicular block   Bifascicular block   Abnormal ECG  When compared with ECG of 22-JUN-2023 08:21,  Criteria for Septal infarct are no longer Present    Referred By: [email or web address]   SELF           Confirmed By:            ASSESSMENT/PLAN:     Active Hospital Problems    Diagnosis    *Subdural hematoma    Night sweats    Essential hypertension       ASSESSMENT & PLAN:   Subdural  hematoma  Syncope  HTN      RECOMMENDATIONS:  Syncope likely related to orthostasis.  All syncopal episodes have occurred after patient has stood up from sitting position.  Orthostatic vital signs were also positive today.  May need to start Midodrine 2.5 mg TID if remains orthostatic after hydration.  Patient needs to wear compression hose to lower extremities.    Observe on continuous telemetry overnight.  Patient may need outpatient cardiac monitor to evaluate for arrhythmias.    Obtained echocardiogram for LV and valvular evaluation today.  EF is normal.  Mild-to-moderate aortic regurgitation noted.      Laura Tuttle NP  [facility]  Department of Cardiology  Date of Service: 01/20/2024        I have personally interviewed and examined the patient, I have reviewed the Nurse Practitioner's history and physical, assessment, and plan. I agree with the findings and plan.    Patient presented with syncopal episode.  Discussed with patient's wife at bedside in the ER. He had similar episodes in the past and all of them occurred when he stood up from sitting position.  Patient denied any palpitations.  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  Avoid antihypertensive medications.  Avoid dehydration and avoid sudden standing from sitting position.  Start midodrine 2.5 mg t.i.d. if remains orthostatic or hypotensive.  Normal ejection fraction on echo.  No events on tele today.  Continue tele monitoring for today.  Cardiac event monitor upon discharge.    Dr. Gabe M.D.  [facility]  Department of Cardiology  Date of Service: 01/20/2024

## 2024-01-20 NOTE — H&P
"  [facility] - Emergency Dept  Hospital Medicine  History & Physical    Patient Name: Brit Jennings  MRN: [id number]  Patient Class: IP- Inpatient  Admission Date: 1/20/2024  Attending Physician: Gamal Reynolds MD  Primary Care Provider: Jian Alcazar MD         Patient information was obtained from patient, spouse/SO, relative(s), ER records, and ED MD .     Subjective:     Principal Problem:Subdural hematoma    Chief Complaint:   Chief Complaint   Patient presents with    Fall    Loss of Consciousness        HPI: 88 year old male with history of HTN, Subdural Hematoma (right), Basal Cell Ca presented to ED after falling on bathroom floor and striking left side of head. He woke, got up to urinate, sat on toilet to urinate, and found himself on floor in "Pool of blood". He does not remember going down. No HA, vision changes or dizziness that he remembers. No CP or SOB.     He was started on trospium chloride (Sanctura) 2 weeks ago for overactive bladder. Since starting this he has not had to wake to urinate overnight. However last PM he woke "Drenched in sweat" (which he admits has been happening often "Lately"), and got up to change his clothing. He also has been taking temazepam before going to sleep. He does not remember going into the bathroom, but he does recollect sitting on the toilet prior to going down. He does not remember falling off of the toilet to the floor.    He had the same thing happen a year ago and suffered right sided subdural then. 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 Was seen by Vascular (Estephania) 8/2023 and options were discussed: surgery vs medical management.     In ED: labs reviewed and noted fully below: minimal anemia; trivial hyponatremia with normal renal function; BNP and troponin are mildly elevated. EKG reviewed: sinus with first degree and bifascicular blocks, but no acute segments. CT Head show small (4 mm) left sided subdural. Neurosurgery was " notified and recommended BP < 140 systolic (190 at presentation) and repeat CT in 6 hours. Patient was given enalapril and started on nicardipine infusion. Currently BP is 122 systolic.    Discussed with ED MD: head wound does not need sutures; repeat CT ordered as above; continue nicardipine infusion, inpatient admission to progressive care unit. Cardiology opinion and ECHO will be obtained as his night sweats could very well be a sign of early failure. He is not established with a Cardiologist.    Past Medical History:   Diagnosis Date    Basal cell carcinoma 2020    L temple / radiation therapy    Cataract     Hypertension     Squamous cell carcinoma of skin        Past Surgical History:   Procedure Laterality Date    APPENDECTOMY      EYE SURGERY      Moh's surgery on right temple area Right 2015    Cancerous spot    PROSTATE BIOPSY      TONSILLECTOMY         Review of patient's allergies indicates:   Allergen Reactions    Pollen extracts Other (See Comments)       Current Facility-Administered Medications on File Prior to Encounter   Medication    albuterol inhaler 2 puff    diphenhydrAMINE injection 25 mg    EPINEPHrine (EPIPEN) 0.3 mg/0.3 mL pen injection 0.3 mg    methylPREDNISolone sodium succinate injection 40 mg    ondansetron disintegrating tablet 4 mg    sodium chloride 0.9% 500 mL flush bag    sodium chloride 0.9% flush 10 mL     Current Outpatient Medications on File Prior to Encounter   Medication Sig    acetaminophen (TYLENOL) 500 MG tablet Take 2 tablets (1,000 mg total) by mouth every 6 (six) hours as needed for Pain.    albuterol (PROVENTIL/VENTOLIN HFA) 90 mcg/actuation inhaler Inhale 1-2 puffs into the lungs daily as needed for Shortness of Breath. Rescue    ascorbic acid (VITAMIN C ORAL) Take 1 tablet by mouth 2 (two) times a day.    atorvastatin (LIPITOR) 80 MG tablet Take 80 mg by mouth once daily.    cholecalciferol, vitamin D3, 125 mcg (5,000 unit) capsule Take 5,000 Units by mouth once  daily.    cyanocobalamin, vitamin B-12, 2,500 mcg Tab Take 2,500 mcg by mouth once daily.    lisinopriL (PRINIVIL,ZESTRIL) 20 MG tablet Take 20 mg by mouth once daily.    solifenacin (VESICARE) 10 MG tablet Take 10 mg by mouth every evening.    SYMBICORT 160-4.5 mcg/actuation HFAA Inhale 2 puffs into the lungs every 12 (twelve) hours.    temazepam (RESTORIL) 30 mg capsule Take 1 capsule (30 mg total) by mouth nightly as needed for Insomnia.    triamcinolone acetonide 0.1% (KENALOG) 0.1 % cream Apply topically daily as needed (rash).    UNABLE TO FIND Place 5 mLs under the tongue once daily. medication name: EDTA    UNABLE TO FIND Take 4 tablets by mouth every evening. medication name: Multi Mineral     Family History    None       Tobacco Use    Smoking status: Never    Smokeless tobacco: Never   Substance and Sexual Activity    Alcohol use: Not Currently    Drug use: Not on file    Sexual activity: Not Currently     Review of Systems   Constitutional:  Positive for fatigue.   HENT: Negative.     Eyes: Negative.    Respiratory: Negative.     Cardiovascular: Negative.    Gastrointestinal: Negative.    Endocrine: Negative.    Genitourinary: Negative.    Musculoskeletal: Negative.    Skin: Negative.    Allergic/Immunologic: Negative.    Neurological: Negative.    Hematological: Negative.    All other systems reviewed and are negative.    Objective:     Vital Signs (Most Recent):  Temp: 99.3 °F (37.4 °C) (01/20/24 0502)  Pulse: 81 (01/20/24 0520)  Resp: 17 (01/20/24 0520)  BP: (!) 96/55 (01/20/24 0553)  SpO2: (!) 92 % (01/20/24 0515) Vital Signs (24h Range):  Temp:  [99.3 °F (37.4 °C)] 99.3 °F (37.4 °C)  Pulse:  [77-84] 81  Resp:  [17-19] 17  SpO2:  [92 %-96 %] 92 %  BP: ()/(55-97) 96/55     Weight: 65.3 kg (144 lb)  Body mass index is 21.9 kg/m².     Physical Exam  Vitals and nursing note reviewed.   Constitutional:       Appearance: He is well-developed.   HENT:      Head: Normocephalic and atraumatic.       Right Ear: External ear normal.      Left Ear: External ear normal.      Nose: Nose normal.   Eyes:      Conjunctiva/sclera: Conjunctivae normal.      Pupils: Pupils are equal, round, and reactive to light.   Cardiovascular:      Rate and Rhythm: Normal rate and regular rhythm.      Heart sounds: Normal heart sounds.   Pulmonary:      Effort: Pulmonary effort is normal.      Breath sounds: Normal breath sounds.   Abdominal:      General: Bowel sounds are normal.      Palpations: Abdomen is soft.   Musculoskeletal:         General: Normal range of motion.      Cervical back: Normal range of motion and neck supple.   Skin:     General: Skin is warm and dry.      Capillary Refill: Capillary refill takes less than 2 seconds.      Comments: Left sided temporal skin avulsion with hemostasis   Neurological:      Mental Status: He is alert and oriented to person, place, and time.   Psychiatric:         Behavior: Behavior normal.         Thought Content: Thought content normal.         Judgment: Judgment normal.              CRANIAL NERVES     CN III, IV, VI   Pupils are equal, round, and reactive to light.       Significant Labs: All pertinent labs within the past 24 hours have been reviewed.  CBC:   Recent Labs   Lab 01/20/24 0438   WBC 10.03   HGB 12.2*   HCT 36.2*        CMP:   Recent Labs   Lab 01/20/24 0438   *   K 3.9   CL 96   CO2 29   *   BUN 14   CREATININE 0.9   CALCIUM 9.5   PROT 7.0   ALBUMIN 3.7   BILITOT 0.6   ALKPHOS 86   AST 41*   ALT 33   ANIONGAP 7*     Cardiac Markers:   Recent Labs   Lab 01/20/24 0438   *     Troponin:   Recent Labs   Lab 01/20/24 0438   TROPONINIHS 40.1*       Significant Imaging: I have reviewed all pertinent imaging results/findings within the past 24 hours.  Assessment/Plan:     * Subdural hematoma  As per HPI      Night sweats  ECHO, Cardiology      Essential hypertension  Chronic, uncontrolled. Latest blood pressure and vitals reviewed-     Temp:   [99.3 °F (37.4 °C)]   Pulse:  [77-84]   Resp:  [17-19]   BP: ()/(55-97)   SpO2:  [92 %-96 %] .   Home meds for hypertension were reviewed and noted below.   Hypertension Medications               lisinopriL (PRINIVIL,ZESTRIL) 20 MG tablet Take 20 mg by mouth once daily.            While in the hospital, will manage blood pressure as follows; Continue home antihypertensive regimen; Given enalapril in ED and started on nicardipine infusion    Will utilize p.r.n. blood pressure medication only if patient's blood pressure greater than  <140 systolic  and he develops symptoms such as worsening chest pain or shortness of breath.      VTE Risk Mitigation (From admission, onward)      None                            Gamal Reynolds MD  Department of Hospital Medicine  [facility] - Emergency Dept

## 2024-01-20 NOTE — CONSULTS
[facility] - Emergency Dept  Neurosurgery  Consult Note    Inpatient consult to Neurosurgery  Consult performed by: Gamal Villalta MD  Consult ordered by: Gamal Reynolds MD        Subjective:     Chief Complaint/Reason for Admission: Fall and Right SDH    History of Present Illness: 88M with fall getting up from toilet. He struck his head on an unknown object and was found by family with a bleeding head wound over the left sided scalp. Patient is amnestic to event details but family does not think he had LOC. 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 He is not on anticoagulation or platelets. He was hypertensive on ED arrival but this was normalized with medication.    (Not in a hospital admission)      Review of patient's allergies indicates:   Allergen Reactions    Pollen extracts Other (See Comments)       Past Medical History:   Diagnosis Date    Basal cell carcinoma 2020    L temple / radiation therapy    Cataract     Hypertension     Squamous cell carcinoma of skin      Past Surgical History:   Procedure Laterality Date    APPENDECTOMY      EYE SURGERY      Moh's surgery on right temple area Right 2015    Cancerous spot    PROSTATE BIOPSY      TONSILLECTOMY       Family History    None       Tobacco Use    Smoking status: Never    Smokeless tobacco: Never   Substance and Sexual Activity    Alcohol use: Not Currently    Drug use: Not on file    Sexual activity: Not Currently     Review of Systems  Objective:     Weight: 65.3 kg (144 lb)  Body mass index is 21.9 kg/m².  Vital Signs (Most Recent):  Temp: 99.3 °F (37.4 °C) (01/20/24 0502)  Pulse: 70 (01/20/24 0945)  Resp: 19 (01/20/24 0945)  BP: 107/63 (01/20/24 0945)  SpO2: 96 % (01/20/24 0945) Vital Signs (24h Range):  Temp:  [99.3 °F (37.4 °C)] 99.3 °F (37.4 °C)  Pulse:  [70-84] 70  Resp:  [15-21] 19  SpO2:  [92 %-97 %] 96 %  BP: ()/(55-97) 107/63     Date  "01/20/24 0700 - 01/21/24 0659   Shift 0747-1262 5362-8437 2568-0582 24 Hour Total   INTAKE   Shift Total(mL/kg)       OUTPUT   Urine(mL/kg/hr) 600   600   Shift Total(mL/kg) 600(9.2)   600(9.2)   Weight (kg) 65.3 65.3 65.3 65.3                     Neurosurgery Physical Exam    AVSS  NAD  A&Ox3  GCS 15  PERRL  EOMI  Cranial nerves intact  MAEW, non-focal  Left scalp abrasion and contusion  Mild midline TTP over C spine    Significant Labs:  Recent Labs   Lab 01/20/24  0438   *   *   K 3.9   CL 96   CO2 29   BUN 14   CREATININE 0.9   CALCIUM 9.5     Recent Labs   Lab 01/20/24  0438   WBC 10.03   HGB 12.2*   HCT 36.2*        Recent Labs   Lab 01/20/24  0438   INR 1.1     Microbiology Results (last 7 days)       ** No results found for the last 168 hours. **          ABGs: No results for input(s): "PH", "PCO2", "PO2", "HCO3", "POCSATURATED", "BE" in the last 48 hours.  CMP:   Recent Labs   Lab 01/20/24  0438   *   CALCIUM 9.5   ALBUMIN 3.7   PROT 7.0   *   K 3.9   CO2 29   CL 96   BUN 14   CREATININE 0.9   ALKPHOS 86   ALT 33   AST 41*   BILITOT 0.6     CRP: No results for input(s): "CRP" in the last 48 hours.  All pertinent labs from the last 24 hours have been reviewed.  Significant Diagnostics:    I have reviewed all pertinent imaging results/findings within the past 24 hours.    CTH ~3AM: Thin right subdural hematoma over frontal convexity without significant midline shift, or fracture. Soft tissue swelling over left frontal scalp.  CTH ~ 9AM: stable right subdural hematoma without mass effect    Assessment/Plan:     Active Diagnoses:    Diagnosis Date Noted POA    PRINCIPAL PROBLEM:  Subdural hematoma [S06.5XAA] 01/20/2024 Yes    Night sweats [R61] 01/20/2024 Yes    Essential hypertension [I10] 10/02/2019 Yes     Chronic      Problems Resolved During this Admission:   88M with possible syncopal etiology of a GLF causing head and soft tissue trauma with a thin, stable and nonsurgical " right subdural hematoma without mass effect. Since he is intact and his SDH is small and stable, I'd recommend outpatient follow up with local neurosurgeon in 1-2 weeks. I will order a CT C spine given his mild TTP on exam. Possible syncope workup and ED discharge per ED docs and CT C spine findings. Plan communicated to ED physician.     Thank you for your consult. I will sign off. Please contact us if you have any additional questions.    Gamal Villalta MD  Neurosurgery  [facility] - Emergency Dept    ADDENDUM: The CT C spine shows possible acute C7 superior articular facet fracture. Will treat in rigid cervical collar and he can follow up with local neurosurgeon in 1-2 weeks. Plan communicated to hospitalist.

## 2024-01-20 NOTE — PROGRESS NOTES
Automatic Inhaler to Nebulizer Interchange    fluticasone/vilanterol (Breo Ellipta) 100 mcg/25 mcg changed to budesonide 0.5 mg twice daily AND arformoterol 15 mcg twice daily per [facility] Automatic Therapeutic Substitutions Protocol.    Please contact pharmacy at extension [phone] with any questions.     Thank you,   Linda Yost

## 2024-01-20 NOTE — SUBJECTIVE & OBJECTIVE
Past Medical History:   Diagnosis Date    Basal cell carcinoma 2020    L temple / radiation therapy    Cataract     Hypertension     Squamous cell carcinoma of skin        Past Surgical History:   Procedure Laterality Date    APPENDECTOMY      EYE SURGERY      Moh's surgery on right temple area Right 2015    Cancerous spot    PROSTATE BIOPSY      TONSILLECTOMY         Review of patient's allergies indicates:   Allergen Reactions    Pollen extracts Other (See Comments)       Current Facility-Administered Medications on File Prior to Encounter   Medication    albuterol inhaler 2 puff    diphenhydrAMINE injection 25 mg    EPINEPHrine (EPIPEN) 0.3 mg/0.3 mL pen injection 0.3 mg    methylPREDNISolone sodium succinate injection 40 mg    ondansetron disintegrating tablet 4 mg    sodium chloride 0.9% 500 mL flush bag    sodium chloride 0.9% flush 10 mL     Current Outpatient Medications on File Prior to Encounter   Medication Sig    acetaminophen (TYLENOL) 500 MG tablet Take 2 tablets (1,000 mg total) by mouth every 6 (six) hours as needed for Pain.    albuterol (PROVENTIL/VENTOLIN HFA) 90 mcg/actuation inhaler Inhale 1-2 puffs into the lungs daily as needed for Shortness of Breath. Rescue    ascorbic acid (VITAMIN C ORAL) Take 1 tablet by mouth 2 (two) times a day.    atorvastatin (LIPITOR) 80 MG tablet Take 80 mg by mouth once daily.    cholecalciferol, vitamin D3, 125 mcg (5,000 unit) capsule Take 5,000 Units by mouth once daily.    cyanocobalamin, vitamin B-12, 2,500 mcg Tab Take 2,500 mcg by mouth once daily.    lisinopriL (PRINIVIL,ZESTRIL) 20 MG tablet Take 20 mg by mouth once daily.    solifenacin (VESICARE) 10 MG tablet Take 10 mg by mouth every evening.    SYMBICORT 160-4.5 mcg/actuation HFAA Inhale 2 puffs into the lungs every 12 (twelve) hours.    temazepam (RESTORIL) 30 mg capsule Take 1 capsule (30 mg total) by mouth nightly as needed for Insomnia.    triamcinolone acetonide 0.1% (KENALOG) 0.1 % cream Apply  topically daily as needed (rash).    UNABLE TO FIND Place 5 mLs under the tongue once daily. medication name: EDTA    UNABLE TO FIND Take 4 tablets by mouth every evening. medication name: Multi Mineral     Family History    None       Tobacco Use    Smoking status: Never    Smokeless tobacco: Never   Substance and Sexual Activity    Alcohol use: Not Currently    Drug use: Not on file    Sexual activity: Not Currently     Review of Systems   Constitutional:  Positive for fatigue.   HENT: Negative.     Eyes: Negative.    Respiratory: Negative.     Cardiovascular: Negative.    Gastrointestinal: Negative.    Endocrine: Negative.    Genitourinary: Negative.    Musculoskeletal: Negative.    Skin: Negative.    Allergic/Immunologic: Negative.    Neurological: Negative.    Hematological: Negative.    All other systems reviewed and are negative.    Objective:     Vital Signs (Most Recent):  Temp: 99.3 °F (37.4 °C) (01/20/24 0502)  Pulse: 81 (01/20/24 0520)  Resp: 17 (01/20/24 0520)  BP: (!) 96/55 (01/20/24 0553)  SpO2: (!) 92 % (01/20/24 0515) Vital Signs (24h Range):  Temp:  [99.3 °F (37.4 °C)] 99.3 °F (37.4 °C)  Pulse:  [77-84] 81  Resp:  [17-19] 17  SpO2:  [92 %-96 %] 92 %  BP: ()/(55-97) 96/55     Weight: 65.3 kg (144 lb)  Body mass index is 21.9 kg/m².     Physical Exam  Vitals and nursing note reviewed.   Constitutional:       Appearance: He is well-developed.   HENT:      Head: Normocephalic and atraumatic.      Right Ear: External ear normal.      Left Ear: External ear normal.      Nose: Nose normal.   Eyes:      Conjunctiva/sclera: Conjunctivae normal.      Pupils: Pupils are equal, round, and reactive to light.   Cardiovascular:      Rate and Rhythm: Normal rate and regular rhythm.      Heart sounds: Normal heart sounds.   Pulmonary:      Effort: Pulmonary effort is normal.      Breath sounds: Normal breath sounds.   Abdominal:      General: Bowel sounds are normal.      Palpations: Abdomen is soft.    Musculoskeletal:         General: Normal range of motion.      Cervical back: Normal range of motion and neck supple.   Skin:     General: Skin is warm and dry.      Capillary Refill: Capillary refill takes less than 2 seconds.      Comments: Left sided temporal skin avulsion with hemostasis   Neurological:      Mental Status: He is alert and oriented to person, place, and time.   Psychiatric:         Behavior: Behavior normal.         Thought Content: Thought content normal.         Judgment: Judgment normal.              CRANIAL NERVES     CN III, IV, VI   Pupils are equal, round, and reactive to light.       Significant Labs: All pertinent labs within the past 24 hours have been reviewed.  CBC:   Recent Labs   Lab 01/20/24 0438   WBC 10.03   HGB 12.2*   HCT 36.2*        CMP:   Recent Labs   Lab 01/20/24 0438   *   K 3.9   CL 96   CO2 29   *   BUN 14   CREATININE 0.9   CALCIUM 9.5   PROT 7.0   ALBUMIN 3.7   BILITOT 0.6   ALKPHOS 86   AST 41*   ALT 33   ANIONGAP 7*     Cardiac Markers:   Recent Labs   Lab 01/20/24 0438   *     Troponin:   Recent Labs   Lab 01/20/24 0438   TROPONINIHS 40.1*       Significant Imaging: I have reviewed all pertinent imaging results/findings within the past 24 hours.

## 2024-01-20 NOTE — CARE UPDATE
I have assumed care of patient and have reviewed the chart.  I performed a physical exam on patient and have conversed with Neurosurgery and Cardiology.  CT head shows subdural hematoma.  Repeat imaging shows that it is stable.  CT cervical spine shows a nondisplaced C7 fracture.  Patient has been placed in a C-collar.  Ecchymosis noted on left side of head on physical exam.  No focal neurologic deficits.  Patient has been weaned off Cardizem drip.  Blood pressure stable at this time.  Echocardiogram shows EF of 58%.  EKG showed first-degree AV block with a SC of 218.  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  Patient will stay overnight for monitoring on telemetry due to syncopal event.  We will order orthostatic vital signs.  Continue to monitor vitals.  He will need to follow up with Neurosurgery outpatient.

## 2024-01-20 NOTE — PHARMACY MED REC
"              .        Admission Medication History     The home medication history was taken by Felisha Mancini.    You may go to "Admission" then "Reconcile Home Medications" tabs to review and/or act upon these items.     The home medication list has been updated by the Pharmacy department.   Please read ALL comments highlighted in yellow.   Please address this information as you see fit.    Feel free to contact us if you have any questions or require assistance.          Medications listed below were obtained from: Patient/family and Analytic software- [employer]  Current Facility-Administered Medications on File Prior to Encounter   Medication Dose Route Frequency Provider Last Rate Last Admin    albuterol inhaler 2 puff  2 puff Inhalation Q20 Min PRN Farhana Riggins, NP        diphenhydrAMINE injection 25 mg  25 mg Intravenous Once PRN Farhana Riggins, BARI        EPINEPHrine (EPIPEN) 0.3 mg/0.3 mL pen injection 0.3 mg  0.3 mg Intramuscular PRN Farhana Riggins NP        methylPREDNISolone sodium succinate injection 40 mg  40 mg Intravenous Once PRN Farhana Riggins NP        ondansetron disintegrating tablet 4 mg  4 mg Oral Once PRN Farhana Riggins, NP        sodium chloride 0.9% 500 mL flush bag   Intravenous PRN Farhana Riggins NP   Stopped at 12/15/21 1455    sodium chloride 0.9% flush 10 mL  10 mL Intravenous PRN Farhana Riggins NP         Current Outpatient Medications on File Prior to Encounter   Medication Sig Dispense Refill    acetaminophen (TYLENOL) 500 MG tablet Take 2 tablets (1,000 mg total) by mouth every 6 (six) hours as needed for Pain.  0    albuterol (PROVENTIL/VENTOLIN HFA) 90 mcg/actuation inhaler Inhale 1-2 puffs into the lungs daily as needed for Shortness of Breath. Rescue      atorvastatin (LIPITOR) 80 MG tablet Take 80 mg by mouth once daily.      cholecalciferol, vitamin D3, 125 mcg (5,000 unit) capsule Take 5,000 Units by mouth once daily.      cyanocobalamin, " vitamin B-12, 2,500 mcg Tab Take 2,500 mcg by mouth once daily.      GEMTESA 75 mg Tab Take 1 tablet by mouth once daily.      SYMBICORT 160-4.5 mcg/actuation HFAA Inhale 2 puffs into the lungs every 12 (twelve) hours. 30 g 4    temazepam (RESTORIL) 30 mg capsule Take 1 capsule (30 mg total) by mouth nightly as needed for Insomnia. 90 capsule 1    triamcinolone acetonide 0.1% (KENALOG) 0.1 % cream Apply 1 g topically daily as needed (rash).      ascorbic acid (VITAMIN C ORAL) Take 1 tablet by mouth 2 (two) times a day.      lisinopriL (PRINIVIL,ZESTRIL) 20 MG tablet Take 20 mg by mouth once daily.      solifenacin (VESICARE) 10 MG tablet Take 10 mg by mouth every evening.      UNABLE TO FIND Place 5 mLs under the tongue once daily. medication name: EDTA      UNABLE TO FIND Take 4 tablets by mouth every evening. medication name: Multi Mineral         Potential issues to be addressed PRIOR TO DISCHARGE  Patient reported not taking the following medications: (Vitamin C, Lisinopril & Vesicare). These medications remain on the home medication list. Please address accordingly.     Felisha Mancini  EXT 1924

## 2024-01-20 NOTE — ED PROVIDER NOTES
Encounter Date: 1/20/2024       History     Chief Complaint   Patient presents with    Fall    Loss of Consciousness     88-year-old male with history of spontaneous subdural hematoma, no longer on anticoagulants, presents now for syncopal episode.  He was sitting on the toilet when he passed out.  He awoke in a pool of blood, with a cut on the left side of his head.      Review of patient's allergies indicates:   Allergen Reactions    Pollen extracts Other (See Comments)     Past Medical History:   Diagnosis Date    Basal cell carcinoma 2020    L temple / radiation therapy    Cataract     Hypertension     Squamous cell carcinoma of skin      Past Surgical History:   Procedure Laterality Date    APPENDECTOMY      EYE SURGERY      Moh's surgery on right temple area Right 2015    Cancerous spot    PROSTATE BIOPSY      TONSILLECTOMY       Family History   Problem Relation Age of Onset    Melanoma Neg Hx     Psoriasis Neg Hx     Lupus Neg Hx     Eczema Neg Hx      Social History     Tobacco Use    Smoking status: Never    Smokeless tobacco: Never   Substance Use Topics    Alcohol use: Not Currently     Review of Systems   HENT:  Positive for facial swelling.    Neurological:  Positive for syncope.       Physical Exam     Initial Vitals   BP Pulse Resp Temp SpO2   01/20/24 0359 01/20/24 0359 01/20/24 0359 01/20/24 0502 01/20/24 0359   (!) 204/93 77 18 99.3 °F (37.4 °C) 95 %      MAP       --                Physical Exam    Nursing note and vitals reviewed.  Constitutional: He appears well-developed and well-nourished.   HENT:   Head: Normocephalic.   Mouth/Throat: Oropharynx is clear and moist.   Abrasions above the left ear, with skin tear, abrasion to the posterior occiput.   Eyes: Conjunctivae and EOM are normal. Pupils are equal, round, and reactive to light.   Neck: No thyromegaly present.   Normal range of motion.  Cardiovascular:  Normal rate, regular rhythm and intact distal pulses.           Pulmonary/Chest:  Breath sounds normal. No respiratory distress. He has no wheezes.   Abdominal: Abdomen is soft. Bowel sounds are normal. He exhibits no distension. There is no abdominal tenderness.   Musculoskeletal:         General: No tenderness or edema. Normal range of motion.      Cervical back: Normal range of motion.     Neurological: He is alert and oriented to person, place, and time. He has normal strength. No cranial nerve deficit.   Skin: Skin is warm and dry. No rash noted.   Psychiatric: He has a normal mood and affect. His behavior is normal. Thought content normal.         ED Course   Critical Care    Date/Time: 1/20/2024 5:43 AM    Performed by: Jaskaran Enriquez MD  Authorized by: Jaskaran Enriquez MD  Direct patient critical care time: 15 minutes  Additional history critical care time: 5 minutes  Ordering / reviewing critical care time: 5 minutes  Documentation critical care time: 5 minutes  Consulting other physicians critical care time: 5 minutes  Total critical care time (exclusive of procedural time) : 35 minutes  Critical care was necessary to treat or prevent imminent or life-threatening deterioration of the following conditions: CNS failure or compromise.  Critical care was time spent personally by me on the following activities: examination of patient, obtaining history from patient or surrogate, ordering and performing treatments and interventions, ordering and review of laboratory studies, ordering and review of radiographic studies, pulse oximetry and re-evaluation of patient's condition.        Labs Reviewed   CBC W/ AUTO DIFFERENTIAL - Abnormal; Notable for the following components:       Result Value    RBC 3.65 (*)     Hemoglobin 12.2 (*)     Hematocrit 36.2 (*)     MCV 99 (*)     MCH 33.4 (*)     Immature Granulocytes 0.7 (*)     Immature Grans (Abs) 0.07 (*)     Lymph # 0.7 (*)     Mono # 1.9 (*)     Lymph % 6.5 (*)     Mono % 19.1 (*)     All other components within normal limits    COMPREHENSIVE METABOLIC PANEL - Abnormal; Notable for the following components:    Sodium 132 (*)     Glucose 114 (*)     AST 41 (*)     Anion Gap 7 (*)     All other components within normal limits   TROPONIN I HIGH SENSITIVITY - Abnormal; Notable for the following components:    Troponin I High Sensitivity 40.1 (*)     All other components within normal limits   B-TYPE NATRIURETIC PEPTIDE - Abnormal; Notable for the following components:     (*)     All other components within normal limits   URINALYSIS, REFLEX TO URINE CULTURE - Abnormal; Notable for the following components:    Appearance, UA Hazy (*)     All other components within normal limits    Narrative:     Specimen Source->Urine   PROTIME-INR   POCT GLUCOSE MONITORING CONTINUOUS        ECG Results              EKG 12-lead (In process)  Result time 01/20/24 05:11:13      In process by Interface, Lab In [facility] (01/20/24 05:11:13)                   Narrative:    Test Reason : R55,    Vent. Rate : 087 BPM     Atrial Rate : 087 BPM     P-R Int : 218 ms          QRS Dur : 146 ms      QT Int : 388 ms       P-R-T Axes : -23 -55 057 degrees     QTc Int : 466 ms    Sinus rhythm with 1st degree A-V block with occasional Premature  ventricular complexes  Right bundle branch block  Left anterior fascicular block   Bifascicular block   Abnormal ECG  When compared with ECG of 22-JUN-2023 08:21,  Criteria for Septal infarct are no longer Present    Referred By: [email or web address]   SELF           Confirmed By:                                   Imaging Results              CT Head Without Contrast (Final result)  Result time 01/20/24 03:49:00      Final result by Jessica Ndiaye MD (01/20/24 03:49:00)                   Narrative:    EXAM:  CT Head Without Intravenous Contrast    CLINICAL HISTORY:  The patient is 88 years old and is Male; Head trauma, intracranial venous injury suspected    TECHNIQUE:  Axial computed tomography images of the head/brain without  intravenous contrast.  Sagittal and coronal reformatted images were created and reviewed.  This CT exam was performed using one or more of the following dose reduction techniques:  automated exposure control, adjustment of the mA and/or kV according to patient size, and/or use of iterative reconstruction technique.    COMPARISON:  CT Head dated 8/4/2023    FINDINGS:  BRAIN:  Generalized cerebral atrophy with commensurate ventricular dilatation.  Low attenuation in the periventricular, subcortical, and deep white matter nonspecific but likely secondary to chronic small vessel ischemic disease.  Small right frontal subdural hematoma measuring up to 4 mm exerting minimal mass effect on the subjacent cerebral cortex. No significant midline shift.  VENTRICLES:  See above.  BONES/JOINTS:  Unremarkable.  No acute fracture.  SOFT TISSUES:  Left frontal scalp swelling/hematoma.  SINUSES:  Air-fluid level and mucosal thickening in the left frontal sinus with mucosal thickening/fluid in the left ethmoid air cells. Minimal mucosal thickening in the right ethmoid air cells and left maxillary sinus.  MASTOID AIR CELLS:  Unremarkable as visualized.  No mastoid effusion.    IMPRESSION:  Small right frontal subdural hematoma measuring up to 4 mm exerting minimal mass effect on the subjacent cerebral cortex. No significant midline shift.    Left frontal scalp swelling/hematoma.    Paranasal sinus disease as described above. Clinical correlation for symptoms of acute sinusitis recommended.    THIS REPORT CONTAINS FINDINGS THAT MAY BE CRITICAL TO PATIENT CARE: The findings were verbally discussed via telephone conference with Dr. HERIBERTO SALGADO  on 1/20/2024 3:47 AM CST .The results were acknowledged and understood.    Electronically signed by:  Jessica Ndiaye MD  01/20/2024 03:49 AM CST Workstation: [id number]                                     Medications   niCARdipine 40 mg/200 mL (0.2 mg/mL) infusion (7.5 mg/hr Intravenous  Rate/Dose Change 1/20/24 9511)   sodium chloride 0.9% bolus 1,000 mL 1,000 mL (has no administration in time range)   enalaprilat injection 2.5 mg (2.5 mg Intravenous Given 1/20/24 6540)     Medical Decision Making  Amount and/or Complexity of Data Reviewed  Labs: ordered.  Radiology: ordered.    Risk  Prescription drug management.  Decision regarding hospitalization.               ED Course as of 01/20/24 0544   Sat Jan 20, 2024   0352 CT head with small right frontal subdural hematoma, GCS 15, no neuro deficits, will consult Neurosurgery [BS]   0405 Case discussed with Dr. Causey, who recommended 6 hour CT head without contrast.  Given patient is hypertensive with systolic of 204, recommended systolic blood pressure less than 140.  Will start Cardene GTT and give home medications.  If subdural hematoma is no worse at 10:00 a.m., patient can be discharged with outpatient follow-up. [BS]      ED Course User Index  [BS] Jaskaran Enriquez MD                           Clinical Impression:  Final diagnoses:  [R55] Syncope  [S06.5XAA] SDH (subdural hematoma) (Primary)          ED Disposition Condition    Admit                 Jaskaran Enriquez MD  01/20/24 0522

## 2024-01-20 NOTE — HPI
"88 year old male with history of HTN, Subdural Hematoma (right), Basal Cell Ca presented to ED after falling on bathroom floor and striking left side of head. He woke, got up to urinate, sat on toilet to urinate, and found himself on floor in "Pool of blood". He does not remember going down. No HA, vision changes or dizziness that he remembers. No CP or SOB.     He was started on trospium chloride (Sanctura) 2 weeks ago for overactive bladder. Since starting this he has not had to wake to urinate overnight. However last PM he woke "Drenched in sweat" (which he admits has been happening often "Lately"), and got up to change his clothing. He also has been taking temazepam before going to sleep. He does not remember going into the bathroom, but he does recollect sitting on the toilet prior to going down. He does not remember falling off of the toilet to the floor.    He had the same thing happen a year ago and suffered right sided subdural then. 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 Was seen by Vascular (Estephania) 8/2023 and options were discussed: surgery vs medical management.     In ED: labs reviewed and noted fully below: minimal anemia; trivial hyponatremia with normal renal function; BNP and troponin are mildly elevated. EKG reviewed: sinus with first degree and bifascicular blocks, but no acute segments. CT Head show small (4 mm) left sided subdural. Neurosurgery was notified and recommended BP < 140 systolic (190 at presentation) and repeat CT in 6 hours. Patient was given enalapril and started on nicardipine infusion. Currently BP is 122 systolic.    Discussed with ED MD: head wound does not need sutures; repeat CT ordered as above; continue nicardipine infusion, inpatient admission to progressive care unit. Cardiology opinion and ECHO will be obtained as his night sweats could very well be a sign of early failure. He is not established with a Cardiologist.  "

## 2024-01-20 NOTE — ASSESSMENT & PLAN NOTE
Chronic, uncontrolled. Latest blood pressure and vitals reviewed-     Temp:  [99.3 °F (37.4 °C)]   Pulse:  [77-84]   Resp:  [17-19]   BP: ()/(55-97)   SpO2:  [92 %-96 %] .   Home meds for hypertension were reviewed and noted below.   Hypertension Medications               lisinopriL (PRINIVIL,ZESTRIL) 20 MG tablet Take 20 mg by mouth once daily.            While in the hospital, will manage blood pressure as follows; Continue home antihypertensive regimen; Given enalapril in ED and started on nicardipine infusion    Will utilize p.r.n. blood pressure medication only if patient's blood pressure greater than  <140 systolic  and he develops symptoms such as worsening chest pain or shortness of breath.

## 2024-01-21 VITALS
TEMPERATURE: 99 F | SYSTOLIC BLOOD PRESSURE: 104 MMHG | HEART RATE: 69 BPM | DIASTOLIC BLOOD PRESSURE: 61 MMHG | OXYGEN SATURATION: 94 % | WEIGHT: 145.5 LBS | RESPIRATION RATE: 21 BRPM | HEIGHT: 68 IN | BODY MASS INDEX: 22.05 KG/M2

## 2024-01-21 PROBLEM — I95.1 ORTHOSTATIC SYNCOPE: Status: ACTIVE | Noted: 2024-01-21

## 2024-01-21 PROBLEM — S12.601A: Status: ACTIVE | Noted: 2024-01-21

## 2024-01-21 LAB
ANION GAP SERPL CALC-SCNC: 5 MMOL/L (ref 8–16)
BASOPHILS # BLD AUTO: 0.03 K/UL (ref 0–0.2)
BASOPHILS NFR BLD: 0.3 % (ref 0–1.9)
BUN SERPL-MCNC: 9 MG/DL (ref 8–23)
CALCIUM SERPL-MCNC: 8.3 MG/DL (ref 8.7–10.5)
CHLORIDE SERPL-SCNC: 98 MMOL/L (ref 95–110)
CO2 SERPL-SCNC: 27 MMOL/L (ref 23–29)
CREAT SERPL-MCNC: 0.7 MG/DL (ref 0.5–1.4)
DIFFERENTIAL METHOD BLD: ABNORMAL
EOSINOPHIL # BLD AUTO: 0.1 K/UL (ref 0–0.5)
EOSINOPHIL NFR BLD: 0.7 % (ref 0–8)
ERYTHROCYTE [DISTWIDTH] IN BLOOD BY AUTOMATED COUNT: 12.9 % (ref 11.5–14.5)
EST. GFR  (NO RACE VARIABLE): >60 ML/MIN/1.73 M^2
GLUCOSE SERPL-MCNC: 100 MG/DL (ref 70–110)
HCT VFR BLD AUTO: 29.6 % (ref 40–54)
HGB BLD-MCNC: 10 G/DL (ref 14–18)
IMM GRANULOCYTES # BLD AUTO: 0.06 K/UL (ref 0–0.04)
IMM GRANULOCYTES NFR BLD AUTO: 0.7 % (ref 0–0.5)
LYMPHOCYTES # BLD AUTO: 1 K/UL (ref 1–4.8)
LYMPHOCYTES NFR BLD: 10.6 % (ref 18–48)
MCH RBC QN AUTO: 33.4 PG (ref 27–31)
MCHC RBC AUTO-ENTMCNC: 33.8 G/DL (ref 32–36)
MCV RBC AUTO: 99 FL (ref 82–98)
MONOCYTES # BLD AUTO: 1.7 K/UL (ref 0.3–1)
MONOCYTES NFR BLD: 19.1 % (ref 4–15)
NEUTROPHILS # BLD AUTO: 6.3 K/UL (ref 1.8–7.7)
NEUTROPHILS NFR BLD: 68.6 % (ref 38–73)
NRBC BLD-RTO: 0 /100 WBC
PLATELET # BLD AUTO: 167 K/UL (ref 150–450)
PMV BLD AUTO: 12.3 FL (ref 9.2–12.9)
POTASSIUM SERPL-SCNC: 3.8 MMOL/L (ref 3.5–5.1)
RBC # BLD AUTO: 2.99 M/UL (ref 4.6–6.2)
SODIUM SERPL-SCNC: 130 MMOL/L (ref 136–145)
WBC # BLD AUTO: 9.11 K/UL (ref 3.9–12.7)

## 2024-01-21 PROCEDURE — 25000003 PHARM REV CODE 250: Performed by: INTERNAL MEDICINE

## 2024-01-21 PROCEDURE — 94761 N-INVAS EAR/PLS OXIMETRY MLT: CPT

## 2024-01-21 PROCEDURE — 94640 AIRWAY INHALATION TREATMENT: CPT

## 2024-01-21 PROCEDURE — 36415 COLL VENOUS BLD VENIPUNCTURE: CPT | Performed by: INTERNAL MEDICINE

## 2024-01-21 PROCEDURE — 85025 COMPLETE CBC W/AUTO DIFF WBC: CPT | Performed by: INTERNAL MEDICINE

## 2024-01-21 PROCEDURE — 99900031 HC PATIENT EDUCATION (STAT)

## 2024-01-21 PROCEDURE — 80048 BASIC METABOLIC PNL TOTAL CA: CPT | Performed by: INTERNAL MEDICINE

## 2024-01-21 PROCEDURE — 25000242 PHARM REV CODE 250 ALT 637 W/ HCPCS: Performed by: INTERNAL MEDICINE

## 2024-01-21 RX ORDER — SODIUM,POTASSIUM PHOSPHATES 280-250MG
2 POWDER IN PACKET (EA) ORAL
Status: DISCONTINUED | OUTPATIENT
Start: 2024-01-21 | End: 2024-01-21 | Stop reason: HOSPADM

## 2024-01-21 RX ORDER — LANOLIN ALCOHOL/MO/W.PET/CERES
800 CREAM (GRAM) TOPICAL
Status: DISCONTINUED | OUTPATIENT
Start: 2024-01-21 | End: 2024-01-21 | Stop reason: HOSPADM

## 2024-01-21 RX ADMIN — CYANOCOBALAMIN TAB 1000 MCG 2000 MCG: 1000 TAB at 08:01

## 2024-01-21 RX ADMIN — ARFORMOTEROL TARTRATE 15 MCG: 15 SOLUTION RESPIRATORY (INHALATION) at 07:01

## 2024-01-21 RX ADMIN — CHOLECALCIFEROL (VITAMIN D3) 10 MCG (400 UNIT) TABLET 4800 UNITS: at 08:01

## 2024-01-21 RX ADMIN — ATORVASTATIN CALCIUM 80 MG: 40 TABLET, FILM COATED ORAL at 08:01

## 2024-01-21 RX ADMIN — LISINOPRIL 20 MG: 20 TABLET ORAL at 08:01

## 2024-01-21 RX ADMIN — OXYCODONE HYDROCHLORIDE AND ACETAMINOPHEN 500 MG: 500 TABLET ORAL at 08:01

## 2024-01-21 RX ADMIN — OXYBUTYNIN CHLORIDE 10 MG: 5 TABLET, EXTENDED RELEASE ORAL at 08:01

## 2024-01-21 RX ADMIN — BUDESONIDE INHALATION 0.5 MG: 0.5 SUSPENSION RESPIRATORY (INHALATION) at 07:01

## 2024-01-21 RX ADMIN — POTASSIUM BICARBONATE 50 MEQ: 977.5 TABLET, EFFERVESCENT ORAL at 08:01

## 2024-01-21 NOTE — NURSING
Discharge instructions reviewed with patient and spouse. No questions or concerns at this time. VSS no distress noted. Patient wheeled with all belongings to personal vehicle. Patient released to spouse who is primary caregiver.

## 2024-01-21 NOTE — NURSING
Nurses Note -- 4 Eyes      1/21/2024   6:05 AM      Skin assessed during: Admit      [] No Altered Skin Integrity Present    []Prevention Measures Documented      [x] Yes- Altered Skin Integrity Present or Discovered   [x] LDA Added if Not in Epic (Describe Wound)   [x] New Altered Skin Integrity was Present on Admit and Documented in LDA   [x] Wound Image Taken    Wound Care Consulted? Yes    Attending Nurse:  LAURA Ibanez.     Second RN/Staff Member:  LAURA Cuevas,

## 2024-01-21 NOTE — HOSPITAL COURSE
Patient admitted after orthostatic syncopal event where he fell and hit his head resulting in a right subdural hematoma and left frontal scalp hematoma as well as a nondisplaced C7 fracture. Neurosurgery was consulted and recommended conservative management. Neck brace placed. Orthostatic BP was positive in ER. Given IVF and orthostatic vital signs appropriate. Given compression stockings and recommended to wear when out of bed. Discussed safe practices regarding orthostasis. Cardiology saw the patient as well and recommended event monitor and to consider midodrine if symptoms were persistent. Care plan discussed with patient and wife. By time of discharge the patient was tolerating a regular diet with resolving admission symptoms. Patient seen and examined on day of discharge.    Physical exam on day of discharge:  General - Patient alert and oriented x3 in NAD, in neck brace  CV - Regular rate and rhythm  Resp - No increased WOB  Extrem-  No cyanosis, clubbing, edema.   Skin -  No masses, rashes or lesions noted on cursory skin exam.  Neuro - baseline, no focal deficits

## 2024-01-21 NOTE — PLAN OF CARE
01/21/24 0939   Final Note   Assessment Type Final Discharge Note   Anticipated Discharge Disposition Home   What phone number can be called within the next 1-3 days to see how you are doing after discharge? [phone]   Post-Acute Status   Coverage [facility]   Discharge Delays None known at this time     Patient cleared for discharge from case management standpoint.    Chart and discharge orders reviewed.  Patient discharged home with no further case management needs.

## 2024-01-21 NOTE — PLAN OF CARE
PIERRE contacted Jessica with [facility] to follow up on patient/wife's request for rolling walker and bedside commode. Jessica provided clarification for eligibility. Patient was not eligible due to the fact that he does not meet criteria at this time.    CM notified patient's wife Neisha. Patient's wife was receptive..

## 2024-01-21 NOTE — CARE UPDATE
01/21/24 0750   Patient Assessment/Suction   Level of Consciousness (AVPU) alert   Respiratory Effort Normal;Unlabored   Expansion/Accessory Muscles/Retractions no use of accessory muscles   All Lung Fields Breath Sounds clear   Rhythm/Pattern, Respiratory unlabored   Cough Frequency no cough   PRE-TX-O2   Device (Oxygen Therapy) room air   SpO2 98 %   Pulse Oximetry Type Continuous   $ Pulse Oximetry - Multiple Charge Pulse Oximetry - Multiple   Pulse 77   Resp 18   Aerosol Therapy   $ Aerosol Therapy Charges Aerosol Treatment   Daily Review of Necessity (SVN) completed   Respiratory Treatment Status (SVN) given   Treatment Route (SVN) mask;oxygen   Patient Position (SVN) HOB elevated   Post Treatment Assessment (SVN) breath sounds unchanged   Signs of Intolerance (SVN) none   Breath Sounds Post-Respiratory Treatment   Throughout All Fields Post-Treatment All Fields   Throughout All Fields Post-Treatment no change   Post-treatment Heart Rate (beats/min) 68   Post-treatment Resp Rate (breaths/min) 17   Education   $ Education Bronchodilator;15 min

## 2024-01-21 NOTE — DISCHARGE SUMMARY
"[facility] Medicine  Discharge Summary      Patient Name: Brit Jennings  MRN: [id number]  GINI: [id number]  Patient Class: IP- Inpatient  Admission Date: 1/20/2024  Hospital Length of Stay: 1 days  Discharge Date and Time: 1/21/2024  2:07 PM  Attending Physician: Yung August MD   Discharging Provider: Yung August MD  Primary Care Provider: Jian Alcazar MD    Primary Care Team: Networked reference to record PCT     HPI:   88 year old male with history of HTN, Subdural Hematoma (right), Basal Cell Ca presented to ED after falling on bathroom floor and striking left side of head. He woke, got up to urinate, sat on toilet to urinate, and found himself on floor in "Pool of blood". He does not remember going down. No HA, vision changes or dizziness that he remembers. No CP or SOB.     He was started on trospium chloride (Sanctura) 2 weeks ago for overactive bladder. Since starting this he has not had to wake to urinate overnight. However last PM he woke "Drenched in sweat" (which he admits has been happening often "Lately"), and got up to change his clothing. He also has been taking temazepam before going to sleep. He does not remember going into the bathroom, but he does recollect sitting on the toilet prior to going down. He does not remember falling off of the toilet to the floor.    He had the same thing happen a year ago and suffered right sided subdural then. 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 Was seen by Vascular (Estephania) 8/2023 and options were discussed: surgery vs medical management.     In ED: labs reviewed and noted fully below: minimal anemia; trivial hyponatremia with normal renal function; BNP and troponin are mildly elevated. EKG reviewed: sinus with first degree and bifascicular blocks, but no acute segments. CT Head show small (4 mm) left sided subdural. Neurosurgery was notified and recommended BP < 140 systolic (190 at presentation) and repeat CT in " 6 hours. Patient was given enalapril and started on nicardipine infusion. Currently BP is 122 systolic.    Discussed with ED MD: head wound does not need sutures; repeat CT ordered as above; continue nicardipine infusion, inpatient admission to progressive care unit. Cardiology opinion and ECHO will be obtained as his night sweats could very well be a sign of early failure. He is not established with a Cardiologist.    * No surgery found *      Hospital Course:   Patient admitted after orthostatic syncopal event where he fell and hit his head resulting in a right subdural hematoma and left frontal scalp hematoma as well as a nondisplaced C7 fracture. Neurosurgery was consulted and recommended conservative management. Neck brace placed. Orthostatic BP was positive in ER. Given IVF and orthostatic vital signs appropriate. Given compression stockings and recommended to wear when out of bed. Discussed safe practices regarding orthostasis. Cardiology saw the patient as well and recommended event monitor and to consider midodrine if symptoms were persistent. Care plan discussed with patient and wife. By time of discharge the patient was tolerating a regular diet with resolving admission symptoms. Patient seen and examined on day of discharge.    Physical exam on day of discharge:  General - Patient alert and oriented x3 in NAD, in neck brace  CV - Regular rate and rhythm  Resp - No increased WOB  Extrem-  No cyanosis, clubbing, edema.   Skin -  No masses, rashes or lesions noted on cursory skin exam.  Neuro - baseline, no focal deficits       Goals of Care Treatment Preferences:  Code Status: Full Code      Consults:   Consults (From admission, onward)          Status Ordering Provider     Inpatient consult to Cardiology  Once        Provider:  Manoj Bernal MD    Completed JESUS FRAIRE     Inpatient consult to Neurosurgery  Once        Provider:  Jesus Villalta MD    Completed JESUS FRAIRE       "      No new Assessment & Plan notes have been filed under this hospital service since the last note was generated.  Service: Hospital Medicine    Final Active Diagnoses:    Diagnosis Date Noted POA    PRINCIPAL PROBLEM:  Subdural hematoma [S06.5XAA] 01/20/2024 Yes    Orthostatic syncope [I95.1] 01/21/2024 Yes    Nondisplaced fracture of seventh cervical vertebra [S12.601A] 01/21/2024 Yes    Night sweats [R61] 01/20/2024 Yes    Chronic hyponatremia [E87.1] 06/23/2023 Yes    Essential hypertension [I10] 10/02/2019 Yes     Chronic      Problems Resolved During this Admission:       Discharged Condition: good    Disposition: Home or Self Care    Follow Up:   Follow-up Information       Jian Alcazar MD. Schedule an appointment as soon as possible for a visit in 1 week(s).    Specialties: Family Medicine, Home Health Services, Hospice Services  Contact information:  [address] [address]  [phone]               Neurosurgery. Schedule an appointment as soon as possible for a visit in 2 week(s).    Why: Referral placed                         Patient Instructions:      WALKER FOR HOME USE     Order Specific Question Answer Comments   Type of Walker: Adult (5'4"-6'6")    With wheels? Yes    Height: 5' 8" (1.727 m)    Weight: 66 kg (145 lb 8.1 oz)    Length of need (1-99 months): 99    Does patient have medical equipment at home? none    Please check all that apply: Patient's condition impairs ambulation.      COMMODE FOR HOME USE     Order Specific Question Answer Comments   Type: Standard    Height: 5' 8" (1.727 m)    Weight: 66 kg (145 lb 8.1 oz)    Does patient have medical equipment at home? none    Length of need (1-99 months): 99      Ambulatory referral/consult to Neurosurgery   Standing Status: Future   Referral Priority: Routine Referral Type: Consultation   Referral Reason: Specialty Services Required   Requested Specialty: Neurosurgery   Number of Visits Requested: 1     Diet " Cardiac     Notify your health care provider if you experience any of the following:  temperature >100.4     Notify your health care provider if you experience any of the following:  persistent nausea and vomiting or diarrhea     Notify your health care provider if you experience any of the following:  severe uncontrolled pain     Notify your health care provider if you experience any of the following:  redness, tenderness, or signs of infection (pain, swelling, redness, odor or green/yellow discharge around incision site)     Notify your health care provider if you experience any of the following:  difficulty breathing or increased cough     Notify your health care provider if you experience any of the following:  severe persistent headache     Notify your health care provider if you experience any of the following:  worsening rash     Notify your health care provider if you experience any of the following:  persistent dizziness, light-headedness, or visual disturbances     Notify your health care provider if you experience any of the following:  increased confusion or weakness     Activity as tolerated       Significant Diagnostic Studies:     Admission on 01/20/2024, Discharged on 01/21/2024   Component Date Value Ref Range Status    WBC 01/20/2024 10.03  3.90 - 12.70 K/uL Final    RBC 01/20/2024 3.65 (L)  4.60 - 6.20 M/uL Final    Hemoglobin 01/20/2024 12.2 (L)  14.0 - 18.0 g/dL Final    Hematocrit 01/20/2024 36.2 (L)  40.0 - 54.0 % Final    MCV 01/20/2024 99 (H)  82 - 98 fL Final    MCH 01/20/2024 33.4 (H)  27.0 - 31.0 pg Final    MCHC 01/20/2024 33.7  32.0 - 36.0 g/dL Final    RDW 01/20/2024 12.7  11.5 - 14.5 % Final    Platelets 01/20/2024 159  150 - 450 K/uL Final    MPV 01/20/2024 12.6  9.2 - 12.9 fL Final    Immature Granulocytes 01/20/2024 0.7 (H)  0.0 - 0.5 % Final    Gran # (ANC) 01/20/2024 7.3  1.8 - 7.7 K/uL Final    Immature Grans (Abs) 01/20/2024 0.07 (H)  0.00 - 0.04 K/uL Final    Comment: Mild  elevation in immature granulocytes is non specific and   can be seen in a variety of conditions including stress response,   acute inflammation, trauma and pregnancy. Correlation with other   laboratory and clinical findings is essential.      Lymph # 01/20/2024 0.7 (L)  1.0 - 4.8 K/uL Final    Mono # 01/20/2024 1.9 (H)  0.3 - 1.0 K/uL Final    Eos # 01/20/2024 0.0  0.0 - 0.5 K/uL Final    Baso # 01/20/2024 0.03  0.00 - 0.20 K/uL Final    nRBC 01/20/2024 0  0 /100 WBC Final    Gran % 01/20/2024 73.0  38.0 - 73.0 % Final    Lymph % 01/20/2024 6.5 (L)  18.0 - 48.0 % Final    Mono % 01/20/2024 19.1 (H)  4.0 - 15.0 % Final    Eosinophil % 01/20/2024 0.4  0.0 - 8.0 % Final    Basophil % 01/20/2024 0.3  0.0 - 1.9 % Final    Differential Method 01/20/2024 Automated   Final    Sodium 01/20/2024 132 (L)  136 - 145 mmol/L Final    Potassium 01/20/2024 3.9  3.5 - 5.1 mmol/L Final    Chloride 01/20/2024 96  95 - 110 mmol/L Final    CO2 01/20/2024 29  23 - 29 mmol/L Final    Glucose 01/20/2024 114 (H)  70 - 110 mg/dL Final    BUN 01/20/2024 14  8 - 23 mg/dL Final    Creatinine 01/20/2024 0.9  0.5 - 1.4 mg/dL Final    Calcium 01/20/2024 9.5  8.7 - 10.5 mg/dL Final    Total Protein 01/20/2024 7.0  6.0 - 8.4 g/dL Final    Albumin 01/20/2024 3.7  3.5 - 5.2 g/dL Final    Total Bilirubin 01/20/2024 0.6  0.1 - 1.0 mg/dL Final    Comment: For infants and newborns, interpretation of results should be based  on gestational age, weight and in agreement with clinical  observations.    Premature Infant recommended reference ranges:  Up to 24 hours.............<8.0 mg/dL  Up to 48 hours............<12.0 mg/dL  3-5 days..................<15.0 mg/dL  6-29 days.................<15.0 mg/dL      Alkaline Phosphatase 01/20/2024 86  55 - 135 U/L Final    AST 01/20/2024 41 (H)  10 - 40 U/L Final    ALT 01/20/2024 33  10 - 44 U/L Final    eGFR 01/20/2024 >60.0  >60 mL/min/1.73 m^2 Final    Anion Gap 01/20/2024 7 (L)  8 - 16 mmol/L Final    Troponin I  High Sensitivity 01/20/2024 40.1 (H)  0.0 - 14.9 pg/mL Final    Comment: Troponin results differ between methods. Do not use   results between Troponin methods interchangeably.    Alkaline Phospatase levels above 400 U/L may   cause false positive results.    Access hsTnI should not be used for patients taking   Asfotase cristhian (Strensiq).      BNP 01/20/2024 554 (H)  0 - 99 pg/mL Final    Values of less than 100 pg/ml are consistent with non-CHF populations.    Prothrombin Time 01/20/2024 12.0  9.0 - 12.5 sec Final    INR 01/20/2024 1.1  0.8 - 1.2 Final    Comment: Coumadin Therapy:  2.0 - 3.0 for INR for all indicators except mechanical heart valves  and antiphospholipid syndromes which should use 2.5 - 3.5.      Specimen UA 01/20/2024 Urine, Clean Catch   Final    Color, UA 01/20/2024 Yellow  Yellow, Straw, [place] Final    Appearance, UA 01/20/2024 Hazy (A)  Clear Final    pH, UA 01/20/2024 8.0  5.0 - 8.0 Final    Specific Gravity, UA 01/20/2024 1.010  1.005 - 1.030 Final    Protein, UA 01/20/2024 Negative  Negative Final    Comment: Recommend a 24 hour urine protein or a urine   protein/creatinine ratio if globulin induced proteinuria is  clinically suspected.      Glucose, UA 01/20/2024 Negative  Negative Final    Ketones, UA 01/20/2024 Negative  Negative Final    Bilirubin (UA) 01/20/2024 Negative  Negative Final    Occult Blood UA 01/20/2024 Negative  Negative Final    Nitrite, UA 01/20/2024 Negative  Negative Final    Urobilinogen, UA 01/20/2024 Negative  Negative EU/dL Final    Leukocytes, UA 01/20/2024 Negative  Negative Final    E/A ratio 01/20/2024 0.53   Final    IVC diameter 01/20/2024 1.35  cm Final    TAPSE 01/20/2024 2.36  cm Final    MV VTI 01/20/2024 29.9  cm Final    Ao root annulus 01/20/2024 4.30  cm Final    Pulmonary Valve Mean Velocity 01/20/2024 0.69  m/s Final    PV peak gradient 01/20/2024 5  mmHg Final    PV PEAK VELOCITY 01/20/2024 1.12  m/s Final    Triscuspid Valve Regurgitation Pea*  01/20/2024 27  mmHg Final    Left Ventricular Outflow Tract Jasmin* 01/20/2024 3.00  mmHg Final    Left Ventricular Outflow Tract Jasmin* 01/20/2024 0.81  cm/s Final    RVDD 01/20/2024 2.89  cm Final    MV valve area by continuity eq 01/20/2024 3.28  cm2 Final    MV valve area p 1/2 method 01/20/2024 2.47  cm2 Final    MV stenosis pressure 1/2 time 01/20/2024 89.00  ms Final    MV peak gradient 01/20/2024 6  mmHg Final    MV mean gradient 01/20/2024 2  mmHg Final    JL by Velocity Ratio 01/20/2024 3.78  cm² Final    AV index (prosthetic) 01/20/2024 0.89   Final    AV Velocity Ratio 01/20/2024 0.91   Final    AV valve area 01/20/2024 3.71  cm² Final    LVOT peak VTI 01/20/2024 23.60  cm Final    Ao VTI 01/20/2024 26.40  cm Final    Ao peak kiersten 01/20/2024 1.35  m/s Final    AV peak gradient 01/20/2024 7  mmHg Final    AV mean gradient 01/20/2024 4  mmHg Final    AR Max Kiersten 01/20/2024 4.26  m/s Final    AV regurgitation pressure 1/2 time 01/20/2024 491  ms Final    LA size 01/20/2024 3.50  cm Final    TR Max Kiersten 01/20/2024 2.62  m/s Final    RV S' 01/20/2024 18.40  cm/s Final    MV Peak A Kiersten 01/20/2024 1.21  m/s Final    LV Mass Index 01/20/2024 97  g/m2 Final    LV mass 01/20/2024 172.69  g Final    LVOT peak kiersten 01/20/2024 1.23  m/s Final    LV LATERAL E/E' RATIO 01/20/2024 9.14  m/s Final    E wave deceleration time 01/20/2024 254.00  msec Final    TDI LATERAL 01/20/2024 0.07  m/s Final    MV Peak E Kiersten 01/20/2024 0.64  m/s Final    Posterior Wall 01/20/2024 1.46 (A)  0.6 - 1.1 cm Final    Left Ventricle Relative Wall Thick* 01/20/2024 0.77  cm Final    FS 01/20/2024 28  28 - 44 % Final    LVOT area 01/20/2024 4.2  cm2 Final    LVOT diameter 01/20/2024 2.30  cm Final    IVS 01/20/2024 1.13 (A)  0.6 - 1.1 cm Final    LV Diastolic Volume Index 01/20/2024 35.00  mL/m2 Final    LV Diastolic Volume 01/20/2024 62.30  mL Final    LVIDs 01/20/2024 2.75  2.1 - 4.0 cm Final    LV Systolic Volume Index 01/20/2024 15.9  mL/m2 Final     LV Systolic Volume 01/20/2024 28.30  mL Final    LVIDd 01/20/2024 3.81  3.5 - 6.0 cm Final    LVOT stroke volume 01/20/2024 98.00  cm3 Final    Izaguirre's Biplane MOD Ejection Fra* 01/20/2024 58  % Final    ZLVIDS 01/20/2024 -0.80   Final    ZLVIDD 01/20/2024 -2.56   Final    AORTIC VALVE CUSP SEPERATION 01/20/2024 2.10  cm Final    BSA 01/20/2024 1.77  m2 Final    TV resting pulmonary artery pressu* 01/20/2024 30  mmHg Final    RV TB RVSP 01/20/2024 6  mmHg Final    Est. RA pres 01/20/2024 3  mmHg Final    WBC 01/21/2024 9.11  3.90 - 12.70 K/uL Final    RBC 01/21/2024 2.99 (L)  4.60 - 6.20 M/uL Final    Hemoglobin 01/21/2024 10.0 (L)  14.0 - 18.0 g/dL Final    Hematocrit 01/21/2024 29.6 (L)  40.0 - 54.0 % Final    MCV 01/21/2024 99 (H)  82 - 98 fL Final    MCH 01/21/2024 33.4 (H)  27.0 - 31.0 pg Final    MCHC 01/21/2024 33.8  32.0 - 36.0 g/dL Final    RDW 01/21/2024 12.9  11.5 - 14.5 % Final    Platelets 01/21/2024 167  150 - 450 K/uL Final    MPV 01/21/2024 12.3  9.2 - 12.9 fL Final    Immature Granulocytes 01/21/2024 0.7 (H)  0.0 - 0.5 % Final    Gran # (ANC) 01/21/2024 6.3  1.8 - 7.7 K/uL Final    Immature Grans (Abs) 01/21/2024 0.06 (H)  0.00 - 0.04 K/uL Final    Comment: Mild elevation in immature granulocytes is non specific and   can be seen in a variety of conditions including stress response,   acute inflammation, trauma and pregnancy. Correlation with other   laboratory and clinical findings is essential.      Lymph # 01/21/2024 1.0  1.0 - 4.8 K/uL Final    Mono # 01/21/2024 1.7 (H)  0.3 - 1.0 K/uL Final    Eos # 01/21/2024 0.1  0.0 - 0.5 K/uL Final    Baso # 01/21/2024 0.03  0.00 - 0.20 K/uL Final    nRBC 01/21/2024 0  0 /100 WBC Final    Gran % 01/21/2024 68.6  38.0 - 73.0 % Final    Lymph % 01/21/2024 10.6 (L)  18.0 - 48.0 % Final    Mono % 01/21/2024 19.1 (H)  4.0 - 15.0 % Final    Eosinophil % 01/21/2024 0.7  0.0 - 8.0 % Final    Basophil % 01/21/2024 0.3  0.0 - 1.9 % Final    Differential Method  01/21/2024 Automated   Final    Sodium 01/21/2024 130 (L)  136 - 145 mmol/L Final    Potassium 01/21/2024 3.8  3.5 - 5.1 mmol/L Final    Chloride 01/21/2024 98  95 - 110 mmol/L Final    CO2 01/21/2024 27  23 - 29 mmol/L Final    Glucose 01/21/2024 100  70 - 110 mg/dL Final    BUN 01/21/2024 9  8 - 23 mg/dL Final    Creatinine 01/21/2024 0.7  0.5 - 1.4 mg/dL Final    Calcium 01/21/2024 8.3 (L)  8.7 - 10.5 mg/dL Final    Anion Gap 01/21/2024 5 (L)  8 - 16 mmol/L Final    eGFR 01/21/2024 >60.0  >60 mL/min/1.73 m^2 Final         Imaging Results              CT Cervical Spine Without Contrast (Final result)  Result time 01/20/24 12:09:14      Final result by Monster Grande MD (01/20/24 12:09:14)                   Narrative:    CMS MANDATED QUALITY DATA - CT RADIATION  436    All CT scans at this facility utilize dose modulation, iterative reconstruction, and/or weight based dosing when appropriate to reduce radiation dose to as low as reasonably achievable.    CLINICAL HISTORY:  88 years (1935) Male subdural bleed    TECHNIQUE:  CT CERVICAL SPINE WITHOUT IV CONTRAST. Contiguous thin section axial images were obtained of the cervical spine. Sagittal and coronal reformatted images were generated. Note that this exam is suboptimal for evaluation of disc disease (which would be better evaluated on MRI) and does not assess for ligamentous injury or stability.    COMPARISON:  CT most recently from March 15, 2023.    FINDINGS:  There is diffusely decreased osseous mineralization (suggesting osteoporosis/osteopenia), which reduces sensitivity for subtle osseous abnormalities.    That being said there is a small linear lucency through the right superior articular facet of C7 (sagittal image 20-21, axial image 108), suspicious for an occult nondisplaced fracture. No perched, jumped or locked facets seen, and no acute intraspinous process is identified.    Vertebral body heights are maintained. There is moderate disc  height loss throughout the cervical spine with moderate to severe disc height loss at C5-C6 and C6-C7. There is moderate to severe facet arthropathy throughout the cervical spine as well. Additional scattered degenerative changes are present (for which this study is not tailored to assess).    The lung apices partially image a small right pleural effusion.    IMPRESSION:  1. Likely acute nondisplaced fracture through the right superior articular facet of C7.  2. Degenerative change throughout the cervical spine, similar to the previous exam.  3. Likely small, right-sided pleural effusion, only partially seen on this exam.          RESULT NOTIFICATION: These observations were discussed by the dictating physician, by phone and acknowledged by the ordering provider CHRISTIAN ALARCON at 1/20/2024 12:05 PM CST                        .    Electronically signed by:  Monster Grande MD  01/20/2024 12:09 PM CST Workstation: [id number]                                     CT Head Without Contrast (Final result)  Result time 01/20/24 10:35:24      Final result by Monster Grande MD (01/20/24 10:35:24)                   Narrative:    CMS MANDATED QUALITY DATA - CT RADIATION 436    All CT scans at this facility utilize dose modulation, iterative reconstruction, and/or weight based dosing when appropriate to reduce radiation dose to as low as reasonably achievable.    CLINICAL HISTORY:  88 years (1935) Male Subdural hemorrhage    TECHNIQUE:  CT HEAD WITHOUT IV CONTRAST. Axial CT of the brain without contrast using soft tissue and bone algorithm. Please note in the acute setting if there is a clinical concern for an acute stroke MRI would be more sensitive/specific for evaluation of ischemia.    COMPARISON:  CT from 3:22 AM.    FINDINGS:  Curvilinear hyperattenuating collection along the anterior right frontal lobe measuring up to 4 mm in thickness (image 22), without adverse interval change. No hydrocephalus, herniation  or midline shift and the basal/suprasellar cisterns are patent. No acute skull fracture is identified with mild soft tissue swelling over the left frontal region.    Overall there is mild diffuse cerebral and cerebellar atrophy when accounting for age with moderate periventricular deep cerebral white matter low attenuation in scattered vascular calcifications in the intracranial internal carotid arteries suggesting chronic small vessel ischemic disease.    The orbits appear within normal limits noting likely bilateral lens replacement/cataract surgery. External auditory canals are unremarkable. There are air-fluid levels in the left frontal and anterior ethmoid air cells. The visualized mastoid air cells are clear.    IMPRESSION:  1. Unchanged small right frontal subdural hematoma.  2. Slight decrease in the soft tissue swelling over the left frontal region.  3. Partial opacification of the left frontal and anterior ethmoid air cells, similar to the previous exam, consider correlation for acute sinusitis.  4. Chronic/involutional findings, similar to the previous exam.                  .    Electronically signed by:  Monster Grande MD  01/20/2024 10:35 AM CST Workstation: [id number]                                     CT Head Without Contrast (Final result)  Result time 01/20/24 03:49:00      Final result by Jessica Ndiaye MD (01/20/24 03:49:00)                   Narrative:    EXAM:  CT Head Without Intravenous Contrast    CLINICAL HISTORY:  The patient is 88 years old and is Male; Head trauma, intracranial venous injury suspected    TECHNIQUE:  Axial computed tomography images of the head/brain without intravenous contrast.  Sagittal and coronal reformatted images were created and reviewed.  This CT exam was performed using one or more of the following dose reduction techniques:  automated exposure control, adjustment of the mA and/or kV according to patient size, and/or use of iterative reconstruction  technique.    COMPARISON:  CT Head dated 8/4/2023    FINDINGS:  BRAIN:  Generalized cerebral atrophy with commensurate ventricular dilatation.  Low attenuation in the periventricular, subcortical, and deep white matter nonspecific but likely secondary to chronic small vessel ischemic disease.  Small right frontal subdural hematoma measuring up to 4 mm exerting minimal mass effect on the subjacent cerebral cortex. No significant midline shift.  VENTRICLES:  See above.  BONES/JOINTS:  Unremarkable.  No acute fracture.  SOFT TISSUES:  Left frontal scalp swelling/hematoma.  SINUSES:  Air-fluid level and mucosal thickening in the left frontal sinus with mucosal thickening/fluid in the left ethmoid air cells. Minimal mucosal thickening in the right ethmoid air cells and left maxillary sinus.  MASTOID AIR CELLS:  Unremarkable as visualized.  No mastoid effusion.    IMPRESSION:  Small right frontal subdural hematoma measuring up to 4 mm exerting minimal mass effect on the subjacent cerebral cortex. No significant midline shift.    Left frontal scalp swelling/hematoma.    Paranasal sinus disease as described above. Clinical correlation for symptoms of acute sinusitis recommended.    THIS REPORT CONTAINS FINDINGS THAT MAY BE CRITICAL TO PATIENT CARE: The findings were verbally discussed via telephone conference with Dr. HERIBERTO SALGADO  on 1/20/2024 3:47 AM CST .The results were acknowledged and understood.    Electronically signed by:  Jessica Ndiaye MD  01/20/2024 03:49 AM CST Workstation: [id number]                                      Pending Diagnostic Studies:       None           Medications:  Reconciled Home Medications:      Medication List        CONTINUE taking these medications      acetaminophen 500 MG tablet  Commonly known as: TYLENOL  Take 2 tablets (1,000 mg total) by mouth every 6 (six) hours as needed for Pain.     albuterol 90 mcg/actuation inhaler  Commonly known as: PROVENTIL/VENTOLIN HFA  Inhale  1-2 puffs into the lungs daily as needed for Shortness of Breath. Rescue     atorvastatin 80 MG tablet  Commonly known as: LIPITOR  Take 80 mg by mouth once daily.     cholecalciferol (vitamin D3) 125 mcg (5,000 unit) capsule  Take 5,000 Units by mouth once daily.     cyanocobalamin (vitamin B-12) 2,500 mcg Tab  Take 2,500 mcg by mouth once daily.     GEMTESA 75 mg Tab  Generic drug: vibegron  Take 1 tablet by mouth once daily.     SYMBICORT 160-4.5 mcg/actuation Hfaa  Generic drug: budesonide-formoterol 160-4.5 mcg  Inhale 2 puffs into the lungs every 12 (twelve) hours.     temazepam 30 mg capsule  Commonly known as: RESTORIL  Take 1 capsule (30 mg total) by mouth nightly as needed for Insomnia.     triamcinolone acetonide 0.1% 0.1 % cream  Commonly known as: KENALOG  Apply 1 g topically daily as needed (rash).     UNABLE TO FIND  Place 5 mLs under the tongue once daily. medication name: EDTA     UNABLE TO FIND  Take 4 tablets by mouth every evening. medication name: Multi Mineral     VITAMIN C ORAL  Take 1 tablet by mouth 2 (two) times a day.            STOP taking these medications      lisinopriL 20 MG tablet (REPORTED HE WAS NOT TAKING THIS)  Commonly known as: PRINIVIL,ZESTRIL     solifenacin 10 MG tablet (REPORTED HE WAS NOT TAKING THIS)  Commonly known as: VESICARE              Indwelling Lines/Drains at time of discharge:   Lines/Drains/Airways       None                   Time spent on the discharge of patient: 39 minutes         Yung August MD  Department of Hospital Medicine  [facility]

## 2024-01-22 ENCOUNTER — TELEPHONE (OUTPATIENT)
Dept: FAMILY MEDICINE | Facility: CLINIC | Age: 89
End: 2024-01-22
Payer: MEDICARE

## 2024-01-22 ENCOUNTER — PATIENT OUTREACH (OUTPATIENT)
Dept: FAMILY MEDICINE | Facility: CLINIC | Age: 89
End: 2024-01-22
Payer: MEDICARE

## 2024-01-24 ENCOUNTER — NURSE TRIAGE (OUTPATIENT)
Dept: ADMINISTRATIVE | Facility: CLINIC | Age: 89
End: 2024-01-24
Payer: MEDICARE

## 2024-01-24 DIAGNOSIS — S12.601A CLOSED NONDISPLACED FRACTURE OF SEVENTH CERVICAL VERTEBRA, UNSPECIFIED FRACTURE MORPHOLOGY, INITIAL ENCOUNTER: ICD-10-CM

## 2024-01-24 DIAGNOSIS — S06.5X0S: Primary | ICD-10-CM

## 2024-01-24 NOTE — TELEPHONE ENCOUNTER
Spoke with wife of pt who reports that pt was seen in ED a few days ago due to fall. States he did obtain C7 fracture. States pt is having right shoulder pain that goes to wrist. Advised to be seen by provider within 4 hours. ED/MARY. [facility] on demand [facility] also offered. Verbalized understanding    Reason for Disposition   [1] SEVERE pain AND [2] not improved 2 hours after pain medicine/ice packs    Additional Information   Negative: Serious injury with multiple fractures (broken bones)   Negative: [1] Major bleeding (e.g., actively dripping or spurting) AND [2] can't be stopped   Negative: Bullet wound, stabbed by knife, or other serious penetrating wound   Negative: Sounds like a life-threatening emergency to the triager   Negative: Looks like a broken bone (crooked or deformed)   Negative: Looks like a dislocated joint   Negative: Can't move injured shoulder at all   Negative: Skin is split open or gaping (or length > 1/2 inch or 12 mm)   Negative: [1] Bleeding AND [2] won't stop after 10 minutes of direct pressure (using correct technique)   Negative: [1] Dirt in the wound AND [2] not removed with 15 minutes of scrubbing   Negative: Sounds like a serious injury to the triager    Protocols used: Shoulder Injury-A-AH

## 2024-01-25 ENCOUNTER — ON-DEMAND VIRTUAL (OUTPATIENT)
Dept: URGENT CARE | Facility: CLINIC | Age: 89
End: 2024-01-25
Payer: MEDICARE

## 2024-01-25 DIAGNOSIS — M25.511 RIGHT SHOULDER PAIN, UNSPECIFIED CHRONICITY: Primary | ICD-10-CM

## 2024-01-25 PROCEDURE — 99202 OFFICE O/P NEW SF 15 MIN: CPT | Mod: 95,,,

## 2024-01-25 NOTE — TELEPHONE ENCOUNTER
Referral info given, they will call in am, had virtual visit today with hospital outreach. Has hydrocodone/acetaminophen at home that they are using for the pain.

## 2024-01-25 NOTE — PROGRESS NOTES
Subjective:      Patient ID: Brit Jennings is a 88 y.o. male.    Vitals:  vitals were not taken for this visit.     Chief Complaint: Shoulder Injury      Visit Type: TELE AUDIOVISUAL    Present with the patient at the time of consultation: TELEMED PRESENT WITH PATIENT: spouse    Past Medical History:   Diagnosis Date    Basal cell carcinoma 2020    L temple / radiation therapy    Cataract     Hypertension     Squamous cell carcinoma of skin      Past Surgical History:   Procedure Laterality Date    APPENDECTOMY      EYE SURGERY      Moh's surgery on right temple area Right 2015    Cancerous spot    PROSTATE BIOPSY      TONSILLECTOMY       Review of patient's allergies indicates:   Allergen Reactions    Pollen extracts Other (See Comments)     Current Outpatient Medications on File Prior to Visit   Medication Sig Dispense Refill    acetaminophen (TYLENOL) 500 MG tablet Take 2 tablets (1,000 mg total) by mouth every 6 (six) hours as needed for Pain.  0    albuterol (PROVENTIL/VENTOLIN HFA) 90 mcg/actuation inhaler Inhale 1-2 puffs into the lungs daily as needed for Shortness of Breath. Rescue      ascorbic acid (VITAMIN C ORAL) Take 1 tablet by mouth 2 (two) times a day.      atorvastatin (LIPITOR) 80 MG tablet Take 80 mg by mouth once daily.      cholecalciferol, vitamin D3, 125 mcg (5,000 unit) capsule Take 5,000 Units by mouth once daily.      cyanocobalamin, vitamin B-12, 2,500 mcg Tab Take 2,500 mcg by mouth once daily.      GEMTESA 75 mg Tab Take 1 tablet by mouth once daily.      SYMBICORT 160-4.5 mcg/actuation HFAA Inhale 2 puffs into the lungs every 12 (twelve) hours. 30 g 4    temazepam (RESTORIL) 30 mg capsule Take 1 capsule (30 mg total) by mouth nightly as needed for Insomnia. 90 capsule 1    triamcinolone acetonide 0.1% (KENALOG) 0.1 % cream Apply 1 g topically daily as needed (rash).      UNABLE TO FIND Place 5 mLs under the tongue once daily. medication name: EDTA      UNABLE TO FIND Take 4  tablets by mouth every evening. medication name: Multi Mineral       Current Facility-Administered Medications on File Prior to Visit   Medication Dose Route Frequency Provider Last Rate Last Admin    albuterol inhaler 2 puff  2 puff Inhalation Q20 Min ADALIDN Farhana Riggins NP        diphenhydrAMINE injection 25 mg  25 mg Intravenous Once PRN Farhana Riggins NP        EPINEPHrine (EPIPEN) 0.3 mg/0.3 mL pen injection 0.3 mg  0.3 mg Intramuscular PRN Farhana Riggins NP        methylPREDNISolone sodium succinate injection 40 mg  40 mg Intravenous Once PRN Farhana Riggins NP        ondansetron disintegrating tablet 4 mg  4 mg Oral Once PRN Farhana Riggins NP        sodium chloride 0.9% 500 mL flush bag   Intravenous PRN Farhana Riggins NP   Stopped at 12/15/21 1455    sodium chloride 0.9% flush 10 mL  10 mL Intravenous PRN Farhana Riggins NP         Family History   Problem Relation Age of Onset    Melanoma Neg Hx     Psoriasis Neg Hx     Lupus Neg Hx     Eczema Neg Hx            Ohs Peq Odvv Intake    1/25/2024  2:48 PM CST - Filed by Patient   What is your current physical address in the event of a medical emergency? [address]   Are you able to take your vital signs? No   Please attach any relevant images or files          Patients wife states that patient is having pain in his right shoulder area. Wife states that patient had a fall over the weekend which he was hospitalized for due to brain bleed and fractured back. Wife states they did do a ct of his shoulder and was not told that anything was abnormal. Wife states that patient is having a significant amount of pain in this shoulder and is needing something for pain. Wife states that OTC medications are not working.         Constitution: Negative.   HENT: Negative.     Neck: neck negative.   Cardiovascular: Negative.    Eyes: Negative.    Respiratory: Negative.     Endocrine: negative.   Genitourinary: Negative.     Musculoskeletal:  Positive for pain, trauma and joint pain.   Skin: Negative.  Positive for bruising.        Patient has bruising noted to his left side top of head area from fall.    Allergic/Immunologic: Negative.    Neurological: Negative.    Hematologic/Lymphatic: Negative.    Psychiatric/Behavioral: Negative.          Objective:   The physical exam was conducted virtually.  Physical Exam   HENT:   Head: Normocephalic and atraumatic.   Eyes: Conjunctivae are normal. Pupils are equal, round, and reactive to light. Extraocular movement intact   Neck: Neck supple.   Pulmonary/Chest: Effort normal.   Abdominal: Normal appearance.   Musculoskeletal:         General: Tenderness and signs of injury present.   Neurological: no focal deficit. He is alert and at baseline.   Skin: bruising   Psychiatric: His behavior is normal. Mood, judgment and thought content normal.       Assessment:     1. Right shoulder pain, unspecified chronicity        Plan:       Right shoulder pain, unspecified chronicity    Advised patient to be seen in person for further testing and treatment.

## 2024-01-25 NOTE — TELEPHONE ENCOUNTER
He needs to followup with neurosurgery. C7 dermatome corresponds to the area of pain he is having. I know he is in a neck brace also and he is instructed to see them within 2 weeks regarding this fracture.

## 2024-01-26 ENCOUNTER — TELEPHONE (OUTPATIENT)
Dept: NEUROSURGERY | Facility: CLINIC | Age: 89
End: 2024-01-26
Payer: MEDICARE

## 2024-01-26 DIAGNOSIS — S06.5XAA SUBDURAL HEMATOMA: ICD-10-CM

## 2024-01-26 DIAGNOSIS — I62.00 NONTRAUMATIC SUBDURAL HEMORRHAGE, UNSPECIFIED: Primary | ICD-10-CM

## 2024-01-26 DIAGNOSIS — S12.9XXD CLOSED FRACTURE OF CERVICAL VERTEBRA, UNSPECIFIED CERVICAL VERTEBRAL LEVEL, SUBSEQUENT ENCOUNTER: ICD-10-CM

## 2024-01-26 NOTE — TELEPHONE ENCOUNTER
Patient wife Neisha called for ER/hosp follow up. Patient advised we do not travel to [place]. Informed patient Dr Dinero is closer. Please reach out to patient to schedule appointment.

## 2024-01-29 NOTE — TELEPHONE ENCOUNTER
Spoke with patient to schedule cervical xray and heat CT and to give appointment details. Verbalizes understanding.

## 2024-02-01 ENCOUNTER — OFFICE VISIT (OUTPATIENT)
Dept: FAMILY MEDICINE | Facility: CLINIC | Age: 89
End: 2024-02-01
Payer: MEDICARE

## 2024-02-01 ENCOUNTER — HOSPITAL ENCOUNTER (OUTPATIENT)
Dept: RADIOLOGY | Facility: HOSPITAL | Age: 89
Discharge: HOME | End: 2024-02-01
Attending: PHYSICIAN ASSISTANT
Payer: MEDICARE

## 2024-02-01 VITALS
WEIGHT: 142 LBS | DIASTOLIC BLOOD PRESSURE: 60 MMHG | HEIGHT: 68 IN | BODY MASS INDEX: 21.52 KG/M2 | SYSTOLIC BLOOD PRESSURE: 112 MMHG

## 2024-02-01 DIAGNOSIS — R07.81 RIB PAIN ON RIGHT SIDE: ICD-10-CM

## 2024-02-01 DIAGNOSIS — F51.01 PRIMARY INSOMNIA: ICD-10-CM

## 2024-02-01 DIAGNOSIS — R54 AGE-RELATED PHYSICAL DEBILITY: Primary | ICD-10-CM

## 2024-02-01 DIAGNOSIS — S12.691A OTHER CLOSED NONDISPLACED FRACTURE OF SEVENTH CERVICAL VERTEBRA, INITIAL ENCOUNTER: ICD-10-CM

## 2024-02-01 DIAGNOSIS — S06.5X0S: ICD-10-CM

## 2024-02-01 DIAGNOSIS — G31.84 MILD COGNITIVE IMPAIRMENT: ICD-10-CM

## 2024-02-01 PROCEDURE — 71100 X-RAY EXAM RIBS UNI 2 VIEWS: CPT | Mod: TC,PO,RT

## 2024-02-01 PROCEDURE — 99496 TRANSJ CARE MGMT HIGH F2F 7D: CPT | Mod: S$GLB,,, | Performed by: PHYSICIAN ASSISTANT

## 2024-02-01 RX ORDER — CALCIUM CARBONATE 160(400)MG
1 TABLET,CHEWABLE ORAL DAILY
Qty: 1 EACH | Refills: 0 | Status: SHIPPED | OUTPATIENT
Start: 2024-02-01

## 2024-02-01 RX ORDER — TRAZODONE HYDROCHLORIDE 50 MG/1
50 TABLET ORAL NIGHTLY
Qty: 30 TABLET | Refills: 5 | Status: SHIPPED | OUTPATIENT
Start: 2024-02-01 | End: 2024-02-22 | Stop reason: SDUPTHER

## 2024-02-01 NOTE — PROGRESS NOTES
SUBJECTIVE:    Patient ID: Brit Jennings is a 88 y.o. male.    Chief Complaint: Hospital Follow Up (Since discharge not doing well, not sleeping well, coughing at night. Family reports cognitive changes. /CT and xray Monday prior to neurosurgeon on 15th-Dr. Dinero/D/c temazepam and lisinopril)    Mr. Jennings is an 87 yo male here fos evaluation following hospital discharge for a syncopal event that resulted in right sided SDH, left sided scalp hematoma, C7 fracture. Today he is feeling better. He reports intermittent neck pain from C7 fracture. He reports one fall since being home, he landed on the bed; denies hitting his head or LOC. Since the fall, he reports right elbow pain and right rib pain (preventing him from taking a deep breath). He is eating two meals daily, drinking very little water, and has not had a bowel movement since getting home.    Hospital Course:  Patient admitted after orthostatic syncopal event where he fell and hit his head resulting in a right subdural hematoma and left frontal scalp hematoma as well as a nondisplaced C7 fracture. Neurosurgery was consulted and recommended conservative management. Neck brace placed. Orthostatic BP was positive in ER. Given IVF and orthostatic vital signs appropriate. Given compression stockings and recommended to wear when out of bed. Discussed safe practices regarding orthostasis. Cardiology saw the patient as well and recommended event monitor and to consider midodrine if symptoms were persistent. Care plan discussed with patient and wife. By time of discharge the patient was tolerating a regular diet with resolving admission symptoms. Patient seen and examined on day of discharge.          Admit Date: 1/20/24   Discharge Date: 1/21/24  Discharge Facility: Hospital    Medication Reconciliation:  No medication changes.   New Prescriptions filled after discharge: no  Discharge summary reviewed:  no  Pending test results at discharge reviewed:    no  Follow up appointments scheduled:  yes              with PCP  Follow up labs/tests ordered:   no  Home Health ordered on discharge:   no  Home Health company name:  n/a  DME ordered at discharge:   no  How patient is feeling since discharge from the hospital?  fair  Patient follow up phone call documented on separate encounter.      Admission on 01/20/2024, Discharged on 01/21/2024   Component Date Value Ref Range Status    WBC 01/20/2024 10.03  3.90 - 12.70 K/uL Final    RBC 01/20/2024 3.65 (L)  4.60 - 6.20 M/uL Final    Hemoglobin 01/20/2024 12.2 (L)  14.0 - 18.0 g/dL Final    Hematocrit 01/20/2024 36.2 (L)  40.0 - 54.0 % Final    MCV 01/20/2024 99 (H)  82 - 98 fL Final    MCH 01/20/2024 33.4 (H)  27.0 - 31.0 pg Final    MCHC 01/20/2024 33.7  32.0 - 36.0 g/dL Final    RDW 01/20/2024 12.7  11.5 - 14.5 % Final    Platelets 01/20/2024 159  150 - 450 K/uL Final    MPV 01/20/2024 12.6  9.2 - 12.9 fL Final    Immature Granulocytes 01/20/2024 0.7 (H)  0.0 - 0.5 % Final    Gran # (ANC) 01/20/2024 7.3  1.8 - 7.7 K/uL Final    Immature Grans (Abs) 01/20/2024 0.07 (H)  0.00 - 0.04 K/uL Final    Lymph # 01/20/2024 0.7 (L)  1.0 - 4.8 K/uL Final    Mono # 01/20/2024 1.9 (H)  0.3 - 1.0 K/uL Final    Eos # 01/20/2024 0.0  0.0 - 0.5 K/uL Final    Baso # 01/20/2024 0.03  0.00 - 0.20 K/uL Final    nRBC 01/20/2024 0  0 /100 WBC Final    Gran % 01/20/2024 73.0  38.0 - 73.0 % Final    Lymph % 01/20/2024 6.5 (L)  18.0 - 48.0 % Final    Mono % 01/20/2024 19.1 (H)  4.0 - 15.0 % Final    Eosinophil % 01/20/2024 0.4  0.0 - 8.0 % Final    Basophil % 01/20/2024 0.3  0.0 - 1.9 % Final    Differential Method 01/20/2024 Automated   Final    Sodium 01/20/2024 132 (L)  136 - 145 mmol/L Final    Potassium 01/20/2024 3.9  3.5 - 5.1 mmol/L Final    Chloride 01/20/2024 96  95 - 110 mmol/L Final    CO2 01/20/2024 29  23 - 29 mmol/L Final    Glucose 01/20/2024 114 (H)  70 - 110 mg/dL Final    BUN 01/20/2024 14  8 - 23 mg/dL Final    Creatinine  01/20/2024 0.9  0.5 - 1.4 mg/dL Final    Calcium 01/20/2024 9.5  8.7 - 10.5 mg/dL Final    Total Protein 01/20/2024 7.0  6.0 - 8.4 g/dL Final    Albumin 01/20/2024 3.7  3.5 - 5.2 g/dL Final    Total Bilirubin 01/20/2024 0.6  0.1 - 1.0 mg/dL Final    Alkaline Phosphatase 01/20/2024 86  55 - 135 U/L Final    AST 01/20/2024 41 (H)  10 - 40 U/L Final    ALT 01/20/2024 33  10 - 44 U/L Final    eGFR 01/20/2024 >60.0  >60 mL/min/1.73 m^2 Final    Anion Gap 01/20/2024 7 (L)  8 - 16 mmol/L Final    Troponin I High Sensitivity 01/20/2024 40.1 (H)  0.0 - 14.9 pg/mL Final    BNP 01/20/2024 554 (H)  0 - 99 pg/mL Final    Prothrombin Time 01/20/2024 12.0  9.0 - 12.5 sec Final    INR 01/20/2024 1.1  0.8 - 1.2 Final    Specimen UA 01/20/2024 Urine, Clean Catch   Final    Color, UA 01/20/2024 Yellow  Yellow, Straw, [place] Final    Appearance, UA 01/20/2024 Hazy (A)  Clear Final    pH, UA 01/20/2024 8.0  5.0 - 8.0 Final    Specific Gravity, UA 01/20/2024 1.010  1.005 - 1.030 Final    Protein, UA 01/20/2024 Negative  Negative Final    Glucose, UA 01/20/2024 Negative  Negative Final    Ketones, UA 01/20/2024 Negative  Negative Final    Bilirubin (UA) 01/20/2024 Negative  Negative Final    Occult Blood UA 01/20/2024 Negative  Negative Final    Nitrite, UA 01/20/2024 Negative  Negative Final    Urobilinogen, UA 01/20/2024 Negative  Negative EU/dL Final    Leukocytes, UA 01/20/2024 Negative  Negative Final    E/A ratio 01/20/2024 0.53   Final    IVC diameter 01/20/2024 1.35  cm Final    TAPSE 01/20/2024 2.36  cm Final    MV VTI 01/20/2024 29.9  cm Final    Ao root annulus 01/20/2024 4.30  cm Final    Pulmonary Valve Mean Velocity 01/20/2024 0.69  m/s Final    PV peak gradient 01/20/2024 5  mmHg Final    PV PEAK VELOCITY 01/20/2024 1.12  m/s Final    Triscuspid Valve Regurgitation Pea* 01/20/2024 27  mmHg Final    Left Ventricular Outflow Tract Jasmin* 01/20/2024 3.00  mmHg Final    Left Ventricular Outflow Tract Jasmin* 01/20/2024 0.81  cm/s  Final    RVDD 01/20/2024 2.89  cm Final    MV valve area by continuity eq 01/20/2024 3.28  cm2 Final    MV valve area p 1/2 method 01/20/2024 2.47  cm2 Final    MV stenosis pressure 1/2 time 01/20/2024 89.00  ms Final    MV peak gradient 01/20/2024 6  mmHg Final    MV mean gradient 01/20/2024 2  mmHg Final    JL by Velocity Ratio 01/20/2024 3.78  cm² Final    AV index (prosthetic) 01/20/2024 0.89   Final    AV Velocity Ratio 01/20/2024 0.91   Final    AV valve area 01/20/2024 3.71  cm² Final    LVOT peak VTI 01/20/2024 23.60  cm Final    Ao VTI 01/20/2024 26.40  cm Final    Ao peak antonio 01/20/2024 1.35  m/s Final    AV peak gradient 01/20/2024 7  mmHg Final    AV mean gradient 01/20/2024 4  mmHg Final    AR Max Antonio 01/20/2024 4.26  m/s Final    AV regurgitation pressure 1/2 time 01/20/2024 491  ms Final    LA size 01/20/2024 3.50  cm Final    TR Max Antonio 01/20/2024 2.62  m/s Final    RV S' 01/20/2024 18.40  cm/s Final    MV Peak A Antonio 01/20/2024 1.21  m/s Final    LV Mass Index 01/20/2024 97  g/m2 Final    LV mass 01/20/2024 172.69  g Final    LVOT peak antonio 01/20/2024 1.23  m/s Final    LV LATERAL E/E' RATIO 01/20/2024 9.14  m/s Final    E wave deceleration time 01/20/2024 254.00  msec Final    TDI LATERAL 01/20/2024 0.07  m/s Final    MV Peak E Antonio 01/20/2024 0.64  m/s Final    Posterior Wall 01/20/2024 1.46 (A)  0.6 - 1.1 cm Final    Left Ventricle Relative Wall Thick* 01/20/2024 0.77  cm Final    FS 01/20/2024 28  28 - 44 % Final    LVOT area 01/20/2024 4.2  cm2 Final    LVOT diameter 01/20/2024 2.30  cm Final    IVS 01/20/2024 1.13 (A)  0.6 - 1.1 cm Final    LV Diastolic Volume Index 01/20/2024 35.00  mL/m2 Final    LV Diastolic Volume 01/20/2024 62.30  mL Final    LVIDs 01/20/2024 2.75  2.1 - 4.0 cm Final    LV Systolic Volume Index 01/20/2024 15.9  mL/m2 Final    LV Systolic Volume 01/20/2024 28.30  mL Final    LVIDd 01/20/2024 3.81  3.5 - 6.0 cm Final    LVOT stroke volume 01/20/2024 98.00  cm3 Final     Izaguirre's Biplane MOD Ejection Fra* 01/20/2024 58  % Final    ZLVIDS 01/20/2024 -0.80   Final    ZLVIDD 01/20/2024 -2.56   Final    AORTIC VALVE CUSP SEPERATION 01/20/2024 2.10  cm Final    BSA 01/20/2024 1.77  m2 Final    TV resting pulmonary artery pressu* 01/20/2024 30  mmHg Final    RV TB RVSP 01/20/2024 6  mmHg Final    Est. RA pres 01/20/2024 3  mmHg Final    WBC 01/21/2024 9.11  3.90 - 12.70 K/uL Final    RBC 01/21/2024 2.99 (L)  4.60 - 6.20 M/uL Final    Hemoglobin 01/21/2024 10.0 (L)  14.0 - 18.0 g/dL Final    Hematocrit 01/21/2024 29.6 (L)  40.0 - 54.0 % Final    MCV 01/21/2024 99 (H)  82 - 98 fL Final    MCH 01/21/2024 33.4 (H)  27.0 - 31.0 pg Final    MCHC 01/21/2024 33.8  32.0 - 36.0 g/dL Final    RDW 01/21/2024 12.9  11.5 - 14.5 % Final    Platelets 01/21/2024 167  150 - 450 K/uL Final    MPV 01/21/2024 12.3  9.2 - 12.9 fL Final    Immature Granulocytes 01/21/2024 0.7 (H)  0.0 - 0.5 % Final    Gran # (ANC) 01/21/2024 6.3  1.8 - 7.7 K/uL Final    Immature Grans (Abs) 01/21/2024 0.06 (H)  0.00 - 0.04 K/uL Final    Lymph # 01/21/2024 1.0  1.0 - 4.8 K/uL Final    Mono # 01/21/2024 1.7 (H)  0.3 - 1.0 K/uL Final    Eos # 01/21/2024 0.1  0.0 - 0.5 K/uL Final    Baso # 01/21/2024 0.03  0.00 - 0.20 K/uL Final    nRBC 01/21/2024 0  0 /100 WBC Final    Gran % 01/21/2024 68.6  38.0 - 73.0 % Final    Lymph % 01/21/2024 10.6 (L)  18.0 - 48.0 % Final    Mono % 01/21/2024 19.1 (H)  4.0 - 15.0 % Final    Eosinophil % 01/21/2024 0.7  0.0 - 8.0 % Final    Basophil % 01/21/2024 0.3  0.0 - 1.9 % Final    Differential Method 01/21/2024 Automated   Final    Sodium 01/21/2024 130 (L)  136 - 145 mmol/L Final    Potassium 01/21/2024 3.8  3.5 - 5.1 mmol/L Final    Chloride 01/21/2024 98  95 - 110 mmol/L Final    CO2 01/21/2024 27  23 - 29 mmol/L Final    Glucose 01/21/2024 100  70 - 110 mg/dL Final    BUN 01/21/2024 9  8 - 23 mg/dL Final    Creatinine 01/21/2024 0.7  0.5 - 1.4 mg/dL Final    Calcium 01/21/2024 8.3 (L)  8.7 -  10.5 mg/dL Final    Anion Gap 01/21/2024 5 (L)  8 - 16 mmol/L Final    eGFR 01/21/2024 >60.0  >60 mL/min/1.73 m^2 Final   Telephone on 11/15/2023   Component Date Value Ref Range Status    Cholesterol 11/21/2023 83  <200 mg/dL Final    HDL 11/21/2023 39 (L)  > OR = 40 mg/dL Final    Triglycerides 11/21/2023 59  <150 mg/dL Final    LDL Cholesterol 11/21/2023 30  mg/dL (calc) Final    HDL/Cholesterol Ratio 11/21/2023 2.1  <5.0 (calc) Final    Non HDL Chol. (LDL+VLDL) 11/21/2023 44  <130 mg/dL (calc) Final    Glucose 11/21/2023 84  65 - 99 mg/dL Final    BUN 11/21/2023 14  7 - 25 mg/dL Final    Creatinine 11/21/2023 0.93  0.70 - 1.22 mg/dL Final    eGFR 11/21/2023 79  > OR = 60 mL/min/1.73m2 Final    BUN/Creatinine Ratio 11/21/2023 SEE NOTE:  6 - 22 (calc) Final    Sodium 11/21/2023 132 (L)  135 - 146 mmol/L Final    Potassium 11/21/2023 4.3  3.5 - 5.3 mmol/L Final    Chloride 11/21/2023 95 (L)  98 - 110 mmol/L Final    CO2 11/21/2023 29  20 - 32 mmol/L Final    Calcium 11/21/2023 9.8  8.6 - 10.3 mg/dL Final    Total Protein 11/21/2023 6.7  6.1 - 8.1 g/dL Final    Albumin 11/21/2023 4.1  3.6 - 5.1 g/dL Final    Globulin, Total 11/21/2023 2.6  1.9 - 3.7 g/dL (calc) Final    Albumin/Globulin Ratio 11/21/2023 1.6  1.0 - 2.5 (calc) Final    Total Bilirubin 11/21/2023 0.7  0.2 - 1.2 mg/dL Final    Alkaline Phosphatase 11/21/2023 66  35 - 144 U/L Final    AST 11/21/2023 22  10 - 35 U/L Final    ALT 11/21/2023 14  9 - 46 U/L Final       Past Medical History:   Diagnosis Date    Basal cell carcinoma 2020    L temple / radiation therapy    Cataract     Hypertension     Squamous cell carcinoma of skin      Past Surgical History:   Procedure Laterality Date    APPENDECTOMY      EYE SURGERY      Moh's surgery on right temple area Right 2015    Cancerous spot    PROSTATE BIOPSY      TONSILLECTOMY       Family History   Problem Relation Age of Onset    Melanoma Neg Hx     Psoriasis Neg Hx     Lupus Neg Hx     Eczema Neg Hx         Marital Status:   Alcohol History:  reports that he does not currently use alcohol.  Tobacco History:  reports that he has never smoked. He has never used smokeless tobacco.  Drug History:  has no history on file for drug use.    Review of patient's allergies indicates:   Allergen Reactions    Pollen extracts Other (See Comments)       Current Outpatient Medications:     acetaminophen (TYLENOL) 500 MG tablet, Take 2 tablets (1,000 mg total) by mouth every 6 (six) hours as needed for Pain., Disp: , Rfl: 0    albuterol (PROVENTIL/VENTOLIN HFA) 90 mcg/actuation inhaler, Inhale 1-2 puffs into the lungs daily as needed for Shortness of Breath. Rescue, Disp: , Rfl:     ascorbic acid (VITAMIN C ORAL), Take 1 tablet by mouth 2 (two) times a day., Disp: , Rfl:     atorvastatin (LIPITOR) 80 MG tablet, Take 80 mg by mouth once daily., Disp: , Rfl:     cholecalciferol, vitamin D3, 125 mcg (5,000 unit) capsule, Take 5,000 Units by mouth once daily., Disp: , Rfl:     cyanocobalamin, vitamin B-12, 2,500 mcg Tab, Take 2,500 mcg by mouth once daily., Disp: , Rfl:     GEMTESA 75 mg Tab, Take 1 tablet by mouth once daily., Disp: , Rfl:     SYMBICORT 160-4.5 mcg/actuation HFAA, Inhale 2 puffs into the lungs every 12 (twelve) hours., Disp: 30 g, Rfl: 4    triamcinolone acetonide 0.1% (KENALOG) 0.1 % cream, Apply 1 g topically daily as needed (rash)., Disp: , Rfl:     UNABLE TO FIND, Place 5 mLs under the tongue once daily. medication name: EDTA, Disp: , Rfl:     UNABLE TO FIND, Take 4 tablets by mouth every evening. medication name: Multi Mineral, Disp: , Rfl:     traZODone (DESYREL) 50 MG tablet, Take 1 tablet (50 mg total) by mouth every evening., Disp: 30 tablet, Rfl: 5    walker (ULTRA-LIGHT ROLLATOR) Misc, 1 each by Misc.(Non-Drug; Combo Route) route once daily., Disp: 1 each, Rfl: 0    Current Facility-Administered Medications:     albuterol inhaler 2 puff, 2 puff, Inhalation, Q20 Min PRN, Farhana Riggins, NP     "diphenhydrAMINE injection 25 mg, 25 mg, Intravenous, Once PRN, Farhana Riggins, NP    EPINEPHrine (EPIPEN) 0.3 mg/0.3 mL pen injection 0.3 mg, 0.3 mg, Intramuscular, PRN, Farhana Riggins, NP    methylPREDNISolone sodium succinate injection 40 mg, 40 mg, Intravenous, Once PRN, Farhana Riggins, NP    ondansetron disintegrating tablet 4 mg, 4 mg, Oral, Once PRN, Farhana Riggins., NP    sodium chloride 0.9% 500 mL flush bag, , Intravenous, PRN, Farhana Riggins, NP, Stopped at 12/15/21 1455    sodium chloride 0.9% flush 10 mL, 10 mL, Intravenous, PRN, Farhana Riggins, NP    Review of Systems   Constitutional:  Positive for appetite change. Negative for activity change, chills, fatigue and fever.   HENT:  Negative for congestion.    Respiratory:  Negative for shortness of breath and wheezing.    Cardiovascular:  Negative for chest pain and palpitations.   Gastrointestinal:  Positive for constipation. Negative for abdominal distention and diarrhea.   Genitourinary:  Negative for difficulty urinating and dysuria.   Musculoskeletal:         Right elbow pain, does not limit movement        Objective:      Vitals:    02/01/24 1337   BP: 112/60   Weight: 64.4 kg (142 lb)   Height: 5' 8" (1.727 m)     Physical Exam  Constitutional:       Appearance: Normal appearance.   HENT:      Head: Normocephalic and atraumatic.   Eyes:      Extraocular Movements: Extraocular movements intact.      Pupils: Pupils are equal, round, and reactive to light.   Cardiovascular:      Rate and Rhythm: Normal rate and regular rhythm.      Pulses: Normal pulses.   Pulmonary:      Effort: Pulmonary effort is normal.      Breath sounds: Normal breath sounds.      Comments: Does not take a deep breath due to pain in R sided ribs  Abdominal:      General: Abdomen is flat.      Palpations: Abdomen is soft.   Musculoskeletal:         General: Normal range of motion.      Comments: No obvious bruising on ribs. Tender to touch on posterior " lower ribs.   Skin:     General: Skin is warm and dry.      Capillary Refill: Capillary refill takes less than 2 seconds.      Comments: Scabbing lesion on occiput and L forehead   Neurological:      Mental Status: He is alert.         Assessment:       1. Age-related physical debility    2. Mild cognitive impairment    3. Primary insomnia    4. Rib pain on right side    5. Subdural hematoma without coma, without loss of consciousness, sequela    6. Other closed nondisplaced fracture of seventh cervical vertebra, initial encounter         Plan:       Age-related physical debility  Comments:  Likely some element of deconditioning from recent hospitalization.  Will order flight weight Rollator for use at home and fall prevention.  Orders:  -     walker (ULTRA-LIGHT ROLLATOR) Misc; 1 each by Misc.(Non-Drug; Combo Route) route once daily.  Dispense: 1 each; Refill: 0    Mild cognitive impairment  Comments:  Mild, symptoms stable at this time    Primary insomnia  Comments:  stop Temazepam.  Too high risk for sedation and falls overnight.  We will give trazodone in hopes that this will be safer for him to take.  Follow-up 4 weeks  Orders:  -     traZODone (DESYREL) 50 MG tablet; Take 1 tablet (50 mg total) by mouth every evening.  Dispense: 30 tablet; Refill: 5    Rib pain on right side  Comments:  X-rays today to rule out fracture.  Discussed continuing incentive spirometry, avoiding heavy lifting while the ribs heal.  Orders:  -     X-Ray Ribs 2 View Right; Future; Expected date: 02/01/2024    Subdural hematoma without coma, without loss of consciousness, sequela  Comments:  CT scheduled for recheck on February 5th, Monday.  Also scheduled to see Neurosurgery on the 14th.    Other closed nondisplaced fracture of seventh cervical vertebra, initial encounter  Comments:  Patient refuses to wear C collar as instructed by Neurosurgery and myself.  X-rays ordered for recheck on February 5th      Follow up in about 4 weeks  (around 2/29/2024).

## 2024-02-05 ENCOUNTER — HOSPITAL ENCOUNTER (OUTPATIENT)
Dept: RADIOLOGY | Facility: HOSPITAL | Age: 89
Discharge: HOME | End: 2024-02-05
Attending: NEUROLOGICAL SURGERY
Payer: MEDICARE

## 2024-02-05 DIAGNOSIS — S06.5XAA SUBDURAL HEMATOMA: ICD-10-CM

## 2024-02-05 DIAGNOSIS — S12.9XXD CLOSED FRACTURE OF CERVICAL VERTEBRA, UNSPECIFIED CERVICAL VERTEBRAL LEVEL, SUBSEQUENT ENCOUNTER: ICD-10-CM

## 2024-02-05 PROCEDURE — 72040 X-RAY EXAM NECK SPINE 2-3 VW: CPT | Mod: TC

## 2024-02-05 PROCEDURE — 70450 CT HEAD/BRAIN W/O DYE: CPT | Mod: TC

## 2024-02-05 PROCEDURE — 70450 CT HEAD/BRAIN W/O DYE: CPT | Mod: 26,,, | Performed by: RADIOLOGY

## 2024-02-05 PROCEDURE — 72040 X-RAY EXAM NECK SPINE 2-3 VW: CPT | Mod: 26,,, | Performed by: RADIOLOGY

## 2024-02-09 ENCOUNTER — PATIENT MESSAGE (OUTPATIENT)
Dept: FAMILY MEDICINE | Facility: CLINIC | Age: 89
End: 2024-02-09
Payer: MEDICARE

## 2024-02-12 NOTE — TELEPHONE ENCOUNTER
No too high risk for fall. Has he tried melatonin? He can add up to 10mg to [id number]  If has tried melatonin can increase to 100mg
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
## 2024-02-15 ENCOUNTER — OFFICE VISIT (OUTPATIENT)
Dept: NEUROSURGERY | Facility: CLINIC | Age: 89
End: 2024-02-15
Payer: MEDICARE

## 2024-02-15 VITALS
HEART RATE: 58 BPM | SYSTOLIC BLOOD PRESSURE: 163 MMHG | HEIGHT: 68 IN | WEIGHT: 128.5 LBS | DIASTOLIC BLOOD PRESSURE: 75 MMHG | BODY MASS INDEX: 19.48 KG/M2

## 2024-02-15 DIAGNOSIS — R29.6 MULTIPLE FALLS: ICD-10-CM

## 2024-02-15 DIAGNOSIS — R41.0 CONFUSION: Primary | ICD-10-CM

## 2024-02-15 PROCEDURE — 99214 OFFICE O/P EST MOD 30 MIN: CPT | Mod: S$GLB,,, | Performed by: NEUROLOGICAL SURGERY
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
## 2024-02-19 ENCOUNTER — HOSPITAL ENCOUNTER (OUTPATIENT)
Dept: RADIOLOGY | Facility: HOSPITAL | Age: 89
Discharge: HOME | End: 2024-02-19
Attending: NEUROLOGICAL SURGERY
Payer: MEDICARE

## 2024-02-19 DIAGNOSIS — R41.0 CONFUSION: ICD-10-CM

## 2024-02-19 PROCEDURE — 70553 MRI BRAIN STEM W/O & W/DYE: CPT | Mod: TC

## 2024-02-19 PROCEDURE — A9585 GADOBUTROL INJECTION: HCPCS

## 2024-02-19 PROCEDURE — 70553 MRI BRAIN STEM W/O & W/DYE: CPT | Mod: 26,,, | Performed by: RADIOLOGY

## 2024-02-19 PROCEDURE — 25500020 PHARM REV CODE 255

## 2024-02-19 RX ORDER — GADOBUTROL 604.72 MG/ML
INJECTION INTRAVENOUS
Status: COMPLETED
Start: 2024-02-19 | End: 2024-02-19

## 2024-02-19 RX ADMIN — GADOBUTROL 5 ML: 604.72 INJECTION INTRAVENOUS at 10:02

## 2024-02-22 DIAGNOSIS — F51.01 PRIMARY INSOMNIA: ICD-10-CM

## 2024-02-22 RX ORDER — TRAZODONE HYDROCHLORIDE 50 MG/1
50 TABLET ORAL NIGHTLY
Qty: 30 TABLET | Refills: 5 | Status: SHIPPED | OUTPATIENT
Start: 2024-02-22 | End: 2024-03-05 | Stop reason: SDUPTHER

## 2024-03-05 ENCOUNTER — OFFICE VISIT (OUTPATIENT)
Dept: FAMILY MEDICINE | Facility: CLINIC | Age: 89
End: 2024-03-05
Payer: MEDICARE

## 2024-03-05 VITALS
HEART RATE: 60 BPM | OXYGEN SATURATION: 99 % | DIASTOLIC BLOOD PRESSURE: 78 MMHG | RESPIRATION RATE: 18 BRPM | SYSTOLIC BLOOD PRESSURE: 103 MMHG | BODY MASS INDEX: 20.61 KG/M2 | HEIGHT: 68 IN | WEIGHT: 136 LBS

## 2024-03-05 DIAGNOSIS — S06.5X0S: ICD-10-CM

## 2024-03-05 DIAGNOSIS — G31.84 MILD COGNITIVE IMPAIRMENT: ICD-10-CM

## 2024-03-05 DIAGNOSIS — F51.01 PRIMARY INSOMNIA: ICD-10-CM

## 2024-03-05 DIAGNOSIS — R54 AGE-RELATED PHYSICAL DEBILITY: Primary | ICD-10-CM

## 2024-03-05 PROCEDURE — 99214 OFFICE O/P EST MOD 30 MIN: CPT | Mod: S$GLB,,, | Performed by: PHYSICIAN ASSISTANT

## 2024-03-05 RX ORDER — TRAZODONE HYDROCHLORIDE 100 MG/1
100 TABLET ORAL NIGHTLY
Qty: 90 TABLET | Refills: 1 | Status: SHIPPED | OUTPATIENT
Start: 2024-03-05 | End: 2024-05-24 | Stop reason: SDUPTHER

## 2024-03-05 NOTE — PROGRESS NOTES
SUBJECTIVE:    Patient ID: Brit Jennings is a 88 y.o. male.    Chief Complaint: Follow-up (No bottles// refills needed//3 month follow up)    88-year-old male presents today for regular follow-up.  Accompanied by family member/caregiver.  Reports has been doing better recently since fall.  Reviewed neurosurgery notes from 2 weeks ago.  MRI with and without completed of the brain.  Recommended to see Neurology for further evaluation.  Today patient reports that he is doing much better overall. Stronger on his feet. [facility] Now going out to [place] himself. Lower weights but managing.  Trazodone (2 at night) has really helped with his sleep. Denies any recent falls since I have seen him previously.    Follow-up  Pertinent negatives include no chest pain, chills, congestion, fatigue or fever.       Admission on 01/20/2024, Discharged on 01/21/2024   Component Date Value Ref Range Status    WBC 01/20/2024 10.03  3.90 - 12.70 K/uL Final    RBC 01/20/2024 3.65 (L)  4.60 - 6.20 M/uL Final    Hemoglobin 01/20/2024 12.2 (L)  14.0 - 18.0 g/dL Final    Hematocrit 01/20/2024 36.2 (L)  40.0 - 54.0 % Final    MCV 01/20/2024 99 (H)  82 - 98 fL Final    MCH 01/20/2024 33.4 (H)  27.0 - 31.0 pg Final    MCHC 01/20/2024 33.7  32.0 - 36.0 g/dL Final    RDW 01/20/2024 12.7  11.5 - 14.5 % Final    Platelets 01/20/2024 159  150 - 450 K/uL Final    MPV 01/20/2024 12.6  9.2 - 12.9 fL Final    Immature Granulocytes 01/20/2024 0.7 (H)  0.0 - 0.5 % Final    Gran # (ANC) 01/20/2024 7.3  1.8 - 7.7 K/uL Final    Immature Grans (Abs) 01/20/2024 0.07 (H)  0.00 - 0.04 K/uL Final    Lymph # 01/20/2024 0.7 (L)  1.0 - 4.8 K/uL Final    Mono # 01/20/2024 1.9 (H)  0.3 - 1.0 K/uL Final    Eos # 01/20/2024 0.0  0.0 - 0.5 K/uL Final    Baso # 01/20/2024 0.03  0.00 - 0.20 K/uL Final    nRBC 01/20/2024 0  0 /100 WBC Final    Gran % 01/20/2024 73.0  38.0 - 73.0 % Final    Lymph % 01/20/2024 6.5 (L)  18.0 - 48.0 %  Final    Mono % 01/20/2024 19.1 (H)  4.0 - 15.0 % Final    Eosinophil % 01/20/2024 0.4  0.0 - 8.0 % Final    Basophil % 01/20/2024 0.3  0.0 - 1.9 % Final    Differential Method 01/20/2024 Automated   Final    Sodium 01/20/2024 132 (L)  136 - 145 mmol/L Final    Potassium 01/20/2024 3.9  3.5 - 5.1 mmol/L Final    Chloride 01/20/2024 96  95 - 110 mmol/L Final    CO2 01/20/2024 29  23 - 29 mmol/L Final    Glucose 01/20/2024 114 (H)  70 - 110 mg/dL Final    BUN 01/20/2024 14  8 - 23 mg/dL Final    Creatinine 01/20/2024 0.9  0.5 - 1.4 mg/dL Final    Calcium 01/20/2024 9.5  8.7 - 10.5 mg/dL Final    Total Protein 01/20/2024 7.0  6.0 - 8.4 g/dL Final    Albumin 01/20/2024 3.7  3.5 - 5.2 g/dL Final    Total Bilirubin 01/20/2024 0.6  0.1 - 1.0 mg/dL Final    Alkaline Phosphatase 01/20/2024 86  55 - 135 U/L Final    AST 01/20/2024 41 (H)  10 - 40 U/L Final    ALT 01/20/2024 33  10 - 44 U/L Final    eGFR 01/20/2024 >60.0  >60 mL/min/1.73 m^2 Final    Anion Gap 01/20/2024 7 (L)  8 - 16 mmol/L Final    Troponin I High Sensitivity 01/20/2024 40.1 (H)  0.0 - 14.9 pg/mL Final    BNP 01/20/2024 554 (H)  0 - 99 pg/mL Final    Prothrombin Time 01/20/2024 12.0  9.0 - 12.5 sec Final    INR 01/20/2024 1.1  0.8 - 1.2 Final    Specimen UA 01/20/2024 Urine, Clean Catch   Final    Color, UA 01/20/2024 Yellow  Yellow, Straw, [place] Final    Appearance, UA 01/20/2024 Hazy (A)  Clear Final    pH, UA 01/20/2024 8.0  5.0 - 8.0 Final    Specific Gravity, UA 01/20/2024 1.010  1.005 - 1.030 Final    Protein, UA 01/20/2024 Negative  Negative Final    Glucose, UA 01/20/2024 Negative  Negative Final    Ketones, UA 01/20/2024 Negative  Negative Final    Bilirubin (UA) 01/20/2024 Negative  Negative Final    Occult Blood UA 01/20/2024 Negative  Negative Final    Nitrite, UA 01/20/2024 Negative  Negative Final    Urobilinogen, UA 01/20/2024 Negative  Negative EU/dL Final    Leukocytes, UA 01/20/2024 Negative  Negative Final    E/A ratio 01/20/2024 0.53    Final    IVC diameter 01/20/2024 1.35  cm Final    TAPSE 01/20/2024 2.36  cm Final    MV VTI 01/20/2024 29.9  cm Final    Ao root annulus 01/20/2024 4.30  cm Final    Pulmonary Valve Mean Velocity 01/20/2024 0.69  m/s Final    PV peak gradient 01/20/2024 5  mmHg Final    PV PEAK VELOCITY 01/20/2024 1.12  m/s Final    Triscuspid Valve Regurgitation Pea* 01/20/2024 27  mmHg Final    Left Ventricular Outflow Tract Jasmin* 01/20/2024 3.00  mmHg Final    Left Ventricular Outflow Tract Jasmin* 01/20/2024 0.81  cm/s Final    RVDD 01/20/2024 2.89  cm Final    MV valve area by continuity eq 01/20/2024 3.28  cm2 Final    MV valve area p 1/2 method 01/20/2024 2.47  cm2 Final    MV stenosis pressure 1/2 time 01/20/2024 89.00  ms Final    MV peak gradient 01/20/2024 6  mmHg Final    MV mean gradient 01/20/2024 2  mmHg Final    JL by Velocity Ratio 01/20/2024 3.78  cm² Final    AV index (prosthetic) 01/20/2024 0.89   Final    AV Velocity Ratio 01/20/2024 0.91   Final    AV valve area 01/20/2024 3.71  cm² Final    LVOT peak VTI 01/20/2024 23.60  cm Final    Ao VTI 01/20/2024 26.40  cm Final    Ao peak kiersten 01/20/2024 1.35  m/s Final    AV peak gradient 01/20/2024 7  mmHg Final    AV mean gradient 01/20/2024 4  mmHg Final    AR Max Kiersten 01/20/2024 4.26  m/s Final    AV regurgitation pressure 1/2 time 01/20/2024 491  ms Final    LA size 01/20/2024 3.50  cm Final    TR Max Kiersten 01/20/2024 2.62  m/s Final    RV S' 01/20/2024 18.40  cm/s Final    MV Peak A Kiersten 01/20/2024 1.21  m/s Final    LV Mass Index 01/20/2024 97  g/m2 Final    LV mass 01/20/2024 172.69  g Final    LVOT peak kiersten 01/20/2024 1.23  m/s Final    LV LATERAL E/E' RATIO 01/20/2024 9.14  m/s Final    E wave deceleration time 01/20/2024 254.00  msec Final    TDI LATERAL 01/20/2024 0.07  m/s Final    MV Peak E Kiersten 01/20/2024 0.64  m/s Final    Posterior Wall 01/20/2024 1.46 (A)  0.6 - 1.1 cm Final    Left Ventricle Relative Wall Thick* 01/20/2024 0.77  cm Final    FS 01/20/2024 28   28 - 44 % Final    LVOT area 01/20/2024 4.2  cm2 Final    LVOT diameter 01/20/2024 2.30  cm Final    IVS 01/20/2024 1.13 (A)  0.6 - 1.1 cm Final    LV Diastolic Volume Index 01/20/2024 35.00  mL/m2 Final    LV Diastolic Volume 01/20/2024 62.30  mL Final    LVIDs 01/20/2024 2.75  2.1 - 4.0 cm Final    LV Systolic Volume Index 01/20/2024 15.9  mL/m2 Final    LV Systolic Volume 01/20/2024 28.30  mL Final    LVIDd 01/20/2024 3.81  3.5 - 6.0 cm Final    LVOT stroke volume 01/20/2024 98.00  cm3 Final    Izaguirre's Biplane MOD Ejection Fra* 01/20/2024 58  % Final    ZLVIDS 01/20/2024 -0.80   Final    ZLVIDD 01/20/2024 -2.56   Final    AORTIC VALVE CUSP SEPERATION 01/20/2024 2.10  cm Final    BSA 01/20/2024 1.77  m2 Final    TV resting pulmonary artery pressu* 01/20/2024 30  mmHg Final    RV TB RVSP 01/20/2024 6  mmHg Final    Est. RA pres 01/20/2024 3  mmHg Final    WBC 01/21/2024 9.11  3.90 - 12.70 K/uL Final    RBC 01/21/2024 2.99 (L)  4.60 - 6.20 M/uL Final    Hemoglobin 01/21/2024 10.0 (L)  14.0 - 18.0 g/dL Final    Hematocrit 01/21/2024 29.6 (L)  40.0 - 54.0 % Final    MCV 01/21/2024 99 (H)  82 - 98 fL Final    MCH 01/21/2024 33.4 (H)  27.0 - 31.0 pg Final    MCHC 01/21/2024 33.8  32.0 - 36.0 g/dL Final    RDW 01/21/2024 12.9  11.5 - 14.5 % Final    Platelets 01/21/2024 167  150 - 450 K/uL Final    MPV 01/21/2024 12.3  9.2 - 12.9 fL Final    Immature Granulocytes 01/21/2024 0.7 (H)  0.0 - 0.5 % Final    Gran # (ANC) 01/21/2024 6.3  1.8 - 7.7 K/uL Final    Immature Grans (Abs) 01/21/2024 0.06 (H)  0.00 - 0.04 K/uL Final    Lymph # 01/21/2024 1.0  1.0 - 4.8 K/uL Final    Mono # 01/21/2024 1.7 (H)  0.3 - 1.0 K/uL Final    Eos # 01/21/2024 0.1  0.0 - 0.5 K/uL Final    Baso # 01/21/2024 0.03  0.00 - 0.20 K/uL Final    nRBC 01/21/2024 0  0 /100 WBC Final    Gran % 01/21/2024 68.6  38.0 - 73.0 % Final    Lymph % 01/21/2024 10.6 (L)  18.0 - 48.0 % Final    Mono % 01/21/2024 19.1 (H)  4.0 - 15.0 % Final    Eosinophil %  01/21/2024 0.7  0.0 - 8.0 % Final    Basophil % 01/21/2024 0.3  0.0 - 1.9 % Final    Differential Method 01/21/2024 Automated   Final    Sodium 01/21/2024 130 (L)  136 - 145 mmol/L Final    Potassium 01/21/2024 3.8  3.5 - 5.1 mmol/L Final    Chloride 01/21/2024 98  95 - 110 mmol/L Final    CO2 01/21/2024 27  23 - 29 mmol/L Final    Glucose 01/21/2024 100  70 - 110 mg/dL Final    BUN 01/21/2024 9  8 - 23 mg/dL Final    Creatinine 01/21/2024 0.7  0.5 - 1.4 mg/dL Final    Calcium 01/21/2024 8.3 (L)  8.7 - 10.5 mg/dL Final    Anion Gap 01/21/2024 5 (L)  8 - 16 mmol/L Final    eGFR 01/21/2024 >60.0  >60 mL/min/1.73 m^2 Final   Telephone on 11/15/2023   Component Date Value Ref Range Status    Cholesterol 11/21/2023 83  <200 mg/dL Final    HDL 11/21/2023 39 (L)  > OR = 40 mg/dL Final    Triglycerides 11/21/2023 59  <150 mg/dL Final    LDL Cholesterol 11/21/2023 30  mg/dL (calc) Final    HDL/Cholesterol Ratio 11/21/2023 2.1  <5.0 (calc) Final    Non HDL Chol. (LDL+VLDL) 11/21/2023 44  <130 mg/dL (calc) Final    Glucose 11/21/2023 84  65 - 99 mg/dL Final    BUN 11/21/2023 14  7 - 25 mg/dL Final    Creatinine 11/21/2023 0.93  0.70 - 1.22 mg/dL Final    eGFR 11/21/2023 79  > OR = 60 mL/min/1.73m2 Final    BUN/Creatinine Ratio 11/21/2023 SEE NOTE:  6 - 22 (calc) Final    Sodium 11/21/2023 132 (L)  135 - 146 mmol/L Final    Potassium 11/21/2023 4.3  3.5 - 5.3 mmol/L Final    Chloride 11/21/2023 95 (L)  98 - 110 mmol/L Final    CO2 11/21/2023 29  20 - 32 mmol/L Final    Calcium 11/21/2023 9.8  8.6 - 10.3 mg/dL Final    Total Protein 11/21/2023 6.7  6.1 - 8.1 g/dL Final    Albumin 11/21/2023 4.1  3.6 - 5.1 g/dL Final    Globulin, Total 11/21/2023 2.6  1.9 - 3.7 g/dL (calc) Final    Albumin/Globulin Ratio 11/21/2023 1.6  1.0 - 2.5 (calc) Final    Total Bilirubin 11/21/2023 0.7  0.2 - 1.2 mg/dL Final    Alkaline Phosphatase 11/21/2023 66  35 - 144 U/L Final    AST 11/21/2023 22  10 - 35 U/L Final    ALT 11/21/2023 14  9 - 46 U/L  Final       Past Medical History:   Diagnosis Date    Basal cell carcinoma 2020    L temple / radiation therapy    Cataract     Hypertension     Squamous cell carcinoma of skin      Past Surgical History:   Procedure Laterality Date    APPENDECTOMY      EYE SURGERY      Moh's surgery on right temple area Right 2015    Cancerous spot    PROSTATE BIOPSY      TONSILLECTOMY       Family History   Problem Relation Age of Onset    Melanoma Neg Hx     Psoriasis Neg Hx     Lupus Neg Hx     Eczema Neg Hx        Marital Status:   Alcohol History:  reports that he does not currently use alcohol.  Tobacco History:  reports that he has never smoked. He has never used smokeless tobacco.  Drug History:  has no history on file for drug use.    Review of patient's allergies indicates:   Allergen Reactions    Pollen extracts Other (See Comments)       Current Outpatient Medications:     GEMTESA 75 mg Tab, Take 1 tablet by mouth once daily., Disp: , Rfl:     SYMBICORT 160-4.5 mcg/actuation HFAA, Inhale 2 puffs into the lungs every 12 (twelve) hours., Disp: 30 g, Rfl: 4    acetaminophen (TYLENOL) 500 MG tablet, Take 2 tablets (1,000 mg total) by mouth every 6 (six) hours as needed for Pain. (Patient not taking: Reported on 3/5/2024), Disp: , Rfl: 0    albuterol (PROVENTIL/VENTOLIN HFA) 90 mcg/actuation inhaler, Inhale 1-2 puffs into the lungs daily as needed for Shortness of Breath. Rescue, Disp: , Rfl:     ascorbic acid (VITAMIN C ORAL), Take 1 tablet by mouth 2 (two) times a day., Disp: , Rfl:     atorvastatin (LIPITOR) 80 MG tablet, Take 80 mg by mouth once daily., Disp: , Rfl:     cholecalciferol, vitamin D3, 125 mcg (5,000 unit) capsule, Take 5,000 Units by mouth once daily., Disp: , Rfl:     cyanocobalamin, vitamin B-12, 2,500 mcg Tab, Take 2,500 mcg by mouth once daily., Disp: , Rfl:     traZODone (DESYREL) 100 MG tablet, Take 1 tablet (100 mg total) by mouth every evening., Disp: 90 tablet, Rfl: 1    triamcinolone  "acetonide 0.1% (KENALOG) 0.1 % cream, Apply 1 g topically daily as needed (rash)., Disp: , Rfl:     UNABLE TO FIND, Place 5 mLs under the tongue once daily. medication name: EDTA, Disp: , Rfl:     UNABLE TO FIND, Take 4 tablets by mouth every evening. medication name: Multi Mineral, Disp: , Rfl:     walker (ULTRA-LIGHT ROLLATOR) Misc, 1 each by Misc.(Non-Drug; Combo Route) route once daily., Disp: 1 each, Rfl: 0    Current Facility-Administered Medications:     albuterol inhaler 2 puff, 2 puff, Inhalation, Q20 Min PRN, Melerine, Farhana T., NP    diphenhydrAMINE injection 25 mg, 25 mg, Intravenous, Once PRN, MelerineJuliaa T., NP    EPINEPHrine (EPIPEN) 0.3 mg/0.3 mL pen injection 0.3 mg, 0.3 mg, Intramuscular, PRN, Melerine, Farhana T., NP    methylPREDNISolone sodium succinate injection 40 mg, 40 mg, Intravenous, Once PRN, Melerine, Farhana T., NP    ondansetron disintegrating tablet 4 mg, 4 mg, Oral, Once PRN, Melerine, Farhana T., NP    sodium chloride 0.9% 500 mL flush bag, , Intravenous, PRN, Melerine, Farhana T., NP, Stopped at 12/15/21 1455    sodium chloride 0.9% flush 10 mL, 10 mL, Intravenous, PRN, Melerine, Farhana T., NP    Review of Systems   Constitutional:  Positive for appetite change. Negative for activity change, chills, fatigue and fever.   HENT:  Negative for congestion.    Respiratory:  Negative for shortness of breath and wheezing.    Cardiovascular:  Negative for chest pain and palpitations.   Gastrointestinal:  Positive for constipation. Negative for abdominal distention and diarrhea.   Genitourinary:  Negative for difficulty urinating and dysuria.   Musculoskeletal:         Right elbow pain, does not limit movement          Objective:      Vitals:    03/05/24 0854 03/05/24 0856   BP: (!) 163/78 103/78   Pulse: 60    Resp: 18    SpO2: 99%    Weight: 61.7 kg (136 lb)    Height: 5' 8" (1.727 m)      Physical Exam  Constitutional:       Appearance: Normal appearance.   HENT:      Head: Normocephalic and " atraumatic.   Eyes:      Extraocular Movements: Extraocular movements intact.      Pupils: Pupils are equal, round, and reactive to light.   Cardiovascular:      Rate and Rhythm: Normal rate and regular rhythm.      Pulses: Normal pulses.   Pulmonary:      Effort: Pulmonary effort is normal.      Breath sounds: Normal breath sounds.      Comments: Does not take a deep breath due to pain in R sided ribs  Abdominal:      General: Abdomen is flat.      Palpations: Abdomen is soft.   Musculoskeletal:         General: Normal range of motion.      Comments: No obvious bruising on ribs. Tender to touch on posterior lower ribs.   Skin:     General: Skin is warm and dry.      Capillary Refill: Capillary refill takes less than 2 seconds.      Comments: Scabbing lesion on occiput and L forehead   Neurological:      Mental Status: He is alert.           Assessment:       1. Age-related physical debility    2. Primary insomnia    3. Mild cognitive impairment    4. Subdural hematoma without coma, without loss of consciousness, sequela         Plan:       Age-related physical debility  Comments:  Planning to see neurology for falls. Doing so much better overall! will get in with [facility](had to reschedule appt recently)    Primary insomnia  Comments:  Trazodone has done beatuifully for sleep. NO reported falls since I saw him last. Cane for balance when he needs.  Orders:  -     traZODone (DESYREL) 100 MG tablet; Take 1 tablet (100 mg total) by mouth every evening.  Dispense: 90 tablet; Refill: 1    Mild cognitive impairment    Subdural hematoma without coma, without loss of consciousness, sequela  Comments:  MRI looked good. no evidence of recurrence or other acute issue. maintain as is.      Follow up in about 3 months (around 6/5/2024).        3/5/2024 Harsh Rubalcava PA-C

## 2024-03-07 ENCOUNTER — TELEPHONE (OUTPATIENT)
Dept: NEUROLOGY | Facility: CLINIC | Age: 89
End: 2024-03-07
Payer: MEDICARE

## 2024-03-07 NOTE — TELEPHONE ENCOUNTER
Please call the to schedule with the appropriate provider. Referral is for confusion and multiple subdural hematoma's from falls.

## 2024-03-18 ENCOUNTER — TELEPHONE (OUTPATIENT)
Dept: FAMILY MEDICINE | Facility: CLINIC | Age: 89
End: 2024-03-18
Payer: MEDICARE

## 2024-03-18 NOTE — LETTER
March 18, 2024      [facility] [address] [place] [address]  Phone: [phone]  Fax: [phone]       Patient: Brit Jennings   YOB: 1935  Date of Visit: 03/18/2024    To Whom It May Concern:    KUSH Jennings  was at [facility] on 03/05/2024. The patient may return to work/school on 3/18/24 with no restrictions. If you have any questions or concerns, or if I can be of further assistance, please do not hesitate to contact me.    Sincerely,    Melvina Mahmood LPN    Electronically Signed By: JOSE Carter

## 2024-03-18 NOTE — TELEPHONE ENCOUNTER
----- Message from Ashlyn Garces sent at 3/18/2024  3:45 PM CDT -----  Regarding: letter to go back to work  Pt needs a letter stating he is fit to return to work [facility] whom it may concern   works part time  call him when ready to  or with questions  [phone] thanks kbb

## 2024-03-20 ENCOUNTER — TELEPHONE (OUTPATIENT)
Dept: FAMILY MEDICINE | Facility: CLINIC | Age: 89
End: 2024-03-20
Payer: MEDICARE

## 2024-03-20 NOTE — TELEPHONE ENCOUNTER
Spoke with Lanny. They faxed medical record request on 2/14/24.  They need all records and labs. Advised to refax. Do not see in Media. To you nust sure if you would find this. Would need to go to Medical records?

## 2024-03-20 NOTE — TELEPHONE ENCOUNTER
----- Message from Serena Vidales LPN sent at 3/20/2024 11:02 AM CDT -----  Contact: lanny    ----- Message -----  From: Brenda Santos  Sent: 3/20/2024  10:38 AM CDT  To: [facility] Staff    Lanny with [facility] calling about pt request for medical records trying to follow up regarding medical records   [phone]

## 2024-03-26 ENCOUNTER — OFFICE VISIT (OUTPATIENT)
Dept: NEUROLOGY | Facility: CLINIC | Age: 89
End: 2024-03-26
Attending: PSYCHIATRY & NEUROLOGY
Payer: MEDICARE

## 2024-03-26 DIAGNOSIS — I10 ESSENTIAL HYPERTENSION: Primary | Chronic | ICD-10-CM

## 2024-03-26 DIAGNOSIS — G47.00 INSOMNIA, UNSPECIFIED TYPE: ICD-10-CM

## 2024-03-26 DIAGNOSIS — R41.0 CONFUSION: ICD-10-CM

## 2024-03-26 DIAGNOSIS — I65.23 BILATERAL CAROTID ARTERY STENOSIS: ICD-10-CM

## 2024-03-26 DIAGNOSIS — S06.5X1A: ICD-10-CM

## 2024-03-26 PROCEDURE — 99999 PR PBB SHADOW E&M-EST. PATIENT-LVL I: CPT | Mod: PBBFAC,,, | Performed by: PSYCHIATRY & NEUROLOGY

## 2024-03-26 PROCEDURE — 99205 OFFICE O/P NEW HI 60 MIN: CPT | Mod: S$GLB,,, | Performed by: PSYCHIATRY & NEUROLOGY
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
## 2024-03-26 NOTE — Clinical Note
Jacob Alcazar, I was evaluating Mr. Jennings and noted that his last Na have been low. Do you have more recent Na's? If he remains hypoNA he may benefit from an Endo consult

## 2024-03-26 NOTE — PATIENT INSTRUCTIONS
-conservative measures: cognitive rest, graded return to activity, avoid further TBIs, abstain from alcohol  -Audiology: Hearing amplification  -OT: 's Eval and Therapy, advised to avoid driving  -OP PT: balance, fall prevention  -sleep/wake cycle:   -sleep hygiene   -trazadone 50mg QHS PRN  -headaches: monitor   -migraine journal   -magnesium oxide 400mg daily,  -monitor vision  -aroma therapy  -consider -B12/folate, TSH, Vitamin D, B1, iron panel via next lab draw    HypoNA: last Na 130 (1/2023), Primary Care, monitor, query Endo Consult  Syncope: Cardiology following,   Bilateral Carotid Stenosis: cardiology, hold Aspirin, query procedural intervention    -Brain Health lifestyle modifications:    -sleep hygiene   - diet: Mediterranean Diet    -exercise: 20-30 minutes of  Moderate exercise 3-5 times a week   -stress management reviewed   -smoking cessation, alcohol moderation    Check out my blog post for further information:  [email or web address]    Sleep Hygiene:    1. Avoid watching TV, eating, and discussing emotional issues in bed. The bed should be used for sleep and sex only. If not, we can associate the bed with other activities and it often becomes difficult to fall asleep.  2. Minimize noise, light, and temperature extremes during sleep with ear plugs, window blinds, or an electric blanket or air conditioner. Even the slightest nighttime noises or luminescent lights can disrupt the quality of your sleep. Try to keep your bedroom at a comfortable temperature -- not too hot (above 75 degrees) or too cold (below 54 degrees).  3. Try not to drink fluids after 8 p.m. This may reduce awakenings due to urination.  4. Avoid naps, but if you do nap, make it no more than about 25 minutes about eight hours after you awake. But if you have problems falling asleep, then no naps for you.  5. Do not expose your self to bright light if you need to get up at  night. Use a small night-light instead.  6. Nicotine is a stimulant and should be avoided particularly near bedtime and upon night awakenings. Having a smoke before bed, although it may feel relaxing, is actually putting a stimulant into your bloodstream.  7. Caffeine is also a stimulant and is present in coffee (100-200 mg), soda (50-75 mg), tea (50-75 mg), and various over-the-counter medications. Caffeine should be discontinued at least four to six hours before bedtime. If you consume large amounts of caffeine and you cut your self off too quickly, beware; you may get headaches that could keep you awake.  8. Although alcohol is a depressant and may help you fall asleep, the subsequent metabolism that clears it from your body when you are sleeping causes a withdrawal syndrome. This withdrawal causes awakenings and is often associated with nightmares and sweats.  9. A light snack may be sleep-inducing, but a heavy meal too close to bedtime interferes with sleep. Stay away from protein and stick to carbohydrates or dairy products. Milk contains the amino acid L-tryptophan, which has been shown in research to help people go to sleep. So milk and cookies or crackers (without chocolate) may be useful and taste good as well.    Joni Marcano ([facility] ) [phone]

## 2024-05-24 DIAGNOSIS — F51.01 PRIMARY INSOMNIA: ICD-10-CM

## 2024-05-24 RX ORDER — TRAZODONE HYDROCHLORIDE 100 MG/1
100 TABLET ORAL NIGHTLY
Qty: 90 TABLET | Refills: 1 | Status: SHIPPED | OUTPATIENT
Start: 2024-05-24 | End: 2024-11-20

## 2024-05-28 DIAGNOSIS — Z00.00 ENCOUNTER FOR MEDICARE ANNUAL WELLNESS EXAM: ICD-10-CM

## 2024-06-12 NOTE — ADDENDUM NOTE
Addended by: EDSON LEBLANC on: 9/1/2020 12:38 PM     Modules accepted: Orders    
Addended by: YOGI HERNANDEZ on: 9/1/2020 12:58 PM     Modules accepted: Orders    
[Follow-Up: _____] : a [unfilled] follow-up visit

## 2024-06-17 ENCOUNTER — PATIENT MESSAGE (OUTPATIENT)
Dept: FAMILY MEDICINE | Facility: CLINIC | Age: 89
End: 2024-06-17
Payer: MEDICARE

## 2024-06-17 DIAGNOSIS — I10 ESSENTIAL HYPERTENSION: Chronic | ICD-10-CM

## 2024-06-17 DIAGNOSIS — E87.1 CHRONIC HYPONATREMIA: ICD-10-CM

## 2024-06-17 DIAGNOSIS — Z79.899 ENCOUNTER FOR LONG-TERM (CURRENT) USE OF OTHER MEDICATIONS: Primary | ICD-10-CM

## 2024-06-20 LAB
ALBUMIN SERPL-MCNC: 3.8 G/DL (ref 3.6–5.1)
ALBUMIN/CREAT UR: 76 MG/G CREAT
ALBUMIN/GLOB SERPL: 1.2 (CALC) (ref 1–2.5)
ALP SERPL-CCNC: 54 U/L (ref 35–144)
ALT SERPL-CCNC: 9 U/L (ref 9–46)
APPEARANCE UR: CLEAR
AST SERPL-CCNC: 18 U/L (ref 10–35)
BACTERIA #/AREA URNS HPF: ABNORMAL /HPF
BACTERIA UR CULT: ABNORMAL
BASOPHILS # BLD AUTO: 22 CELLS/UL (ref 0–200)
BASOPHILS NFR BLD AUTO: 0.4 %
BILIRUB SERPL-MCNC: 0.7 MG/DL (ref 0.2–1.2)
BILIRUB UR QL STRIP: NEGATIVE
BUN SERPL-MCNC: 18 MG/DL (ref 7–25)
BUN/CREAT SERPL: NORMAL (CALC) (ref 6–22)
CALCIUM SERPL-MCNC: 9.5 MG/DL (ref 8.6–10.3)
CHLORIDE SERPL-SCNC: 99 MMOL/L (ref 98–110)
CHOLEST SERPL-MCNC: 97 MG/DL
CHOLEST/HDLC SERPL: 3.6 (CALC)
CO2 SERPL-SCNC: 29 MMOL/L (ref 20–32)
COLOR UR: ABNORMAL
CREAT SERPL-MCNC: 0.84 MG/DL (ref 0.7–1.22)
CREAT UR-MCNC: 157 MG/DL (ref 20–320)
EGFR: 84 ML/MIN/1.73M2
EOSINOPHIL # BLD AUTO: 32 CELLS/UL (ref 15–500)
EOSINOPHIL NFR BLD AUTO: 0.6 %
ERYTHROCYTE [DISTWIDTH] IN BLOOD BY AUTOMATED COUNT: 11.3 % (ref 11–15)
GLOBULIN SER CALC-MCNC: 3.2 G/DL (CALC) (ref 1.9–3.7)
GLUCOSE SERPL-MCNC: 86 MG/DL (ref 65–99)
GLUCOSE UR QL STRIP: NEGATIVE
HCT VFR BLD AUTO: 35.2 % (ref 38.5–50)
HDLC SERPL-MCNC: 27 MG/DL
HGB BLD-MCNC: 12.2 G/DL (ref 13.2–17.1)
HGB UR QL STRIP: NEGATIVE
HYALINE CASTS #/AREA URNS LPF: ABNORMAL /LPF
KETONES UR QL STRIP: ABNORMAL
LDLC SERPL CALC-MCNC: 55 MG/DL (CALC)
LEUKOCYTE ESTERASE UR QL STRIP: NEGATIVE
LYMPHOCYTES # BLD AUTO: 2198 CELLS/UL (ref 850–3900)
LYMPHOCYTES NFR BLD AUTO: 40.7 %
MCH RBC QN AUTO: 33.2 PG (ref 27–33)
MCHC RBC AUTO-ENTMCNC: 34.7 G/DL (ref 32–36)
MCV RBC AUTO: 95.9 FL (ref 80–100)
MICROALBUMIN UR-MCNC: 11.9 MG/DL
MONOCYTES # BLD AUTO: 1042 CELLS/UL (ref 200–950)
MONOCYTES NFR BLD AUTO: 19.3 %
NEUTROPHILS # BLD AUTO: 2106 CELLS/UL (ref 1500–7800)
NEUTROPHILS NFR BLD AUTO: 39 %
NITRITE UR QL STRIP: NEGATIVE
NONHDLC SERPL-MCNC: 70 MG/DL (CALC)
PH UR STRIP: 6.5 [PH] (ref 5–8)
PLATELET # BLD AUTO: 95 THOUSAND/UL (ref 140–400)
PMV BLD REES-ECKER: 12.4 FL (ref 7.5–12.5)
POTASSIUM SERPL-SCNC: 4.3 MMOL/L (ref 3.5–5.3)
PROT SERPL-MCNC: 7 G/DL (ref 6.1–8.1)
PROT UR QL STRIP: ABNORMAL
RBC # BLD AUTO: 3.67 MILLION/UL (ref 4.2–5.8)
RBC #/AREA URNS HPF: ABNORMAL /HPF
SERVICE CMNT-IMP: ABNORMAL
SODIUM SERPL-SCNC: 136 MMOL/L (ref 135–146)
SP GR UR STRIP: 1.02 (ref 1–1.03)
SQUAMOUS #/AREA URNS HPF: ABNORMAL /HPF
TRIGL SERPL-MCNC: 69 MG/DL
TSH SERPL-ACNC: 2.09 MIU/L (ref 0.4–4.5)
WBC # BLD AUTO: 5.4 THOUSAND/UL (ref 3.8–10.8)
WBC #/AREA URNS HPF: ABNORMAL /HPF

## 2024-06-25 ENCOUNTER — HOSPITAL ENCOUNTER (OUTPATIENT)
Dept: RADIOLOGY | Facility: HOSPITAL | Age: 89
Discharge: HOME | End: 2024-06-25
Attending: PHYSICIAN ASSISTANT
Payer: MEDICARE

## 2024-06-25 ENCOUNTER — OFFICE VISIT (OUTPATIENT)
Dept: FAMILY MEDICINE | Facility: CLINIC | Age: 89
End: 2024-06-25
Payer: MEDICARE

## 2024-06-25 VITALS
HEIGHT: 67 IN | WEIGHT: 136 LBS | SYSTOLIC BLOOD PRESSURE: 120 MMHG | BODY MASS INDEX: 21.35 KG/M2 | OXYGEN SATURATION: 96 % | HEART RATE: 53 BPM | DIASTOLIC BLOOD PRESSURE: 86 MMHG

## 2024-06-25 DIAGNOSIS — J40 BRONCHITIS: ICD-10-CM

## 2024-06-25 DIAGNOSIS — J40 BRONCHITIS: Primary | ICD-10-CM

## 2024-06-25 DIAGNOSIS — N32.81 OAB (OVERACTIVE BLADDER): ICD-10-CM

## 2024-06-25 PROCEDURE — 1101F PT FALLS ASSESS-DOCD LE1/YR: CPT | Mod: CPTII,S$GLB,, | Performed by: PHYSICIAN ASSISTANT

## 2024-06-25 PROCEDURE — 3288F FALL RISK ASSESSMENT DOCD: CPT | Mod: CPTII,S$GLB,, | Performed by: PHYSICIAN ASSISTANT

## 2024-06-25 PROCEDURE — 1159F MED LIST DOCD IN RCRD: CPT | Mod: CPTII,S$GLB,, | Performed by: PHYSICIAN ASSISTANT

## 2024-06-25 PROCEDURE — 99214 OFFICE O/P EST MOD 30 MIN: CPT | Mod: S$GLB,,, | Performed by: PHYSICIAN ASSISTANT

## 2024-06-25 PROCEDURE — 1126F AMNT PAIN NOTED NONE PRSNT: CPT | Mod: CPTII,S$GLB,, | Performed by: PHYSICIAN ASSISTANT

## 2024-06-25 PROCEDURE — 71046 X-RAY EXAM CHEST 2 VIEWS: CPT | Mod: TC,PO

## 2024-06-25 PROCEDURE — 71046 X-RAY EXAM CHEST 2 VIEWS: CPT | Mod: 26,,, | Performed by: RADIOLOGY

## 2024-06-25 RX ORDER — CHLOPHEDIANOL HCL AND PYRILAMINE MALEATE 12.5; 12.5 MG/5ML; MG/5ML
SOLUTION ORAL
COMMUNITY
Start: 2024-06-21

## 2024-06-25 RX ORDER — AMOXICILLIN AND CLAVULANATE POTASSIUM 875; 125 MG/1; MG/1
1 TABLET, FILM COATED ORAL 2 TIMES DAILY
Qty: 20 TABLET | Refills: 0 | Status: SHIPPED | OUTPATIENT
Start: 2024-06-25 | End: 2024-07-05

## 2024-06-25 NOTE — PROGRESS NOTES
SUBJECTIVE:    Patient ID: Brit Jennings is a 88 y.o. male.    Chief Complaint: Follow-up (Bottle triston//pt here for 3 mo follow up//discuss runny nose and deep cough x 2-3 weeks. Denies fever or feeling ill, just has cough. Went to  and was rx'd Ninjacof, did help but he ran out//JL)    88 year old male presents for 3 month follow up. He has been doing pretty well. He is working twice a week at the [employer]. He has not had any falls recently. He reports a runny nose and clear productive cough for the past 2 weeks. He was seen at urgent care and tested negative for flu and COVID. He has been taking ninjacof. His wife was sick after traveling with the same thing which lasted for about two weeks.     Seeing Dr. Andrade for urology. He has noticed he is having more frequent urination. He was on Gemtesa, but did not think it helped more.    Follow-up  Associated symptoms include coughing. Pertinent negatives include no arthralgias, chest pain, headaches, joint swelling, neck pain, vomiting or weakness.       Patient Message on 06/17/2024   Component Date Value Ref Range Status    WBC 06/19/2024 5.4  3.8 - 10.8 Thousand/uL Final    RBC 06/19/2024 3.67 (L)  4.20 - 5.80 Million/uL Final    Hemoglobin 06/19/2024 12.2 (L)  13.2 - 17.1 g/dL Final    Hematocrit 06/19/2024 35.2 (L)  38.5 - 50.0 % Final    MCV 06/19/2024 95.9  80.0 - 100.0 fL Final    MCH 06/19/2024 33.2 (H)  27.0 - 33.0 pg Final    MCHC 06/19/2024 34.7  32.0 - 36.0 g/dL Final    RDW 06/19/2024 11.3  11.0 - 15.0 % Final    Platelets 06/19/2024 95 (L)  140 - 400 Thousand/uL Final    MPV 06/19/2024 12.4  7.5 - 12.5 fL Final    Neutrophils, Abs 06/19/2024 2,106  1,500 - 7,800 cells/uL Final    Lymph # 06/19/2024 2,198  850 - 3,900 cells/uL Final    Mono # 06/19/2024 1,042 (H)  200 - 950 cells/uL Final    Eos # 06/19/2024 32  15 - 500 cells/uL Final    Baso # 06/19/2024 22  0 - 200 cells/uL Final    Neutrophils Relative 06/19/2024 39  % Final     Lymph % 06/19/2024 40.7  % Final    Mono % 06/19/2024 19.3  % Final    Eosinophil % 06/19/2024 0.6  % Final    Basophil % 06/19/2024 0.4  % Final    Glucose 06/19/2024 86  65 - 99 mg/dL Final    BUN 06/19/2024 18  7 - 25 mg/dL Final    Creatinine 06/19/2024 0.84  0.70 - 1.22 mg/dL Final    eGFR 06/19/2024 84  > OR = 60 mL/min/1.73m2 Final    BUN/Creatinine Ratio 06/19/2024 SEE NOTE:  6 - 22 (calc) Final    Sodium 06/19/2024 136  135 - 146 mmol/L Final    Potassium 06/19/2024 4.3  3.5 - 5.3 mmol/L Final    Chloride 06/19/2024 99  98 - 110 mmol/L Final    CO2 06/19/2024 29  20 - 32 mmol/L Final    Calcium 06/19/2024 9.5  8.6 - 10.3 mg/dL Final    Total Protein 06/19/2024 7.0  6.1 - 8.1 g/dL Final    Albumin 06/19/2024 3.8  3.6 - 5.1 g/dL Final    Globulin, Total 06/19/2024 3.2  1.9 - 3.7 g/dL (calc) Final    Albumin/Globulin Ratio 06/19/2024 1.2  1.0 - 2.5 (calc) Final    Total Bilirubin 06/19/2024 0.7  0.2 - 1.2 mg/dL Final    Alkaline Phosphatase 06/19/2024 54  35 - 144 U/L Final    AST 06/19/2024 18  10 - 35 U/L Final    ALT 06/19/2024 9  9 - 46 U/L Final    Cholesterol 06/19/2024 97  <200 mg/dL Final    HDL 06/19/2024 27 (L)  > OR = 40 mg/dL Final    Triglycerides 06/19/2024 69  <150 mg/dL Final    LDL Cholesterol 06/19/2024 55  mg/dL (calc) Final    HDL/Cholesterol Ratio 06/19/2024 3.6  <5.0 (calc) Final    Non HDL Chol. (LDL+VLDL) 06/19/2024 70  <130 mg/dL (calc) Final    TSH w/reflex to FT4 06/19/2024 2.09  0.40 - 4.50 mIU/L Final    Color, UA 06/19/2024 DARK YELLOW  YELLOW Final    Appearance, UA 06/19/2024 CLEAR  CLEAR Final    Specific Gravity, UA 06/19/2024 1.022  1.001 - 1.035 Final    pH, UA 06/19/2024 6.5  5.0 - 8.0 Final    Glucose, UA 06/19/2024 NEGATIVE  NEGATIVE Final    Bilirubin, UA 06/19/2024 NEGATIVE  NEGATIVE Final    Ketones, UA 06/19/2024 TRACE (A)  NEGATIVE Final    Occult Blood UA 06/19/2024 NEGATIVE  NEGATIVE Final    Protein, UA 06/19/2024 1+ (A)  NEGATIVE Final    Nitrite, UA  06/19/2024 NEGATIVE  NEGATIVE Final    Leukocytes, UA 06/19/2024 NEGATIVE  NEGATIVE Final    WBC Casts, UA 06/19/2024 NONE SEEN  < OR = 5 /HPF Final    RBC Casts, UA 06/19/2024 0-2  < OR = 2 /HPF Final    Squam Epithel, UA 06/19/2024 NONE SEEN  < OR = 5 /HPF Final    Bacteria, UA 06/19/2024 NONE SEEN  NONE SEEN /HPF Final    Hyaline Casts, UA 06/19/2024 NONE SEEN  NONE SEEN /LPF Final    Service Cmt: 06/19/2024 SEE COMMENT   Final    Reflexive Urine Culture 06/19/2024 SEE COMMENT   Final    Creatinine, Urine 06/19/2024 157  20 - 320 mg/dL Final    Microalb, Ur 06/19/2024 11.9  See Note: mg/dL Final    Microalb/Creat Ratio 06/19/2024 76 (H)  <30 mg/g creat Final   Admission on 01/20/2024, Discharged on 01/21/2024   Component Date Value Ref Range Status    WBC 01/20/2024 10.03  3.90 - 12.70 K/uL Final    RBC 01/20/2024 3.65 (L)  4.60 - 6.20 M/uL Final    Hemoglobin 01/20/2024 12.2 (L)  14.0 - 18.0 g/dL Final    Hematocrit 01/20/2024 36.2 (L)  40.0 - 54.0 % Final    MCV 01/20/2024 99 (H)  82 - 98 fL Final    MCH 01/20/2024 33.4 (H)  27.0 - 31.0 pg Final    MCHC 01/20/2024 33.7  32.0 - 36.0 g/dL Final    RDW 01/20/2024 12.7  11.5 - 14.5 % Final    Platelets 01/20/2024 159  150 - 450 K/uL Final    MPV 01/20/2024 12.6  9.2 - 12.9 fL Final    Immature Granulocytes 01/20/2024 0.7 (H)  0.0 - 0.5 % Final    Gran # (ANC) 01/20/2024 7.3  1.8 - 7.7 K/uL Final    Immature Grans (Abs) 01/20/2024 0.07 (H)  0.00 - 0.04 K/uL Final    Lymph # 01/20/2024 0.7 (L)  1.0 - 4.8 K/uL Final    Mono # 01/20/2024 1.9 (H)  0.3 - 1.0 K/uL Final    Eos # 01/20/2024 0.0  0.0 - 0.5 K/uL Final    Baso # 01/20/2024 0.03  0.00 - 0.20 K/uL Final    nRBC 01/20/2024 0  0 /100 WBC Final    Gran % 01/20/2024 73.0  38.0 - 73.0 % Final    Lymph % 01/20/2024 6.5 (L)  18.0 - 48.0 % Final    Mono % 01/20/2024 19.1 (H)  4.0 - 15.0 % Final    Eosinophil % 01/20/2024 0.4  0.0 - 8.0 % Final    Basophil % 01/20/2024 0.3  0.0 - 1.9 % Final    Differential Method  01/20/2024 Automated   Final    Sodium 01/20/2024 132 (L)  136 - 145 mmol/L Final    Potassium 01/20/2024 3.9  3.5 - 5.1 mmol/L Final    Chloride 01/20/2024 96  95 - 110 mmol/L Final    CO2 01/20/2024 29  23 - 29 mmol/L Final    Glucose 01/20/2024 114 (H)  70 - 110 mg/dL Final    BUN 01/20/2024 14  8 - 23 mg/dL Final    Creatinine 01/20/2024 0.9  0.5 - 1.4 mg/dL Final    Calcium 01/20/2024 9.5  8.7 - 10.5 mg/dL Final    Total Protein 01/20/2024 7.0  6.0 - 8.4 g/dL Final    Albumin 01/20/2024 3.7  3.5 - 5.2 g/dL Final    Total Bilirubin 01/20/2024 0.6  0.1 - 1.0 mg/dL Final    Alkaline Phosphatase 01/20/2024 86  55 - 135 U/L Final    AST 01/20/2024 41 (H)  10 - 40 U/L Final    ALT 01/20/2024 33  10 - 44 U/L Final    eGFR 01/20/2024 >60.0  >60 mL/min/1.73 m^2 Final    Anion Gap 01/20/2024 7 (L)  8 - 16 mmol/L Final    Troponin I High Sensitivity 01/20/2024 40.1 (H)  0.0 - 14.9 pg/mL Final    BNP 01/20/2024 554 (H)  0 - 99 pg/mL Final    Prothrombin Time 01/20/2024 12.0  9.0 - 12.5 sec Final    INR 01/20/2024 1.1  0.8 - 1.2 Final    Specimen UA 01/20/2024 Urine, Clean Catch   Final    Color, UA 01/20/2024 Yellow  Yellow, Straw, [place] Final    Appearance, UA 01/20/2024 Hazy (A)  Clear Final    pH, UA 01/20/2024 8.0  5.0 - 8.0 Final    Specific Gravity, UA 01/20/2024 1.010  1.005 - 1.030 Final    Protein, UA 01/20/2024 Negative  Negative Final    Glucose, UA 01/20/2024 Negative  Negative Final    Ketones, UA 01/20/2024 Negative  Negative Final    Bilirubin (UA) 01/20/2024 Negative  Negative Final    Occult Blood UA 01/20/2024 Negative  Negative Final    Nitrite, UA 01/20/2024 Negative  Negative Final    Urobilinogen, UA 01/20/2024 Negative  Negative EU/dL Final    Leukocytes, UA 01/20/2024 Negative  Negative Final    E/A ratio 01/20/2024 0.53   Final    IVC diameter 01/20/2024 1.35  cm Final    TAPSE 01/20/2024 2.36  cm Final    MV VTI 01/20/2024 29.9  cm Final    Ao root annulus 01/20/2024 4.30  cm Final    Pulmonary  Valve Mean Velocity 01/20/2024 0.69  m/s Final    PV peak gradient 01/20/2024 5  mmHg Final    PV PEAK VELOCITY 01/20/2024 1.12  m/s Final    Triscuspid Valve Regurgitation Pea* 01/20/2024 27  mmHg Final    Left Ventricular Outflow Tract Jasmin* 01/20/2024 3.00  mmHg Final    Left Ventricular Outflow Tract Jasmin* 01/20/2024 0.81  cm/s Final    RVDD 01/20/2024 2.89  cm Final    MV valve area by continuity eq 01/20/2024 3.28  cm2 Final    MV valve area p 1/2 method 01/20/2024 2.47  cm2 Final    MV stenosis pressure 1/2 time 01/20/2024 89.00  ms Final    MV peak gradient 01/20/2024 6  mmHg Final    MV mean gradient 01/20/2024 2  mmHg Final    JL by Velocity Ratio 01/20/2024 3.78  cm² Final    AV index (prosthetic) 01/20/2024 0.89   Final    AV Velocity Ratio 01/20/2024 0.91   Final    AV valve area 01/20/2024 3.71  cm² Final    LVOT peak VTI 01/20/2024 23.60  cm Final    Ao VTI 01/20/2024 26.40  cm Final    Ao peak kiersten 01/20/2024 1.35  m/s Final    AV peak gradient 01/20/2024 7  mmHg Final    AV mean gradient 01/20/2024 4  mmHg Final    AR Max Kiersten 01/20/2024 4.26  m/s Final    AV regurgitation pressure 1/2 time 01/20/2024 491  ms Final    LA size 01/20/2024 3.50  cm Final    TR Max Kiersten 01/20/2024 2.62  m/s Final    RV S' 01/20/2024 18.40  cm/s Final    MV Peak A Kiersten 01/20/2024 1.21  m/s Final    LV Mass Index 01/20/2024 97  g/m2 Final    LV mass 01/20/2024 172.69  g Final    LVOT peak kiersten 01/20/2024 1.23  m/s Final    LV LATERAL E/E' RATIO 01/20/2024 9.14  m/s Final    E wave deceleration time 01/20/2024 254.00  msec Final    TDI LATERAL 01/20/2024 0.07  m/s Final    MV Peak E Kiersten 01/20/2024 0.64  m/s Final    Posterior Wall 01/20/2024 1.46 (A)  0.6 - 1.1 cm Final    Left Ventricle Relative Wall Thick* 01/20/2024 0.77  cm Final    FS 01/20/2024 28  28 - 44 % Final    LVOT area 01/20/2024 4.2  cm2 Final    LVOT diameter 01/20/2024 2.30  cm Final    IVS 01/20/2024 1.13 (A)  0.6 - 1.1 cm Final    LV Diastolic Volume Index  01/20/2024 35.00  mL/m2 Final    LV Diastolic Volume 01/20/2024 62.30  mL Final    LVIDs 01/20/2024 2.75  2.1 - 4.0 cm Final    LV Systolic Volume Index 01/20/2024 15.9  mL/m2 Final    LV Systolic Volume 01/20/2024 28.30  mL Final    LVIDd 01/20/2024 3.81  3.5 - 6.0 cm Final    LVOT stroke volume 01/20/2024 98.00  cm3 Final    Izaguirre's Biplane MOD Ejection Fra* 01/20/2024 58  % Final    ZLVIDS 01/20/2024 -0.80   Final    ZLVIDD 01/20/2024 -2.56   Final    AORTIC VALVE CUSP SEPERATION 01/20/2024 2.10  cm Final    BSA 01/20/2024 1.77  m2 Final    TV resting pulmonary artery pressu* 01/20/2024 30  mmHg Final    RV TB RVSP 01/20/2024 6  mmHg Final    Est. RA pres 01/20/2024 3  mmHg Final    WBC 01/21/2024 9.11  3.90 - 12.70 K/uL Final    RBC 01/21/2024 2.99 (L)  4.60 - 6.20 M/uL Final    Hemoglobin 01/21/2024 10.0 (L)  14.0 - 18.0 g/dL Final    Hematocrit 01/21/2024 29.6 (L)  40.0 - 54.0 % Final    MCV 01/21/2024 99 (H)  82 - 98 fL Final    MCH 01/21/2024 33.4 (H)  27.0 - 31.0 pg Final    MCHC 01/21/2024 33.8  32.0 - 36.0 g/dL Final    RDW 01/21/2024 12.9  11.5 - 14.5 % Final    Platelets 01/21/2024 167  150 - 450 K/uL Final    MPV 01/21/2024 12.3  9.2 - 12.9 fL Final    Immature Granulocytes 01/21/2024 0.7 (H)  0.0 - 0.5 % Final    Gran # (ANC) 01/21/2024 6.3  1.8 - 7.7 K/uL Final    Immature Grans (Abs) 01/21/2024 0.06 (H)  0.00 - 0.04 K/uL Final    Lymph # 01/21/2024 1.0  1.0 - 4.8 K/uL Final    Mono # 01/21/2024 1.7 (H)  0.3 - 1.0 K/uL Final    Eos # 01/21/2024 0.1  0.0 - 0.5 K/uL Final    Baso # 01/21/2024 0.03  0.00 - 0.20 K/uL Final    nRBC 01/21/2024 0  0 /100 WBC Final    Gran % 01/21/2024 68.6  38.0 - 73.0 % Final    Lymph % 01/21/2024 10.6 (L)  18.0 - 48.0 % Final    Mono % 01/21/2024 19.1 (H)  4.0 - 15.0 % Final    Eosinophil % 01/21/2024 0.7  0.0 - 8.0 % Final    Basophil % 01/21/2024 0.3  0.0 - 1.9 % Final    Differential Method 01/21/2024 Automated   Final    Sodium 01/21/2024 130 (L)  136 - 145 mmol/L  Final    Potassium 01/21/2024 3.8  3.5 - 5.1 mmol/L Final    Chloride 01/21/2024 98  95 - 110 mmol/L Final    CO2 01/21/2024 27  23 - 29 mmol/L Final    Glucose 01/21/2024 100  70 - 110 mg/dL Final    BUN 01/21/2024 9  8 - 23 mg/dL Final    Creatinine 01/21/2024 0.7  0.5 - 1.4 mg/dL Final    Calcium 01/21/2024 8.3 (L)  8.7 - 10.5 mg/dL Final    Anion Gap 01/21/2024 5 (L)  8 - 16 mmol/L Final    eGFR 01/21/2024 >60.0  >60 mL/min/1.73 m^2 Final       Past Medical History:   Diagnosis Date    Basal cell carcinoma 2020    L temple / radiation therapy    Cataract     Hypertension     Squamous cell carcinoma of skin      Past Surgical History:   Procedure Laterality Date    APPENDECTOMY      EYE SURGERY      Moh's surgery on right temple area Right 2015    Cancerous spot    PROSTATE BIOPSY      TONSILLECTOMY       Family History   Problem Relation Name Age of Onset    Melanoma Neg Hx      Psoriasis Neg Hx      Lupus Neg Hx      Eczema Neg Hx         Marital Status:   Alcohol History:  reports that he does not currently use alcohol.  Tobacco History:  reports that he has never smoked. He has never used smokeless tobacco.  Drug History:  has no history on file for drug use.    Review of patient's allergies indicates:   Allergen Reactions    Pollen extracts Other (See Comments)       Current Outpatient Medications:     acetaminophen (TYLENOL) 500 MG tablet, Take 2 tablets (1,000 mg total) by mouth every 6 (six) hours as needed for Pain., Disp: , Rfl: 0    albuterol (PROVENTIL/VENTOLIN HFA) 90 mcg/actuation inhaler, Inhale 1-2 puffs into the lungs daily as needed for Shortness of Breath. Rescue, Disp: , Rfl:     NINJACOF 12.5-12.5 mg/5 mL Liqd, SMARTSIG:10 Milliliter(s) By Mouth Every 6-8 Hours PRN, Disp: , Rfl:     SYMBICORT 160-4.5 mcg/actuation HFAA, Inhale 2 puffs into the lungs every 12 (twelve) hours., Disp: 30 g, Rfl: 4    traZODone (DESYREL) 100 MG tablet, Take 1 tablet (100 mg total) by mouth every evening.,  Disp: 90 tablet, Rfl: 1    UNABLE TO FIND, Place 5 mLs under the tongue once daily. medication name: EDTA, Disp: , Rfl:     UNABLE TO FIND, Take 4 tablets by mouth every evening. medication name: Multi Mineral, Disp: , Rfl:     amoxicillin-clavulanate 875-125mg (AUGMENTIN) 875-125 mg per tablet, Take 1 tablet by mouth 2 (two) times daily. for 10 days, Disp: 20 tablet, Rfl: 0    ascorbic acid (VITAMIN C ORAL), Take 1 tablet by mouth 2 (two) times a day. (Patient not taking: Reported on 6/25/2024), Disp: , Rfl:     atorvastatin (LIPITOR) 80 MG tablet, Take 80 mg by mouth once daily. (Patient not taking: Reported on 6/25/2024), Disp: , Rfl:     cholecalciferol, vitamin D3, 125 mcg (5,000 unit) capsule, Take 5,000 Units by mouth once daily. (Patient not taking: Reported on 6/25/2024), Disp: , Rfl:     cyanocobalamin, vitamin B-12, 2,500 mcg Tab, Take 2,500 mcg by mouth once daily. (Patient not taking: Reported on 6/25/2024), Disp: , Rfl:     GEMTESA 75 mg Tab, Take 1 tablet by mouth once daily., Disp: , Rfl:     triamcinolone acetonide 0.1% (KENALOG) 0.1 % cream, Apply 1 g topically daily as needed (rash). (Patient not taking: Reported on 6/25/2024), Disp: , Rfl:     walker (ULTRA-LIGHT ROLLATOR) Misc, 1 each by Misc.(Non-Drug; Combo Route) route once daily. (Patient not taking: Reported on 6/25/2024), Disp: 1 each, Rfl: 0    Current Facility-Administered Medications:     albuterol inhaler 2 puff, 2 puff, Inhalation, Q20 Min PRN, Farhaan Riggins, NP    diphenhydrAMINE injection 25 mg, 25 mg, Intravenous, Once PRN, Farhana Riggins, NP    EPINEPHrine (EPIPEN) 0.3 mg/0.3 mL pen injection 0.3 mg, 0.3 mg, Intramuscular, PRN, Farhana Riggins, NP    methylPREDNISolone sodium succinate injection 40 mg, 40 mg, Intravenous, Once PRN, Farhana Riggins, NP    ondansetron disintegrating tablet 4 mg, 4 mg, Oral, Once PRN, Farhana Riggins, NP    sodium chloride 0.9% 500 mL flush bag, , Intravenous, PRN, Farhana Riggins  "BARI VENTURA, Stopped at 12/15/21 1455    sodium chloride 0.9% flush 10 mL, 10 mL, Intravenous, PRN, Farhana Riggins NP    Review of Systems   Constitutional:  Positive for unexpected weight change. Negative for activity change.   HENT:  Positive for hearing loss and rhinorrhea. Negative for trouble swallowing.    Eyes:  Negative for discharge and visual disturbance.   Respiratory:  Positive for cough and wheezing. Negative for chest tightness.    Cardiovascular:  Negative for chest pain and palpitations.   Gastrointestinal:  Negative for blood in stool, constipation, diarrhea and vomiting.   Endocrine: Negative for polydipsia and polyuria.   Genitourinary:  Positive for urgency. Negative for difficulty urinating and hematuria.   Musculoskeletal:  Negative for arthralgias, joint swelling and neck pain.   Neurological:  Negative for weakness and headaches.   Psychiatric/Behavioral:  Negative for confusion and dysphoric mood.           Objective:      Vitals:    06/25/24 0904 06/25/24 0909   BP: (!) 158/68 120/86   Pulse: (!) 53    SpO2: 96%    Weight: 61.7 kg (136 lb)    Height: 5' 7" (1.702 m)      Physical Exam  Constitutional:       General: He is not in acute distress.     Appearance: Normal appearance.   HENT:      Head: Normocephalic and atraumatic.   Eyes:      Extraocular Movements: Extraocular movements intact.   Cardiovascular:      Rate and Rhythm: Normal rate and regular rhythm.      Heart sounds: Normal heart sounds.   Pulmonary:      Effort: Pulmonary effort is normal. No respiratory distress.      Breath sounds: Wheezing present.   Abdominal:      General: Abdomen is flat. There is no distension.   Musculoskeletal:      Cervical back: Normal range of motion.      Right lower leg: No edema.      Left lower leg: No edema.   Skin:     General: Skin is warm and dry.   Neurological:      Mental Status: He is alert and oriented to person, place, and time.           Assessment:       1. Bronchitis    2. OAB " (overactive bladder)           Plan:       Bronchitis  Comments:  concerning due to age. check CXR now. go ahead and add augmentin and will push fluids and rest. If sxs worsen or persist will call. update in a few days  Orders:  -     X-Ray Chest PA And Lateral; Future; Expected date: 06/25/2024  -     amoxicillin-clavulanate 875-125mg (AUGMENTIN) 875-125 mg per tablet; Take 1 tablet by mouth 2 (two) times daily. for 10 days  Dispense: 20 tablet; Refill: 0    OAB (overactive bladder)  Comments:  has a new rx from DR. Christiansen to get filled.      Problem List Items Addressed This Visit    None  Visit Diagnoses       Bronchitis    -  Primary    concerning due to age. check CXR now. go ahead and add augmentin and will push fluids and rest. If sxs worsen or persist will call. update in a few days    Relevant Medications    amoxicillin-clavulanate 875-125mg (AUGMENTIN) 875-125 mg per tablet    Other Relevant Orders    X-Ray Chest PA And Lateral    OAB (overactive bladder)        has a new rx from DR. Christiansen to get filled.          Follow up if symptoms worsen or fail to improve.        6/25/2024 ALLEY FosterC

## 2024-08-12 ENCOUNTER — PATIENT MESSAGE (OUTPATIENT)
Dept: FAMILY MEDICINE | Facility: CLINIC | Age: 89
End: 2024-08-12
Payer: MEDICARE

## 2024-08-22 DIAGNOSIS — F51.01 PRIMARY INSOMNIA: ICD-10-CM

## 2024-08-23 RX ORDER — TRAZODONE HYDROCHLORIDE 100 MG/1
100 TABLET ORAL NIGHTLY
Qty: 90 TABLET | Refills: 1 | Status: SHIPPED | OUTPATIENT
Start: 2024-08-23 | End: 2025-02-19

## 2024-12-02 DIAGNOSIS — F51.01 PRIMARY INSOMNIA: ICD-10-CM

## 2024-12-02 RX ORDER — TRAZODONE HYDROCHLORIDE 100 MG/1
100 TABLET ORAL NIGHTLY
Qty: 90 TABLET | Refills: 1 | Status: SHIPPED | OUTPATIENT
Start: 2024-12-02 | End: 2025-05-31

## 2024-12-19 ENCOUNTER — TELEPHONE (OUTPATIENT)
Dept: DERMATOLOGY | Facility: CLINIC | Age: 89
End: 2024-12-19
Payer: MEDICARE

## 2024-12-19 NOTE — TELEPHONE ENCOUNTER
----- Message from [employer] sent at 12/19/2024  2:58 PM CST -----  Type:  Sooner Appointment Request    Caller is requesting a sooner appointment.  Caller declined first available appointment listed below.  Caller will not accept being placed on the waitlist and is requesting a message be sent to doctor.    Name of Caller:  pt  When is the first available appointment?  07/03  Symptoms:  skin check  Would the patient rather a call back or a response via [facility]? Call back  Best Call Back Number:  [phone]  Additional Information: Pt states that the first available appt does not work for him. Pt states that he needs to be seen for a skin check. Pt states that he is having a hard time brushing his hair due to painful, raised bumps on his scalp. Pt states that the bumps are bleeding now.

## 2025-02-14 ENCOUNTER — PATIENT MESSAGE (OUTPATIENT)
Dept: FAMILY MEDICINE | Facility: CLINIC | Age: OVER 89
End: 2025-02-14
Payer: MEDICARE

## 2025-02-14 DIAGNOSIS — J43.1 PANLOBULAR EMPHYSEMA: Primary | ICD-10-CM

## 2025-02-14 RX ORDER — ALBUTEROL SULFATE 90 UG/1
1-2 INHALANT RESPIRATORY (INHALATION) DAILY PRN
Qty: 54 G | Refills: 1 | Status: CANCELLED | OUTPATIENT
Start: 2025-02-14

## 2025-02-14 RX ORDER — ALBUTEROL SULFATE 90 UG/1
1-2 INHALANT RESPIRATORY (INHALATION) DAILY PRN
Qty: 18 G | Refills: 0 | Status: SHIPPED | OUTPATIENT
Start: 2025-02-14

## 2025-03-14 ENCOUNTER — OFFICE VISIT (OUTPATIENT)
Dept: FAMILY MEDICINE | Facility: CLINIC | Age: OVER 89
End: 2025-03-14
Payer: MEDICARE

## 2025-03-14 VITALS
RESPIRATION RATE: 18 BRPM | WEIGHT: 145 LBS | BODY MASS INDEX: 22.76 KG/M2 | SYSTOLIC BLOOD PRESSURE: 132 MMHG | DIASTOLIC BLOOD PRESSURE: 100 MMHG | HEART RATE: 71 BPM | OXYGEN SATURATION: 100 % | HEIGHT: 67 IN

## 2025-03-14 DIAGNOSIS — I10 ESSENTIAL HYPERTENSION: ICD-10-CM

## 2025-03-14 DIAGNOSIS — J43.1 PANLOBULAR EMPHYSEMA: Primary | ICD-10-CM

## 2025-03-14 RX ORDER — LISINOPRIL 20 MG/1
20 TABLET ORAL DAILY
Qty: 90 TABLET | Refills: 3 | Status: SHIPPED | OUTPATIENT
Start: 2025-03-14 | End: 2026-03-14

## 2025-03-14 NOTE — PROGRESS NOTES
SUBJECTIVE:    Patient ID: Brit Jennings is a 89 y.o. male.    Chief Complaint: Follow-up (No bottles//no refills needed// pt states he's been having difficulty breathing due to asthma doing activities like walking and putting on clothes says he's been using albuterol inhaler going on for a very long time//pt refused flu vaccine )    History of Present Illness    CHIEF COMPLAINT:  Brit presents today for breathing difficulties.    RESPIRATORY:  He has a history of asthma since early 40s that had resolved but has now returned. He reports difficulty breathing when changing positions in bed, requiring time to catch breath before returning to sleep. He uses albuterol inhaler as needed, including prior to entering the clinic building. Currently prescribed Symbicort, which he takes before sleep but rarely uses in the morning as prescribed.    DERMATOLOGIC:  He has a history of squamous cell carcinoma previously treated with Mohs surgery. He expresses concern about potential recurrence due to the extensive nature of his previous procedure.    OCULAR:  He reports progressive drooping of eyelids affecting vision, particularly while driving, requiring manual lifting of eyelids to maintain visibility.    MEDICATIONS:  He is not currently taking prescribed Lisinopril.    SOCIAL HISTORY:  He is 89 years old, turning 89 in 3 months. He is a former smoker who quit at age 28 with brief exposure during young adulthood.      ROS:  General: -fever, -chills, -fatigue, -weight gain, -weight loss  Eyes: -vision changes, -redness, -discharge  ENT: -ear pain, -nasal congestion, -sore throat  Cardiovascular: -chest pain, -palpitations, -lower extremity edema  Respiratory: -cough, -shortness of breath, +shortness of breath lying down, +difficulty breathing, +wheezing, +chest tightness  Gastrointestinal: -abdominal pain, -nausea, -vomiting, -diarrhea, -constipation, -blood in stool  Genitourinary: -dysuria, -hematuria,  -frequency  Musculoskeletal: -joint pain, -muscle pain  Skin: -rash, +lesion  Neurological: -headache, -dizziness, -numbness, -tingling, +speech difficulty  Psychiatric: -anxiety, -depression, -sleep difficulty         No visits with results within 6 Month(s) from this visit.   Latest known visit with results is:   Patient Message on 06/17/2024   Component Date Value Ref Range Status    WBC 06/19/2024 5.4  3.8 - 10.8 Thousand/uL Final    RBC 06/19/2024 3.67 (L)  4.20 - 5.80 Million/uL Final    Hemoglobin 06/19/2024 12.2 (L)  13.2 - 17.1 g/dL Final    Hematocrit 06/19/2024 35.2 (L)  38.5 - 50.0 % Final    MCV 06/19/2024 95.9  80.0 - 100.0 fL Final    MCH 06/19/2024 33.2 (H)  27.0 - 33.0 pg Final    MCHC 06/19/2024 34.7  32.0 - 36.0 g/dL Final    RDW 06/19/2024 11.3  11.0 - 15.0 % Final    Platelets 06/19/2024 95 (L)  140 - 400 Thousand/uL Final    MPV 06/19/2024 12.4  7.5 - 12.5 fL Final    Neutrophils, Abs 06/19/2024 2,106  1,500 - 7,800 cells/uL Final    Lymph # 06/19/2024 2,198  850 - 3,900 cells/uL Final    Mono # 06/19/2024 1,042 (H)  200 - 950 cells/uL Final    Eos # 06/19/2024 32  15 - 500 cells/uL Final    Baso # 06/19/2024 22  0 - 200 cells/uL Final    Neutrophils Relative 06/19/2024 39  % Final    Lymph % 06/19/2024 40.7  % Final    Mono % 06/19/2024 19.3  % Final    Eosinophil % 06/19/2024 0.6  % Final    Basophil % 06/19/2024 0.4  % Final    Glucose 06/19/2024 86  65 - 99 mg/dL Final    BUN 06/19/2024 18  7 - 25 mg/dL Final    Creatinine 06/19/2024 0.84  0.70 - 1.22 mg/dL Final    eGFR 06/19/2024 84  > OR = 60 mL/min/1.73m2 Final    BUN/Creatinine Ratio 06/19/2024 SEE NOTE:  6 - 22 (calc) Final    Sodium 06/19/2024 136  135 - 146 mmol/L Final    Potassium 06/19/2024 4.3  3.5 - 5.3 mmol/L Final    Chloride 06/19/2024 99  98 - 110 mmol/L Final    CO2 06/19/2024 29  20 - 32 mmol/L Final    Calcium 06/19/2024 9.5  8.6 - 10.3 mg/dL Final    Total Protein 06/19/2024 7.0  6.1 - 8.1 g/dL Final    Albumin  06/19/2024 3.8  3.6 - 5.1 g/dL Final    Globulin, Total 06/19/2024 3.2  1.9 - 3.7 g/dL (calc) Final    Albumin/Globulin Ratio 06/19/2024 1.2  1.0 - 2.5 (calc) Final    Total Bilirubin 06/19/2024 0.7  0.2 - 1.2 mg/dL Final    Alkaline Phosphatase 06/19/2024 54  35 - 144 U/L Final    AST 06/19/2024 18  10 - 35 U/L Final    ALT 06/19/2024 9  9 - 46 U/L Final    Cholesterol 06/19/2024 97  <200 mg/dL Final    HDL 06/19/2024 27 (L)  > OR = 40 mg/dL Final    Triglycerides 06/19/2024 69  <150 mg/dL Final    LDL Cholesterol 06/19/2024 55  mg/dL (calc) Final    HDL/Cholesterol Ratio 06/19/2024 3.6  <5.0 (calc) Final    Non HDL Chol. (LDL+VLDL) 06/19/2024 70  <130 mg/dL (calc) Final    TSH w/reflex to FT4 06/19/2024 2.09  0.40 - 4.50 mIU/L Final    Color, UA 06/19/2024 DARK YELLOW  YELLOW Final    Appearance, UA 06/19/2024 CLEAR  CLEAR Final    Specific Gravity, UA 06/19/2024 1.022  1.001 - 1.035 Final    pH, UA 06/19/2024 6.5  5.0 - 8.0 Final    Glucose, UA 06/19/2024 NEGATIVE  NEGATIVE Final    Bilirubin, UA 06/19/2024 NEGATIVE  NEGATIVE Final    Ketones, UA 06/19/2024 TRACE (A)  NEGATIVE Final    Occult Blood UA 06/19/2024 NEGATIVE  NEGATIVE Final    Protein, UA 06/19/2024 1+ (A)  NEGATIVE Final    Nitrite, UA 06/19/2024 NEGATIVE  NEGATIVE Final    Leukocytes, UA 06/19/2024 NEGATIVE  NEGATIVE Final    WBC Casts, UA 06/19/2024 NONE SEEN  < OR = 5 /HPF Final    RBC Casts, UA 06/19/2024 0-2  < OR = 2 /HPF Final    Squam Epithel, UA 06/19/2024 NONE SEEN  < OR = 5 /HPF Final    Bacteria, UA 06/19/2024 NONE SEEN  NONE SEEN /HPF Final    Hyaline Casts, UA 06/19/2024 NONE SEEN  NONE SEEN /LPF Final    Service Cmt: 06/19/2024 SEE COMMENT   Final    Reflexive Urine Culture 06/19/2024 SEE COMMENT   Final    Creatinine, Urine 06/19/2024 157  20 - 320 mg/dL Final    Microalb, Ur 06/19/2024 11.9  See Note: mg/dL Final    Microalb/Creat Ratio 06/19/2024 76 (H)  <30 mg/g creat Final       Past Medical History:   Diagnosis Date    Basal  "cell carcinoma 2020    L temple / radiation therapy    Cataract     Hypertension     Squamous cell carcinoma of skin      Past Surgical History:   Procedure Laterality Date    APPENDECTOMY      EYE SURGERY      Moh's surgery on right temple area Right 2015    Cancerous spot    PROSTATE BIOPSY      TONSILLECTOMY       Family History   Problem Relation Name Age of Onset    Melanoma Neg Hx      Psoriasis Neg Hx      Lupus Neg Hx      Eczema Neg Hx         Marital Status:   Alcohol History:  reports that he does not currently use alcohol.  Tobacco History:  reports that he has never smoked. He has never used smokeless tobacco.  Drug History:  has no history on file for drug use.    Review of patient's allergies indicates:   Allergen Reactions    Pollen extracts Other (See Comments)     Current Medications[1]    Objective:      Vitals:    03/14/25 0729 03/14/25 0733   BP: (!) 122/94 (!) 132/100   Pulse: 71    Resp: 18    SpO2: 100%    Weight: 65.8 kg (145 lb)    Height: 5' 7" (1.702 m)      Physical Exam    General: No acute distress. Well-developed. Well-nourished.  Eyes: EOMI. Sclerae anicteric.  HENT: Normocephalic. Atraumatic. Nares patent. Moist oral mucosa.  Ears: Bilateral TMs clear. Bilateral EACs clear.  Cardiovascular: Regular rate. Regular rhythm. No murmurs. No rubs. No gallops. Normal S1, S2.  Respiratory: Normal respiratory effort. Clear to auscultation bilaterally. No rales. No rhonchi. No wheezing.  Abdomen: Soft. Non-tender. Non-distended. Normoactive bowel sounds.  Musculoskeletal: No  obvious deformity.  Extremities: No lower extremity edema.  Neurological: Alert & oriented x3. No slurred speech. Normal gait.  Psychiatric: Normal mood. Normal affect. Good insight. Good judgment.  Skin: Warm. Dry. No rash.         Assessment:       Assessment & Plan    - Assessed asthma symptoms and current medication regimen.  - Determined Symbicort usage was suboptimal, particularly lacking morning dose.  - " Considered potential upgrade to Breztri (triple therapy inhaler) if Symbicort proves insufficient after 4-6 weeks of proper usage.  - Evaluated blood pressure, noting it was elevated and patient had discontinued Lisinopril.  - Reviewed skin lesions, identifying a likely benign seborrheic keratosis.  - Discussed ptosis affecting vision, acknowledging potential need for surgical intervention.    CHF (CONGESTIVE HEART FAILURE):  - Brit reported difficulty breathing, especially when lying down and changing positions in bed.  - Slightly elevated blood pressure observed during exam.  - Prescribed Lisinopril for blood pressure management.  - Scheduled a follow up in 4 weeks to reassess the patient's condition.    PANLOBULAR EMPHYSEMA:  - Brit reported difficulty breathing, particularly during physical activity and when talking.  - Physical exam performed, including auscultation.  - Confirmed current use of Symbicort as a maintenance inhaler, prescribed to take twice daily (before sleep and upon waking).  - Recommend consistent use of Symbicort, with albuterol as needed in between doses, especially before physical activity.  - Acknowledged the need for consistent use of maintenance inhaler and considered upgrading to a more potent inhaler if necessary.  - Scheduled a follow up in 4 weeks to assess treatment effectiveness and consider potential upgrades.    ASTHMA EXACERBATION:  - Brit reports difficulty breathing affecting speech and sleep, with frequent albuterol use.  - Instructed to use Symbicort twice daily (2 puffs morning and evening) and albuterol as needed for breakthrough symptoms.  - Will consider upgrading to a different inhaler if Symbicort does not provide sufficient relief.    BILATERAL EYELID PTOSIS:  - Brit reports droopy eyelids affecting vision, especially while driving.  - Acknowledged as a medical problem affecting quality of life, not just cosmetic.  - Referred to Dr. Kenneth Aragon, who visits  [facility] once a week, with an appointment scheduled for next Thursday.  - Approved potential surgery if recommended by the specialist.    ESSENTIAL HYPERTENSION:  - Restarted Lisinopril for management.  - Prescribed a 90-day supply to be sent to [facility].    HISTORY OF SKIN CANCER:  - Noted history of squamous cell carcinoma and previous Mohs surgery.  - Observed a new skin lesion, which appears to be a benign seborrheic keratosis.  - Recommend patient get on the cancellation list for a dermatology appointment with Dr. Miles for skin cancer follow-up.  - Noted patient's wife is applying a natural remedy (frankincense) to the skin.    HISTORY OF NICOTINE DEPENDENCE:  - Brit reports smoking cessation at age 28.    MEDICATION UNDERDOSING:  - Brit admitted to not taking Lisinopril as prescribed and using less than the recommended dose of Symbicort.  - Emphasized the importance of taking medications as prescribed, especially the morning dose of Symbicort and resuming Lisinopril.    FOLLOW-UP:  - Follow-up scheduled in 4 weeks (mid-April) for asthma and blood pressure reassessment.  - Brit instructed to contact the office if any problems arise before the scheduled follow-up.       Plan:       Panlobular emphysema    Essential hypertension  -     lisinopriL (PRINIVIL,ZESTRIL) 20 MG tablet; Take 1 tablet (20 mg total) by mouth once daily.  Dispense: 90 tablet; Refill: 3      Follow up in about 4 weeks (around 4/11/2025) for BP Check-Up.    This note was generated with the assistance of ambient listening technology. Verbal consent was obtained by the patient and accompanying visitor(s) for the recording of patient appointment to facilitate this note. I attest to having reviewed and edited the generated note for accuracy, though some syntax or spelling errors may persist. Please contact the author of this note for any clarification.          3/14/2025 Harsh Rubalcava PA-C           [1]   Current Outpatient  Medications:     albuterol (PROVENTIL/VENTOLIN HFA) 90 mcg/actuation inhaler, Inhale 1-2 puffs into the lungs daily as needed for Shortness of Breath. Rescue, Disp: 18 g, Rfl: 0    SYMBICORT 160-4.5 mcg/actuation HFAA, Inhale 2 puffs into the lungs every 12 (twelve) hours., Disp: 30 g, Rfl: 4    traZODone (DESYREL) 100 MG tablet, Take 1 tablet (100 mg total) by mouth every evening., Disp: 90 tablet, Rfl: 1    ascorbic acid (VITAMIN C ORAL), Take 1 tablet by mouth 2 (two) times a day. (Patient not taking: Reported on 6/25/2024), Disp: , Rfl:     lisinopriL (PRINIVIL,ZESTRIL) 20 MG tablet, Take 1 tablet (20 mg total) by mouth once daily., Disp: 90 tablet, Rfl: 3    walker (ULTRA-LIGHT ROLLATOR) Misc, 1 each by Misc.(Non-Drug; Combo Route) route once daily. (Patient not taking: Reported on 6/25/2024), Disp: 1 each, Rfl: 0    Current Facility-Administered Medications:     albuterol inhaler 2 puff, 2 puff, Inhalation, Q20 Min PRN, Farhana Riggins, NP    diphenhydrAMINE injection 25 mg, 25 mg, Intravenous, Once PRN, Farhana Riggins, NP    EPINEPHrine (EPIPEN) 0.3 mg/0.3 mL pen injection 0.3 mg, 0.3 mg, Intramuscular, PRN, Farhana Riggins, NP    methylPREDNISolone sodium succinate injection 40 mg, 40 mg, Intravenous, Once PRN, Farhana Riggins, NP    ondansetron disintegrating tablet 4 mg, 4 mg, Oral, Once PRN, Farhana Riggins, NP    sodium chloride 0.9% 500 mL flush bag, , Intravenous, PRN, Farhana Riggins, NP, Stopped at 12/15/21 1455    sodium chloride 0.9% flush 10 mL, 10 mL, Intravenous, PRN, Farhana Riggins, NP

## 2025-03-24 DIAGNOSIS — Z00.00 ENCOUNTER FOR MEDICARE ANNUAL WELLNESS EXAM: ICD-10-CM

## 2025-03-25 ENCOUNTER — OFFICE VISIT (OUTPATIENT)
Dept: DERMATOLOGY | Facility: CLINIC | Age: OVER 89
End: 2025-03-25
Payer: MEDICARE

## 2025-03-25 DIAGNOSIS — Z08 ENCOUNTER FOR FOLLOW-UP SURVEILLANCE OF SKIN CANCER: ICD-10-CM

## 2025-03-25 DIAGNOSIS — L57.8 ACTINIC SKIN DAMAGE: ICD-10-CM

## 2025-03-25 DIAGNOSIS — Z85.828 ENCOUNTER FOR FOLLOW-UP SURVEILLANCE OF SKIN CANCER: ICD-10-CM

## 2025-03-25 DIAGNOSIS — D48.5 NEOPLASM OF UNCERTAIN BEHAVIOR OF SKIN: Primary | ICD-10-CM

## 2025-03-25 DIAGNOSIS — L57.0 ACTINIC KERATOSES: ICD-10-CM

## 2025-03-25 PROCEDURE — 17003 DESTRUCT PREMALG LES 2-14: CPT | Mod: S$GLB,,, | Performed by: DERMATOLOGY

## 2025-03-25 PROCEDURE — 88342 IMHCHEM/IMCYTCHM 1ST ANTB: CPT | Mod: 26,,, | Performed by: PATHOLOGY

## 2025-03-25 PROCEDURE — 11103 TANGNTL BX SKIN EA SEP/ADDL: CPT | Mod: S$GLB,,, | Performed by: DERMATOLOGY

## 2025-03-25 PROCEDURE — 88305 TISSUE EXAM BY PATHOLOGIST: CPT | Mod: TC,91 | Performed by: DERMATOLOGY

## 2025-03-25 PROCEDURE — 99213 OFFICE O/P EST LOW 20 MIN: CPT | Mod: 25,S$GLB,, | Performed by: DERMATOLOGY

## 2025-03-25 PROCEDURE — 1101F PT FALLS ASSESS-DOCD LE1/YR: CPT | Mod: CPTII,S$GLB,, | Performed by: DERMATOLOGY

## 2025-03-25 PROCEDURE — 3288F FALL RISK ASSESSMENT DOCD: CPT | Mod: CPTII,S$GLB,, | Performed by: DERMATOLOGY

## 2025-03-25 PROCEDURE — 11102 TANGNTL BX SKIN SINGLE LES: CPT | Mod: S$GLB,,, | Performed by: DERMATOLOGY

## 2025-03-25 PROCEDURE — 88305 TISSUE EXAM BY PATHOLOGIST: CPT | Mod: 26,,, | Performed by: PATHOLOGY

## 2025-03-25 PROCEDURE — 1159F MED LIST DOCD IN RCRD: CPT | Mod: CPTII,S$GLB,, | Performed by: DERMATOLOGY

## 2025-03-25 PROCEDURE — 1160F RVW MEDS BY RX/DR IN RCRD: CPT | Mod: CPTII,S$GLB,, | Performed by: DERMATOLOGY

## 2025-03-25 PROCEDURE — 17000 DESTRUCT PREMALG LESION: CPT | Mod: XS,S$GLB,, | Performed by: DERMATOLOGY

## 2025-03-25 NOTE — PROGRESS NOTES
Subjective:      Patient ID:  Brit Jennings is a 89 y.o. male who presents for   Chief Complaint   Patient presents with    Spot     LOV 8/17/23 SCC of skin    Patient here today for spot to scalp, right temple.  Pt states right temple spot is sight of previous MOHS site, painful.    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 Rescue, Disp: 18 g, Rfl: 0  ·  ascorbic acid (VITAMIN C ORAL), Take 1 tablet by mouth 2 (two) times a day. (Patient not taking: Reported on 6/25/2024), Disp: , Rfl:   ·  lisinopriL (PRINIVIL,ZESTRIL) 20 MG tablet, Take 1 tablet (20 mg total) by mouth once daily., Disp: 90 tablet, Rfl: 3  ·  SYMBICORT 160-4.5 mcg/actuation HFAA, Inhale 2 puffs into the lungs every 12 (twelve) hours., Disp: 30 g, Rfl: 4  ·  traZODone (DESYREL) 100 MG tablet, Take 1 tablet (100 mg total) by mouth every evening., Disp: 90 tablet, Rfl: 1  ·  walker (ULTRA-LIGHT ROLLATOR) Misc, 1 each by Misc.(Non-Drug; Combo Route) route once daily. (Patient not taking: Reported on 6/25/2024), Disp: 1 each, Rfl: 0    Current Facility-Administered Medications:   ·  albuterol inhaler 2 puff, 2 puff, Inhalation, Q20 Min PRN, MeljoeyeJuliaa T., NP  ·  diphenhydrAMINE injection 25 mg, 25 mg, Intravenous, Once PRN, MelerineJuliaa T., NP  ·  EPINEPHrine (EPIPEN) 0.3 mg/0.3 mL pen injection 0.3 mg, 0.3 mg, Intramuscular, PRN, Farhana Riggins, NP  ·  methylPREDNISolone sodium succinate injection 40 mg, 40 mg, Intravenous, Once PRN, Farhana Riggins, NP  ·  ondansetron disintegrating tablet 4 mg, 4 mg, Oral, Once  ELISA, Farhana Riggins, NP  ·  sodium chloride 0.9% 500 mL flush bag, , Intravenous, Gilsno PÉREZ Linda T., NP, Stopped at 12/15/21 1455  ·  sodium chloride 0.9% flush 10 mL, 10 mL, Intravenous, Gilosn PÉREZ Linda T., NP          Review of Systems   Constitutional:  Negative for fever, chills and fatigue.   Skin:  Negative for daily sunscreen use and activity-related sunscreen use.       Objective:   Physical Exam   Constitutional: He appears well-developed and well-nourished. No distress.   Neurological: He is alert and oriented to person, place, and time. He is not disoriented.   Psychiatric: He has a normal mood and affect.   Skin:   Areas Examined (abnormalities noted in diagram):   Scalp / Hair Palpated and Inspected  Head / Face Inspection Performed  Neck Inspection Performed            Diagram Legend     Erythematous scaling macule/papule c/w actinic keratosis       Vascular papule c/w angioma      Pigmented verrucoid papule/plaque c/w seborrheic keratosis      Yellow umbilicated papule c/w sebaceous hyperplasia      Irregularly shaped tan macule c/w lentigo     1-2 mm smooth white papules consistent with Milia      Movable subcutaneous cyst with punctum c/w epidermal inclusion cyst      Subcutaneous movable cyst c/w pilar cyst      Firm pink to brown papule c/w dermatofibroma      Pedunculated fleshy papule(s) c/w skin tag(s)      Evenly pigmented macule c/w junctional nevus     Mildly variegated pigmented, slightly irregular-bordered macule c/w mildly atypical nevus      Flesh colored to evenly pigmented papule c/w intradermal nevus       Pink pearly papule/plaque c/w basal cell carcinoma      Erythematous hyperkeratotic cursted plaque c/w SCC      Surgical scar with no sign of skin cancer recurrence      Open and closed comedones      Inflammatory papules and pustules      Verrucoid papule consistent consistent with wart     Erythematous eczematous patches and plaques     Dystrophic onycholytic nail  with subungual debris c/w onychomycosis     Umbilicated papule    Erythematous-base heme-crusted tan verrucoid plaque consistent with inflamed seborrheic keratosis     Erythematous Silvery Scaling Plaque c/w Psoriasis     See annotation            Assessment / Plan:      Pathology Orders:       Normal Orders This Visit    Specimen to Pathology, Dermatology     Questions:    Procedure Type: Dermatology and skin neoplasms    Number of Specimens: 2    ------------------------: -------------------------    Spec 1 Procedure: Shave Biopsy    Spec 1 Clinical Impression: SCC vs other    Spec 1 Source: left paramedian anterior scalp    ------------------------: -------------------------    Spec 2 Procedure: Shave Biopsy    Spec 2 Clinical Impression: erythematous scaly non healing plaque in surgical scar, r/oNMSC recurrence    Spec 2 Source: R preauricular cheek    Clinical Information: see EPIC    Clinical History: see EPIC    Specimen Source: Skin    Release to patient: Immediate    Send normal result to authorizing provider's In Basket if patient is active on MyChart: Yes          Neoplasm of uncertain behavior of skin  -     Specimen to Pathology, Dermatology  Shave biopsy procedure note:x2    Shave biopsy performed after verbal consent including risk of infection, scar, recurrence, need for additional treatment of site. Area prepped with alcohol, anesthetized with approximately 1.0cc of 1% lidocaine with epinephrine. Lesional tissue shaved with razor blade. Hemostasis achieved with application of aluminum chloride followed by hyfrecation. No complications. Dressing applied. Wound care explained.    Actinic keratoses  Cryosurgery Procedure Note    Verbal consent from the patient is obtained and the patient is aware of the precancerous quality and need for treatment of these lesions. Liquid nitrogen cryosurgery is applied to the 8 actinic keratoses, as detailed in the physical exam, to produce a freeze injury. The patient  is aware that blisters may form and is instructed on wound care with gentle cleansing and use of vaseline ointment to keep moist until healed. The patient is supplied a handout on cryosurgery and is instructed to call if lesions do not completely resolve.    Encounter for follow-up surveillance of skin cancer  Area of previous NMSC (multiple) examined. Sites well healed with no signs of recurrence.  Skin examination performed today as noted in physical examination. 2 lesions suspicious for malignancy noted.    Actinic skin damage  Patient instructed in importance in daily broad spectrum sun protection of at least spf 30. Mineral sunscreen ingredients preferred (Zinc +/- Titanium) and can be found OTC.   Patient encouraged to wear hat for all outdoor exposure.   Also discussed sun avoidance and use of protective clothing.               No follow-ups on file.

## 2025-03-28 ENCOUNTER — TELEPHONE (OUTPATIENT)
Dept: FAMILY MEDICINE | Facility: CLINIC | Age: OVER 89
End: 2025-03-28
Payer: MEDICARE

## 2025-03-28 DIAGNOSIS — J43.1 PANLOBULAR EMPHYSEMA: Primary | ICD-10-CM

## 2025-03-28 RX ORDER — BUDESONIDE, GLYCOPYRROLATE, AND FORMOTEROL FUMARATE 160; 9; 4.8 UG/1; UG/1; UG/1
2 AEROSOL, METERED RESPIRATORY (INHALATION) 2 TIMES DAILY
Qty: 10.7 G | Refills: 5 | Status: SHIPPED | OUTPATIENT
Start: 2025-03-28 | End: 2025-09-24

## 2025-03-28 NOTE — TELEPHONE ENCOUNTER
----- Message from Joesph sent at 3/28/2025 10:37 AM CDT -----  Pt is here and said that the albuterol Mike put him on he was taking it and he's had a rough week.[phone]

## 2025-03-28 NOTE — TELEPHONE ENCOUNTER
Started taking the symbicort like he was supposed to 2 puffs bid. And doing albuterol more and more. Said you mentioned if symbicort does not help possibly changing to breztri. Does okay at night but struggling during day

## 2025-04-01 ENCOUNTER — RESULTS FOLLOW-UP (OUTPATIENT)
Dept: DERMATOLOGY | Facility: CLINIC | Age: OVER 89
End: 2025-04-01

## 2025-04-02 DIAGNOSIS — L57.0 AK (ACTINIC KERATOSIS): Primary | ICD-10-CM

## 2025-04-02 NOTE — TELEPHONE ENCOUNTER
----- Message from Marcelle sent at 4/2/2025 11:30 AM CDT -----  Name of Who is Calling:MILKA LEWIS [[id number]]  What is the request in detail:Pt stated he missed a call from the office on today at 8am and requesting a call back today if possible.  Can the clinic reply by [facility]:Call  What Number to Call Back if not in [facility]:Telephone Information:Mobile          [phone]

## 2025-04-03 ENCOUNTER — PATIENT MESSAGE (OUTPATIENT)
Dept: DERMATOLOGY | Facility: CLINIC | Age: OVER 89
End: 2025-04-03
Payer: MEDICARE

## 2025-04-03 RX ORDER — FLUOROURACIL 50 MG/G
CREAM TOPICAL
Qty: 40 G | Refills: 0 | Status: SHIPPED | OUTPATIENT
Start: 2025-04-03

## 2025-04-09 ENCOUNTER — TELEPHONE (OUTPATIENT)
Dept: FAMILY MEDICINE | Facility: CLINIC | Age: OVER 89
End: 2025-04-09

## 2025-04-09 ENCOUNTER — OFFICE VISIT (OUTPATIENT)
Dept: FAMILY MEDICINE | Facility: CLINIC | Age: OVER 89
End: 2025-04-09
Payer: MEDICARE

## 2025-04-09 ENCOUNTER — HOSPITAL ENCOUNTER (OUTPATIENT)
Dept: RADIOLOGY | Facility: HOSPITAL | Age: OVER 89
Discharge: HOME | End: 2025-04-09
Payer: MEDICARE

## 2025-04-09 ENCOUNTER — HOSPITAL ENCOUNTER (INPATIENT)
Facility: HOSPITAL | Age: OVER 89
LOS: 4 days | Discharge: HOME | DRG: 291 | End: 2025-04-13
Attending: EMERGENCY MEDICINE | Admitting: STUDENT IN AN ORGANIZED HEALTH CARE EDUCATION/TRAINING PROGRAM
Payer: MEDICARE

## 2025-04-09 VITALS
RESPIRATION RATE: 18 BRPM | HEART RATE: 74 BPM | OXYGEN SATURATION: 97 % | SYSTOLIC BLOOD PRESSURE: 150 MMHG | BODY MASS INDEX: 22.91 KG/M2 | WEIGHT: 146 LBS | DIASTOLIC BLOOD PRESSURE: 82 MMHG | HEIGHT: 67 IN

## 2025-04-09 DIAGNOSIS — J90 PLEURAL EFFUSION ON RIGHT: ICD-10-CM

## 2025-04-09 DIAGNOSIS — R06.00 DYSPNEA, UNSPECIFIED TYPE: Primary | ICD-10-CM

## 2025-04-09 DIAGNOSIS — I50.9 CONGESTIVE HEART FAILURE, UNSPECIFIED HF CHRONICITY, UNSPECIFIED HEART FAILURE TYPE: ICD-10-CM

## 2025-04-09 DIAGNOSIS — R06.02 SHORTNESS OF BREATH AT REST: ICD-10-CM

## 2025-04-09 DIAGNOSIS — L84 CALLUS OF FOOT: ICD-10-CM

## 2025-04-09 DIAGNOSIS — I50.9 NEW ONSET OF CONGESTIVE HEART FAILURE: Primary | ICD-10-CM

## 2025-04-09 DIAGNOSIS — R06.02 SHORTNESS OF BREATH: ICD-10-CM

## 2025-04-09 DIAGNOSIS — R06.00 DYSPNEA, UNSPECIFIED TYPE: ICD-10-CM

## 2025-04-09 DIAGNOSIS — R79.89 ELEVATED BRAIN NATRIURETIC PEPTIDE (BNP) LEVEL: ICD-10-CM

## 2025-04-09 DIAGNOSIS — R60.0 BILATERAL LOWER EXTREMITY EDEMA: ICD-10-CM

## 2025-04-09 DIAGNOSIS — D64.9 ANEMIA, UNSPECIFIED TYPE: ICD-10-CM

## 2025-04-09 PROBLEM — S12.601A: Status: RESOLVED | Noted: 2024-01-21 | Resolved: 2025-04-09

## 2025-04-09 PROBLEM — D69.6 THROMBOCYTOPENIA: Status: ACTIVE | Noted: 2025-04-09

## 2025-04-09 LAB
ABSOLUTE EOSINOPHIL (SMH): 0.02 K/UL
ABSOLUTE MONOCYTE (SMH): 0.58 K/UL (ref 0.3–1)
ABSOLUTE NEUTROPHIL COUNT (SMH): 2.1 K/UL (ref 1.8–7.7)
ALBUMIN SERPL-MCNC: 4.5 G/DL (ref 3.5–5.2)
ALP SERPL-CCNC: 64 UNIT/L (ref 55–135)
ALT SERPL-CCNC: 25 UNIT/L (ref 10–44)
ANION GAP (SMH): 9 MMOL/L (ref 8–16)
AST SERPL-CCNC: 33 UNIT/L (ref 10–40)
BASOPHILS # BLD AUTO: 0.03 K/UL
BASOPHILS NFR BLD AUTO: 0.8 %
BILIRUB SERPL-MCNC: 1 MG/DL (ref 0.1–1)
BNP SERPL-MCNC: 3385 PG/ML
BUN SERPL-MCNC: 23 MG/DL (ref 8–23)
CALCIUM SERPL-MCNC: 10.3 MG/DL (ref 8.7–10.5)
CHLORIDE SERPL-SCNC: 98 MMOL/L (ref 95–110)
CO2 SERPL-SCNC: 27 MMOL/L (ref 23–29)
CREAT SERPL-MCNC: 1 MG/DL (ref 0.5–1.4)
EKG 12-LEAD: ABNORMAL
ERYTHROCYTE [DISTWIDTH] IN BLOOD BY AUTOMATED COUNT: 13.2 % (ref 11.5–14.5)
FOLATE SERPL-MCNC: 18.8 NG/ML (ref 4–24)
GFR SERPLBLD CREATININE-BSD FMLA CKD-EPI: >60 ML/MIN/1.73/M2
GLUCOSE SERPL-MCNC: 89 MG/DL (ref 70–110)
HCT VFR BLD AUTO: 41.2 % (ref 40–54)
HGB BLD-MCNC: 13.6 GM/DL (ref 14–18)
IMM GRANULOCYTES # BLD AUTO: 0.02 K/UL (ref 0–0.04)
IMM GRANULOCYTES NFR BLD AUTO: 0.5 % (ref 0–0.5)
LYMPHOCYTES # BLD AUTO: 1.06 K/UL (ref 1–4.8)
MAGNESIUM SERPL-MCNC: 2.5 MG/DL (ref 1.6–2.6)
MCH RBC QN AUTO: 33.8 PG (ref 27–31)
MCHC RBC AUTO-ENTMCNC: 33 G/DL (ref 32–36)
MCV RBC AUTO: 103 FL (ref 82–98)
NUCLEATED RBC (/100WBC) (SMH): 0 /100 WBC
PLATELET # BLD AUTO: 112 K/UL (ref 150–450)
PMV BLD AUTO: 13.2 FL (ref 9.2–12.9)
POTASSIUM SERPL-SCNC: 4.2 MMOL/L (ref 3.5–5.1)
PR INTERVAL: 302
PROT SERPL-MCNC: 7.8 GM/DL (ref 6–8.4)
PRT AXES: ABNORMAL
QRS DURATION: 142
QT/QTC: ABNORMAL
RBC # BLD AUTO: 4.02 M/UL (ref 4.6–6.2)
RELATIVE EOSINOPHIL (SMH): 0.5 % (ref 0–8)
RELATIVE LYMPHOCYTE (SMH): 27.8 % (ref 18–48)
RELATIVE MONOCYTE (SMH): 15.2 % (ref 4–15)
RELATIVE NEUTROPHIL (SMH): 55.2 % (ref 38–73)
SODIUM SERPL-SCNC: 134 MMOL/L (ref 136–145)
TROPONIN HIGH SENSITIVE (SMH): 254.2 PG/ML
TROPONIN HIGH SENSITIVE (SMH): 258.8 PG/ML
VENTRICULAR RATE: 72
VIT B12 SERPL-MCNC: 1085 PG/ML (ref 210–950)
WBC # BLD AUTO: 3.81 K/UL (ref 3.9–12.7)

## 2025-04-09 PROCEDURE — 99285 EMERGENCY DEPT VISIT HI MDM: CPT | Mod: 25

## 2025-04-09 PROCEDURE — 99900035 HC TECH TIME PER 15 MIN (STAT)

## 2025-04-09 PROCEDURE — 83880 ASSAY OF NATRIURETIC PEPTIDE: CPT | Performed by: EMERGENCY MEDICINE

## 2025-04-09 PROCEDURE — 93010 ELECTROCARDIOGRAM REPORT: CPT | Mod: ,,, | Performed by: INTERNAL MEDICINE

## 2025-04-09 PROCEDURE — 12000002 HC ACUTE/MED SURGE SEMI-PRIVATE ROOM

## 2025-04-09 PROCEDURE — 71046 X-RAY EXAM CHEST 2 VIEWS: CPT | Mod: TC

## 2025-04-09 PROCEDURE — 71046 X-RAY EXAM CHEST 2 VIEWS: CPT | Mod: 26,,, | Performed by: RADIOLOGY

## 2025-04-09 PROCEDURE — 82746 ASSAY OF FOLIC ACID SERUM: CPT | Performed by: STUDENT IN AN ORGANIZED HEALTH CARE EDUCATION/TRAINING PROGRAM

## 2025-04-09 PROCEDURE — 85025 COMPLETE CBC W/AUTO DIFF WBC: CPT | Performed by: EMERGENCY MEDICINE

## 2025-04-09 PROCEDURE — 36415 COLL VENOUS BLD VENIPUNCTURE: CPT | Performed by: EMERGENCY MEDICINE

## 2025-04-09 PROCEDURE — 84484 ASSAY OF TROPONIN QUANT: CPT | Performed by: EMERGENCY MEDICINE

## 2025-04-09 PROCEDURE — 80053 COMPREHEN METABOLIC PANEL: CPT | Performed by: EMERGENCY MEDICINE

## 2025-04-09 PROCEDURE — 84484 ASSAY OF TROPONIN QUANT: CPT | Performed by: STUDENT IN AN ORGANIZED HEALTH CARE EDUCATION/TRAINING PROGRAM

## 2025-04-09 PROCEDURE — 94799 UNLISTED PULMONARY SVC/PX: CPT

## 2025-04-09 PROCEDURE — 82607 VITAMIN B-12: CPT | Performed by: STUDENT IN AN ORGANIZED HEALTH CARE EDUCATION/TRAINING PROGRAM

## 2025-04-09 PROCEDURE — 25000003 PHARM REV CODE 250: Performed by: STUDENT IN AN ORGANIZED HEALTH CARE EDUCATION/TRAINING PROGRAM

## 2025-04-09 PROCEDURE — 93005 ELECTROCARDIOGRAM TRACING: CPT | Performed by: INTERNAL MEDICINE

## 2025-04-09 PROCEDURE — 83735 ASSAY OF MAGNESIUM: CPT | Performed by: EMERGENCY MEDICINE

## 2025-04-09 PROCEDURE — 63600175 PHARM REV CODE 636 W HCPCS: Performed by: STUDENT IN AN ORGANIZED HEALTH CARE EDUCATION/TRAINING PROGRAM

## 2025-04-09 RX ORDER — LANOLIN ALCOHOL/MO/W.PET/CERES
800 CREAM (GRAM) TOPICAL
Status: DISCONTINUED | OUTPATIENT
Start: 2025-04-09 | End: 2025-04-13 | Stop reason: HOSPADM

## 2025-04-09 RX ORDER — SODIUM,POTASSIUM PHOSPHATES 280-250MG
2 POWDER IN PACKET (EA) ORAL
Status: DISCONTINUED | OUTPATIENT
Start: 2025-04-09 | End: 2025-04-13 | Stop reason: HOSPADM

## 2025-04-09 RX ORDER — ONDANSETRON HYDROCHLORIDE 2 MG/ML
4 INJECTION, SOLUTION INTRAVENOUS EVERY 6 HOURS PRN
Status: DISCONTINUED | OUTPATIENT
Start: 2025-04-09 | End: 2025-04-13 | Stop reason: HOSPADM

## 2025-04-09 RX ORDER — TRAZODONE HYDROCHLORIDE 50 MG/1
100 TABLET ORAL NIGHTLY
Status: DISCONTINUED | OUTPATIENT
Start: 2025-04-09 | End: 2025-04-13 | Stop reason: HOSPADM

## 2025-04-09 RX ORDER — LISINOPRIL 20 MG/1
20 TABLET ORAL DAILY
Status: DISCONTINUED | OUTPATIENT
Start: 2025-04-10 | End: 2025-04-12

## 2025-04-09 RX ORDER — LISINOPRIL 20 MG/1
20 TABLET ORAL DAILY
Status: DISCONTINUED | OUTPATIENT
Start: 2025-04-10 | End: 2025-04-09

## 2025-04-09 RX ORDER — ACETAMINOPHEN 500 MG
1000 TABLET ORAL EVERY 6 HOURS PRN
Status: DISCONTINUED | OUTPATIENT
Start: 2025-04-09 | End: 2025-04-13 | Stop reason: HOSPADM

## 2025-04-09 RX ORDER — IPRATROPIUM BROMIDE AND ALBUTEROL SULFATE 2.5; .5 MG/3ML; MG/3ML
3 SOLUTION RESPIRATORY (INHALATION) EVERY 6 HOURS PRN
Status: DISCONTINUED | OUTPATIENT
Start: 2025-04-09 | End: 2025-04-13 | Stop reason: HOSPADM

## 2025-04-09 RX ORDER — NEOMYCIN SULFATE, POLYMYXIN B SULFATE, AND DEXAMETHASONE 3.5; 10000; 1 MG/G; [USP'U]/G; MG/G
1 OINTMENT OPHTHALMIC EVERY 6 HOURS
COMMUNITY

## 2025-04-09 RX ORDER — SODIUM CHLORIDE 0.9 % (FLUSH) 0.9 %
10 SYRINGE (ML) INJECTION
Status: DISCONTINUED | OUTPATIENT
Start: 2025-04-09 | End: 2025-04-13 | Stop reason: HOSPADM

## 2025-04-09 RX ORDER — SODIUM FERRIC GLUCONATE COMPLEX IN SUCROSE 12.5 MG/ML
125 INJECTION INTRAVENOUS DAILY
Status: COMPLETED | OUTPATIENT
Start: 2025-04-09 | End: 2025-04-11

## 2025-04-09 RX ORDER — FUROSEMIDE 10 MG/ML
40 INJECTION INTRAMUSCULAR; INTRAVENOUS DAILY
Status: DISCONTINUED | OUTPATIENT
Start: 2025-04-09 | End: 2025-04-11

## 2025-04-09 RX ADMIN — TRAZODONE HYDROCHLORIDE 100 MG: 50 TABLET ORAL at 09:04

## 2025-04-09 RX ADMIN — SODIUM FERRIC GLUCONATE COMPLEX IN SUCROSE 125 MG: 12.5 INJECTION INTRAVENOUS at 09:04

## 2025-04-09 RX ADMIN — FUROSEMIDE 40 MG: 10 INJECTION, SOLUTION INTRAVENOUS at 09:04

## 2025-04-09 NOTE — PROGRESS NOTES
SUBJECTIVE:    Patient ID: Brit Jennings is a 89 y.o. male.    Chief Complaint: Follow-up (No bottles// no refills needed// pt states he's been feeling off and on SOB and he's been using inhaler pt says he does not think its working also have swelling in both legs going on for 1 week )    Follow-up      History of Present Illness    CHIEF COMPLAINT:  Brit presents today for worsening shortness of breath with ineffective inhaler use    RESPIRATORY:  He has a history of asthma since age 45, previously only affecting him during exercise, particularly when using the treadmill. Recently, he has experienced episodes of gasping for breath. He is unable to breathe when lying on his left side and must sleep on his right side, a condition that has persisted for some time. He denies needing to sit up higher or using extra pillows to breathe easier at night.    GENITOURINARY:  He reports frequent urination with decreased volume over the past week. He has a history of chronic urinary leakage.    LOWER EXTREMITY EDEMA:  He reports new onset leg swelling since the weekend. He denies any prior history of leg swelling, noting that previous physician exams found his ankles to be in good condition.    SLEEP:  He typically sleeps 5-6 hours per night and takes Trazodone for sleep management.      ROS:  General: -fever, -chills, -fatigue, -weight gain, -weight loss  Eyes: -vision changes, -redness, -discharge  ENT: -ear pain, -nasal congestion, -sore throat  Cardiovascular: -chest pain, -palpitations, +lower extremity edema  Respiratory: -cough, +shortness of breath, +difficulty breathing, +shortness of breath lying down  Gastrointestinal: -abdominal pain, -nausea, -vomiting, -diarrhea, -constipation, -blood in stool  Genitourinary: -dysuria, -hematuria, +frequency, +urine changes  Musculoskeletal: -joint pain, -muscle pain, +difficulty walking  Skin: -rash, -lesion  Neurological: -headache, -dizziness, -numbness,  -tingling  Psychiatric: -anxiety, -depression, -sleep difficulty         Office Visit on 04/09/2025   Component Date Value Ref Range Status    EKG 12-Lead 04/09/2025    Corrected    Ventricular Rate 04/09/2025 72   Final    PA Interval 04/09/2025 302   Final    QRS Duration 04/09/2025 142   Final    QT/QTc 04/09/2025 432/473   Final    PRT Axes 04/09/2025 -14  -51  5   Final   Office Visit on 03/25/2025   Component Date Value Ref Range Status    Case Report 03/25/2025    Final                    Value:Dontrell Payne - [facility] [place] [facility]                           Case: [id number]                                Authorizing Provider:  Tova Miles MD    Collected:           03/25/2025 09:02 AM          Ordering Location:     [facility] -         Received:            03/26/2025 02:56 PM                                 Dermatology                                                                  Pathologist:           Ko Sarah MD                                                     Specimens:   1) - Skin, left paramedian anterior scalp                                                           2) - Cheek, Right, R preauricular cheek                                                    Final Diagnosis 03/25/2025    Final                    Value: 1. Skin,  left paramedian  anterior scalp, shave biopsy:   - ACTINIC KERATOSIS WITH FOCAL TRANSITION TO SQUAMOUS CELL CARCINOMA IN SITU.  - FULL-THICKNESS SQUAMOUS ATYPIA FOCALLY EXTENDS TO THE DEEP BIOPSY MARGIN AND A LATERAL BIOPSY MARGIN.     This lesion is atypical and further treatment may be advised.  You will be contacted by your provider's office.       2. Skin, right preauricular cheek, shave biopsy:   - ACTINIC KERATOSIS WITH FOCAL TRANSITION TO SQUAMOUS CELL CARCINOMA IN SITU.  - FULL-THICKNESS SQUAMOUS ATYPIA FOCALLY EXTENDS TO THE DEEP BIOPSY MARGIN AND A LATERAL BIOPSY MARGIN.     This lesion is atypical and further treatment may be advised.  You will be  "contacted by your provider's office.        Microscopic Description 03/25/2025    Final                    Value:Sections show atypia within the lowermost epidermal layers associated with foci showing full-thickness atypia.  The underlying papillary dermis shows solar elastosis.    The atypical squamous epithelium is focally transected at the deep biopsy margin and full-thickness squamous atypia extends to a peripheral biopsy margin.    Sections show atypia within the lowermost epidermal layers associated with foci showing full-thickness atypia.  The underlying papillary dermis shows solar elastosis.    The atypical squamous epithelium is focally transected at the deep biopsy margin and full-thickness squamous atypia extends to a peripheral biopsy margin.  Ki 67 immunohistochemical stain shows an elevated proliferative index focally spans nearly the full-thickness of the involved epidermis.  Appropriately reactive controls were reviewed.  Multiple levels were examined.      Gross Description 03/25/2025    Final                    Value:1. Skin: left paramedian anterior scalp  Two Part Case:    Part 1  Pathology MRN# [id number]  Hospital/Clinic label MRN# [id number]    Received in formalin labeled with the patient's name, MRN, and "paramedian anterior scalp is an unoriented, ovoid, 1.2 x 0.9 cm skin shave excised to a depth of 0.1 cm.  The epidermis is diffusely granular, tan-white, and otherwise grossly unremarkable.  The resection margin is inked blue, cut surfaces are grossly unremarkable.  The specimen is entirely submitted in cassette:  1A    BERTIN Yañez PA ([facility])CM    Grossing was completed by Wade Gutierrez on 3/27/2025.    2. Cheek, Right: R preauricular cheek  Part 2  Pathology MRN# [id number]  Hospital/Clinic label MRN# [id number]    Received in formalin labeled with the patient's name, MRN, and "right preauricular cheek is an irregular, unoriented, 1.2 x 0.8 cm skin shave excised to a depth of 0.1 cm.  The " "epidermis is diffusely granular, tan-white, and otherwise grossly unremarkable.  The resection margin is inked                           blue, cut surfaces are grossly unremarkable.  The specimen is entirely submitted in cassette:  2A    BERTIN Yañez PA ([facility])CM     Grossing was completed by Wade Gutierrez on 3/27/2025.        Report Footnotes 03/25/2025    Final                    Value:Unless the case is a "gross only" or additional testing only, the final diagnosis for each specimen is based on a microscopic examination of appropriate tissue sections.         Past Medical History:   Diagnosis Date    Basal cell carcinoma 2020    L temple / radiation therapy    Cataract     Hypertension     Squamous cell carcinoma of skin      Social History[1]  Past Surgical History:   Procedure Laterality Date    APPENDECTOMY      EYE SURGERY      Moh's surgery on right temple area Right 2015    Cancerous spot    PROSTATE BIOPSY      TONSILLECTOMY       Family History   Problem Relation Name Age of Onset    Melanoma Neg Hx      Psoriasis Neg Hx      Lupus Neg Hx      Eczema Neg Hx         The CVD Risk score (RAGHUAgostino, et al., 2008) failed to calculate for the following reasons:    The 2008 CVD risk score is only valid for ages 30 to 74    All of your core healthy metrics are met.      Review of patient's allergies indicates:   Allergen Reactions    Pollen extracts Other (See Comments)     Current Medications[2]    Review of Systems        Objective:      Vitals:    04/09/25 1530 04/09/25 1532   BP: (!) 146/80 (!) 150/82   Pulse: 74    Resp: 18    SpO2: 97%    Weight: 66.2 kg (146 lb)    Height: 5' 7" (1.702 m)      Physical Exam  Constitutional:       General: He is not in acute distress.     Appearance: He is well-groomed and normal weight.      Comments: Elderly, white male, seated in clinic chair. Has cane for ambulation. Driven here by his son. NAD.   HENT:      Head: Normocephalic and atraumatic.   Eyes:      General: " No scleral icterus.  Neck:      Vascular: No JVD.   Cardiovascular:      Rate and Rhythm: Normal rate and regular rhythm.      Heart sounds:      Gallop present. S3 sounds present.   Pulmonary:      Effort: No respiratory distress.      Breath sounds: Examination of the right-lower field reveals decreased breath sounds. Examination of the left-lower field reveals rales. Decreased breath sounds and rales present.      Comments: Some shortness of breath, and shallow breathing, but no overt dyspnea at rest  Abdominal:      Palpations: Abdomen is soft.   Musculoskeletal:      Right lower leg: 3+ Pitting Edema present.      Left lower leg: 3+ Pitting Edema present.   Skin:     General: Skin is warm and dry.      Coloration: Skin is pale.   Neurological:      General: No focal deficit present.      Mental Status: He is alert.   Psychiatric:         Behavior: Behavior is cooperative.       Physical Exam               Assessment:       1. Dyspnea, unspecified type    2. Bilateral lower extremity edema    3. Elevated brain natriuretic peptide (BNP) level    4. Anemia, unspecified type    5. Congestive heart failure, unspecified HF chronicity, unspecified heart failure type         Plan:                           Assessment & Plan      IMPRESSION:  - Suspect potential heart problem rather than lung issue due to leg swelling and shortness of breath. Patient reports utilizing same inhaler he has always used with relief of symptoms and now not getting relief of SOB.   - Considered congestive heart failure as possible diagnosis given symptoms. Patient denies knowledge of history of CHF, but some time last year had a hospitalization during which his BNP was in the 500's.   - Plan to assess renal function through labs before considering diuretic prescription.    CHF (CONGESTIVE HEART FAILURE):  Congestive heart failure, unspecified HF chronicity, unspecified heart failure type  Elevated brain natriuretic peptide (BNP) level  -      CBC Auto Differential; Future; Expected date: 04/09/2025  -     BNP; Future; Expected date: 04/09/2025  -     Comprehensive Metabolic Panel; Future; Expected date: 04/09/2025  -     X-Ray Chest PA And Lateral; Future; Expected date: 04/09/2025  - Assessed patient's condition, suspecting heart failure based on reported symptoms: shortness of breath (not fully relieved by inhaler), leg swelling since the weekend, difficulty breathing when lying on the left side, and increased urinary frequency with decreased volume.  - Auscultated lungs and heart. S3 apparent. Rate and rhythm are regular, evaluated with EKG due to extra sound.   - Ordered EKG to evaluate heart rhythm, chest XR, and labs to evaluate for heart failure. EKG mostly stable compared to previous, with exception of some non specific T wave abnormality, with my nurse stating they had some difficulty in capturing reading, so I am not sure if this is secondary to this. Since patient is not toxic appearing, will allow labs and chest xray results to come back to make further decision on treatment. Ischemia not ruled out completely.  - Consider prescribing a diuretic pending labs results to check kidney function.      SHORTNESS OF BREATH:  Dyspnea, unspecified type  -     CBC Auto Differential; Future; Expected date: 04/09/2025  -     BNP; Future; Expected date: 04/09/2025  -     Comprehensive Metabolic Panel; Future; Expected date: 04/09/2025  -     X-Ray Chest PA And Lateral; Future; Expected date: 04/09/2025  -     POCT EKG 12-LEAD (Manually Resulted by Ordering Provider)  - Consider multiple potential causes, including asthma, heart problems, and fluid in the lungs.  - Suspect current symptoms may not be solely due to asthma.    ORTHOPNEA:  - Brit reports difficulty breathing when sitting up high and when lying on the left side.  - Inquired about changes in sleeping position due to breathing difficulties.    URINARY URGENCY:  - Brit reports frequent  urination with small amounts.    URINARY INCONTINENCE:  - Brit reports long-standing urinary leakage.    LOCALIZED EDEMA:  Bilateral lower extremity edema  -     CBC Auto Differential; Future; Expected date: 04/09/2025  -     BNP; Future; Expected date: 04/09/2025  -     Comprehensive Metabolic Panel; Future; Expected date: 04/09/2025  -     X-Ray Chest PA And Lateral; Future; Expected date: 04/09/2025  -     POCT EKG 12-LEAD (Manually Resulted by Ordering Provider)  - Brit reports recent onset of leg swelling, which is unusual for him.  - Observed significant leg swelling during exam.  - Suspect potential heart problems as the cause.    Anemia, unspecified type  Patient does have chronic anemia, likely of chronic disease, and low iron is in differential for symptoms of SOB.  -     CBC Auto Differential; Future; Expected date: 04/09/2025  -     Ferritin; Future; Expected date: 04/09/2025  -     Iron and TIBC; Future; Expected date: 04/09/2025    SLEEP DISORDER:  - Brit reports sleeping for 5-6 hours per night, occasionally up to 8 hours.  - Takes Trazodone to help with sleep.       ADVISED PATIENT TO GO TO [facility] TO HAVE LABS AND CHEST XRAY COMPLETED AND I WILL LET HIM KNOW OF RESULTS  Follow up if symptoms worsen or fail to improve, for depending on lab and chest xray findings.        This note was generated with the assistance of ambient listening technology. Verbal consent was obtained by the patient and accompanying visitor(s) for the recording of patient appointment to facilitate this note. I attest to having reviewed and edited the generated note for accuracy, though some syntax or spelling errors may persist. Please contact the author of this note for any clarification.      4/9/2025 Jessika Mtz         [1]   Social History  Socioeconomic History    Marital status:    Tobacco Use    Smoking status: Never    Smokeless tobacco: Never   Substance and Sexual Activity    Alcohol use: Not Currently     Sexual activity: Not Currently     Social Drivers of Health     Financial Resource Strain: Low Risk  (3/24/2025)    Overall Financial Resource Strain (CARDIA)     Difficulty of Paying Living Expenses: Not hard at all   Food Insecurity: No Food Insecurity (3/24/2025)    Hunger Vital Sign     Worried About Running Out of Food in the Last Year: Never true     Ran Out of Food in the Last Year: Never true   Transportation Needs: No Transportation Needs (3/24/2025)    PRAPARE - Transportation     Lack of Transportation (Medical): No     Lack of Transportation (Non-Medical): No   Physical Activity: Unknown (3/24/2025)    Exercise Vital Sign     Minutes of Exercise per Session: 150+ min   Stress: Stress Concern Present (3/24/2025)    [employer] - Occupational Stress Questionnaire     Feeling of Stress : To some extent   Housing Stability: Low Risk  (3/24/2025)    Housing Stability Vital Sign     Unable to Pay for Housing in the Last Year: No     Number of Times Moved in the Last Year: 0     Homeless in the Last Year: No   [2]   Current Outpatient Medications:     albuterol (PROVENTIL/VENTOLIN HFA) 90 mcg/actuation inhaler, Inhale 1-2 puffs into the lungs daily as needed for Shortness of Breath. Rescue, Disp: 18 g, Rfl: 0    fluorouraciL (EFUDEX) 5 % cream, Apply thin film to left scalp and  in front of right ear  BID x 4 weeks, Disp: 40 g, Rfl: 0    lisinopriL (PRINIVIL,ZESTRIL) 20 MG tablet, Take 1 tablet (20 mg total) by mouth once daily., Disp: 90 tablet, Rfl: 3    SYMBICORT 160-4.5 mcg/actuation HFAA, Inhale 2 puffs into the lungs every 12 (twelve) hours., Disp: 30 g, Rfl: 4    traZODone (DESYREL) 100 MG tablet, Take 1 tablet (100 mg total) by mouth every evening., Disp: 90 tablet, Rfl: 1    ascorbic acid (VITAMIN C ORAL), Take 1 tablet by mouth 2 (two) times a day. (Patient not taking: Reported on 6/25/2024), Disp: , Rfl:     budesonide-glycopyr-formoterol (BREZTRI AEROSPHERE) 160-9-4.8  mcg/actuation HFAA, Inhale 2 puffs into the lungs 2 (two) times daily. (Patient not taking: Reported on 4/9/2025), Disp: 10.7 g, Rfl: 5    Current Facility-Administered Medications:     albuterol inhaler 2 puff, 2 puff, Inhalation, Q20 Min PRN, Farhana Riggins, NP    diphenhydrAMINE injection 25 mg, 25 mg, Intravenous, Once PRN, Farhana Riggins, NP    EPINEPHrine (EPIPEN) 0.3 mg/0.3 mL pen injection 0.3 mg, 0.3 mg, Intramuscular, PRN, Farhana Riggins, NP

## 2025-04-09 NOTE — TELEPHONE ENCOUNTER
increased albuterol usage, breztri too expensive, so back on symbicort. Does not see a cardiologist or pulmonologist   Pitting edema bilateral lower extremities since last week. Takes no diuretics  Discussed with Jessika Hein had 2 openings this afternoon, so worked him in today

## 2025-04-09 NOTE — ED PROVIDER NOTES
Chief complaint:  Shortness of Breath (For one week, increased with edema to lower extremities since last Friday. Denies cp )      HPI:  Brit Jennings is a 89 y.o. male with hx asthma, htn, prostate CA presenting with shortness of breath episodically for one-week, primarily with exertion on further discussion with family.  No orthopnea.  He denies dyspnea at rest here.  He was seen by PCP this morning and sent after outpatient x-ray demonstrated right-sided pleural effusion.  This is a new finding.  Patient has occasionally been using his albuterol when short of breath.  Leg swelling is new as of the past 4-5 days much greater than in the past.  He denies decreased urination, blood in the stools, dark stools.  No associated chest pain or pleuritic pain.  No fever, night sweats, unexplained weight loss, hemoptysis.    ROS: As per HPI and below:  No abdominal pain, confusion, syncope.    Review of patient's allergies indicates:   Allergen Reactions    Pollen extracts Other (See Comments)       Patient's Medications   New Prescriptions    No medications on file   Previous Medications    ALBUTEROL (PROVENTIL/VENTOLIN HFA) 90 MCG/ACTUATION INHALER    Inhale 1-2 puffs into the lungs daily as needed for Shortness of Breath. Rescue    ASCORBIC ACID (VITAMIN C ORAL)    Take 1 tablet by mouth 2 (two) times a day.    BUDESONIDE-GLYCOPYR-FORMOTEROL (BREZTRI AEROSPHERE) 160-9-4.8 MCG/ACTUATION HFAA    Inhale 2 puffs into the lungs 2 (two) times daily.    FLUOROURACIL (EFUDEX) 5 % CREAM    Apply thin film to left scalp and  in front of right ear  BID x 4 weeks    LISINOPRIL (PRINIVIL,ZESTRIL) 20 MG TABLET    Take 1 tablet (20 mg total) by mouth once daily.    SYMBICORT 160-4.5 MCG/ACTUATION HFAA    Inhale 2 puffs into the lungs every 12 (twelve) hours.    TRAZODONE (DESYREL) 100 MG TABLET    Take 1 tablet (100 mg total) by mouth every evening.   Modified Medications    No medications on file   Discontinued Medications     No medications on file       PMH:  As per HPI and below:  Past Medical History:   Diagnosis Date    Basal cell carcinoma 2020    L temple / radiation therapy    Cataract     Hypertension     Squamous cell carcinoma of skin      Past Surgical History:   Procedure Laterality Date    APPENDECTOMY      EYE SURGERY      Moh's surgery on right temple area Right 2015    Cancerous spot    PROSTATE BIOPSY      TONSILLECTOMY         Social History     Socioeconomic History    Marital status:    Tobacco Use    Smoking status: Never    Smokeless tobacco: Never   Substance and Sexual Activity    Alcohol use: Not Currently    Sexual activity: Not Currently     Social Drivers of Health     Financial Resource Strain: Low Risk  (3/24/2025)    Overall Financial Resource Strain (CARDIA)     Difficulty of Paying Living Expenses: Not hard at all   Food Insecurity: No Food Insecurity (3/24/2025)    Hunger Vital Sign     Worried About Running Out of Food in the Last Year: Never true     Ran Out of Food in the Last Year: Never true   Transportation Needs: No Transportation Needs (3/24/2025)    PRAPARE - Transportation     Lack of Transportation (Medical): No     Lack of Transportation (Non-Medical): No   Physical Activity: Unknown (3/24/2025)    Exercise Vital Sign     Minutes of Exercise per Session: 150+ min   Stress: Stress Concern Present (3/24/2025)    [employer] - Occupational Stress Questionnaire     Feeling of Stress : To some extent   Housing Stability: Low Risk  (3/24/2025)    Housing Stability Vital Sign     Unable to Pay for Housing in the Last Year: No     Number of Times Moved in the Last Year: 0     Homeless in the Last Year: No       Family History   Problem Relation Name Age of Onset    Melanoma Neg Hx      Psoriasis Neg Hx      Lupus Neg Hx      Eczema Neg Hx         Physical Exam:    Vitals:    04/09/25 2000   BP: (!) 193/98   Pulse: 78   Resp: (!) 21   Temp:      GENERAL:  No  apparent distress.  Alert.    HEENT:  Moist mucous membranes.  Normocephalic and atraumatic.    NECK:  No swelling.  Midline trachea.   CARDIOVASCULAR:  Regular rate and rhythm.  2+ radial pulses.  No murmur auscultated.  PULMONARY:  Diminished breath sounds to the right base with lungs are otherwise clear.  No wheezes or rhonchi.  No rales.  ABDOMEN:  Non-tender and non-distended.    EXTREMITIES:  Warm and well perfused.  Brisk capillary refill.  3+ pitting pedal edema.  Legs are symmetric and nontender to palpation with no calf or thigh tenderness.  NEUROLOGICAL:  Normal mental status.  Appropriate and conversant.    SKIN:  No rashes or ecchymoses.      Labs Reviewed   COMPREHENSIVE METABOLIC PANEL - Abnormal       Result Value    Sodium 134 (*)     Potassium 4.2      Chloride 98      CO2 27      Glucose 89      BUN 23      Creatinine 1.0      Calcium 10.3      Protein Total 7.8      Albumin 4.5      Bilirubin Total 1.0      ALP 64      AST 33      ALT 25      Anion Gap 9      eGFR >60     TROPONIN I HIGH SENSITIVITY - Abnormal    Troponin High Sensitive 254.2 (*)    B-TYPE NATRIURETIC PEPTIDE - Abnormal    BNP 3,385 (*)    CBC WITH DIFFERENTIAL - Abnormal    WBC 3.81 (*)     RBC 4.02 (*)     Hgb 13.6 (*)     Hct 41.2       (*)     MCH 33.8 (*)     MCHC 33.0      RDW 13.2      Platelet Count 112 (*)     MPV 13.2 (*)     Nucleated RBC 0      Neut % 55.2      Lymph % 27.8      Mono % 15.2 (*)     Eos % 0.5      Basophil % 0.8      Imm Grans % 0.5      Neut # 2.1      Lymph # 1.06      Mono # 0.58      Eos # 0.02      Baso # 0.03      Imm Grans # 0.02     CBC W/ AUTO DIFFERENTIAL    Narrative:     The following orders were created for panel order CBC auto differential.  Procedure                               Abnormality         Status                     ---------                               -----------         ------                     CBC with Differential[[id number]]       Abnormal            Final  result                 Please view results for these tests on the individual orders.   EXTRA TUBES    Narrative:     The following orders were created for panel order EXTRA TUBES.  Procedure                               Abnormality         Status                     ---------                               -----------         ------                     Light Blue Top Hold[[id number]]                             In process                 Gold Top Hold[[id number]]                                   In process                   Please view results for these tests on the individual orders.   LIGHT BLUE TOP HOLD   GOLD TOP HOLD   TROPONIN I HIGH SENSITIVITY   MAGNESIUM       Current Discharge Medication List        CONTINUE these medications which have NOT CHANGED    Details   albuterol (PROVENTIL/VENTOLIN HFA) 90 mcg/actuation inhaler Inhale 1-2 puffs into the lungs daily as needed for Shortness of Breath. Rescue  Qty: 18 g, Refills: 0    Associated Diagnoses: Panlobular emphysema      ascorbic acid (VITAMIN C ORAL) Take 1 tablet by mouth 2 (two) times a day.      budesonide-glycopyr-formoterol (BREZTRI AEROSPHERE) 160-9-4.8 mcg/actuation HFAA Inhale 2 puffs into the lungs 2 (two) times daily.  Qty: 10.7 g, Refills: 5    Associated Diagnoses: Panlobular emphysema      fluorouraciL (EFUDEX) 5 % cream Apply thin film to left scalp and  in front of right ear  BID x 4 weeks  Qty: 40 g, Refills: 0    Associated Diagnoses: AK (actinic keratosis)      lisinopriL (PRINIVIL,ZESTRIL) 20 MG tablet Take 1 tablet (20 mg total) by mouth once daily.  Qty: 90 tablet, Refills: 3    Comments: .  Associated Diagnoses: Essential hypertension      SYMBICORT 160-4.5 mcg/actuation HFAA Inhale 2 puffs into the lungs every 12 (twelve) hours.  Qty: 30 g, Refills: 4    Associated Diagnoses: Panlobular emphysema      traZODone (DESYREL) 100 MG tablet Take 1 tablet (100 mg total) by mouth every evening.  Qty: 90 tablet, Refills: 1    Associated  Diagnoses: Primary insomnia             Orders Placed This Encounter   Procedures    CBC auto differential    Comprehensive metabolic panel    Troponin I High Sensitivity    Brain Natriuertic Peptide (BNP)    CBC with Differential    EXTRA TUBES    Light Blue Top Hold    Gold Top Hold    Troponin I High Sensitivity    Basic metabolic panel    Magnesium    CBC Without Differential    Troponin I High Sensitivity    Diet Low Sodium, 2gm Fluid - 1500mL    Vital signs    Cardiac Monitoring - Adult    Fluid restriction    Height and weight On admission. Standing Weight Method required.    Daily weights On presentation to floor. Standing Weight Method required.    Strict intake and output 1.5 Liters maximum per 24 hours.    Strict intake and output 1.5 Liters maximum per 24 hours.    Notify Physician    Notify Physician - Potential Need of Opioid Reversal    Place sequential compression device    Full code    Inpatient consult to Social Work/Case Management    Inpatient consult to Registered Dietitian/Nutritionist    Inpatient consult to Cardiology    Pulse Oximetry Q4H    Inhalation Treatment Q6H PRN    EKG 12-lead    EKG 12-lead    Echo    Insert Saline lock IV    Admit to Inpatient    Fall precautions       Imaging Results    None         ED Course as of 04/09/25 2037 Wed Apr 09, 2025   1806 Outpatient CXR today reviewed with large R pleural effusion. [MR]      ED Course User Index  [MR] Harsh Christianson MD       MDM:    89 y.o. male with exertional dyspnea with prior history of asthma and no specific cardiac history presenting with associated lower extremity edema.  CHF considered but pattern on outpatient x-ray reviewed shows large right-sided effusion with some associated atelectasis.  Previous x-ray shows some basilar disease last year, but no significant effusion.  This is a new problem.  EKG performed to exclude ischemia or arrhythmia with low suspicion.  Laboratories sent to exclude other end-organ  dysfunction.  I have very low suspicion for PE.  I do not think D-dimer or CTA of the chest is indicated.  Legs are symmetric and nontender and I doubt DVT.  I do not think emergent ultrasound is indicated.  I do think he would benefit from admission for monitoring and expedited workup of markedly increased diffusion.  He does have elevated BNP with asymmetric perfusion related to CHF considered, although further paracentesis may be diagnostic and therapeutic.  I have discussed with medicine.  Elevated cardiac biomarker noted and may be trended.  This is likely secondary.  I do not think he requires emergent cardiac catheterization.  He remains resting comfortably.    Diagnoses:    1. SOB  2. R pleural effusion       Harsh Christianson MD  04/09/25 2037

## 2025-04-09 NOTE — TELEPHONE ENCOUNTER
----- Message from Ashlyn sent at 4/9/2025  3:11 PM CDT -----  Regarding: pt is here  Pt IS HERE   wants seen by someone sooner than 4/17/25 his appt date with Mike  complaining of inhalers not working and swelling in legs  can someone speak with the pt   thanks

## 2025-04-10 ENCOUNTER — CLINICAL SUPPORT (OUTPATIENT)
Dept: CARDIOLOGY | Facility: HOSPITAL | Age: OVER 89
End: 2025-04-10
Attending: STUDENT IN AN ORGANIZED HEALTH CARE EDUCATION/TRAINING PROGRAM
Payer: MEDICARE

## 2025-04-10 LAB
ANION GAP (SMH): 7 MMOL/L (ref 8–16)
AORTIC ROOT ANNULUS: 3.7 CM
AORTIC VALVE CUSP SEPERATION: 1.8 CM
APICAL FOUR CHAMBER EJECTION FRACTION: 50 %
APICAL TWO CHAMBER EJECTION FRACTION: 39 %
ASCENDING AORTA: 4.3 CM
AV INDEX (PROSTH): 0.65
AV MEAN GRADIENT: 4 MMHG
AV PEAK GRADIENT: 7 MMHG
AV REGURGITATION PRESSURE HALF TIME: 608 MS
AV VALVE AREA BY VELOCITY RATIO: 1.8 CM²
AV VALVE AREA: 1.6 CM²
AV VELOCITY RATIO: 0.69
BSA FOR ECHO PROCEDURE: 1.78 M2
BUN SERPL-MCNC: 21 MG/DL (ref 8–23)
CALCIUM SERPL-MCNC: 9.1 MG/DL (ref 8.7–10.5)
CHLORIDE SERPL-SCNC: 99 MMOL/L (ref 95–110)
CO2 SERPL-SCNC: 30 MMOL/L (ref 23–29)
CREAT SERPL-MCNC: 1 MG/DL (ref 0.5–1.4)
CV ECHO LV RWT: 0.57 CM
DOP CALC AO PEAK VEL: 1.3 M/S
DOP CALC AO VTI: 24.3 CM
DOP CALC LVOT AREA: 2.5 CM2
DOP CALC LVOT DIAMETER: 1.8 CM
DOP CALC LVOT PEAK VEL: 0.9 M/S
DOP CALC LVOT STROKE VOLUME: 39.9 CM3
DOP CALC MV VTI: 25.7 CM
DOP CALCLVOT PEAK VEL VTI: 15.7 CM
E WAVE DECELERATION TIME: 208 MSEC
E/A RATIO: 1.08
E/E' RATIO: 7 M/S
ECHO LV POSTERIOR WALL: 1.4 CM (ref 0.6–1.1)
ERYTHROCYTE [DISTWIDTH] IN BLOOD BY AUTOMATED COUNT: 13.2 % (ref 11.5–14.5)
GFR SERPLBLD CREATININE-BSD FMLA CKD-EPI: >60 ML/MIN/1.73/M2
GLUCOSE SERPL-MCNC: 101 MG/DL (ref 70–110)
HCT VFR BLD AUTO: 35.7 % (ref 40–54)
HGB BLD-MCNC: 11.7 GM/DL (ref 14–18)
INTERVENTRICULAR SEPTUM: 1.3 CM (ref 0.6–1.1)
IVC DIAMETER: 2.9 CM
LEFT ATRIUM AREA SYSTOLIC (APICAL 2 CHAMBER): 39.4 CM2
LEFT ATRIUM AREA SYSTOLIC (APICAL 4 CHAMBER): 34.5 CM2
LEFT ATRIUM VOLUME INDEX MOD: 87 ML/M2
LEFT ATRIUM VOLUME MOD: 154 ML
LEFT VENTRICLE DIASTOLIC VOLUME INDEX: 63.84 ML/M2
LEFT VENTRICLE DIASTOLIC VOLUME: 113 ML
LEFT VENTRICLE END DIASTOLIC VOLUME APICAL 2 CHAMBER: 143 ML
LEFT VENTRICLE END DIASTOLIC VOLUME APICAL 4 CHAMBER: 139 ML
LEFT VENTRICLE END SYSTOLIC VOLUME APICAL 2 CHAMBER: 163 ML
LEFT VENTRICLE END SYSTOLIC VOLUME APICAL 4 CHAMBER: 132 ML
LEFT VENTRICLE MASS INDEX: 151.4 G/M2
LEFT VENTRICLE SYSTOLIC VOLUME INDEX: 46.3 ML/M2
LEFT VENTRICLE SYSTOLIC VOLUME: 82 ML
LEFT VENTRICULAR INTERNAL DIMENSION IN DIASTOLE: 4.9 CM (ref 3.5–6)
LEFT VENTRICULAR MASS: 267.9 G
LV LATERAL E/E' RATIO: 6 M/S
LV SEPTAL E/E' RATIO: 8.2 M/S
LVED V (TEICH): 113 ML
LVOT MG: 1 MMHG
LVOT MV: 0.55 CM/S
MAGNESIUM SERPL-MCNC: 2.4 MG/DL (ref 1.6–2.6)
MCH RBC QN AUTO: 33.5 PG (ref 27–31)
MCHC RBC AUTO-ENTMCNC: 32.8 G/DL (ref 32–36)
MCV RBC AUTO: 102 FL (ref 82–98)
MV MEAN GRADIENT: 3 MMHG
MV PEAK A VEL: 0.83 M/S
MV PEAK E VEL: 0.9 M/S
MV PEAK GRADIENT: 5 MMHG
MV VALVE AREA BY CONTINUITY EQUATION: 1.55 CM2
OHS CV RV/LV RATIO: 0.57 CM
OHS LV EJECTION FRACTION SIMPSONS BIPLANE MOD: 45 %
PISA AR MAX VEL: 4.01 M/S
PISA MRMAX VEL: 5.84 M/S
PISA TR MAX VEL: 3.5 M/S
PLATELET # BLD AUTO: 98 K/UL (ref 150–450)
PMV BLD AUTO: 13.1 FL (ref 9.2–12.9)
POTASSIUM SERPL-SCNC: 3.6 MMOL/L (ref 3.5–5.1)
POTASSIUM SERPL-SCNC: 4.1 MMOL/L (ref 3.5–5.1)
PV MV: 0.46 M/S
PV PEAK GRADIENT: 2 MMHG
PV PEAK VELOCITY: 0.71 M/S
RA PRESSURE ESTIMATED: 15 MMHG
RBC # BLD AUTO: 3.49 M/UL (ref 4.6–6.2)
RIGHT ATRIUM VOLUME AREA LENGTH APICAL 4 CHAMBER: 81.6 ML
RIGHT VENTRICLE DIASTOLIC BASEL DIMENSION: 2.8 CM
RIGHT VENTRICULAR END-DIASTOLIC DIMENSION: 2.8 CM
RV TB RVSP: 19 MMHG
RV TISSUE DOPPLER FREE WALL SYSTOLIC VELOCITY 1 (APICAL 4 CHAMBER VIEW): 9.46 CM/S
SODIUM SERPL-SCNC: 136 MMOL/L (ref 136–145)
TDI LATERAL: 0.15 M/S
TDI SEPTAL: 0.11 M/S
TDI: 0.13 M/S
TR MAX PG: 49 MMHG
TRICUSPID ANNULAR PLANE SYSTOLIC EXCURSION: 1.93 CM
TROPONIN HIGH SENSITIVE (SMH): 264.6 PG/ML
TSH SERPL-ACNC: 2.42 UIU/ML (ref 0.34–5.6)
TV REST PULMONARY ARTERY PRESSURE: 64 MMHG
WBC # BLD AUTO: 3.65 K/UL (ref 3.9–12.7)
Z-SCORE OF LEFT VENTRICULAR DIMENSION IN END DIASTOLE: 0.01

## 2025-04-10 PROCEDURE — 84443 ASSAY THYROID STIM HORMONE: CPT

## 2025-04-10 PROCEDURE — 12000002 HC ACUTE/MED SURGE SEMI-PRIVATE ROOM

## 2025-04-10 PROCEDURE — 63600175 PHARM REV CODE 636 W HCPCS: Performed by: INTERNAL MEDICINE

## 2025-04-10 PROCEDURE — 99900035 HC TECH TIME PER 15 MIN (STAT)

## 2025-04-10 PROCEDURE — 36415 COLL VENOUS BLD VENIPUNCTURE: CPT

## 2025-04-10 PROCEDURE — 80048 BASIC METABOLIC PNL TOTAL CA: CPT | Performed by: STUDENT IN AN ORGANIZED HEALTH CARE EDUCATION/TRAINING PROGRAM

## 2025-04-10 PROCEDURE — 93306 TTE W/DOPPLER COMPLETE: CPT | Mod: 26,,, | Performed by: INTERNAL MEDICINE

## 2025-04-10 PROCEDURE — 25000003 PHARM REV CODE 250: Performed by: STUDENT IN AN ORGANIZED HEALTH CARE EDUCATION/TRAINING PROGRAM

## 2025-04-10 PROCEDURE — 36415 COLL VENOUS BLD VENIPUNCTURE: CPT | Performed by: STUDENT IN AN ORGANIZED HEALTH CARE EDUCATION/TRAINING PROGRAM

## 2025-04-10 PROCEDURE — 84132 ASSAY OF SERUM POTASSIUM: CPT

## 2025-04-10 PROCEDURE — 25000003 PHARM REV CODE 250

## 2025-04-10 PROCEDURE — P9047 ALBUMIN (HUMAN), 25%, 50ML: HCPCS | Performed by: INTERNAL MEDICINE

## 2025-04-10 PROCEDURE — 83735 ASSAY OF MAGNESIUM: CPT | Performed by: STUDENT IN AN ORGANIZED HEALTH CARE EDUCATION/TRAINING PROGRAM

## 2025-04-10 PROCEDURE — 99223 1ST HOSP IP/OBS HIGH 75: CPT | Mod: ,,, | Performed by: INTERNAL MEDICINE

## 2025-04-10 PROCEDURE — 25000003 PHARM REV CODE 250: Performed by: INTERNAL MEDICINE

## 2025-04-10 PROCEDURE — 63600175 PHARM REV CODE 636 W HCPCS: Performed by: STUDENT IN AN ORGANIZED HEALTH CARE EDUCATION/TRAINING PROGRAM

## 2025-04-10 PROCEDURE — 84484 ASSAY OF TROPONIN QUANT: CPT | Performed by: STUDENT IN AN ORGANIZED HEALTH CARE EDUCATION/TRAINING PROGRAM

## 2025-04-10 PROCEDURE — 93306 TTE W/DOPPLER COMPLETE: CPT

## 2025-04-10 PROCEDURE — 85027 COMPLETE CBC AUTOMATED: CPT | Performed by: STUDENT IN AN ORGANIZED HEALTH CARE EDUCATION/TRAINING PROGRAM

## 2025-04-10 PROCEDURE — 99900031 HC PATIENT EDUCATION (STAT)

## 2025-04-10 PROCEDURE — 94761 N-INVAS EAR/PLS OXIMETRY MLT: CPT

## 2025-04-10 RX ORDER — METHOCARBAMOL 750 MG/1
750 TABLET, FILM COATED ORAL ONCE
Status: COMPLETED | OUTPATIENT
Start: 2025-04-10 | End: 2025-04-10

## 2025-04-10 RX ORDER — LISINOPRIL 20 MG/1
20 TABLET ORAL ONCE
Status: COMPLETED | OUTPATIENT
Start: 2025-04-10 | End: 2025-04-10

## 2025-04-10 RX ORDER — ALBUMIN HUMAN 250 G/1000ML
12.5 SOLUTION INTRAVENOUS ONCE
Status: COMPLETED | OUTPATIENT
Start: 2025-04-10 | End: 2025-04-10

## 2025-04-10 RX ORDER — HYDRALAZINE HYDROCHLORIDE 20 MG/ML
10 INJECTION INTRAMUSCULAR; INTRAVENOUS EVERY 6 HOURS PRN
Status: DISCONTINUED | OUTPATIENT
Start: 2025-04-10 | End: 2025-04-13 | Stop reason: HOSPADM

## 2025-04-10 RX ORDER — DIAZEPAM 10 MG/2ML
2.5 INJECTION INTRAMUSCULAR ONCE
Status: COMPLETED | OUTPATIENT
Start: 2025-04-10 | End: 2025-04-10

## 2025-04-10 RX ORDER — METHOCARBAMOL 750 MG/1
750 TABLET, FILM COATED ORAL 4 TIMES DAILY PRN
Status: DISCONTINUED | OUTPATIENT
Start: 2025-04-10 | End: 2025-04-13 | Stop reason: HOSPADM

## 2025-04-10 RX ADMIN — ALBUMIN (HUMAN) 12.5 G: 12.5 SOLUTION INTRAVENOUS at 12:04

## 2025-04-10 RX ADMIN — SODIUM FERRIC GLUCONATE COMPLEX IN SUCROSE 125 MG: 12.5 INJECTION INTRAVENOUS at 08:04

## 2025-04-10 RX ADMIN — POTASSIUM BICARBONATE 50 MEQ: 978 TABLET, EFFERVESCENT ORAL at 01:04

## 2025-04-10 RX ADMIN — METHOCARBAMOL 750 MG: 750 TABLET ORAL at 05:04

## 2025-04-10 RX ADMIN — METHOCARBAMOL 750 MG: 750 TABLET ORAL at 12:04

## 2025-04-10 RX ADMIN — LISINOPRIL 20 MG: 20 TABLET ORAL at 10:04

## 2025-04-10 RX ADMIN — DIAZEPAM 2.5 MG: 10 INJECTION, SOLUTION INTRAMUSCULAR; INTRAVENOUS at 04:04

## 2025-04-10 RX ADMIN — LISINOPRIL 20 MG: 20 TABLET ORAL at 08:04

## 2025-04-10 RX ADMIN — TRAZODONE HYDROCHLORIDE 100 MG: 50 TABLET ORAL at 08:04

## 2025-04-10 RX ADMIN — FUROSEMIDE 40 MG: 10 INJECTION, SOLUTION INTRAVENOUS at 08:04

## 2025-04-10 NOTE — ASSESSMENT & PLAN NOTE
The likely etiology of thrombocytopenia is unclear. The patients 3 most recent labs are listed below.  Recent Labs     04/09/25  1635 04/09/25  1821 04/10/25  0457   * 112* 98*     Plan  - Will transfuse if platelet count is <10k.  Or less than 50 K if needing procedure or active bleeding  - B12 and folate reviewed

## 2025-04-10 NOTE — PROGRESS NOTES
[facility] - Emergency Dept  Hospital Medicine  Progress Note    Patient Name: Brit Jennings  MRN: [id number]  Patient Class: IP- Inpatient   Admission Date: 4/9/2025  Length of Stay: 1 days  Attending Physician: Miller Del Cid, *  Primary Care Provider: Jian Alcazar MD        Subjective     Principal Problem:New onset of congestive heart failure        HPI:  89-year-old male with PMH asthma, HTN, skin cancer, history of subdural hematoma presents to ER due to several weeks of worsening dyspnea on exertion with about 1 week of worsening peripheral edema of the bilateral lower extremities.  Wife and son bedside.  He is found to have elevated BNP at 3385, elevated troponin 254, and a chest x-ray showing moderate right pleural effusion with compressive atelectasis versus infiltrate as well as a tiny left pleural effusion.  EKG shows a bifascicular block which is old.  Other significant labs include WBC 3.81, hemoglobin 13.6, , platelet 112, iron saturation 16, sodium 134, creatinine 1.0.  He is hypertensive.  He is on room air.  Admitted to hospital medicine service.    Overview/Hospital Course:  No notes on file    Interval History:   Patient seen and examined.  NAD.  Patient is sitting up eating breakfast.  Patient on room air satting 95%.  Blood pressure elevated.  Increased lisinopril dosing.  Cardiology following.  Echocardiogram pending.  Wife at bedside.  Review of Systems   Constitutional:  Negative for activity change, chills, fatigue and fever.   Respiratory:  Positive for shortness of breath (With exertion). Negative for cough.    Cardiovascular:  Negative for chest pain.   Musculoskeletal: Negative.         Uses cane as needed   Psychiatric/Behavioral: Negative.       Objective:     Vital Signs (Most Recent):  Temp: 97.7 °F (36.5 °C) (04/09/25 1718)  Pulse: 67 (04/10/25 0128)  Resp: 18 (04/10/25 0128)  BP: (!) 170/79 (04/10/25 1010)  SpO2: 95 % (04/10/25 0128) Vital  Signs (24h Range):  Temp:  [97.7 °F (36.5 °C)] 97.7 °F (36.5 °C)  Pulse:  [67-87] 67  Resp:  [15-23] 18  SpO2:  [91 %-97 %] 95 %  BP: (146-209)/() 170/79     Weight: 66.7 kg (147 lb)  Body mass index is 23.02 kg/m².    Intake/Output Summary (Last 24 hours) at 4/10/2025 1415  Last data filed at 4/10/2025 1347  Gross per 24 hour   Intake --   Output 3725 ml   Net -3725 ml         Physical Exam  Vitals and nursing note reviewed. Exam conducted with a chaperone present.   Constitutional:       General: He is not in acute distress.  Cardiovascular:      Rate and Rhythm: Normal rate.      Pulses: Normal pulses.   Pulmonary:      Effort: Pulmonary effort is normal.      Comments: Decreased breath sounds  Abdominal:      General: Bowel sounds are normal.      Palpations: Abdomen is soft.   Musculoskeletal:      Right lower leg: Edema (+2) present.      Left lower leg: Edema (+2) present.   Skin:     General: Skin is warm and dry.   Neurological:      Mental Status: He is alert and oriented to person, place, and time.   Psychiatric:         Mood and Affect: Mood normal.         Behavior: Behavior normal.         Thought Content: Thought content normal.               Significant Labs: All pertinent labs within the past 24 hours have been reviewed.  CBC:   Recent Labs   Lab 04/09/25  1635 04/09/25  1821 04/10/25  0457   WBC 4.39 3.81* 3.65*   HGB 13.1* 13.6* 11.7*   HCT 40.6 41.2 35.7*   * 112* 98*     CMP:   Recent Labs   Lab 04/09/25  1635 04/09/25  1821 04/10/25  0457   * 134* 136   K 4.3 4.2 3.6   CO2 28 27 30*   BUN 24* 23 21   CREATININE 1.0 1.0 1.0   CALCIUM 10.2 10.3 9.1   ALBUMIN 4.3 4.5  --    BILITOT 0.8 1.0  --    ALKPHOS 60 64  --    AST 30 33  --    ALT 24 25  --      Cardiac Markers:   Recent Labs   Lab 04/09/25 1821   BNP 3,385*     Magnesium:   Recent Labs   Lab 04/09/25  2034 04/10/25  0457   MG 2.5 2.4       Recent Labs   Lab 04/10/25  1050   TSH 2.420     Lab Results   Component Value  Date    [id number] 1,085 (H) 04/09/2025     Lab Results   Component Value Date    FOLATE 18.8 04/09/2025       Significant Imaging: I have reviewed all pertinent imaging results/findings within the past 24 hours.  Imaging Results              X-Ray Chest 1 View (Final result)  Result time 04/10/25 07:31:08      Final result by Elie Mendoza MD (04/10/25 07:31:08)                   Impression:      No significant interval change.      Electronically signed by: Elie Mendoza  Date:    04/10/2025  Time:    07:31               Narrative:    EXAMINATION:  XR CHEST 1 VIEW    CLINICAL HISTORY:  reassess pleural effusions;    FINDINGS:  Portable chest radiograph at 04:07 hours compared to 04/09/2025 shows stable enlarged cardiac silhouette and normal pulmonary vascularity, with diffuse aortic vascular calcifications.    There are persistent right mid and lower lung airspace opacities, with right pleural effusion, obscuring the right hemidiaphragm.  Left lung base opacities suggesting subsegmental atelectasis are similar to prior, with no new pleural or parenchymal abnormality.                                         Assessment & Plan  New onset of congestive heart failure  Based on symptoms, BNP, pleural effusions  -start Lasix IV 40 mg daily  -monitor I/O  -echocardiogram pending interpretation  -telemetry  -cardiology following  Essential hypertension  Uncontrolled  -increased home lisinopril  -getting IV Lasix   -q.4 hours vital signs  -P.r.n. hydralazine  Elevated troponin  No chest pains.  Likely demand from the new CHF  -trend and treat the CHF  Anemia  Anemia is likely due to  iron deficiency . Most recent hemoglobin and hematocrit are listed below.  Recent Labs     04/09/25  1635 04/09/25  1821 04/10/25  0457   HGB 13.1* 13.6* 11.7*   HCT 40.6 41.2 35.7*     Plan  - Monitor serial CBC: Daily  - Transfuse PRBC if patient becomes hemodynamically unstable, symptomatic or H/H drops below 7/21.  - Patient has not  received any PRBC transfusions to date  - Patient's anemia is currently stable  - IV iron daily x3 doses  - B12 and folate reviewed  Thrombocytopenia  The likely etiology of thrombocytopenia is  unclear . The patients 3 most recent labs are listed below.  Recent Labs     04/09/25  1635 04/09/25  1821 04/10/25  0457   * 112* 98*     Plan  - Will transfuse if platelet count is <10k.  Or less than 50 K if needing procedure or active bleeding  - B12 and folate reviewed   Bilateral pleural effusion  On room air.  More than likely from new CHF  -see CHF section  -repeat CXR in morning and can consider thoracentesis if not improving with diuresis  VTE Risk Mitigation (From admission, onward)           Ordered     IP VTE HIGH RISK PATIENT  Once         04/09/25 2033     Place sequential compression device  Until discontinued         04/09/25 2033                    Discharge Planning   ELIESER: 4/11/2025     Code Status: Full Code   Medical Readiness for Discharge Date:                            Javad Noble NP  Department of Hospital Medicine   Former[facility] - Emergency Dept

## 2025-04-10 NOTE — ASSESSMENT & PLAN NOTE
Based on symptoms, BNP, pleural effusions  -start Lasix IV 40 mg daily  -monitor I/O  -echocardiogram pending interpretation  -telemetry  -cardiology following

## 2025-04-10 NOTE — PLAN OF CARE
Problem: Adult Inpatient Plan of Care  Goal: Plan of Care Review  Outcome: Progressing  Goal: Patient-Specific Goal (Individualized)  Outcome: Progressing  Goal: Absence of Hospital-Acquired Illness or Injury  Outcome: Progressing  Goal: Optimal Comfort and Wellbeing  Outcome: Progressing  Goal: Readiness for Transition of Care  Outcome: Progressing     Problem: Skin Injury Risk Increased  Goal: Skin Health and Integrity  Outcome: Progressing     Problem: Wound  Goal: Optimal Coping  Outcome: Progressing

## 2025-04-10 NOTE — ASSESSMENT & PLAN NOTE
Uncontrolled  -increased home lisinopril  -getting IV Lasix   -q.4 hours vital signs  -P.r.n. hydralazine

## 2025-04-10 NOTE — NURSING TRANSFER
Nursing Transfer Note      4/10/2025   3:58 PM    Nurse giving handoff: Renae SANCHEZ   Nurse receiving handoff: Emy    Transfer To: [id number]    Transfer via stretcher    Transfer with cardiac monitoring    Transported by Tech    Transfer Vital Signs:  Blood Pressure: 150/84  Heart Rate: 67  O2: 92  Temperature:98.2  Respirations:16    Telemetry: Box number [id number]  Order for Tele Monitor? Yes    Patient belongings transferred with patient: Yes    Chart send with patient: Yes    Notified: spouse    Upon arrival to floor: cardiac monitor applied, patient oriented to room, call bell in reach, and bed in lowest position

## 2025-04-10 NOTE — ASSESSMENT & PLAN NOTE
Based on symptoms, BNP, pleural effusions  -start Lasix IV 40 mg daily  -monitor I/O  -echocardiogram  -telemetry  -cardiology consult

## 2025-04-10 NOTE — CONSULTS
Formerly [facility], [facility]–1983 - Emergency Dept  Department of Cardiology  Consult Note      PATIENT NAME: Brit Jennings  MRN: [id number]  TODAY'S DATE: 04/10/2025  ADMIT DATE: 4/9/2025                          CONSULT REQUESTED BY: Miller Del Cid, *    SUBJECTIVE     PRINCIPAL PROBLEM: New onset of congestive heart failure      REASON FOR CONSULT:  New onset CHF      HPI:     89-year-old male with PMH asthma, HTN, skin cancer, history of subdural hematoma who presented to ED with c/o worsening shortness of breath and lower extremity edema x 1 week. CXR concerning for cardiomegaly with moderate R pleural effusion with compressive atelectasis vs infiltrate in RML and RLL.  Tiny left pleural effusion and LLL atelectasis. No acute ST- T wave changes on EKG.  Patient is very active. He goes to the gym daily, and still runs and bikes several miles a day.  Denies recent illness.  In ED H&H 13/40, , K 4.3, Cr 1.0, Mag 2.4, BNP 3951, Troponin  > 258 > 254.    Patient had echo 1/20/24 that showed EF 58% normal wall motion and no acute valvular abnormalities.                Review of patient's allergies indicates:   Allergen Reactions    Pollen extracts Other (See Comments)       Past Medical History:   Diagnosis Date    Basal cell carcinoma 2020    L temple / radiation therapy    Cataract     Hypertension     Squamous cell carcinoma of skin      Past Surgical History:   Procedure Laterality Date    APPENDECTOMY      EYE SURGERY      Moh's surgery on right temple area Right 2015    Cancerous spot    PROSTATE BIOPSY      TONSILLECTOMY       Social History[1]     REVIEW OF SYSTEMS    As mentioned in HPI    OBJECTIVE     VITAL SIGNS (Most Recent)  Temp: 97.7 °F (36.5 °C) (04/09/25 1718)  Pulse: 67 (04/10/25 0128)  Resp: 18 (04/10/25 0128)  BP: (!) 170/79 (04/10/25 1010)  SpO2: 95 % (04/10/25 0128)    VENTILATION STATUS  Resp: 18 (04/10/25 0128)  SpO2: 95 % (04/10/25 0128)           I & O (Last  24H):  Intake/Output Summary (Last 24 hours) at 4/10/2025 1030  Last data filed at 4/10/2025 1009  Gross per 24 hour   Intake --   Output 3325 ml   Net -3325 ml       WEIGHTS  Wt Readings from Last 3 Encounters:   04/09/25 1718 66.7 kg (147 lb)   04/09/25 1530 66.2 kg (146 lb)   03/14/25 0729 65.8 kg (145 lb)       PHYSICAL EXAM    CONSTITUTIONAL: NAD  HEENT: Normocephalic.   NECK: no JVD  LUNGS: crackles BLL; diminished RLL  HEART: regular rate and rhythm, S1, S2 normal, no murmur   ABDOMEN:  Nondistended  EXTREMITIES: 1+ BLE edema  SKIN: No rash  NEURO: AAO X 3  PSYCH: normal affect      HOME MEDICATIONS:Medications Ordered Prior to Encounter[2]    SCHEDULED MEDS:   ferric gluconate  125 mg Intravenous Daily    furosemide (LASIX) injection  40 mg Intravenous Daily    lisinopriL  20 mg Oral Daily    traZODone  100 mg Oral QHS       CONTINUOUS INFUSIONS:    PRN MEDS:  Current Facility-Administered Medications:     acetaminophen, 1,000 mg, Oral, Q6H PRN    albuterol, 2 puff, Inhalation, Q20 Min PRN    albuterol-ipratropium, 3 mL, Nebulization, Q6H PRN    diphenhydrAMINE, 25 mg, Intravenous, Once PRN    EPINEPHrine, 0.3 mg, Intramuscular, PRN    hydrALAZINE, 10 mg, Intravenous, Q6H PRN    magnesium oxide, 800 mg, Oral, PRN    magnesium oxide, 800 mg, Oral, PRN    ondansetron, 4 mg, Intravenous, Q6H PRN    potassium bicarbonate, 35 mEq, Oral, PRN    potassium bicarbonate, 50 mEq, Oral, PRN    potassium bicarbonate, 60 mEq, Oral, PRN    potassium, sodium phosphates, 2 packet, Oral, PRN    potassium, sodium phosphates, 2 packet, Oral, PRN    potassium, sodium phosphates, 2 packet, Oral, PRN    sodium chloride 0.9%, 10 mL, Intravenous, PRN    LABS AND DIAGNOSTICS     CBC LAST 3 DAYS  Recent Labs   Lab 04/09/25  1635 04/09/25  1821 04/10/25  0457   WBC 4.39 3.81* 3.65*   RBC 3.90* 4.02* 3.49*   HGB 13.1* 13.6* 11.7*   HCT 40.6 41.2 35.7*   * 103* 102*   MCH 33.6* 33.8* 33.5*   MCHC 32.3 33.0 32.8   RDW 13.2 13.2  "13.2   * 112* 98*   MPV 13.3* 13.2* 13.1*   NRBC 0 0  --        COAGULATION LAST 3 DAYS  No results for input(s): "LABPT", "INR", "APTT" in the last 168 hours.    CHEMISTRY LAST 3 DAYS  Recent Labs   Lab 04/09/25  1635 04/09/25  1821 04/09/25  2034 04/10/25  0457   * 134*  --  136   K 4.3 4.2  --  3.6   CO2 28 27  --  30*   BUN 24* 23  --  21   CREATININE 1.0 1.0  --  1.0   CALCIUM 10.2 10.3  --  9.1   MG  --   --  2.5 2.4   ALBUMIN 4.3 4.5  --   --    ALKPHOS 60 64  --   --    ALT 24 25  --   --    AST 30 33  --   --    BILITOT 0.8 1.0  --   --        CARDIAC PROFILE LAST 3 DAYS  Recent Labs   Lab 04/09/25  1635 04/09/25  1821   BNP 3,951* 3,385*       ENDOCRINE LAST 3 DAYS  No results for input(s): "TSH", "PROCAL" in the last 168 hours.    LAST ARTERIAL BLOOD GAS  ABG  No results for input(s): "PH", "PO2", "PCO2", "HCO3", "BE" in the last 168 hours.    LAST 7 DAYS MICROBIOLOGY   Microbiology Results (last 7 days)       ** No results found for the last 168 hours. **            MOST RECENT IMAGING  X-Ray Chest 1 View  Narrative: EXAMINATION:  XR CHEST 1 VIEW    CLINICAL HISTORY:  reassess pleural effusions;    FINDINGS:  Portable chest radiograph at 04:07 hours compared to 04/09/2025 shows stable enlarged cardiac silhouette and normal pulmonary vascularity, with diffuse aortic vascular calcifications.    There are persistent right mid and lower lung airspace opacities, with right pleural effusion, obscuring the right hemidiaphragm.  Left lung base opacities suggesting subsegmental atelectasis are similar to prior, with no new pleural or parenchymal abnormality.  Impression: No significant interval change.    Electronically signed by: Elie Mendoza  Date:    04/10/2025  Time:    07:31      ECHOCARDIOGRAM RESULTS (last 5)  Results for orders placed during the hospital encounter of 01/20/24    Echo    Interpretation Summary    Left Ventricle: The left ventricle is normal in size. There is concentric " remodeling. Normal wall motion. There is normal systolic function. Biplane (2D) method of discs ejection fraction is 58%. Unable to assess diastolic function due to poor image quality.    Right Ventricle: Right ventricle was not well visualized due to poor acoustic window.    Left Atrium: Left atrium is moderately dilated.    Aortic Valve: The aortic valve is a trileaflet valve. There is mild aortic valve sclerosis. There is mild to moderate aortic regurgitation.    Mitral Valve: Mildly thickened anterior leaflet.    IVC/SVC: Normal venous pressure at 3 mmHg.    Ascending Aorta: Not well visualized due to poor acoustic window. Aortic root is mildly dilated (about 4.2 cm).      CURRENT/PREVIOUS VISIT EKG  Results for orders placed or performed during the hospital encounter of 04/09/25   EKG 12-lead    Collection Time: 04/09/25  5:13 PM   Result Value Ref Range    QRS Duration 124 ms    OHS QTC Calculation 488 ms    Narrative    Test Reason : R06.02,    Vent. Rate :  87 BPM     Atrial Rate :  87 BPM     P-R Int : 284 ms          QRS Dur : 124 ms      QT Int : 406 ms       P-R-T Axes :    -50  31 degrees    QTcB Int : 488 ms    Sinus rhythm with 1st degree A-V block  Right bundle branch block  Left anterior fascicular block   Bifascicular block   Septal infarct ,age undetermined  Marked ST abnormality, possible anterolateral subendocardial injury  Abnormal ECG  No previous ECGs available    Referred By: AAAREFERRAL SELF           Confirmed By:            ASSESSMENT/PLAN:     Active Hospital Problems    Diagnosis    *New onset of congestive heart failure    Anemia    Thrombocytopenia    Bilateral pleural effusion    Elevated troponin    Essential hypertension       ASSESSMENT & PLAN:     Acute CHF exacerbation  Chronic Anemia  Thrombocytopenia  Bilateral pleural effusions R>L  Elevated troponin HS  Hypertension      RECOMMENDATIONS:    Acute CHF exacerbation this admission.  Hx preserved EF 58% 1/20/24.  + lower  extremity edema and pulmonary edema with R pleural effusion on CXR.    Strict I's and O's. 2 g salt restriction. 1.5 L fluid restriction.  Continue IV diuresis. Daily weights. Closely monitor renal function.  Repeat echo pending. Further recommendations to follow.  Elevated troponin HS likely secondary to acute CHF exacerbation. Denies CP. . Can consider stress testing when euvolemic.  Obtain TSH  Continue to check and replace potassium and magnesium. Goal for potassium is 4.0, and goal for magnesium is 2.0.    Thank you for the consult. We will continue to follow.     Adriana Lewis NP   Department of Cardiology  Date of Service: 04/10/2025        I have personally interviewed and examined the patient, I have reviewed the Nurse Practitioner's history and physical, assessment, and plan. I have personally evaluated the patient at bedside and agree with the findings and made appropriate changes as necessary in recommendations.  All pertinent recent labs, imaging and EKGs independently reviewed and interpreted.     Manoj Bernal MD [facility]  Department of Cardiology  [facility]  4/10/2025           [1]   Social History  Tobacco Use    Smoking status: Never    Smokeless tobacco: Never   Substance Use Topics    Alcohol use: Not Currently   [2]   Current Facility-Administered Medications on File Prior to Encounter   Medication Dose Route Frequency Provider Last Rate Last Admin    albuterol inhaler 2 puff  2 puff Inhalation Q20 Min PRN Farhana Riggins NP        diphenhydrAMINE injection 25 mg  25 mg Intravenous Once PRN Farhana Riggins, BARI        EPINEPHrine (EPIPEN) 0.3 mg/0.3 mL pen injection 0.3 mg  0.3 mg Intramuscular PRN Farhana Riggins NP        [DISCONTINUED] methylPREDNISolone sodium succinate injection 40 mg  40 mg Intravenous Once PRN Farhana Riggins NP        [DISCONTINUED] ondansetron disintegrating tablet 4 mg  4 mg Oral Once PRN Farhana Riggins NP        [DISCONTINUED]  sodium chloride 0.9% 500 mL flush bag   Intravenous PRN Farhana Riggins NP   Stopped at 12/15/21 7449    [DISCONTINUED] sodium chloride 0.9% flush 10 mL  10 mL Intravenous PRN Farhana Riggins NP         Current Outpatient Medications on File Prior to Encounter   Medication Sig Dispense Refill    albuterol (PROVENTIL/VENTOLIN HFA) 90 mcg/actuation inhaler Inhale 1-2 puffs into the lungs daily as needed for Shortness of Breath. Rescue 18 g 0    lisinopriL (PRINIVIL,ZESTRIL) 20 MG tablet Take 1 tablet (20 mg total) by mouth once daily. (Patient taking differently: Take 20 mg by mouth every evening.) 90 tablet 3    neomycin-polymyxin-dexamethasone (DEXACINE) 3.5 mg/g-10,000 unit/g-0.1 % Oint Place 1 application  into both eyes every 6 (six) hours.      SYMBICORT 160-4.5 mcg/actuation HFAA Inhale 2 puffs into the lungs every 12 (twelve) hours. 30 g 4    traZODone (DESYREL) 100 MG tablet Take 1 tablet (100 mg total) by mouth every evening. 90 tablet 1    ascorbic acid (VITAMIN C ORAL) Take 1 tablet by mouth 2 (two) times a day. (Patient not taking: Reported on 4/9/2025)      budesonide-glycopyr-formoterol (BREZTRI AEROSPHERE) 160-9-4.8 mcg/actuation HFAA Inhale 2 puffs into the lungs 2 (two) times daily. (Patient not taking: Reported on 4/9/2025) 10.7 g 5

## 2025-04-10 NOTE — SUBJECTIVE & OBJECTIVE
Past Medical History:   Diagnosis Date    Basal cell carcinoma 2020    L temple / radiation therapy    Cataract     Hypertension     Squamous cell carcinoma of skin        Past Surgical History:   Procedure Laterality Date    APPENDECTOMY      EYE SURGERY      Moh's surgery on right temple area Right 2015    Cancerous spot    PROSTATE BIOPSY      TONSILLECTOMY         Review of patient's allergies indicates:   Allergen Reactions    Pollen extracts Other (See Comments)       Current Facility-Administered Medications on File Prior to Encounter   Medication    albuterol inhaler 2 puff    diphenhydrAMINE injection 25 mg    EPINEPHrine (EPIPEN) 0.3 mg/0.3 mL pen injection 0.3 mg    [DISCONTINUED] methylPREDNISolone sodium succinate injection 40 mg    [DISCONTINUED] ondansetron disintegrating tablet 4 mg    [DISCONTINUED] sodium chloride 0.9% 500 mL flush bag    [DISCONTINUED] sodium chloride 0.9% flush 10 mL     Current Outpatient Medications on File Prior to Encounter   Medication Sig    albuterol (PROVENTIL/VENTOLIN HFA) 90 mcg/actuation inhaler Inhale 1-2 puffs into the lungs daily as needed for Shortness of Breath. Rescue    ascorbic acid (VITAMIN C ORAL) Take 1 tablet by mouth 2 (two) times a day. (Patient not taking: Reported on 6/25/2024)    budesonide-glycopyr-formoterol (BREZTRI AEROSPHERE) 160-9-4.8 mcg/actuation HFAA Inhale 2 puffs into the lungs 2 (two) times daily. (Patient not taking: Reported on 4/9/2025)    fluorouraciL (EFUDEX) 5 % cream Apply thin film to left scalp and  in front of right ear  BID x 4 weeks    lisinopriL (PRINIVIL,ZESTRIL) 20 MG tablet Take 1 tablet (20 mg total) by mouth once daily.    SYMBICORT 160-4.5 mcg/actuation HFAA Inhale 2 puffs into the lungs every 12 (twelve) hours.    traZODone (DESYREL) 100 MG tablet Take 1 tablet (100 mg total) by mouth every evening.     Family History    None       Tobacco Use    Smoking status: Never    Smokeless tobacco: Never   Substance and Sexual  Activity    Alcohol use: Not Currently    Drug use: Not on file    Sexual activity: Not Currently       Objective:     Vital Signs (Most Recent):  Temp: 97.7 °F (36.5 °C) (04/09/25 1718)  Pulse: 78 (04/09/25 2028)  Resp: (!) 23 (04/09/25 2028)  BP: (!) 192/109 (04/09/25 2028)  SpO2: (!) 93 % (04/09/25 2028) Vital Signs (24h Range):  Temp:  [97.7 °F (36.5 °C)] 97.7 °F (36.5 °C)  Pulse:  [70-87] 78  Resp:  [15-23] 23  SpO2:  [93 %-97 %] 93 %  BP: (146-209)/() 192/109     Weight: 66.7 kg (147 lb)  Body mass index is 23.02 kg/m².     Physical Exam  Vitals reviewed.   Constitutional:       General: He is not in acute distress.  HENT:      Head: Normocephalic and atraumatic.   Cardiovascular:      Rate and Rhythm: Normal rate and regular rhythm.      Heart sounds: Normal heart sounds.   Pulmonary:      Effort: Pulmonary effort is normal. No respiratory distress.      Comments: Decreased breath sounds right mid to lower lung with rales  Abdominal:      General: Abdomen is flat. There is no distension.      Palpations: Abdomen is soft.      Tenderness: There is no abdominal tenderness. There is no guarding.   Musculoskeletal:      Right lower leg: Edema (2 to 3+) present.      Left lower leg: Edema (2 to 3+) present.   Skin:     General: Skin is warm and dry.   Neurological:      General: No focal deficit present.      Mental Status: He is alert and oriented to person, place, and time. Mental status is at baseline.   Psychiatric:         Mood and Affect: Affect normal.         Behavior: Behavior normal.                Significant Labs: All pertinent labs within the past 24 hours have been reviewed.    Significant Imaging: I have reviewed all pertinent imaging results/findings within the past 24 hours.    Admission on 04/09/2025   Component Date Value Ref Range Status    Sodium 04/09/2025 134 (L)  136 - 145 mmol/L Final    Potassium 04/09/2025 4.2  3.5 - 5.1 mmol/L Final    Chloride 04/09/2025 98  95 - 110 mmol/L Final     CO2 04/09/2025 27  23 - 29 mmol/L Final    Glucose 04/09/2025 89  70 - 110 mg/dL Final    BUN 04/09/2025 23  8 - 23 mg/dL Final    Creatinine 04/09/2025 1.0  0.5 - 1.4 mg/dL Final    Calcium 04/09/2025 10.3  8.7 - 10.5 mg/dL Final    Protein Total 04/09/2025 7.8  6.0 - 8.4 gm/dL Final    Albumin 04/09/2025 4.5  3.5 - 5.2 g/dL Final    Bilirubin Total 04/09/2025 1.0  0.1 - 1.0 mg/dL Final    For infants and newborns, interpretation of results should be based   on gestational age, weight and in agreement with clinical   observations.    Premature Infant recommended reference ranges:   0-24 hours:  <8.0 mg/dL   24-48 hours: <12.0 mg/dL   3-5 days:    <15.0 mg/dL   6-29 days:   <15.0 mg/dL    ALP 04/09/2025 64  55 - 135 unit/L Final    AST 04/09/2025 33  10 - 40 unit/L Final    ALT 04/09/2025 25  10 - 44 unit/L Final    Anion Gap 04/09/2025 9  8 - 16 mmol/L Final    eGFR 04/09/2025 >60  >60 mL/min/1.73/m2 Final    Troponin High Sensitive 04/09/2025 254.2 (HH)  <=14.9 pg/mL Final    Critical result TNIHS 254.2 pg/mL called to and read back by Dr. Catie Solis ED at 09-Apr-2025 19:[facility].  Troponin results differ between methods. Do not use   results between Troponin methods interchangeably.    Alkaline Phospatase levels above 400 U/L may   cause false positive results.    Access hsTnI should not be used for patients taking   Asfotase cristhian (Strensiq).    BNP 04/09/2025 3,385 (H)  <=99 pg/mL Final    Values of less than 100 pg/ml are consistent with non-CHF populations.    WBC 04/09/2025 3.81 (L)  3.90 - 12.70 K/uL Final    RBC 04/09/2025 4.02 (L)  4.60 - 6.20 M/uL Final    Hgb 04/09/2025 13.6 (L)  14.0 - 18.0 gm/dL Final    Hct 04/09/2025 41.2  40.0 - 54.0 % Final    MCV 04/09/2025 103 (H)  82 - 98 fL Final    MCH 04/09/2025 33.8 (H)  27.0 - 31.0 pg Final    MCHC 04/09/2025 33.0  32.0 - 36.0 g/dL Final    RDW 04/09/2025 13.2  11.5 - 14.5 % Final    Platelet Count 04/09/2025 112 (L)  150 - 450 K/uL Final     MPV 04/09/2025 13.2 (H)  9.2 - 12.9 fL Final    Nucleated RBC 04/09/2025 0  <=0 /100 WBC Final    Neut % 04/09/2025 55.2  38 - 73 % Final    Lymph % 04/09/2025 27.8  18 - 48 % Final    Mono % 04/09/2025 15.2 (H)  4 - 15 % Final    Eos % 04/09/2025 0.5  0 - 8 % Final    Basophil % 04/09/2025 0.8  <=1.9 % Final    Imm Grans % 04/09/2025 0.5  0.0 - 0.5 % Final    Neut # 04/09/2025 2.1  1.8 - 7.7 K/uL Final    Lymph # 04/09/2025 1.06  1 - 4.8 K/uL Final    Mono # 04/09/2025 0.58  0.3 - 1 K/uL Final    Eos # 04/09/2025 0.02  <=0.5 K/uL Final    Baso # 04/09/2025 0.03  <=0.2 K/uL Final    Imm Grans # 04/09/2025 0.02  0.00 - 0.04 K/uL Final    Mild elevation in immature granulocytes is non specific and can be seen in a variety of conditions including stress response, acute inflammation, trauma and pregnancy. Correlation with other laboratory and clinical findings is essential.    Magnesium 04/09/2025 2.5  1.6 - 2.6 mg/dL Final   Lab Visit on 04/09/2025   Component Date Value Ref Range Status    BNP 04/09/2025 3,951 (H)  <=99 pg/mL Final    Values of less than 100 pg/ml are consistent with non-CHF populations.    Sodium 04/09/2025 134 (L)  136 - 145 mmol/L Final    Potassium 04/09/2025 4.3  3.5 - 5.1 mmol/L Final    Chloride 04/09/2025 97  95 - 110 mmol/L Final    CO2 04/09/2025 28  23 - 29 mmol/L Final    Glucose 04/09/2025 93  70 - 110 mg/dL Final    BUN 04/09/2025 24 (H)  8 - 23 mg/dL Final    Creatinine 04/09/2025 1.0  0.5 - 1.4 mg/dL Final    Calcium 04/09/2025 10.2  8.7 - 10.5 mg/dL Final    Protein Total 04/09/2025 7.3  6.0 - 8.4 gm/dL Final    Albumin 04/09/2025 4.3  3.5 - 5.2 g/dL Final    Bilirubin Total 04/09/2025 0.8  0.1 - 1.0 mg/dL Final    For infants and newborns, interpretation of results should be based   on gestational age, weight and in agreement with clinical   observations.    Premature Infant recommended reference ranges:   0-24 hours:  <8.0 mg/dL   24-48 hours: <12.0 mg/dL   3-5 days:    <15.0 mg/dL    6-29 days:   <15.0 mg/dL    ALP 04/09/2025 60  55 - 135 unit/L Final    AST 04/09/2025 30  10 - 40 unit/L Final    ALT 04/09/2025 24  10 - 44 unit/L Final    Anion Gap 04/09/2025 9  8 - 16 mmol/L Final    eGFR 04/09/2025 >60  >60 mL/min/1.73/m2 Final    Ferritin 04/09/2025 69.2  20.0 - 300.0 ng/mL Final    Iron Level 04/09/2025 56  45 - 160 ug/dL Final    Transferrin 04/09/2025 247  200 - 375 mg/dL Final    Iron Binding Capacity Total 04/09/2025 346  250 - 450 ug/dL Final    Iron Saturation 04/09/2025 16 (L)  20 - 50 % Final    WBC 04/09/2025 4.39  3.90 - 12.70 K/uL Final    RBC 04/09/2025 3.90 (L)  4.60 - 6.20 M/uL Final    Hgb 04/09/2025 13.1 (L)  14.0 - 18.0 gm/dL Final    Hct 04/09/2025 40.6  40.0 - 54.0 % Final    MCV 04/09/2025 104 (H)  82 - 98 fL Final    MCH 04/09/2025 33.6 (H)  27.0 - 31.0 pg Final    MCHC 04/09/2025 32.3  32.0 - 36.0 g/dL Final    RDW 04/09/2025 13.2  11.5 - 14.5 % Final    Platelet Count 04/09/2025 120 (L)  150 - 450 K/uL Final    MPV 04/09/2025 13.3 (H)  9.2 - 12.9 fL Final    Nucleated RBC 04/09/2025 0  <=0 /100 WBC Final    Neut % 04/09/2025 48.5  38 - 73 % Final    Lymph % 04/09/2025 33.0  18 - 48 % Final    Mono % 04/09/2025 17.1 (H)  4 - 15 % Final    Eos % 04/09/2025 0.2  0 - 8 % Final    Basophil % 04/09/2025 0.7  <=1.9 % Final    Imm Grans % 04/09/2025 0.5  0.0 - 0.5 % Final    Neut # 04/09/2025 2.1  1.8 - 7.7 K/uL Final    Lymph # 04/09/2025 1.45  1 - 4.8 K/uL Final    Mono # 04/09/2025 0.75  0.3 - 1 K/uL Final    Eos # 04/09/2025 0.01  <=0.5 K/uL Final    Baso # 04/09/2025 0.03  <=0.2 K/uL Final    Imm Grans # 04/09/2025 0.02  0.00 - 0.04 K/uL Final    Mild elevation in immature granulocytes is non specific and can be seen in a variety of conditions including stress response, acute inflammation, trauma and pregnancy. Correlation with other laboratory and clinical findings is essential.   Office Visit on 04/09/2025   Component Date Value Ref Range Status    EKG 12-Lead  04/09/2025    Corrected    SR with first degree AV block, (known) right BBB, known fascicular block, non specific T wave abnormality, no overt depression or elevation noted    Ventricular Rate 04/09/2025 72   Final    HI Interval 04/09/2025 302   Final    QRS Duration 04/09/2025 142   Final    QT/QTc 04/09/2025 432/473   Final    PRT Axes 04/09/2025 -14  -51  5   Final     X-Ray Chest PA And Lateral  Result Date: 4/9/2025  EXAMINATION: XR CHEST PA AND LATERAL CLINICAL HISTORY: Dyspnea, unspecified FINDINGS: PA and lateral chest is compared to 06/25/2024 shows cardiomegaly.  There is a moderate size right pleural effusion with compressive atelectasis in the right lower lobe. Tiny left pleural effusion with left lower lobe atelectasis.  The upper lobes are clear. Diffuse vascular calcification of the aorta which is tortuous.  Pulmonary vasculature is normal. Degenerative changes of the spine.     Cardiomegaly with moderate size right pleural effusion with compressive atelectasis versus infiltrate in the right middle lobe and right lower lobe Tiny left pleural effusion with left lower lobe atelectasis Tortuous aorta Electronically signed by: Tori Dawn Date:    04/09/2025 Time:    16:52  - pulls last radiology orders

## 2025-04-10 NOTE — ASSESSMENT & PLAN NOTE
The likely etiology of thrombocytopenia is unclear. The patients 3 most recent labs are listed below.  Recent Labs     04/09/25  1635 04/09/25  1821   * 112*     Plan  - Will transfuse if platelet count is <10k.  Or less than 50 K if needing procedure or active bleeding  - check B12 and folate

## 2025-04-10 NOTE — ASSESSMENT & PLAN NOTE
Anemia is likely due to iron deficiency. Most recent hemoglobin and hematocrit are listed below.  Recent Labs     04/09/25  1635 04/09/25  1821 04/10/25  0457   HGB 13.1* 13.6* 11.7*   HCT 40.6 41.2 35.7*     Plan  - Monitor serial CBC: Daily  - Transfuse PRBC if patient becomes hemodynamically unstable, symptomatic or H/H drops below 7/21.  - Patient has not received any PRBC transfusions to date  - Patient's anemia is currently stable  - IV iron daily x3 doses  - B12 and folate reviewed

## 2025-04-10 NOTE — ASSESSMENT & PLAN NOTE
Anemia is likely due to iron deficiency. Most recent hemoglobin and hematocrit are listed below.  Recent Labs     04/09/25  1635 04/09/25  1821   HGB 13.1* 13.6*   HCT 40.6 41.2     Plan  - Monitor serial CBC: Daily  - Transfuse PRBC if patient becomes hemodynamically unstable, symptomatic or H/H drops below 7/21.  - Patient has not received any PRBC transfusions to date  - Patient's anemia is currently stable  - IV iron daily x3 doses  - check B12 and folate as well

## 2025-04-10 NOTE — SUBJECTIVE & OBJECTIVE
Interval History:   Patient seen and examined.  NAD.  Patient is sitting up eating breakfast.  Patient on room air satting 95%.  Blood pressure elevated.  Increased lisinopril dosing.  Cardiology following.  Echocardiogram pending.  Wife at bedside.  Review of Systems   Constitutional:  Negative for activity change, chills, fatigue and fever.   Respiratory:  Positive for shortness of breath (With exertion). Negative for cough.    Cardiovascular:  Negative for chest pain.   Musculoskeletal: Negative.         Uses cane as needed   Psychiatric/Behavioral: Negative.       Objective:     Vital Signs (Most Recent):  Temp: 97.7 °F (36.5 °C) (04/09/25 1718)  Pulse: 67 (04/10/25 0128)  Resp: 18 (04/10/25 0128)  BP: (!) 170/79 (04/10/25 1010)  SpO2: 95 % (04/10/25 0128) Vital Signs (24h Range):  Temp:  [97.7 °F (36.5 °C)] 97.7 °F (36.5 °C)  Pulse:  [67-87] 67  Resp:  [15-23] 18  SpO2:  [91 %-97 %] 95 %  BP: (146-209)/() 170/79     Weight: 66.7 kg (147 lb)  Body mass index is 23.02 kg/m².    Intake/Output Summary (Last 24 hours) at 4/10/2025 1415  Last data filed at 4/10/2025 1347  Gross per 24 hour   Intake --   Output 3725 ml   Net -3725 ml         Physical Exam  Vitals and nursing note reviewed. Exam conducted with a chaperone present.   Constitutional:       General: He is not in acute distress.  Cardiovascular:      Rate and Rhythm: Normal rate.      Pulses: Normal pulses.   Pulmonary:      Effort: Pulmonary effort is normal.      Comments: Decreased breath sounds  Abdominal:      General: Bowel sounds are normal.      Palpations: Abdomen is soft.   Musculoskeletal:      Right lower leg: Edema (+2) present.      Left lower leg: Edema (+2) present.   Skin:     General: Skin is warm and dry.   Neurological:      Mental Status: He is alert and oriented to person, place, and time.   Psychiatric:         Mood and Affect: Mood normal.         Behavior: Behavior normal.         Thought Content: Thought content normal.                Significant Labs: All pertinent labs within the past 24 hours have been reviewed.  CBC:   Recent Labs   Lab 04/09/25  1635 04/09/25  1821 04/10/25  0457   WBC 4.39 3.81* 3.65*   HGB 13.1* 13.6* 11.7*   HCT 40.6 41.2 35.7*   * 112* 98*     CMP:   Recent Labs   Lab 04/09/25  1635 04/09/25  1821 04/10/25  0457   * 134* 136   K 4.3 4.2 3.6   CO2 28 27 30*   BUN 24* 23 21   CREATININE 1.0 1.0 1.0   CALCIUM 10.2 10.3 9.1   ALBUMIN 4.3 4.5  --    BILITOT 0.8 1.0  --    ALKPHOS 60 64  --    AST 30 33  --    ALT 24 25  --      Cardiac Markers:   Recent Labs   Lab 04/09/25  1821   BNP 3,385*     Magnesium:   Recent Labs   Lab 04/09/25  2034 04/10/25  0457   MG 2.5 2.4       Recent Labs   Lab 04/10/25  1050   TSH 2.420     Lab Results   Component Value Date    [id number] 1,085 (H) 04/09/2025     Lab Results   Component Value Date    FOLATE 18.8 04/09/2025       Significant Imaging: I have reviewed all pertinent imaging results/findings within the past 24 hours.  Imaging Results              X-Ray Chest 1 View (Final result)  Result time 04/10/25 07:31:08      Final result by Elie Mendoza MD (04/10/25 07:31:08)                   Impression:      No significant interval change.      Electronically signed by: Elie Mendoza  Date:    04/10/2025  Time:    07:31               Narrative:    EXAMINATION:  XR CHEST 1 VIEW    CLINICAL HISTORY:  reassess pleural effusions;    FINDINGS:  Portable chest radiograph at 04:07 hours compared to 04/09/2025 shows stable enlarged cardiac silhouette and normal pulmonary vascularity, with diffuse aortic vascular calcifications.    There are persistent right mid and lower lung airspace opacities, with right pleural effusion, obscuring the right hemidiaphragm.  Left lung base opacities suggesting subsegmental atelectasis are similar to prior, with no new pleural or parenchymal abnormality.

## 2025-04-10 NOTE — NURSING
Pt arrived to [facility] 3118 from ER. VSS, call light in reach. Messaged BARI rivas pt is complaining of muscle cramps, Robaxin given .

## 2025-04-10 NOTE — HPI
89-year-old male with PMH asthma, HTN, skin cancer, history of subdural hematoma presents to ER due to several weeks of worsening dyspnea on exertion with about 1 week of worsening peripheral edema of the bilateral lower extremities.  Wife and son bedside.  He is found to have elevated BNP at 3385, elevated troponin 254, and a chest x-ray showing moderate right pleural effusion with compressive atelectasis versus infiltrate as well as a tiny left pleural effusion.  EKG shows a bifascicular block which is old.  Other significant labs include WBC 3.81, hemoglobin 13.6, , platelet 112, iron saturation 16, sodium 134, creatinine 1.0.  He is hypertensive.  He is on room air.  Admitted to hospital medicine service.

## 2025-04-10 NOTE — ASSESSMENT & PLAN NOTE
On room air.  More than likely from new CHF  -see CHF section  -repeat CXR in morning and can consider thoracentesis if not improving with diuresis

## 2025-04-10 NOTE — H&P
[facility] - Emergency Dept  Hospital Medicine  History & Physical    Patient Name: Brit Jennings  MRN: [id number]  Patient Class: IP- Inpatient  Admission Date: 4/9/2025  Attending Physician: Harsh Christianson, *   Primary Care Provider: Jian Alcazar MD         Patient information was obtained from patient, spouse/SO, relative(s), past medical records, and ER records.     Subjective:     Principal Problem:New onset of congestive heart failure    Chief Complaint:   Chief Complaint   Patient presents with    Shortness of Breath     For one week, increased with edema to lower extremities since last Friday. Denies cp         HPI: 89-year-old male with PMH asthma, HTN, skin cancer, history of subdural hematoma presents to ER due to several weeks of worsening dyspnea on exertion with about 1 week of worsening peripheral edema of the bilateral lower extremities.  Wife and son bedside.  He is found to have elevated BNP at 3385, elevated troponin 254, and a chest x-ray showing moderate right pleural effusion with compressive atelectasis versus infiltrate as well as a tiny left pleural effusion.  EKG shows a bifascicular block which is old.  Other significant labs include WBC 3.81, hemoglobin 13.6, , platelet 112, iron saturation 16, sodium 134, creatinine 1.0.  He is hypertensive.  He is on room air.  Admitted to hospital medicine service.    Past Medical History:   Diagnosis Date    Basal cell carcinoma 2020    L temple / radiation therapy    Cataract     Hypertension     Squamous cell carcinoma of skin        Past Surgical History:   Procedure Laterality Date    APPENDECTOMY      EYE SURGERY      Moh's surgery on right temple area Right 2015    Cancerous spot    PROSTATE BIOPSY      TONSILLECTOMY         Review of patient's allergies indicates:   Allergen Reactions    Pollen extracts Other (See Comments)       Current Facility-Administered Medications on File Prior to Encounter    Medication    albuterol inhaler 2 puff    diphenhydrAMINE injection 25 mg    EPINEPHrine (EPIPEN) 0.3 mg/0.3 mL pen injection 0.3 mg    [DISCONTINUED] methylPREDNISolone sodium succinate injection 40 mg    [DISCONTINUED] ondansetron disintegrating tablet 4 mg    [DISCONTINUED] sodium chloride 0.9% 500 mL flush bag    [DISCONTINUED] sodium chloride 0.9% flush 10 mL     Current Outpatient Medications on File Prior to Encounter   Medication Sig    albuterol (PROVENTIL/VENTOLIN HFA) 90 mcg/actuation inhaler Inhale 1-2 puffs into the lungs daily as needed for Shortness of Breath. Rescue    ascorbic acid (VITAMIN C ORAL) Take 1 tablet by mouth 2 (two) times a day. (Patient not taking: Reported on 6/25/2024)    budesonide-glycopyr-formoterol (BREZTRI AEROSPHERE) 160-9-4.8 mcg/actuation HFAA Inhale 2 puffs into the lungs 2 (two) times daily. (Patient not taking: Reported on 4/9/2025)    fluorouraciL (EFUDEX) 5 % cream Apply thin film to left scalp and  in front of right ear  BID x 4 weeks    lisinopriL (PRINIVIL,ZESTRIL) 20 MG tablet Take 1 tablet (20 mg total) by mouth once daily.    SYMBICORT 160-4.5 mcg/actuation HFAA Inhale 2 puffs into the lungs every 12 (twelve) hours.    traZODone (DESYREL) 100 MG tablet Take 1 tablet (100 mg total) by mouth every evening.     Family History    None       Tobacco Use    Smoking status: Never    Smokeless tobacco: Never   Substance and Sexual Activity    Alcohol use: Not Currently    Drug use: Not on file    Sexual activity: Not Currently       Objective:     Vital Signs (Most Recent):  Temp: 97.7 °F (36.5 °C) (04/09/25 1718)  Pulse: 78 (04/09/25 2028)  Resp: (!) 23 (04/09/25 2028)  BP: (!) 192/109 (04/09/25 2028)  SpO2: (!) 93 % (04/09/25 2028) Vital Signs (24h Range):  Temp:  [97.7 °F (36.5 °C)] 97.7 °F (36.5 °C)  Pulse:  [70-87] 78  Resp:  [15-23] 23  SpO2:  [93 %-97 %] 93 %  BP: (146-209)/() 192/109     Weight: 66.7 kg (147 lb)  Body mass index is 23.02 kg/m².     Physical  Exam  Vitals reviewed.   Constitutional:       General: He is not in acute distress.  HENT:      Head: Normocephalic and atraumatic.   Cardiovascular:      Rate and Rhythm: Normal rate and regular rhythm.      Heart sounds: Normal heart sounds.   Pulmonary:      Effort: Pulmonary effort is normal. No respiratory distress.      Comments: Decreased breath sounds right mid to lower lung with rales  Abdominal:      General: Abdomen is flat. There is no distension.      Palpations: Abdomen is soft.      Tenderness: There is no abdominal tenderness. There is no guarding.   Musculoskeletal:      Right lower leg: Edema (2 to 3+) present.      Left lower leg: Edema (2 to 3+) present.   Skin:     General: Skin is warm and dry.   Neurological:      General: No focal deficit present.      Mental Status: He is alert and oriented to person, place, and time. Mental status is at baseline.   Psychiatric:         Mood and Affect: Affect normal.         Behavior: Behavior normal.                Significant Labs: All pertinent labs within the past 24 hours have been reviewed.    Significant Imaging: I have reviewed all pertinent imaging results/findings within the past 24 hours.    Admission on 04/09/2025   Component Date Value Ref Range Status    Sodium 04/09/2025 134 (L)  136 - 145 mmol/L Final    Potassium 04/09/2025 4.2  3.5 - 5.1 mmol/L Final    Chloride 04/09/2025 98  95 - 110 mmol/L Final    CO2 04/09/2025 27  23 - 29 mmol/L Final    Glucose 04/09/2025 89  70 - 110 mg/dL Final    BUN 04/09/2025 23  8 - 23 mg/dL Final    Creatinine 04/09/2025 1.0  0.5 - 1.4 mg/dL Final    Calcium 04/09/2025 10.3  8.7 - 10.5 mg/dL Final    Protein Total 04/09/2025 7.8  6.0 - 8.4 gm/dL Final    Albumin 04/09/2025 4.5  3.5 - 5.2 g/dL Final    Bilirubin Total 04/09/2025 1.0  0.1 - 1.0 mg/dL Final    For infants and newborns, interpretation of results should be based   on gestational age, weight and in agreement with clinical    observations.    Premature Infant recommended reference ranges:   0-24 hours:  <8.0 mg/dL   24-48 hours: <12.0 mg/dL   3-5 days:    <15.0 mg/dL   6-29 days:   <15.0 mg/dL    ALP 04/09/2025 64  55 - 135 unit/L Final    AST 04/09/2025 33  10 - 40 unit/L Final    ALT 04/09/2025 25  10 - 44 unit/L Final    Anion Gap 04/09/2025 9  8 - 16 mmol/L Final    eGFR 04/09/2025 >60  >60 mL/min/1.73/m2 Final    Troponin High Sensitive 04/09/2025 254.2 (HH)  <=14.9 pg/mL Final    Critical result TNIHS 254.2 pg/mL called to and read back by Dr. Catie Solis ED at 09-Apr-2025 19:25 by [facility].  Troponin results differ between methods. Do not use   results between Troponin methods interchangeably.    Alkaline Phospatase levels above 400 U/L may   cause false positive results.    Access hsTnI should not be used for patients taking   Asfotase cristhian (Strensiq).    BNP 04/09/2025 3,385 (H)  <=99 pg/mL Final    Values of less than 100 pg/ml are consistent with non-CHF populations.    WBC 04/09/2025 3.81 (L)  3.90 - 12.70 K/uL Final    RBC 04/09/2025 4.02 (L)  4.60 - 6.20 M/uL Final    Hgb 04/09/2025 13.6 (L)  14.0 - 18.0 gm/dL Final    Hct 04/09/2025 41.2  40.0 - 54.0 % Final    MCV 04/09/2025 103 (H)  82 - 98 fL Final    MCH 04/09/2025 33.8 (H)  27.0 - 31.0 pg Final    MCHC 04/09/2025 33.0  32.0 - 36.0 g/dL Final    RDW 04/09/2025 13.2  11.5 - 14.5 % Final    Platelet Count 04/09/2025 112 (L)  150 - 450 K/uL Final    MPV 04/09/2025 13.2 (H)  9.2 - 12.9 fL Final    Nucleated RBC 04/09/2025 0  <=0 /100 WBC Final    Neut % 04/09/2025 55.2  38 - 73 % Final    Lymph % 04/09/2025 27.8  18 - 48 % Final    Mono % 04/09/2025 15.2 (H)  4 - 15 % Final    Eos % 04/09/2025 0.5  0 - 8 % Final    Basophil % 04/09/2025 0.8  <=1.9 % Final    Imm Grans % 04/09/2025 0.5  0.0 - 0.5 % Final    Neut # 04/09/2025 2.1  1.8 - 7.7 K/uL Final    Lymph # 04/09/2025 1.06  1 - 4.8 K/uL Final    Mono # 04/09/2025 0.58  0.3 - 1 K/uL Final    Eos # 04/09/2025 0.02   <=0.5 K/uL Final    Baso # 04/09/2025 0.03  <=0.2 K/uL Final    Imm Grans # 04/09/2025 0.02  0.00 - 0.04 K/uL Final    Mild elevation in immature granulocytes is non specific and can be seen in a variety of conditions including stress response, acute inflammation, trauma and pregnancy. Correlation with other laboratory and clinical findings is essential.    Magnesium 04/09/2025 2.5  1.6 - 2.6 mg/dL Final   Lab Visit on 04/09/2025   Component Date Value Ref Range Status    BNP 04/09/2025 3,951 (H)  <=99 pg/mL Final    Values of less than 100 pg/ml are consistent with non-CHF populations.    Sodium 04/09/2025 134 (L)  136 - 145 mmol/L Final    Potassium 04/09/2025 4.3  3.5 - 5.1 mmol/L Final    Chloride 04/09/2025 97  95 - 110 mmol/L Final    CO2 04/09/2025 28  23 - 29 mmol/L Final    Glucose 04/09/2025 93  70 - 110 mg/dL Final    BUN 04/09/2025 24 (H)  8 - 23 mg/dL Final    Creatinine 04/09/2025 1.0  0.5 - 1.4 mg/dL Final    Calcium 04/09/2025 10.2  8.7 - 10.5 mg/dL Final    Protein Total 04/09/2025 7.3  6.0 - 8.4 gm/dL Final    Albumin 04/09/2025 4.3  3.5 - 5.2 g/dL Final    Bilirubin Total 04/09/2025 0.8  0.1 - 1.0 mg/dL Final    For infants and newborns, interpretation of results should be based   on gestational age, weight and in agreement with clinical   observations.    Premature Infant recommended reference ranges:   0-24 hours:  <8.0 mg/dL   24-48 hours: <12.0 mg/dL   3-5 days:    <15.0 mg/dL   6-29 days:   <15.0 mg/dL    ALP 04/09/2025 60  55 - 135 unit/L Final    AST 04/09/2025 30  10 - 40 unit/L Final    ALT 04/09/2025 24  10 - 44 unit/L Final    Anion Gap 04/09/2025 9  8 - 16 mmol/L Final    eGFR 04/09/2025 >60  >60 mL/min/1.73/m2 Final    Ferritin 04/09/2025 69.2  20.0 - 300.0 ng/mL Final    Iron Level 04/09/2025 56  45 - 160 ug/dL Final    Transferrin 04/09/2025 247  200 - 375 mg/dL Final    Iron Binding Capacity Total 04/09/2025 346  250 - 450 ug/dL Final    Iron Saturation 04/09/2025 16 (L)  20 - 50 %  Final    WBC 04/09/2025 4.39  3.90 - 12.70 K/uL Final    RBC 04/09/2025 3.90 (L)  4.60 - 6.20 M/uL Final    Hgb 04/09/2025 13.1 (L)  14.0 - 18.0 gm/dL Final    Hct 04/09/2025 40.6  40.0 - 54.0 % Final    MCV 04/09/2025 104 (H)  82 - 98 fL Final    MCH 04/09/2025 33.6 (H)  27.0 - 31.0 pg Final    MCHC 04/09/2025 32.3  32.0 - 36.0 g/dL Final    RDW 04/09/2025 13.2  11.5 - 14.5 % Final    Platelet Count 04/09/2025 120 (L)  150 - 450 K/uL Final    MPV 04/09/2025 13.3 (H)  9.2 - 12.9 fL Final    Nucleated RBC 04/09/2025 0  <=0 /100 WBC Final    Neut % 04/09/2025 48.5  38 - 73 % Final    Lymph % 04/09/2025 33.0  18 - 48 % Final    Mono % 04/09/2025 17.1 (H)  4 - 15 % Final    Eos % 04/09/2025 0.2  0 - 8 % Final    Basophil % 04/09/2025 0.7  <=1.9 % Final    Imm Grans % 04/09/2025 0.5  0.0 - 0.5 % Final    Neut # 04/09/2025 2.1  1.8 - 7.7 K/uL Final    Lymph # 04/09/2025 1.45  1 - 4.8 K/uL Final    Mono # 04/09/2025 0.75  0.3 - 1 K/uL Final    Eos # 04/09/2025 0.01  <=0.5 K/uL Final    Baso # 04/09/2025 0.03  <=0.2 K/uL Final    Imm Grans # 04/09/2025 0.02  0.00 - 0.04 K/uL Final    Mild elevation in immature granulocytes is non specific and can be seen in a variety of conditions including stress response, acute inflammation, trauma and pregnancy. Correlation with other laboratory and clinical findings is essential.   Office Visit on 04/09/2025   Component Date Value Ref Range Status    EKG 12-Lead 04/09/2025    Corrected    SR with first degree AV block, (known) right BBB, known fascicular block, non specific T wave abnormality, no overt depression or elevation noted    Ventricular Rate 04/09/2025 72   Final    NC Interval 04/09/2025 302   Final    QRS Duration 04/09/2025 142   Final    QT/QTc 04/09/2025 432/473   Final    PRT Axes 04/09/2025 -14  -51  5   Final     X-Ray Chest PA And Lateral  Result Date: 4/9/2025  EXAMINATION: XR CHEST PA AND LATERAL CLINICAL HISTORY: Dyspnea, unspecified FINDINGS: PA and lateral chest  is compared to 06/25/2024 shows cardiomegaly.  There is a moderate size right pleural effusion with compressive atelectasis in the right lower lobe. Tiny left pleural effusion with left lower lobe atelectasis.  The upper lobes are clear. Diffuse vascular calcification of the aorta which is tortuous.  Pulmonary vasculature is normal. Degenerative changes of the spine.     Cardiomegaly with moderate size right pleural effusion with compressive atelectasis versus infiltrate in the right middle lobe and right lower lobe Tiny left pleural effusion with left lower lobe atelectasis Tortuous aorta Electronically signed by: Tori Dawn Date:    04/09/2025 Time:    16:52  - pulls last radiology orders    Assessment/Plan:     Assessment & Plan  New onset of congestive heart failure  Based on symptoms, BNP, pleural effusions  -start Lasix IV 40 mg daily  -monitor I/O  -echocardiogram  -telemetry  -cardiology consult  Essential hypertension  Uncontrolled  -continue home lisinopril  -getting IV Lasix   -monitor  Elevated troponin  No chest pains.  Likely demand from the new CHF  -trend and treat the CHF  Anemia  Anemia is likely due to  iron deficiency . Most recent hemoglobin and hematocrit are listed below.  Recent Labs     04/09/25  1635 04/09/25  1821   HGB 13.1* 13.6*   HCT 40.6 41.2     Plan  - Monitor serial CBC: Daily  - Transfuse PRBC if patient becomes hemodynamically unstable, symptomatic or H/H drops below 7/21.  - Patient has not received any PRBC transfusions to date  - Patient's anemia is currently stable  - IV iron daily x3 doses  - check B12 and folate as well  Thrombocytopenia  The likely etiology of thrombocytopenia is  unclear . The patients 3 most recent labs are listed below.  Recent Labs     04/09/25  1635 04/09/25  1821   * 112*     Plan  - Will transfuse if platelet count is <10k.  Or less than 50 K if needing procedure or active bleeding  - check B12 and folate  Bilateral pleural effusion  On  room air.  More than likely from new CHF  -see CHF section  -repeat CXR in morning and can consider thoracentesis if not improving with diuresis  VTE Risk Mitigation (From admission, onward)           Ordered     IP VTE HIGH RISK PATIENT  Once         04/09/25 2033     Place sequential compression device  Until discontinued         04/09/25 2033                                    Yung August MD  Department of Hospital Medicine  [facility] - Emergency Dept

## 2025-04-11 LAB
ANION GAP (SMH): 6 MMOL/L (ref 8–16)
BUN SERPL-MCNC: 26 MG/DL (ref 8–23)
CALCIUM SERPL-MCNC: 9 MG/DL (ref 8.7–10.5)
CHLORIDE SERPL-SCNC: 97 MMOL/L (ref 95–110)
CO2 SERPL-SCNC: 34 MMOL/L (ref 23–29)
CREAT SERPL-MCNC: 1.2 MG/DL (ref 0.5–1.4)
ERYTHROCYTE [DISTWIDTH] IN BLOOD BY AUTOMATED COUNT: 13.6 % (ref 11.5–14.5)
GFR SERPLBLD CREATININE-BSD FMLA CKD-EPI: 58 ML/MIN/1.73/M2
GLUCOSE SERPL-MCNC: 91 MG/DL (ref 70–110)
HCT VFR BLD AUTO: 35.7 % (ref 40–54)
HGB BLD-MCNC: 11.6 GM/DL (ref 14–18)
MAGNESIUM SERPL-MCNC: 2.2 MG/DL (ref 1.6–2.6)
MCH RBC QN AUTO: 33.3 PG (ref 27–31)
MCHC RBC AUTO-ENTMCNC: 32.5 G/DL (ref 32–36)
MCV RBC AUTO: 103 FL (ref 82–98)
PLATELET # BLD AUTO: 99 K/UL (ref 150–450)
PMV BLD AUTO: 12.6 FL (ref 9.2–12.9)
POTASSIUM SERPL-SCNC: 3.9 MMOL/L (ref 3.5–5.1)
RBC # BLD AUTO: 3.48 M/UL (ref 4.6–6.2)
SODIUM SERPL-SCNC: 137 MMOL/L (ref 136–145)
WBC # BLD AUTO: 4.46 K/UL (ref 3.9–12.7)

## 2025-04-11 PROCEDURE — 97116 GAIT TRAINING THERAPY: CPT

## 2025-04-11 PROCEDURE — 25000003 PHARM REV CODE 250: Performed by: STUDENT IN AN ORGANIZED HEALTH CARE EDUCATION/TRAINING PROGRAM

## 2025-04-11 PROCEDURE — 99900031 HC PATIENT EDUCATION (STAT)

## 2025-04-11 PROCEDURE — 12000002 HC ACUTE/MED SURGE SEMI-PRIVATE ROOM

## 2025-04-11 PROCEDURE — 99233 SBSQ HOSP IP/OBS HIGH 50: CPT | Mod: ,,, | Performed by: INTERNAL MEDICINE

## 2025-04-11 PROCEDURE — 94761 N-INVAS EAR/PLS OXIMETRY MLT: CPT

## 2025-04-11 PROCEDURE — 36415 COLL VENOUS BLD VENIPUNCTURE: CPT | Performed by: STUDENT IN AN ORGANIZED HEALTH CARE EDUCATION/TRAINING PROGRAM

## 2025-04-11 PROCEDURE — 99900035 HC TECH TIME PER 15 MIN (STAT)

## 2025-04-11 PROCEDURE — 83735 ASSAY OF MAGNESIUM: CPT | Performed by: STUDENT IN AN ORGANIZED HEALTH CARE EDUCATION/TRAINING PROGRAM

## 2025-04-11 PROCEDURE — 80048 BASIC METABOLIC PNL TOTAL CA: CPT | Performed by: STUDENT IN AN ORGANIZED HEALTH CARE EDUCATION/TRAINING PROGRAM

## 2025-04-11 PROCEDURE — 97161 PT EVAL LOW COMPLEX 20 MIN: CPT

## 2025-04-11 PROCEDURE — 85027 COMPLETE CBC AUTOMATED: CPT | Performed by: STUDENT IN AN ORGANIZED HEALTH CARE EDUCATION/TRAINING PROGRAM

## 2025-04-11 PROCEDURE — 63600175 PHARM REV CODE 636 W HCPCS: Performed by: STUDENT IN AN ORGANIZED HEALTH CARE EDUCATION/TRAINING PROGRAM

## 2025-04-11 RX ORDER — FUROSEMIDE 40 MG/1
40 TABLET ORAL DAILY
Status: DISCONTINUED | OUTPATIENT
Start: 2025-04-12 | End: 2025-04-13 | Stop reason: HOSPADM

## 2025-04-11 RX ADMIN — LISINOPRIL 20 MG: 20 TABLET ORAL at 08:04

## 2025-04-11 RX ADMIN — TRAZODONE HYDROCHLORIDE 100 MG: 50 TABLET ORAL at 09:04

## 2025-04-11 RX ADMIN — SODIUM FERRIC GLUCONATE COMPLEX IN SUCROSE 125 MG: 12.5 INJECTION INTRAVENOUS at 08:04

## 2025-04-11 RX ADMIN — FUROSEMIDE 40 MG: 10 INJECTION, SOLUTION INTRAVENOUS at 08:04

## 2025-04-11 NOTE — CONSULTS
[facility]   Department of Cardiology  Progress Note      PATIENT NAME: Brit Jennings  MRN: [id number]  TODAY'S DATE: 04/11/2025  ADMIT DATE: 4/9/2025                          CONSULT REQUESTED BY: Jana Mcallister MD    SUBJECTIVE     PRINCIPAL PROBLEM: New onset of congestive heart failure      REASON FOR CONSULT:  New onset CHF    INTERVAL HISTORY:  04/11/25:  -4 L net balance  Resting comfortably without dyspnea  Sinus rhythm/sinus arrhythmia/sinus bradycardia on telemetry    HPI:  89-year-old male with PMH asthma, HTN, skin cancer, history of subdural hematoma who presented to ED with c/o worsening shortness of breath and lower extremity edema x 1 week. CXR concerning for cardiomegaly with moderate R pleural effusion with compressive atelectasis vs infiltrate in RML and RLL.  Tiny left pleural effusion and LLL atelectasis. No acute ST- T wave changes on EKG.  Patient is very active. He goes to the gym daily, and still runs and bikes several miles a day.  Denies recent illness.  In ED H&H 13/40, , K 4.3, Cr 1.0, Mag 2.4, BNP 3951, Troponin  > 258 > 254.    Patient had echo 1/20/24 that showed EF 58% normal wall motion and no acute valvular abnormalities.                Review of patient's allergies indicates:   Allergen Reactions    Pollen extracts Other (See Comments)       Past Medical History:   Diagnosis Date    Basal cell carcinoma 2020    L temple / radiation therapy    Cataract     Hypertension     Squamous cell carcinoma of skin      Past Surgical History:   Procedure Laterality Date    APPENDECTOMY      EYE SURGERY      Moh's surgery on right temple area Right 2015    Cancerous spot    PROSTATE BIOPSY      TONSILLECTOMY       Social History[1]     REVIEW OF SYSTEMS    As mentioned in HPI    OBJECTIVE     VITAL SIGNS (Most Recent)  Temp: 97.8 °F (36.6 °C) (04/11/25 0720)  Pulse: (!) 59 (04/11/25 0720)  Resp: 16 (04/11/25 0720)  BP: (!) 162/83 (04/11/25 0720)  SpO2: (!) 94 %  (04/11/25 0720)    VENTILATION STATUS  Resp: 16 (04/11/25 0720)  SpO2: (!) 94 % (04/11/25 0720)           I & O (Last 24H):  Intake/Output Summary (Last 24 hours) at 4/11/2025 0954  Last data filed at 4/11/2025 0819  Gross per 24 hour   Intake 320 ml   Output 1850 ml   Net -1530 ml       WEIGHTS  Wt Readings from Last 3 Encounters:   04/10/25 2301 55.2 kg (121 lb 11.1 oz)   04/09/25 1718 66.7 kg (147 lb)   04/09/25 1530 66.2 kg (146 lb)   03/14/25 0729 65.8 kg (145 lb)       PHYSICAL EXAM    CONSTITUTIONAL: NAD out of bed in chair breathing comfortably on room air  HEENT: Normocephalic.   NECK: no JVD  LUNGS:  Diminished at bases, no conversational dyspnea, breathing comfortably on room air  HEART: regular bradycardic rate and rhythm, S1, S2 normal, no murmur   ABDOMEN:  Nondistended  EXTREMITIES:  Trace ankle edema  SKIN: No rash  NEURO: AAO X 3  PSYCH: normal affect      HOME MEDICATIONS:Medications Ordered Prior to Encounter[2]    SCHEDULED MEDS:   furosemide (LASIX) injection  40 mg Intravenous Daily    lisinopriL  20 mg Oral Daily    traZODone  100 mg Oral QHS       CONTINUOUS INFUSIONS:    PRN MEDS:  Current Facility-Administered Medications:     acetaminophen, 1,000 mg, Oral, Q6H PRN    albuterol-ipratropium, 3 mL, Nebulization, Q6H PRN    hydrALAZINE, 10 mg, Intravenous, Q6H PRN    magnesium oxide, 800 mg, Oral, PRN    magnesium oxide, 800 mg, Oral, PRN    methocarbamoL, 750 mg, Oral, QID PRN    ondansetron, 4 mg, Intravenous, Q6H PRN    potassium bicarbonate, 35 mEq, Oral, PRN    potassium bicarbonate, 50 mEq, Oral, PRN    potassium bicarbonate, 60 mEq, Oral, PRN    potassium, sodium phosphates, 2 packet, Oral, PRN    potassium, sodium phosphates, 2 packet, Oral, PRN    potassium, sodium phosphates, 2 packet, Oral, PRN    sodium chloride 0.9%, 10 mL, Intravenous, PRN    LABS AND DIAGNOSTICS     CBC LAST 3 DAYS  Recent Labs   Lab 04/09/25  1635 04/09/25  1821 04/10/25  0457 04/11/25  0534   WBC 4.39 3.81*  "3.65* 4.46   RBC 3.90* 4.02* 3.49* 3.48*   HGB 13.1* 13.6* 11.7* 11.6*   HCT 40.6 41.2 35.7* 35.7*   * 103* 102* 103*   MCH 33.6* 33.8* 33.5* 33.3*   MCHC 32.3 33.0 32.8 32.5   RDW 13.2 13.2 13.2 13.6   * 112* 98* 99*   MPV 13.3* 13.2* 13.1* 12.6   NRBC 0 0  --   --        COAGULATION LAST 3 DAYS  No results for input(s): "LABPT", "INR", "APTT" in the last 168 hours.    CHEMISTRY LAST 3 DAYS  Recent Labs   Lab 04/09/25  1635 04/09/25  1821 04/09/25  2034 04/10/25  0457 04/10/25  1735 04/11/25  0535   * 134*  --  136  --  137   K 4.3 4.2  --  3.6 4.1 3.9   CO2 28 27  --  30*  --  34*   BUN 24* 23  --  21  --  26*   CREATININE 1.0 1.0  --  1.0  --  1.2   CALCIUM 10.2 10.3  --  9.1  --  9.0   MG  --   --  2.5 2.4  --  2.2   ALBUMIN 4.3 4.5  --   --   --   --    ALKPHOS 60 64  --   --   --   --    ALT 24 25  --   --   --   --    AST 30 33  --   --   --   --    BILITOT 0.8 1.0  --   --   --   --        CARDIAC PROFILE LAST 3 DAYS  Recent Labs   Lab 04/09/25  1635 04/09/25  1821   BNP 3,951* 3,385*       ENDOCRINE LAST 3 DAYS  Recent Labs   Lab 04/10/25  1050   TSH 2.420       LAST ARTERIAL BLOOD GAS  ABG  No results for input(s): "PH", "PO2", "PCO2", "HCO3", "BE" in the last 168 hours.    LAST 7 DAYS MICROBIOLOGY   Microbiology Results (last 7 days)       ** No results found for the last 168 hours. **            MOST RECENT IMAGING  Echo    Large left pleural effusion    Left Ventricle: The left ventricle is normal in size. Mildly increased   wall thickness. Mild global hypokinesis present. There is mildly reduced   systolic function with a visually estimated ejection fraction of 40 - 45%.    Right Ventricle: Right ventricle was not well visualized due to poor   acoustic window. Systolic function is normal.    Left Atrium: Severely dilated    Right Atrium: Right atrium is severely dilated.    Aortic Valve: The aortic valve is a trileaflet valve. There is moderate   aortic valve sclerosis. There is " mild aortic regurgitation.    Mitral Valve: There is mild to moderate regurgitation.    Tricuspid Valve: There is mild regurgitation.    Pulmonic Valve: There is mild regurgitation.    Aorta: The Ascending aorta is mildly dilated measuring 4.3 cm.    IVC/SVC: Elevated venous pressure at 15 mmHg.  X-Ray Chest 1 View  Narrative: EXAMINATION:  XR CHEST 1 VIEW    CLINICAL HISTORY:  reassess pleural effusions;    FINDINGS:  Portable chest radiograph at 04:07 hours compared to 04/09/2025 shows stable enlarged cardiac silhouette and normal pulmonary vascularity, with diffuse aortic vascular calcifications.    There are persistent right mid and lower lung airspace opacities, with right pleural effusion, obscuring the right hemidiaphragm.  Left lung base opacities suggesting subsegmental atelectasis are similar to prior, with no new pleural or parenchymal abnormality.  Impression: No significant interval change.    Electronically signed by: Elie Mendoza  Date:    04/10/2025  Time:    07:31      ECHOCARDIOGRAM RESULTS (last 5)  Results for orders placed during the hospital encounter of 01/20/24    Echo    Interpretation Summary    Left Ventricle: The left ventricle is normal in size. There is concentric remodeling. Normal wall motion. There is normal systolic function. Biplane (2D) method of discs ejection fraction is 58%. Unable to assess diastolic function due to poor image quality.    Right Ventricle: Right ventricle was not well visualized due to poor acoustic window.    Left Atrium: Left atrium is moderately dilated.    Aortic Valve: The aortic valve is a trileaflet valve. There is mild aortic valve sclerosis. There is mild to moderate aortic regurgitation.    Mitral Valve: Mildly thickened anterior leaflet.    IVC/SVC: Normal venous pressure at 3 mmHg.    Ascending Aorta: Not well visualized due to poor acoustic window. Aortic root is mildly dilated (about 4.2 cm).      CURRENT/PREVIOUS VISIT EKG  Results for orders  placed or performed during the hospital encounter of 04/09/25   EKG 12-lead    Collection Time: 04/09/25  5:13 PM   Result Value Ref Range    QRS Duration 124 ms    OHS QTC Calculation 488 ms    Narrative    Test Reason : R06.02,    Vent. Rate :  87 BPM     Atrial Rate :  87 BPM     P-R Int : 284 ms          QRS Dur : 124 ms      QT Int : 406 ms       P-R-T Axes :    -50  31 degrees    QTcB Int : 488 ms    Sinus rhythm with 1st degree A-V block  Right bundle branch block  Left anterior fascicular block   Bifascicular block   Septal infarct ,age undetermined  Marked ST abnormality, possible anterolateral subendocardial injury  Abnormal ECG  No previous ECGs available    Referred By: AAAREFERRAL SELF           Confirmed By:            ASSESSMENT/PLAN:     Active Hospital Problems    Diagnosis    *New onset of congestive heart failure    Anemia    Thrombocytopenia    Bilateral pleural effusion    Elevated troponin    Essential hypertension       ASSESSMENT & PLAN:     Acute HFmrEF  Chronic Anemia  Thrombocytopenia  Bilateral pleural effusions R>L  Elevated troponin HS  Hypertension      RECOMMENDATIONS:    Ejection fraction dropped to 40-45% in comparison to prior EF 58% Jan '24.    -4 liters net balance since arrival.  Continue Strict I's and O's. 2 g salt restriction. 1.5 L fluid restriction.  Switch IV Lasix to p.o. Lasix tomorrow.  Continue  Daily weights. Closely monitor renal function.  Continue lisinopril, heart rate is bradycardic at times, not on beta-blocker.   Add SGLT2i or MRA??  Obtain nuclear stress test tomorrow to evaluate for myocardial ischemia  Continue to check and replace potassium and magnesium. Goal for potassium is 4.0, and goal for magnesium is 2.0.            Laura Tuttle, BRENDONC, CVNP-BC  [facility]  Department of Cardiology  Date of Service: 04/11/2025        I have personally interviewed and examined the patient, I have reviewed the Nurse Practitioner's history and physical,  assessment, and plan. I have personally evaluated the patient at bedside and agree with the findings and made appropriate changes as necessary in recommendations.  All pertinent recent labs, imaging and EKGs independently reviewed and interpreted.     Manoj Bernal MD [facility]  Department of Cardiology  Former[facility]  Date of service 4/11/2025           [1]   Social History  Tobacco Use    Smoking status: Never    Smokeless tobacco: Never   Substance Use Topics    Alcohol use: Not Currently   [2]   No current facility-administered medications on file prior to encounter.     Current Outpatient Medications on File Prior to Encounter   Medication Sig Dispense Refill    albuterol (PROVENTIL/VENTOLIN HFA) 90 mcg/actuation inhaler Inhale 1-2 puffs into the lungs daily as needed for Shortness of Breath. Rescue 18 g 0    lisinopriL (PRINIVIL,ZESTRIL) 20 MG tablet Take 1 tablet (20 mg total) by mouth once daily. (Patient taking differently: Take 20 mg by mouth every evening.) 90 tablet 3    neomycin-polymyxin-dexamethasone (DEXACINE) 3.5 mg/g-10,000 unit/g-0.1 % Oint Place 1 application  into both eyes every 6 (six) hours.      SYMBICORT 160-4.5 mcg/actuation HFAA Inhale 2 puffs into the lungs every 12 (twelve) hours. 30 g 4    traZODone (DESYREL) 100 MG tablet Take 1 tablet (100 mg total) by mouth every evening. 90 tablet 1    ascorbic acid (VITAMIN C ORAL) Take 1 tablet by mouth 2 (two) times a day. (Patient not taking: Reported on 4/9/2025)      budesonide-glycopyr-formoterol (BREZTRI AEROSPHERE) 160-9-4.8 mcg/actuation HFAA Inhale 2 puffs into the lungs 2 (two) times daily. (Patient not taking: Reported on 4/9/2025) 10.7 g 5      Dr Gaines

## 2025-04-11 NOTE — PROGRESS NOTES
[facility]  Progress Note    Patient Name: Brit Jennings  MRN: [id number]  Patient Class: IP- Inpatient   Admission Date: 4/9/2025  Length of Stay: 2 days  Attending Physician: Jana Mcallister MD  Primary Care Provider: Jian Alcazar MD        Subjective     Principal Problem:New onset of congestive heart failure        HPI:  89-year-old male with PMH asthma, HTN, skin cancer, history of subdural hematoma presents to ER due to several weeks of worsening dyspnea on exertion with about 1 week of worsening peripheral edema of the bilateral lower extremities.  Wife and son bedside.  He is found to have elevated BNP at 3385, elevated troponin 254, and a chest x-ray showing moderate right pleural effusion with compressive atelectasis versus infiltrate as well as a tiny left pleural effusion.  EKG shows a bifascicular block which is old.  Other significant labs include WBC 3.81, hemoglobin 13.6, , platelet 112, iron saturation 16, sodium 134, creatinine 1.0.  He is hypertensive.  He is on room air.  Admitted to hospital medicine service.    Overview/Hospital Course:  No notes on file    Interval History: Patient seen and examined.  NAD.  On room air.  Reports feeling much better today.  Discussed PT/OT with patient and he is agreeable.  Cardiology following, continues on IV lasix, net negative 1530 over the last 24 hours.  BP elevated this morning, will monitor after giving morning medications.  Serum creatinine 1.2.    Review of Systems   Constitutional:  Negative for activity change, chills, fatigue and fever.   Respiratory:  Positive for shortness of breath (With exertion). Negative for cough.    Cardiovascular:  Negative for chest pain.   Gastrointestinal:  Negative for abdominal pain, nausea and vomiting.     Objective:     Vital Signs (Most Recent):  Temp: 97.8 °F (36.6 °C) (04/11/25 0720)  Pulse: (!) 59 (04/11/25 0720)  Resp: 16 (04/11/25 0720)  BP: (!) 162/83 (04/11/25  0720)  SpO2: (!) 94 % (04/11/25 0720) Vital Signs (24h Range):  Temp:  [97.8 °F (36.6 °C)-98.2 °F (36.8 °C)] 97.8 °F (36.6 °C)  Pulse:  [54-69] 59  Resp:  [16-21] 16  SpO2:  [91 %-97 %] 94 %  BP: (106-162)/(68-87) 162/83     Weight: 55.2 kg (121 lb 11.1 oz)  Body mass index is 19.06 kg/m².    Intake/Output Summary (Last 24 hours) at 4/11/2025 1055  Last data filed at 4/11/2025 0819  Gross per 24 hour   Intake 320 ml   Output 850 ml   Net -530 ml         Physical Exam  Vitals and nursing note reviewed.   Constitutional:       General: He is not in acute distress.     Appearance: Normal appearance.   HENT:      Head: Normocephalic.      Mouth/Throat:      Mouth: Mucous membranes are moist.      Pharynx: Oropharynx is clear.   Cardiovascular:      Rate and Rhythm: Normal rate and regular rhythm.      Pulses: Normal pulses.   Pulmonary:      Effort: Pulmonary effort is normal.      Comments: Decreased breath sounds  Abdominal:      General: Bowel sounds are normal. There is no distension.      Palpations: Abdomen is soft.      Tenderness: There is no abdominal tenderness.   Musculoskeletal:      Right lower leg: Edema present.      Left lower leg: Edema present.   Skin:     General: Skin is warm and dry.   Neurological:      General: No focal deficit present.      Mental Status: He is alert and oriented to person, place, and time.   Psychiatric:         Mood and Affect: Mood normal.         Behavior: Behavior normal.         Thought Content: Thought content normal.               Significant Labs: All pertinent labs within the past 24 hours have been reviewed.  CBC:   Recent Labs   Lab 04/09/25  1821 04/10/25  0457 04/11/25  0534   WBC 3.81* 3.65* 4.46   HGB 13.6* 11.7* 11.6*   HCT 41.2 35.7* 35.7*   * 98* 99*     CMP:   Recent Labs   Lab 04/09/25  1635 04/09/25  1821 04/10/25  0457 04/10/25  1735 04/11/25  0535   * 134* 136  --  137   K 4.3 4.2 3.6 4.1 3.9   CO2 28 27 30*  --  34*   BUN 24* 23 21  --  26*    CREATININE 1.0 1.0 1.0  --  1.2   CALCIUM 10.2 10.3 9.1  --  9.0   ALBUMIN 4.3 4.5  --   --   --    BILITOT 0.8 1.0  --   --   --    ALKPHOS 60 64  --   --   --    AST 30 33  --   --   --    ALT 24 25  --   --   --      Cardiac Markers:   Recent Labs   Lab 04/09/25  1821   BNP 3,385*     Magnesium:   Recent Labs   Lab 04/09/25 2034 04/10/25  0457 04/11/25  0535   MG 2.5 2.4 2.2       Recent Labs   Lab 04/10/25  1050   TSH 2.420     Lab Results   Component Value Date    [id number] 1,085 (H) 04/09/2025     Lab Results   Component Value Date    FOLATE 18.8 04/09/2025       Significant Imaging: I have reviewed all pertinent imaging results/findings within the past 24 hours.  Imaging Results              X-Ray Chest 1 View (Final result)  Result time 04/10/25 07:31:08      Final result by Elie Mendoza MD (04/10/25 07:31:08)                   Impression:      No significant interval change.      Electronically signed by: Elie Mendoza  Date:    04/10/2025  Time:    07:31               Narrative:    EXAMINATION:  XR CHEST 1 VIEW    CLINICAL HISTORY:  reassess pleural effusions;    FINDINGS:  Portable chest radiograph at 04:07 hours compared to 04/09/2025 shows stable enlarged cardiac silhouette and normal pulmonary vascularity, with diffuse aortic vascular calcifications.    There are persistent right mid and lower lung airspace opacities, with right pleural effusion, obscuring the right hemidiaphragm.  Left lung base opacities suggesting subsegmental atelectasis are similar to prior, with no new pleural or parenchymal abnormality.                                         Assessment & Plan  New onset of congestive heart failure  Based on symptoms, BNP, pleural effusions  -continue Lasix IV   -monitor I/O  -telemetry  -cardiology following  Echo  Result Date: 4/10/2025    Large left pleural effusion    Left Ventricle: The left ventricle is normal in size. Mildly increased   wall thickness. Mild global hypokinesis  present. There is mildly reduced   systolic function with a visually estimated ejection fraction of 40 - 45%.    Right Ventricle: Right ventricle was not well visualized due to poor   acoustic window. Systolic function is normal.    Left Atrium: Severely dilated    Right Atrium: Right atrium is severely dilated.    Aortic Valve: The aortic valve is a trileaflet valve. There is moderate   aortic valve sclerosis. There is mild aortic regurgitation.    Mitral Valve: There is mild to moderate regurgitation.    Tricuspid Valve: There is mild regurgitation.    Pulmonic Valve: There is mild regurgitation.    Aorta: The Ascending aorta is mildly dilated measuring 4.3 cm.    IVC/SVC: Elevated venous pressure at 15 mmHg.         Essential hypertension  -Continue lisinopril  -IV lasix  Elevated troponin  No chest pain.  Likely demand from the new CHF  -trend and treat the CHF  -possible stress test when euvolemic per cardiology  Anemia  Anemia is likely due to  iron deficiency . Most recent hemoglobin and hematocrit are listed below.  Recent Labs     04/09/25  1821 04/10/25  0457 04/11/25  0534   HGB 13.6* 11.7* 11.6*   HCT 41.2 35.7* 35.7*     Plan  - Monitor serial CBC: Daily  - Transfuse PRBC if patient becomes hemodynamically unstable, symptomatic or H/H drops below 7/21.  - Patient has not received any PRBC transfusions to date  - Patient's anemia is currently stable  - IV iron daily x3 doses  - B12 and folate reviewed  Thrombocytopenia  The patients 3 most recent labs are listed below.  Recent Labs     04/09/25  1821 04/10/25  0457 04/11/25  0534   * 98* 99*     Plan  - Will transfuse if platelet count is <10k.  Or less than 50 K if needing procedure or active bleeding  VTE Risk Mitigation (From admission, onward)           Ordered     IP VTE HIGH RISK PATIENT  Once         04/09/25 2033     Place sequential compression device  Until discontinued         04/09/25 2033                    Discharge Planning   ELIESER:  4/14/2025     Code Status: Full Code   Medical Readiness for Discharge Date:                    Please place Justification for DME        GILSON Galdamez  Department of Hospital Medicine   [facility]

## 2025-04-11 NOTE — SUBJECTIVE & OBJECTIVE
Interval History: Patient seen and examined.  NAD.  On room air.  Reports feeling much better today.  Discussed PT/OT with patient and he is agreeable.  Cardiology following, continues on IV lasix, net negative 1530 over the last 24 hours.  BP elevated this morning, will monitor after giving morning medications.    Review of Systems   Constitutional:  Negative for activity change, chills, fatigue and fever.   Respiratory:  Positive for shortness of breath (With exertion). Negative for cough.    Cardiovascular:  Negative for chest pain.   Gastrointestinal:  Negative for abdominal pain, nausea and vomiting.     Objective:     Vital Signs (Most Recent):  Temp: 97.8 °F (36.6 °C) (04/11/25 0720)  Pulse: (!) 59 (04/11/25 0720)  Resp: 16 (04/11/25 0720)  BP: (!) 162/83 (04/11/25 0720)  SpO2: (!) 94 % (04/11/25 0720) Vital Signs (24h Range):  Temp:  [97.8 °F (36.6 °C)-98.2 °F (36.8 °C)] 97.8 °F (36.6 °C)  Pulse:  [54-69] 59  Resp:  [16-21] 16  SpO2:  [91 %-97 %] 94 %  BP: (106-162)/(68-87) 162/83     Weight: 55.2 kg (121 lb 11.1 oz)  Body mass index is 19.06 kg/m².    Intake/Output Summary (Last 24 hours) at 4/11/2025 1055  Last data filed at 4/11/2025 0819  Gross per 24 hour   Intake 320 ml   Output 850 ml   Net -530 ml         Physical Exam  Vitals and nursing note reviewed.   Constitutional:       General: He is not in acute distress.     Appearance: Normal appearance.   HENT:      Head: Normocephalic.      Mouth/Throat:      Mouth: Mucous membranes are moist.      Pharynx: Oropharynx is clear.   Cardiovascular:      Rate and Rhythm: Normal rate and regular rhythm.      Pulses: Normal pulses.   Pulmonary:      Effort: Pulmonary effort is normal.      Comments: Decreased breath sounds  Abdominal:      General: Bowel sounds are normal. There is no distension.      Palpations: Abdomen is soft.      Tenderness: There is no abdominal tenderness.   Musculoskeletal:      Right lower leg: Edema present.      Left lower leg:  Edema present.   Skin:     General: Skin is warm and dry.   Neurological:      General: No focal deficit present.      Mental Status: He is alert and oriented to person, place, and time.   Psychiatric:         Mood and Affect: Mood normal.         Behavior: Behavior normal.         Thought Content: Thought content normal.               Significant Labs: All pertinent labs within the past 24 hours have been reviewed.  CBC:   Recent Labs   Lab 04/09/25  1821 04/10/25  0457 04/11/25  0534   WBC 3.81* 3.65* 4.46   HGB 13.6* 11.7* 11.6*   HCT 41.2 35.7* 35.7*   * 98* 99*     CMP:   Recent Labs   Lab 04/09/25  1635 04/09/25  1821 04/10/25  0457 04/10/25  1735 04/11/25  0535   * 134* 136  --  137   K 4.3 4.2 3.6 4.1 3.9   CO2 28 27 30*  --  34*   BUN 24* 23 21  --  26*   CREATININE 1.0 1.0 1.0  --  1.2   CALCIUM 10.2 10.3 9.1  --  9.0   ALBUMIN 4.3 4.5  --   --   --    BILITOT 0.8 1.0  --   --   --    ALKPHOS 60 64  --   --   --    AST 30 33  --   --   --    ALT 24 25  --   --   --      Cardiac Markers:   Recent Labs   Lab 04/09/25  1821   BNP 3,385*     Magnesium:   Recent Labs   Lab 04/09/25  2034 04/10/25  0457 04/11/25  0535   MG 2.5 2.4 2.2       Recent Labs   Lab 04/10/25  1050   TSH 2.420     Lab Results   Component Value Date    [id number] 1,085 (H) 04/09/2025     Lab Results   Component Value Date    FOLATE 18.8 04/09/2025       Significant Imaging: I have reviewed all pertinent imaging results/findings within the past 24 hours.  Imaging Results              X-Ray Chest 1 View (Final result)  Result time 04/10/25 07:31:08      Final result by Elie Mendoza MD (04/10/25 07:31:08)                   Impression:      No significant interval change.      Electronically signed by: Elie Mendoza  Date:    04/10/2025  Time:    07:31               Narrative:    EXAMINATION:  XR CHEST 1 VIEW    CLINICAL HISTORY:  reassess pleural effusions;    FINDINGS:  Portable chest radiograph at 04:07 hours compared to  04/09/2025 shows stable enlarged cardiac silhouette and normal pulmonary vascularity, with diffuse aortic vascular calcifications.    There are persistent right mid and lower lung airspace opacities, with right pleural effusion, obscuring the right hemidiaphragm.  Left lung base opacities suggesting subsegmental atelectasis are similar to prior, with no new pleural or parenchymal abnormality.

## 2025-04-11 NOTE — PLAN OF CARE
[facility]  Initial Discharge Assessment       Primary Care Provider: Jian Alcazar MD    Admission Diagnosis: New onset of congestive heart failure [I50.9]    Admission Date: 4/9/2025  Expected Discharge Date: 4/14/2025    SW met with patient bedside. Spouse/Neisha was present in the room assisting with assessment. SW verified demographics, insurance, supports, and PCP.  Patient reported no needs. Patient is able to complete ADLs independently. Patient verified at home DME: Cane.  Patient verified No HH, No Dialysis, No Blood Thinners, and No Oxygen.     Patient verified pharmacy of choice: [facility] [address] Or [facility]  Patient confirmed Son/Mamadou [phone] will be source of transportation at the time of discharge.     Transition of Care Barriers: None    Payor: [employer] [facility] / Plan: [employer] CHOICES / Product Type: Medicare Advantage /     Extended Emergency Contact Information  Primary Emergency Contact: Neisha Jennings  Address: [address] [place] [address] [place]  Mobile Phone: [phone]  Relation: Spouse  Preferred language: English   needed? No    Discharge Plan A: Home with family  Discharge Plan B: Home with family      [facility] - [address] [address]  Phone: [phone] Fax: [phone]    [facility] [address] [place], [place] - [address] [facility]  [address] [place] [address]  Phone: [phone] Fax: [phone]      Initial Assessment (most recent)       Adult Discharge Assessment - 04/11/25 1300          Discharge Assessment    Assessment Type Discharge Planning Assessment     Confirmed/corrected address, phone number and insurance Yes     Confirmed Demographics Correct on Facesheet     Source of Information patient;family     Communicated ELIESER with patient/caregiver Yes     People in Home  spouse;child(danielito), adult     Do you expect to return to your current living situation? Yes     Do you have help at home or someone to help you manage your care at home? Yes     Who are your caregiver(s) and their phone number(s)? Spouse/Neisha and Son/Mamadou     Prior to hospitilization cognitive status: Alert/Oriented     Current cognitive status: Alert/Oriented     Walking or Climbing Stairs Difficulty no     Dressing/Bathing Difficulty no     Equipment Currently Used at Home cane, straight     Readmission within 30 days? No     Patient currently being followed by outpatient case management? No     Do you currently have service(s) that help you manage your care at home? No     Do you take prescription medications? Yes     Do you have prescription coverage? Yes     Coverage PHN     Do you have any problems affording any of your prescribed medications? No     Is the patient taking medications as prescribed? yes     Who is going to help you get home at discharge? Spouse/Neisha and Son/Mamadou     How do you get to doctors appointments? family or friend will provide     Are you on dialysis? No     Do you take coumadin? No     Discharge Plan A Home with family     Discharge Plan B Home with family     DME Needed Upon Discharge  none     Discharge Plan discussed with: Patient;Spouse/sig other     Name(s) and Number(s) Spouse/Neisha     Transition of Care Barriers None

## 2025-04-11 NOTE — PLAN OF CARE
Problem: Adult Inpatient Plan of Care  Goal: Plan of Care Review  Outcome: Progressing     Problem: Adult Inpatient Plan of Care  Goal: Optimal Comfort and Wellbeing  Outcome: Progressing     Problem: Skin Injury Risk Increased  Goal: Skin Health and Integrity  Outcome: Progressing     Problem: Fall Injury Risk  Goal: Absence of Fall and Fall-Related Injury  Outcome: Progressing     Problem: Heart Failure  Goal: Fluid and Electrolyte Balance  Outcome: Progressing     Problem: Heart Failure  Goal: Effective Oxygenation and Ventilation  Outcome: Progressing

## 2025-04-11 NOTE — ASSESSMENT & PLAN NOTE
No chest pain.  Likely demand from the new CHF  -trend and treat the CHF  -possible stress test when euvolemic per cardiology

## 2025-04-11 NOTE — ASSESSMENT & PLAN NOTE
Anemia is likely due to iron deficiency. Most recent hemoglobin and hematocrit are listed below.  Recent Labs     04/09/25  1821 04/10/25  0457 04/11/25  0534   HGB 13.6* 11.7* 11.6*   HCT 41.2 35.7* 35.7*     Plan  - Monitor serial CBC: Daily  - Transfuse PRBC if patient becomes hemodynamically unstable, symptomatic or H/H drops below 7/21.  - Patient has not received any PRBC transfusions to date  - Patient's anemia is currently stable  - IV iron daily x3 doses  - B12 and folate reviewed

## 2025-04-11 NOTE — PT/OT/SLP EVAL
"Physical Therapy Evaluation    Patient Name:  Brit Jennings   MRN:  [id number]    Recommendations:     Discharge Recommendations: No Therapy Indicated   Discharge Equipment Recommendations: none   Barriers to discharge:  medical status    Assessment:     Brit Jennings is a 89 y.o. male admitted with a medical diagnosis of New onset of congestive heart failure.  He presents with the following impairments/functional limitations: weakness, impaired endurance, impaired self care skills, impaired functional mobility, gait instability, impaired balance, decreased lower extremity function, decreased safety awareness, pain, impaired cardiopulmonary response to activity.    Pt found in bed with HOB elevated. Wife at bedside. Pt reports that he had R ankle shin pain that limited mobility yesterday. No report of pain today. Pt initially ambulated 50 ft with RW and supervision. Normalized gait pattern and good stability. Pt then transitioned to personal cane x 100 ft with supervision. Single loss of balance pt reported "stubbing" his toe but able to self correct.     Rehab Prognosis: Fair; patient would benefit from acute skilled PT services to address these deficits and reach maximum level of function.    Recent Surgery: * No surgery found *      Plan:     During this hospitalization, patient to be seen 3 x/week to address the identified rehab impairments via therapeutic activities, therapeutic exercises, gait training, neuromuscular re-education and progress toward the following goals:    Plan of Care Expires:  05/11/25    Subjective     Chief Complaint: R anterior shin pain yesterday   Patient/Family Comments/goals: go home  Pain/Comfort:       Patients cultural, spiritual, Ep[employer]alian conflicts given the current situation: no    Living Environment:  Pt lives with his wife in a one story home with no DEE  Prior to admission, patients level of function was MI cane.  Equipment used at home:  (cane).  DME owned (not " currently used): none.  Upon discharge, patient will have assistance from wife.    Objective:     Communicated with RN prior to session.  Patient found HOB elevated with peripheral IV, PureWick, telemetry  upon PT entry to room.    General Precautions: Standard, fall  Orthopedic Precautions:N/A   Braces: N/A  Respiratory Status: Room air    Exams:  RLE ROM: WFL  RLE Strength: WFL  LLE ROM: WFL  LLE Strength: WFL    Functional Mobility:  Bed Mobility:     Supine to Sit: supervision  Transfers:     Sit to Stand:  supervision with rolling walker  Gait: 50 ft with RW and supervision, 100 ft with personal cane and supervision      AM-PAC 6 CLICK MOBILITY  Total Score:        Treatment & Education:  Pt educated on POC, discharge recommendation, importance of time OOB, pacing/energy conservation, need for assist with mobility, use of call bell to seek assistance as needed and fall prevention      Patient left up in chair with all lines intact, call button in reach, and chair alarm on.    GOALS:   Multidisciplinary Problems       Physical Therapy Goals          Problem: Physical Therapy    Goal Priority Disciplines Outcome Interventions   Physical Therapy Goal     PT, PT/OT Progressing    Description: Goals to be met by: 25     Patient will increase functional independence with mobility by performin. Supine to sit with Supervision  2. Sit to stand transfer with Supervision  3. Bed to chair transfer with Supervision using personal cane  4. Gait  x 150 feet with Supervision using personal cane                             DME Justifications:  No DME recommended requiring DME justifications    History:     Past Medical History:   Diagnosis Date    Basal cell carcinoma 2020    L temple / radiation therapy    Cataract     Hypertension     Squamous cell carcinoma of skin        Past Surgical History:   Procedure Laterality Date    APPENDECTOMY      EYE SURGERY      Moh's surgery on right temple area Right      Cancerous spot    PROSTATE BIOPSY      TONSILLECTOMY         Time Tracking:     PT Received On: 04/11/25  PT Start Time: 1057     PT Stop Time: 1115  PT Total Time (min): 18 min     Billable Minutes: Evaluation 8 and Gait Training 10      04/13/2025

## 2025-04-11 NOTE — CONSULTS
[facility]  Adult Nutrition  Education Short Note      Nutrition Education    Previous education: yes    Diet at home: Low sodium    Written information provided and explained on  low fat, low cholesterol diet, fluid restriction: 1500 mL, and 2 gram sodium diet diet.     Discussed with: patient and family member. Patient reports not weighing himself daily but he will start once discharged.    Educational Need? yes    Barriers: none identified    Interventions: Fat modified diet, Cholesterol modified diet, Fluid modified diet, and Sodium modified diet    Patient and/or family comprehend instructions: yes    Outcome: Verbalizes understanding     Thanks for the consult!    Martina Gonzalez RD 04/11/2025 4:37 PM

## 2025-04-11 NOTE — ASSESSMENT & PLAN NOTE
The patients 3 most recent labs are listed below.  Recent Labs     04/09/25  1821 04/10/25  0457 04/11/25  0534   * 98* 99*     Plan  - Will transfuse if platelet count is <10k.  Or less than 50 K if needing procedure or active bleeding

## 2025-04-11 NOTE — PLAN OF CARE
Problem: Adult Inpatient Plan of Care  Goal: Plan of Care Review  Outcome: Progressing  Goal: Patient-Specific Goal (Individualized)  Outcome: Progressing  Goal: Absence of Hospital-Acquired Illness or Injury  Outcome: Progressing  Goal: Optimal Comfort and Wellbeing  Outcome: Progressing  Goal: Readiness for Transition of Care  Outcome: Progressing     Problem: Skin Injury Risk Increased  Goal: Skin Health and Integrity  Outcome: Progressing     Problem: Wound  Goal: Optimal Coping  Outcome: Progressing  Goal: Optimal Functional Ability  Outcome: Progressing  Goal: Absence of Infection Signs and Symptoms  Outcome: Progressing  Goal: Improved Oral Intake  Outcome: Progressing  Goal: Optimal Pain Control and Function  Outcome: Progressing  Goal: Skin Health and Integrity  Outcome: Progressing  Goal: Optimal Wound Healing  Outcome: Progressing     Problem: Fall Injury Risk  Goal: Absence of Fall and Fall-Related Injury  Outcome: Progressing     Problem: Heart Failure  Goal: Optimal Coping  Outcome: Progressing  Goal: Optimal Cardiac Output  Outcome: Progressing  Goal: Stable Heart Rate and Rhythm  Outcome: Progressing  Goal: Optimal Functional Ability  Outcome: Progressing  Goal: Fluid and Electrolyte Balance  Outcome: Progressing  Goal: Improved Oral Intake  Outcome: Progressing  Goal: Effective Oxygenation and Ventilation  Outcome: Progressing  Goal: Effective Breathing Pattern During Sleep  Outcome: Progressing

## 2025-04-11 NOTE — ASSESSMENT & PLAN NOTE
Based on symptoms, BNP, pleural effusions  -continue Lasix IV   -monitor I/O  -telemetry  -cardiology following  Echo  Result Date: 4/10/2025    Large left pleural effusion    Left Ventricle: The left ventricle is normal in size. Mildly increased   wall thickness. Mild global hypokinesis present. There is mildly reduced   systolic function with a visually estimated ejection fraction of 40 - 45%.    Right Ventricle: Right ventricle was not well visualized due to poor   acoustic window. Systolic function is normal.    Left Atrium: Severely dilated    Right Atrium: Right atrium is severely dilated.    Aortic Valve: The aortic valve is a trileaflet valve. There is moderate   aortic valve sclerosis. There is mild aortic regurgitation.    Mitral Valve: There is mild to moderate regurgitation.    Tricuspid Valve: There is mild regurgitation.    Pulmonic Valve: There is mild regurgitation.    Aorta: The Ascending aorta is mildly dilated measuring 4.3 cm.    IVC/SVC: Elevated venous pressure at 15 mmHg.

## 2025-04-11 NOTE — HOSPITAL COURSE
89 year old female was admitted with new onset heart failure.  Troponin ->258 ->254.  No acute ST- T wave changes on EKG.  CXR concerning for fluid overload.  Echo showed ejection fraction dropped to 40-45% in comparison to prior EF 58% Jan '24.  NM stress test was negative for reversible ischemia. Cardiology was consulted.  He was diuresed with IV lasix, then changed to oral.  Cardiology cleared for d/c on PO lasix and lisinopril.  He will follow up with PCP and Cardiology outpatient.  Plan of care was discussed with patient and he verbalized understanding.  Patient was seen and examined on the day of discharge.

## 2025-04-12 ENCOUNTER — CLINICAL SUPPORT (OUTPATIENT)
Dept: CARDIOLOGY | Facility: HOSPITAL | Age: OVER 89
End: 2025-04-12
Attending: EMERGENCY MEDICINE
Payer: MEDICARE

## 2025-04-12 LAB
ANION GAP (SMH): 4 MMOL/L (ref 8–16)
BUN SERPL-MCNC: 22 MG/DL (ref 8–23)
CALCIUM SERPL-MCNC: 9 MG/DL (ref 8.7–10.5)
CHLORIDE SERPL-SCNC: 99 MMOL/L (ref 95–110)
CO2 SERPL-SCNC: 33 MMOL/L (ref 23–29)
CREAT SERPL-MCNC: 0.9 MG/DL (ref 0.5–1.4)
CV STRESS BASE HR: 65 BPM
DIASTOLIC BLOOD PRESSURE: 87 MMHG
ERYTHROCYTE [DISTWIDTH] IN BLOOD BY AUTOMATED COUNT: 13.3 % (ref 11.5–14.5)
GFR SERPLBLD CREATININE-BSD FMLA CKD-EPI: >60 ML/MIN/1.73/M2
GLUCOSE SERPL-MCNC: 86 MG/DL (ref 70–110)
HCT VFR BLD AUTO: 37.7 % (ref 40–54)
HGB BLD-MCNC: 12.6 GM/DL (ref 14–18)
MAGNESIUM SERPL-MCNC: 2.1 MG/DL (ref 1.6–2.6)
MCH RBC QN AUTO: 34.3 PG (ref 27–31)
MCHC RBC AUTO-ENTMCNC: 33.4 G/DL (ref 32–36)
MCV RBC AUTO: 103 FL (ref 82–98)
OHS CV CPX 1 MINUTE RECOVERY HEART RATE: 68 BPM
OHS CV CPX 85 PERCENT MAX PREDICTED HEART RATE MALE: 111
OHS CV CPX MAX PREDICTED HEART RATE: 131
OHS CV CPX PATIENT IS FEMALE: 0
OHS CV CPX PATIENT IS MALE: 1
OHS CV CPX PEAK DIASTOLIC BLOOD PRESSURE: 84 MMHG
OHS CV CPX PEAK HEAR RATE: 76 BPM
OHS CV CPX PEAK RATE PRESSURE PRODUCT: NORMAL
OHS CV CPX PEAK SYSTOLIC BLOOD PRESSURE: 166 MMHG
OHS CV CPX PERCENT MAX PREDICTED HEART RATE ACHIEVED: 58
OHS CV CPX RATE PRESSURE PRODUCT PRESENTING: NORMAL
PLATELET # BLD AUTO: 122 K/UL (ref 150–450)
PMV BLD AUTO: 12.9 FL (ref 9.2–12.9)
POTASSIUM SERPL-SCNC: 3.8 MMOL/L (ref 3.5–5.1)
RBC # BLD AUTO: 3.67 M/UL (ref 4.6–6.2)
SODIUM SERPL-SCNC: 136 MMOL/L (ref 136–145)
SYSTOLIC BLOOD PRESSURE: 176 MMHG
WBC # BLD AUTO: 4.01 K/UL (ref 3.9–12.7)

## 2025-04-12 PROCEDURE — 12000002 HC ACUTE/MED SURGE SEMI-PRIVATE ROOM

## 2025-04-12 PROCEDURE — 93018 CV STRESS TEST I&R ONLY: CPT | Mod: ,,, | Performed by: INTERNAL MEDICINE

## 2025-04-12 PROCEDURE — 94761 N-INVAS EAR/PLS OXIMETRY MLT: CPT

## 2025-04-12 PROCEDURE — 36415 COLL VENOUS BLD VENIPUNCTURE: CPT | Performed by: STUDENT IN AN ORGANIZED HEALTH CARE EDUCATION/TRAINING PROGRAM

## 2025-04-12 PROCEDURE — 93016 CV STRESS TEST SUPVJ ONLY: CPT | Mod: ,,, | Performed by: INTERNAL MEDICINE

## 2025-04-12 PROCEDURE — 85027 COMPLETE CBC AUTOMATED: CPT | Performed by: STUDENT IN AN ORGANIZED HEALTH CARE EDUCATION/TRAINING PROGRAM

## 2025-04-12 PROCEDURE — 93017 CV STRESS TEST TRACING ONLY: CPT

## 2025-04-12 PROCEDURE — 25000003 PHARM REV CODE 250: Performed by: STUDENT IN AN ORGANIZED HEALTH CARE EDUCATION/TRAINING PROGRAM

## 2025-04-12 PROCEDURE — 63600175 PHARM REV CODE 636 W HCPCS: Performed by: STUDENT IN AN ORGANIZED HEALTH CARE EDUCATION/TRAINING PROGRAM

## 2025-04-12 PROCEDURE — 83735 ASSAY OF MAGNESIUM: CPT | Performed by: STUDENT IN AN ORGANIZED HEALTH CARE EDUCATION/TRAINING PROGRAM

## 2025-04-12 PROCEDURE — 80048 BASIC METABOLIC PNL TOTAL CA: CPT | Performed by: STUDENT IN AN ORGANIZED HEALTH CARE EDUCATION/TRAINING PROGRAM

## 2025-04-12 PROCEDURE — 99900031 HC PATIENT EDUCATION (STAT)

## 2025-04-12 PROCEDURE — 99900035 HC TECH TIME PER 15 MIN (STAT)

## 2025-04-12 PROCEDURE — 25000003 PHARM REV CODE 250: Performed by: NURSE PRACTITIONER

## 2025-04-12 PROCEDURE — A9502 TC99M TETROFOSMIN: HCPCS | Performed by: STUDENT IN AN ORGANIZED HEALTH CARE EDUCATION/TRAINING PROGRAM

## 2025-04-12 RX ORDER — LISINOPRIL 20 MG/1
20 TABLET ORAL ONCE
Status: DISCONTINUED | OUTPATIENT
Start: 2025-04-12 | End: 2025-04-12

## 2025-04-12 RX ORDER — REGADENOSON 0.08 MG/ML
0.4 INJECTION, SOLUTION INTRAVENOUS ONCE
Status: COMPLETED | OUTPATIENT
Start: 2025-04-12 | End: 2025-04-12

## 2025-04-12 RX ORDER — LISINOPRIL 20 MG/1
20 TABLET ORAL DAILY
Status: DISCONTINUED | OUTPATIENT
Start: 2025-04-13 | End: 2025-04-13 | Stop reason: HOSPADM

## 2025-04-12 RX ORDER — LISINOPRIL 20 MG/1
40 TABLET ORAL DAILY
Status: DISCONTINUED | OUTPATIENT
Start: 2025-04-13 | End: 2025-04-12

## 2025-04-12 RX ADMIN — TRAZODONE HYDROCHLORIDE 100 MG: 50 TABLET ORAL at 08:04

## 2025-04-12 RX ADMIN — LISINOPRIL 20 MG: 20 TABLET ORAL at 11:04

## 2025-04-12 RX ADMIN — FUROSEMIDE 40 MG: 40 TABLET ORAL at 11:04

## 2025-04-12 RX ADMIN — TETROFOSMIN 26.8 MILLICURIE: 0.23 INJECTION, POWDER, LYOPHILIZED, FOR SOLUTION INTRAVENOUS at 09:04

## 2025-04-12 RX ADMIN — TETROFOSMIN 10.1 MILLICURIE: 0.23 INJECTION, POWDER, LYOPHILIZED, FOR SOLUTION INTRAVENOUS at 08:04

## 2025-04-12 RX ADMIN — REGADENOSON 0.4 MG: 0.08 INJECTION, SOLUTION INTRAVENOUS at 09:04

## 2025-04-12 NOTE — PROGRESS NOTES
[facility]  Progress Note    Patient Name: Brit Jennings  MRN: [id number]  Patient Class: IP- Inpatient   Admission Date: 4/9/2025  Length of Stay: 3 days  Attending Physician: Charles Arroyo MD  Primary Care Provider: Jian Alcazar MD        Subjective     Principal Problem:New onset of congestive heart failure        HPI:  89-year-old male with PMH asthma, HTN, skin cancer, history of subdural hematoma presents to ER due to several weeks of worsening dyspnea on exertion with about 1 week of worsening peripheral edema of the bilateral lower extremities.  Wife and son bedside.  He is found to have elevated BNP at 3385, elevated troponin 254, and a chest x-ray showing moderate right pleural effusion with compressive atelectasis versus infiltrate as well as a tiny left pleural effusion.  EKG shows a bifascicular block which is old.  Other significant labs include WBC 3.81, hemoglobin 13.6, , platelet 112, iron saturation 16, sodium 134, creatinine 1.0.  He is hypertensive.  He is on room air.  Admitted to hospital medicine service.    Overview/Hospital Course:  Patient monitored closely during hospital stay. Patient was admitted 04/09 due to dyspnea on exertion, HTN, elevated troponin of 254, and elevated BNP of 3385. CXR showed moderate right pleural effusion with compressive atelectasis versus infiltrate as well as a tiny left pleural effusion. Cardiology consulted. Started on Lasix 40 mg IV daily, changed to PO today. Echo showed ejection fraction dropped to 40-45% in comparison to prior EF 58% Jan '24.  NM stress test negative for reversible ischemia.    Interval History: Patient seen and examined.  NAD.  On room air.  Walking around the hallway.  NM stress test negative for reversible ischemia.  Changed to PO lasix today.  Await further cardiology recommendations.     Review of Systems   Constitutional:  Negative for activity change, chills, fatigue and fever.    Respiratory:  Negative for cough and shortness of breath.    Cardiovascular:  Negative for chest pain.   Gastrointestinal:  Negative for abdominal pain, nausea and vomiting.     Objective:     Vital Signs (Most Recent):  Temp: 98.1 °F (36.7 °C) (04/12/25 1133)  Pulse: 66 (04/12/25 1243)  Resp: 18 (04/12/25 1133)  BP: (!) 172/82 (04/12/25 1243)  SpO2: 96 % (04/12/25 1133) Vital Signs (24h Range):  Temp:  [97.7 °F (36.5 °C)-98.4 °F (36.9 °C)] 98.1 °F (36.7 °C)  Pulse:  [51-79] 66  Resp:  [17-18] 18  SpO2:  [92 %-97 %] 96 %  BP: (137-182)/() 172/82     Weight: 54.3 kg (119 lb 11.4 oz)  Body mass index is 18.75 kg/m².    Intake/Output Summary (Last 24 hours) at 4/12/2025 1606  Last data filed at 4/12/2025 0602  Gross per 24 hour   Intake 120 ml   Output 700 ml   Net -580 ml         Physical Exam  Vitals and nursing note reviewed.   Constitutional:       General: He is not in acute distress.     Appearance: Normal appearance.   HENT:      Head: Normocephalic.      Mouth/Throat:      Mouth: Mucous membranes are moist.      Pharynx: Oropharynx is clear.   Cardiovascular:      Rate and Rhythm: Normal rate and regular rhythm.      Pulses: Normal pulses.   Pulmonary:      Effort: Pulmonary effort is normal.      Comments: Decreased breath sounds  Abdominal:      General: Bowel sounds are normal. There is no distension.      Palpations: Abdomen is soft.      Tenderness: There is no abdominal tenderness.   Musculoskeletal:      Right lower leg: Edema present.      Left lower leg: Edema present.   Skin:     General: Skin is warm and dry.   Neurological:      General: No focal deficit present.      Mental Status: He is alert and oriented to person, place, and time.   Psychiatric:         Mood and Affect: Mood normal.         Behavior: Behavior normal.         Thought Content: Thought content normal.               Significant Labs: All pertinent labs within the past 24 hours have been reviewed.  CBC:   Recent Labs   Lab  "04/11/25  0534 04/12/25  0556   WBC 4.46 4.01   HGB 11.6* 12.6*   HCT 35.7* 37.7*   PLT 99* 122*     CMP:   Recent Labs   Lab 04/10/25  1735 04/11/25  0535 04/12/25  0556   NA  --  137 136   K 4.1 3.9 3.8   CO2  --  34* 33*   BUN  --  26* 22   CREATININE  --  1.2 0.9   CALCIUM  --  9.0 9.0     Cardiac Markers:   No results for input(s): "CKMB", "MYOGLOBIN", "BNP", "TROPISTAT" in the last 48 hours.    Magnesium:   Recent Labs   Lab 04/11/25  0535 04/12/25  0556   MG 2.2 2.1       Recent Labs   Lab 04/10/25  1050   TSH 2.420     Lab Results   Component Value Date    MWOCTORZ08 1,085 (H) 04/09/2025     Lab Results   Component Value Date    FOLATE 18.8 04/09/2025       Significant Imaging: I have reviewed all pertinent imaging results/findings within the past 24 hours.  Imaging Results              X-Ray Chest 1 View (Final result)  Result time 04/10/25 07:31:08      Final result by Elie Mendoza MD (04/10/25 07:31:08)                   Impression:      No significant interval change.      Electronically signed by: Elie Mendoza  Date:    04/10/2025  Time:    07:31               Narrative:    EXAMINATION:  XR CHEST 1 VIEW    CLINICAL HISTORY:  reassess pleural effusions;    FINDINGS:  Portable chest radiograph at 04:07 hours compared to 04/09/2025 shows stable enlarged cardiac silhouette and normal pulmonary vascularity, with diffuse aortic vascular calcifications.    There are persistent right mid and lower lung airspace opacities, with right pleural effusion, obscuring the right hemidiaphragm.  Left lung base opacities suggesting subsegmental atelectasis are similar to prior, with no new pleural or parenchymal abnormality.                                         Assessment & Plan  New onset of congestive heart failure  Based on symptoms, BNP, pleural effusions  -monitor I/O  -telemetry  -cardiology following  -IV lasix changed to PO today  -NM stress test negative for reversible ischemia  Echo  Result Date: " 4/10/2025    Large left pleural effusion    Left Ventricle: The left ventricle is normal in size. Mildly increased   wall thickness. Mild global hypokinesis present. There is mildly reduced   systolic function with a visually estimated ejection fraction of 40 - 45%.    Right Ventricle: Right ventricle was not well visualized due to poor   acoustic window. Systolic function is normal.    Left Atrium: Severely dilated    Right Atrium: Right atrium is severely dilated.    Aortic Valve: The aortic valve is a trileaflet valve. There is moderate   aortic valve sclerosis. There is mild aortic regurgitation.    Mitral Valve: There is mild to moderate regurgitation.    Tricuspid Valve: There is mild regurgitation.    Pulmonic Valve: There is mild regurgitation.    Aorta: The Ascending aorta is mildly dilated measuring 4.3 cm.    IVC/SVC: Elevated venous pressure at 15 mmHg.         Essential hypertension  -Continue lisinopril  -IV lasix  Elevated troponin  No chest pain.  Likely demand from the new CHF  -trend and treat the CHF  -NM stress test negative for reversible ischemia  Anemia  Anemia is likely due to  iron deficiency . Most recent hemoglobin and hematocrit are listed below.  Recent Labs     04/10/25  0457 04/11/25  0534 04/12/25  0556   HGB 11.7* 11.6* 12.6*   HCT 35.7* 35.7* 37.7*     Plan  - Monitor serial CBC: Daily  - Transfuse PRBC if patient becomes hemodynamically unstable, symptomatic or H/H drops below 7/21.  - Patient has not received any PRBC transfusions to date  - Patient's anemia is currently stable  - IV iron daily x3 doses  - B12 and folate reviewed  Thrombocytopenia  The patients 3 most recent labs are listed below.  Recent Labs     04/10/25  0457 04/11/25  0534 04/12/25  0556   PLT 98* 99* 122*     Plan  - Will transfuse if platelet count is <10k.  Or less than 50 K if needing procedure or active bleeding  VTE Risk Mitigation (From admission, onward)           Ordered     IP VTE HIGH RISK PATIENT   Once         04/09/25 2033     Place sequential compression device  Until discontinued         04/09/25 2033                    Discharge Planning   ELIESER: 4/13/2025     Code Status: Full Code   Medical Readiness for Discharge Date:   Discharge Plan A: Home with family                Please place Justification for DME        GILSON Galdamez  Department of Hospital Medicine   [facility]

## 2025-04-12 NOTE — ASSESSMENT & PLAN NOTE
No chest pain.  Likely demand from the new CHF  -trend and treat the CHF  -NM stress test negative for reversible ischemia

## 2025-04-12 NOTE — PT/OT/SLP PROGRESS
Occupational Therapy      Patient Name:  Brit Jennings   MRN:  [id number]    Patient not seen today secondary to Off the floor for procedure/surgery (stress test x 2 attempts). Will follow-up next service date.    4/12/2025

## 2025-04-12 NOTE — SUBJECTIVE & OBJECTIVE
Interval History: Patient seen and examined.  NAD.  On room air.  Walking around the hallway.  NM stress test negative for reversible ischemia.  Changed to PO lasix today.  Await further cardiology recommendations.     Review of Systems   Constitutional:  Negative for activity change, chills, fatigue and fever.   Respiratory:  Negative for cough and shortness of breath.    Cardiovascular:  Negative for chest pain.   Gastrointestinal:  Negative for abdominal pain, nausea and vomiting.     Objective:     Vital Signs (Most Recent):  Temp: 98.1 °F (36.7 °C) (04/12/25 1133)  Pulse: 66 (04/12/25 1243)  Resp: 18 (04/12/25 1133)  BP: (!) 172/82 (04/12/25 1243)  SpO2: 96 % (04/12/25 1133) Vital Signs (24h Range):  Temp:  [97.7 °F (36.5 °C)-98.4 °F (36.9 °C)] 98.1 °F (36.7 °C)  Pulse:  [51-79] 66  Resp:  [17-18] 18  SpO2:  [92 %-97 %] 96 %  BP: (137-182)/() 172/82     Weight: 54.3 kg (119 lb 11.4 oz)  Body mass index is 18.75 kg/m².    Intake/Output Summary (Last 24 hours) at 4/12/2025 1606  Last data filed at 4/12/2025 0602  Gross per 24 hour   Intake 120 ml   Output 700 ml   Net -580 ml         Physical Exam  Vitals and nursing note reviewed.   Constitutional:       General: He is not in acute distress.     Appearance: Normal appearance.   HENT:      Head: Normocephalic.      Mouth/Throat:      Mouth: Mucous membranes are moist.      Pharynx: Oropharynx is clear.   Cardiovascular:      Rate and Rhythm: Normal rate and regular rhythm.      Pulses: Normal pulses.   Pulmonary:      Effort: Pulmonary effort is normal.      Comments: Decreased breath sounds  Abdominal:      General: Bowel sounds are normal. There is no distension.      Palpations: Abdomen is soft.      Tenderness: There is no abdominal tenderness.   Musculoskeletal:      Right lower leg: Edema present.      Left lower leg: Edema present.   Skin:     General: Skin is warm and dry.   Neurological:      General: No focal deficit present.      Mental Status:  "He is alert and oriented to person, place, and time.   Psychiatric:         Mood and Affect: Mood normal.         Behavior: Behavior normal.         Thought Content: Thought content normal.               Significant Labs: All pertinent labs within the past 24 hours have been reviewed.  CBC:   Recent Labs   Lab 04/11/25  0534 04/12/25  0556   WBC 4.46 4.01   HGB 11.6* 12.6*   HCT 35.7* 37.7*   PLT 99* 122*     CMP:   Recent Labs   Lab 04/10/25  1735 04/11/25  0535 04/12/25  0556   NA  --  137 136   K 4.1 3.9 3.8   CO2  --  34* 33*   BUN  --  26* 22   CREATININE  --  1.2 0.9   CALCIUM  --  9.0 9.0     Cardiac Markers:   No results for input(s): "CKMB", "MYOGLOBIN", "BNP", "TROPISTAT" in the last 48 hours.    Magnesium:   Recent Labs   Lab 04/11/25  0535 04/12/25  0556   MG 2.2 2.1       Recent Labs   Lab 04/10/25  1050   TSH 2.420     Lab Results   Component Value Date    [id number] 1,085 (H) 04/09/2025     Lab Results   Component Value Date    FOLATE 18.8 04/09/2025       Significant Imaging: I have reviewed all pertinent imaging results/findings within the past 24 hours.  Imaging Results              X-Ray Chest 1 View (Final result)  Result time 04/10/25 07:31:08      Final result by Elie Mendoza MD (04/10/25 07:31:08)                   Impression:      No significant interval change.      Electronically signed by: Elie Mendoza  Date:    04/10/2025  Time:    07:31               Narrative:    EXAMINATION:  XR CHEST 1 VIEW    CLINICAL HISTORY:  reassess pleural effusions;    FINDINGS:  Portable chest radiograph at 04:07 hours compared to 04/09/2025 shows stable enlarged cardiac silhouette and normal pulmonary vascularity, with diffuse aortic vascular calcifications.    There are persistent right mid and lower lung airspace opacities, with right pleural effusion, obscuring the right hemidiaphragm.  Left lung base opacities suggesting subsegmental atelectasis are similar to prior, with no new pleural or parenchymal " abnormality.

## 2025-04-12 NOTE — RESPIRATORY THERAPY
04/12/25 0735   Patient Assessment/Suction   Level of Consciousness (AVPU) alert   Respiratory Effort Normal;Unlabored   Expansion/Accessory Muscles/Retractions no retractions;expansion symmetric   Rhythm/Pattern, Respiratory unlabored;pattern regular;depth regular;no shortness of breath reported   Cough Frequency no cough   PRE-TX-O2   Device (Oxygen Therapy) room air   SpO2 95 %   Pulse Oximetry Type Intermittent   $ Pulse Oximetry - Multiple Charge Pulse Oximetry - Multiple   Pulse 60   Resp 18   Aerosol Therapy   $ Aerosol Therapy Charges PRN treatment not required

## 2025-04-12 NOTE — PLAN OF CARE
Problem: Adult Inpatient Plan of Care  Goal: Plan of Care Review  Outcome: Progressing     Problem: Adult Inpatient Plan of Care  Goal: Optimal Comfort and Wellbeing  Outcome: Progressing     Problem: Skin Injury Risk Increased  Goal: Skin Health and Integrity  Outcome: Progressing     Problem: Wound  Goal: Skin Health and Integrity  Outcome: Progressing     Problem: Fall Injury Risk  Goal: Absence of Fall and Fall-Related Injury  Outcome: Progressing     Problem: Heart Failure  Goal: Stable Heart Rate and Rhythm  Outcome: Progressing     Problem: Heart Failure  Goal: Optimal Cardiac Output  Outcome: Progressing     Problem: Heart Failure  Goal: Fluid and Electrolyte Balance  Outcome: Progressing     Problem: Heart Failure  Goal: Effective Oxygenation and Ventilation  Outcome: Progressing

## 2025-04-12 NOTE — RESPIRATORY THERAPY
04/12/25 0735   Patient Assessment/Suction   Level of Consciousness (AVPU) alert   Respiratory Effort Normal;Unlabored   Expansion/Accessory Muscles/Retractions no retractions;expansion symmetric   Rhythm/Pattern, Respiratory unlabored;pattern regular;depth regular;no shortness of breath reported   Cough Frequency no cough   PRE-TX-O2   Device (Oxygen Therapy) room air   SpO2 95 %   Pulse Oximetry Type Intermittent   $ Pulse Oximetry - Multiple Charge Pulse Oximetry - Multiple   Pulse 60   Resp 18

## 2025-04-12 NOTE — ASSESSMENT & PLAN NOTE
Anemia is likely due to iron deficiency. Most recent hemoglobin and hematocrit are listed below.  Recent Labs     04/10/25  0457 04/11/25  0534 04/12/25  0556   HGB 11.7* 11.6* 12.6*   HCT 35.7* 35.7* 37.7*     Plan  - Monitor serial CBC: Daily  - Transfuse PRBC if patient becomes hemodynamically unstable, symptomatic or H/H drops below 7/21.  - Patient has not received any PRBC transfusions to date  - Patient's anemia is currently stable  - IV iron daily x3 doses  - B12 and folate reviewed   a 70 degree microdebrider.  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  This was performed until the frontal sinus suction was able to be passed fully into the frontal sinus, which was confirmed by intraoperative guidance.  The sinus was then irrigated with vancomycin impregnated sterile saline and purulent secretions were confirmed to have drained out under direct visualization.  Given amount of inflammation at the beginning of the case, the decision was made to place a new brain stent within the frontal sinus.    The 0 degree scope was then used to perform outfracturing of bilateral inferior turbinates. There was mild oozing of the left septal spur so posisep was placed.  Once hemostasis was confirmed, an Afrin-soaked pledget was placed on both sides of the nose to be taken out by anesthesia at the time of extubation.  An orogastric tube was used to suction the stomach and oropharynx of bloody secretions. An oral airway was placed and the patient's care was turned back over to anesthesia, and was transported to PACU in stable condition.     I was present for and performed the entirety of the case      Krish Baker MD    Pediatric Otolaryngology- Head and Neck Surgery  [facility]

## 2025-04-12 NOTE — ASSESSMENT & PLAN NOTE
Based on symptoms, BNP, pleural effusions  -monitor I/O  -telemetry  -cardiology following  -IV lasix changed to PO today  -NM stress test negative for reversible ischemia  Echo  Result Date: 4/10/2025    Large left pleural effusion    Left Ventricle: The left ventricle is normal in size. Mildly increased   wall thickness. Mild global hypokinesis present. There is mildly reduced   systolic function with a visually estimated ejection fraction of 40 - 45%.    Right Ventricle: Right ventricle was not well visualized due to poor   acoustic window. Systolic function is normal.    Left Atrium: Severely dilated    Right Atrium: Right atrium is severely dilated.    Aortic Valve: The aortic valve is a trileaflet valve. There is moderate   aortic valve sclerosis. There is mild aortic regurgitation.    Mitral Valve: There is mild to moderate regurgitation.    Tricuspid Valve: There is mild regurgitation.    Pulmonic Valve: There is mild regurgitation.    Aorta: The Ascending aorta is mildly dilated measuring 4.3 cm.    IVC/SVC: Elevated venous pressure at 15 mmHg.

## 2025-04-12 NOTE — ASSESSMENT & PLAN NOTE
The patients 3 most recent labs are listed below.  Recent Labs     04/10/25  0457 04/11/25  0534 04/12/25  0556   PLT 98* 99* 122*     Plan  - Will transfuse if platelet count is <10k.  Or less than 50 K if needing procedure or active bleeding

## 2025-04-13 VITALS
SYSTOLIC BLOOD PRESSURE: 155 MMHG | RESPIRATION RATE: 18 BRPM | HEIGHT: 67 IN | WEIGHT: 121.5 LBS | BODY MASS INDEX: 19.07 KG/M2 | TEMPERATURE: 98 F | HEART RATE: 60 BPM | OXYGEN SATURATION: 98 % | DIASTOLIC BLOOD PRESSURE: 81 MMHG

## 2025-04-13 LAB
ANION GAP (SMH): 5 MMOL/L (ref 8–16)
BUN SERPL-MCNC: 24 MG/DL (ref 8–23)
CALCIUM SERPL-MCNC: 9.2 MG/DL (ref 8.7–10.5)
CHLORIDE SERPL-SCNC: 100 MMOL/L (ref 95–110)
CO2 SERPL-SCNC: 31 MMOL/L (ref 23–29)
CREAT SERPL-MCNC: 0.9 MG/DL (ref 0.5–1.4)
ERYTHROCYTE [DISTWIDTH] IN BLOOD BY AUTOMATED COUNT: 13.4 % (ref 11.5–14.5)
GFR SERPLBLD CREATININE-BSD FMLA CKD-EPI: >60 ML/MIN/1.73/M2
GLUCOSE SERPL-MCNC: 88 MG/DL (ref 70–110)
HCT VFR BLD AUTO: 35.8 % (ref 40–54)
HGB BLD-MCNC: 11.9 GM/DL (ref 14–18)
MAGNESIUM SERPL-MCNC: 2 MG/DL (ref 1.6–2.6)
MCH RBC QN AUTO: 34.1 PG (ref 27–31)
MCHC RBC AUTO-ENTMCNC: 33.2 G/DL (ref 32–36)
MCV RBC AUTO: 103 FL (ref 82–98)
OHS QRS DURATION: 124 MS
OHS QTC CALCULATION: 488 MS
PLATELET # BLD AUTO: 123 K/UL (ref 150–450)
PMV BLD AUTO: 12.4 FL (ref 9.2–12.9)
POTASSIUM SERPL-SCNC: 3.6 MMOL/L (ref 3.5–5.1)
RBC # BLD AUTO: 3.49 M/UL (ref 4.6–6.2)
SODIUM SERPL-SCNC: 136 MMOL/L (ref 136–145)
WBC # BLD AUTO: 3.55 K/UL (ref 3.9–12.7)

## 2025-04-13 PROCEDURE — 97165 OT EVAL LOW COMPLEX 30 MIN: CPT

## 2025-04-13 PROCEDURE — 94761 N-INVAS EAR/PLS OXIMETRY MLT: CPT

## 2025-04-13 PROCEDURE — 85027 COMPLETE CBC AUTOMATED: CPT | Performed by: STUDENT IN AN ORGANIZED HEALTH CARE EDUCATION/TRAINING PROGRAM

## 2025-04-13 PROCEDURE — 99900031 HC PATIENT EDUCATION (STAT)

## 2025-04-13 PROCEDURE — 25000003 PHARM REV CODE 250: Performed by: NURSE PRACTITIONER

## 2025-04-13 PROCEDURE — 83735 ASSAY OF MAGNESIUM: CPT | Performed by: STUDENT IN AN ORGANIZED HEALTH CARE EDUCATION/TRAINING PROGRAM

## 2025-04-13 PROCEDURE — 99900035 HC TECH TIME PER 15 MIN (STAT)

## 2025-04-13 PROCEDURE — 97535 SELF CARE MNGMENT TRAINING: CPT

## 2025-04-13 PROCEDURE — 36415 COLL VENOUS BLD VENIPUNCTURE: CPT | Performed by: STUDENT IN AN ORGANIZED HEALTH CARE EDUCATION/TRAINING PROGRAM

## 2025-04-13 PROCEDURE — 25000003 PHARM REV CODE 250

## 2025-04-13 PROCEDURE — 97116 GAIT TRAINING THERAPY: CPT

## 2025-04-13 PROCEDURE — 80048 BASIC METABOLIC PNL TOTAL CA: CPT | Performed by: STUDENT IN AN ORGANIZED HEALTH CARE EDUCATION/TRAINING PROGRAM

## 2025-04-13 RX ORDER — POTASSIUM CHLORIDE 750 MG/1
10 CAPSULE, EXTENDED RELEASE ORAL DAILY
Qty: 30 CAPSULE | Refills: 0 | Status: SHIPPED | OUTPATIENT
Start: 2025-04-13

## 2025-04-13 RX ORDER — FUROSEMIDE 20 MG/1
20 TABLET ORAL DAILY
Qty: 30 TABLET | Refills: 0 | Status: SHIPPED | OUTPATIENT
Start: 2025-04-13 | End: 2025-04-17 | Stop reason: SDUPTHER

## 2025-04-13 RX ADMIN — LISINOPRIL 20 MG: 20 TABLET ORAL at 08:04

## 2025-04-13 RX ADMIN — FUROSEMIDE 40 MG: 40 TABLET ORAL at 08:04

## 2025-04-13 NOTE — PT/OT/SLP EVAL
Occupational Therapy   Evaluation and Discharge Note    Name: Brit Jennings  MRN: [id number]  Admitting Diagnosis: New onset of congestive heart failure  Recent Surgery: * No surgery found *      Recommendations:     Discharge Recommendations: No Therapy Indicated  Discharge Equipment Recommendations: none  Barriers to discharge:  None    Assessment:     Brit Jennings is a 89 y.o. male with a medical diagnosis of New onset of congestive heart failure. At this time, patient is functioning at their prior level of function and does not require further acute OT services.     Plan:     During this hospitalization, patient does not require further acute OT services.  Please re-consult if situation changes.    Plan of Care Reviewed with: patient, spouse    Subjective     Chief Complaint: none  Patient/Family Comments/goals: none    Occupational Profile:  Living Environment: lives with wife in single story home, no DEE  Previous level of function: Mod I with ADLs and cooking, cleaning, driving  Roles and Routines: , father, , employee  Equipment Used at home: cane, straight  Assistance upon Discharge: Wife, adult children    Pain/Comfort:  Pain Rating 1: 0/10    Patients cultural, spiritual, Orthodoxy conflicts given the current situation: no    Objective:     Communicated with: nurse prior to session.  Patient found up in chair with peripheral IV upon OT entry to room.    General Precautions: Standard, fall  Orthopedic Precautions: N/A  Braces: N/A  Respiratory Status: Room air     Occupational Performance:    Functional Mobility/Transfers:  Patient completed Sit <> Stand Transfer with supervision  with  straight cane   Functional Mobility: Upon OT arrival, pt was up and in bathroom performing toileting. Pt was noted using a straight cane and ambulated well to chair.    Activities of Daily Living:  Grooming: stand by assistance with straight cane to bathroom for face hygiene  in standing at sink  level    Cognitive/Visual Perceptual:  Cognitive/Psychosocial Skills:     -       Oriented to: Person, Place, Time, and Situation   -       Follows Commands/attention:Follows multistep  commands  -       Communication: clear/fluent  -       Memory: No Deficits noted  -       Safety awareness/insight to disability: intact   -       Mood/Affect/Coping skills/emotional control: Appropriate to situation    Physical Exam:  Upper Extremity Range of Motion:     -       Right Upper Extremity: WNL  -       Left Upper Extremity: WNL  Upper Extremity Strength:    -       Right Upper Extremity: WNL  -       Left Upper Extremity: WNL   Strength:    -       Right Upper Extremity: WNL  -       Left Upper Extremity: WNL  Fine Motor Coordination:    -       Intact  Gross motor coordination:   WFL    AMPAC 6 Click ADL:  AMPAC Total Score: 24    Treatment & Education:  Pt educated on role of OT and POC, OOB/EOB activities, and call bell. Pt was found emerging from the bathroom upon OT arrival. Pt was able to sit to stand with SBA and with the use of straight cane ambulated to sink to perform face hygiene standing at sink. No SOB or LOB noted.     Patient left up in chair with all lines intact, call button in reach, and chair alarm on    GOALS:   Multidisciplinary Problems       Occupational Therapy Goals       Not on file                  History:     Past Medical History:   Diagnosis Date    Basal cell carcinoma 2020    L temple / radiation therapy    Cataract     Hypertension     Squamous cell carcinoma of skin          Past Surgical History:   Procedure Laterality Date    APPENDECTOMY      EYE SURGERY      Moh's surgery on right temple area Right 2015    Cancerous spot    PROSTATE BIOPSY      TONSILLECTOMY         Time Tracking:     OT Date of Treatment: 04/13/25  OT Start Time: 1110  OT Stop Time: 1139  OT Total Time (min): 29 min    Billable Minutes:Evaluation 6  Self Care/Home Management 23    4/13/2025

## 2025-04-13 NOTE — DISCHARGE INSTRUCTIONS
Check blood pressure twice a day and keep log  Follow up with PCP and cardiology outpatient  Weigh self daily and keep log    Our goal at [facility] is to always give you outstanding care and exceptional service. You may receive a survey from [employer] by mail, text or e-mail in the next 24-48 hours asking about the care you received with us. The survey should only take 5-10 minutes to complete and is very important to us.     Your feedback provides us with a way to recognize our staff who work tirelessly to provide the best care! Also, your responses help us learn how to improve when your experience was below our aspiration of excellence. We are always looking for ways to improve your stay. We WILL use your feedback to continue making improvements to help us provide the highest quality care. We keep your personal information and feedback confidential. We appreciate your time completing this survey and can't wait to hear from you!!!    We look forward to your continued care with us! Thanks so much for choosing [facility] for your healthcare needs!

## 2025-04-13 NOTE — DISCHARGE SUMMARY
[facility] Medicine  Discharge Summary      Patient Name: Brit Jennings  MRN: [id number]  GINI: [id number]  Patient Class: IP- Inpatient  Admission Date: 4/9/2025  Hospital Length of Stay: 4 days  Discharge Date and Time: 04/13/2025 1:21 PM  Attending Physician: Jaida Moore MD   Discharging Provider: GILSON Galdamez  Primary Care Provider: Jian Alcazar MD    Primary Care Team: Networked reference to record PCT     HPI:   89-year-old male with PMH asthma, HTN, skin cancer, history of subdural hematoma presents to ER due to several weeks of worsening dyspnea on exertion with about 1 week of worsening peripheral edema of the bilateral lower extremities.  Wife and son bedside.  He is found to have elevated BNP at 3385, elevated troponin 254, and a chest x-ray showing moderate right pleural effusion with compressive atelectasis versus infiltrate as well as a tiny left pleural effusion.  EKG shows a bifascicular block which is old.  Other significant labs include WBC 3.81, hemoglobin 13.6, , platelet 112, iron saturation 16, sodium 134, creatinine 1.0.  He is hypertensive.  He is on room air.  Admitted to hospital medicine service.    * No surgery found *      Hospital Course:   89 year old female was admitted with new onset heart failure.  Troponin ->258 ->254.  No acute ST- T wave changes on EKG.  CXR concerning for fluid overload.  Echo showed ejection fraction dropped to 40-45% in comparison to prior EF 58% Jan '24.  NM stress test was negative for reversible ischemia. Cardiology was consulted.  He was diuresed with IV lasix, then changed to oral.  Cardiology cleared for d/c on PO lasix and lisinopril.  He will follow up with PCP and Cardiology outpatient.  Plan of care was discussed with patient and he verbalized understanding.  Patient was seen and examined on the day of discharge.     Goals of Care Treatment Preferences:  Code Status: Full Code      SDOH  Screening:  The patient was screened for utility difficulties, food insecurity, transport difficulties, housing insecurity, and interpersonal safety and there were no concerns identified this admission.     Consults:   Consults (From admission, onward)          Status Ordering Provider     Inpatient consult to Social Work/Case Management  Once        Provider:  (Not yet assigned)    Completed ADITHYA LOERA     Inpatient consult to Registered Dietitian/Nutritionist  Once        Provider:  (Not yet assigned)    Completed ADITHYA LOERA     Inpatient consult to Cardiology  Once        Provider:  Eros Denise MD    Completed ADITHYA LOERA            Final Active Diagnoses:    Diagnosis Date Noted POA    PRINCIPAL PROBLEM:  New onset of congestive heart failure [I50.9] 04/09/2025 Yes    Anemia [D64.9] 04/09/2025 Yes    Thrombocytopenia [D69.6] 04/09/2025 Yes    Elevated troponin [R79.89] 04/09/2025 Yes    Essential hypertension [I10] 10/02/2019 Yes     Chronic      Problems Resolved During this Admission:       Discharged Condition: good    Disposition: Home or Self Care    Follow Up:   Follow-up Information       Jian Alcazar MD Follow up in 1 week(s).    Specialties: Family Medicine, Home Health Services, Hospice Services  Contact information:  [address]  [address]  [place] [address]  [phone]               Manoj Bernal MD Follow up in 2 week(s).    Specialty: Cardiology  Contact information:  [address]  [address]  [place] [address]  [phone]                           Patient Instructions:      Notify your health care provider if you experience any of the following:  temperature >100.4     Notify your health care provider if you experience any of the following:  persistent nausea and vomiting or diarrhea     Notify your health care provider if you experience any of the following:  severe uncontrolled pain     Notify your health care provider if you experience any of the  following:  difficulty breathing or increased cough     Notify your health care provider if you experience any of the following:  severe persistent headache     Notify your health care provider if you experience any of the following:  increased confusion or weakness     Notify your health care provider if you experience any of the following:  persistent dizziness, light-headedness, or visual disturbances     Activity as tolerated       Significant Diagnostic Studies: Labs: CMP   Recent Labs   Lab 04/12/25  0556 04/13/25  0556    136   K 3.8 3.6   CO2 33* 31*   BUN 22 24*   CREATININE 0.9 0.9   CALCIUM 9.0 9.2    and CBC   Recent Labs   Lab 04/12/25  0556 04/13/25  0556   WBC 4.01 3.55*   HGB 12.6* 11.9*   HCT 37.7* 35.8*   * 123*       Pending Diagnostic Studies:       Procedure Component Value Units Date/Time    EXTRA TUBES [[id number]] Collected: 04/09/25 1825    Order Status: Sent Lab Status: In process Updated: 04/09/25 1832    Specimen: Blood, Venous     Narrative:      The following orders were created for panel order EXTRA TUBES.  Procedure                               Abnormality         Status                     ---------                               -----------         ------                     Light Blue Top Hold[[id number]]                             In process                 Gold Top Hold[[id number]]                                   In process                   Please view results for these tests on the individual orders.           Medications:  Reconciled Home Medications:      Medication List        START taking these medications      furosemide 20 MG tablet  Commonly known as: LASIX  Take 1 tablet (20 mg total) by mouth once daily.     potassium chloride 10 MEQ Cpsr  Commonly known as: MICRO-K  Take 1 capsule (10 mEq total) by mouth once daily.            CHANGE how you take these medications      lisinopriL 20 MG tablet  Commonly known as: PRINIVIL,ZESTRIL  Take 1 tablet (20 mg  total) by mouth once daily.  What changed: when to take this            CONTINUE taking these medications      albuterol 90 mcg/actuation inhaler  Commonly known as: PROVENTIL/VENTOLIN HFA  Inhale 1-2 puffs into the lungs daily as needed for Shortness of Breath. Rescue     neomycin-polymyxin-dexamethasone 3.5 mg/g-10,000 unit/g-0.1 % Oint  Commonly known as: DEXACINE  Place 1 application  into both eyes every 6 (six) hours.     SYMBICORT 160-4.5 mcg/actuation Hfaa  Generic drug: budesonide-formoterol 160-4.5 mcg  Inhale 2 puffs into the lungs every 12 (twelve) hours.     traZODone 100 MG tablet  Commonly known as: DESYREL  Take 1 tablet (100 mg total) by mouth every evening.     VITAMIN C ORAL  Take 1 tablet by mouth 2 (two) times a day.            ASK your doctor about these medications      YOSELIN AEROSPHERE 160-9-4.8 mcg/actuation Hfaa  Generic drug: budesonide-glycopyr-formoterol  Inhale 2 puffs into the lungs 2 (two) times daily.              Indwelling Lines/Drains at time of discharge:   Lines/Drains/Airways       None                   Time spent on the discharge of patient: 35 minutes         GILSON Galdamez  Department of Hospital Medicine  [facility]

## 2025-04-13 NOTE — PROGRESS NOTES
"AVS virtually reviewed with Brit MEJIA" Dick and his wife and son at the bedside, in its entirety with emphasis on diet, medications, follow-up appointments and reasons to return to the ED or contact the [facility] On Call Nurse Care Line. Patient also encouraged to utilize their patient portal. Ease and convenience of use reiterated. Education complete and patient voiced understanding. All questions answered. Discharge teaching complete.    "

## 2025-04-13 NOTE — NURSING
Discharge instructions reviewed with pt by virtual nurse.  PIV removed without difficulty, catheter intact.

## 2025-04-13 NOTE — PLAN OF CARE
Problem: Adult Inpatient Plan of Care  Goal: Plan of Care Review  Outcome: Progressing     Problem: Skin Injury Risk Increased  Goal: Skin Health and Integrity  Outcome: Progressing     Problem: Fall Injury Risk  Goal: Absence of Fall and Fall-Related Injury  Outcome: Progressing     Problem: Heart Failure  Goal: Optimal Coping  Outcome: Progressing

## 2025-04-13 NOTE — PT/OT/SLP PROGRESS
Physical Therapy Treatment    Patient Name:  Brit Jennings   MRN:  [id number]    Recommendations:     Discharge Recommendations: No Therapy Indicated  Discharge Equipment Recommendations: none  Barriers to discharge:  medical status    Assessment:     Brit Jennings is a 89 y.o. male admitted with a medical diagnosis of New onset of congestive heart failure.  He presents with the following impairments/functional limitations: weakness, impaired endurance, impaired self care skills, impaired functional mobility, gait instability, impaired balance, decreased lower extremity function, decreased safety awareness, pain, impaired cardiopulmonary response to activity.    Pt found in bed with HOB elevated. Wife at bedside. Pt agreeable to visit. Pt ambulated 500 ft with personal cane and CGA. Occ misstep but able to self correct. No shortness of breath.     Rehab Prognosis: Fair; patient would benefit from acute skilled PT services to address these deficits and reach maximum level of function.    Recent Surgery: * No surgery found *      Plan:     During this hospitalization, patient to be seen 3 x/week to address the identified rehab impairments via therapeutic activities, therapeutic exercises, gait training, neuromuscular re-education and progress toward the following goals:    Plan of Care Expires:  05/11/25    Subjective     Chief Complaint: heel pain  Patient/Family Comments/goals: go home  Pain/Comfort:  Pain Rating 1: 0/10      Objective:     Communicated with RN prior to session.  Patient found HOB elevated with peripheral IV, telemetry upon PT entry to room.     General Precautions: Standard, fall  Orthopedic Precautions: N/A  Braces: N/A  Respiratory Status: Room air     Functional Mobility:  Bed Mobility:     Supine to Sit: supervision  Transfers:     Sit to Stand:  supervision with rolling walker  Gait: 500 ft with personal cane and CGA occ misstep able to self correct      AM-PAC 6 CLICK MOBILITY           Treatment & Education:  Pt educated on POC, discharge recommendation, need for assist with mobility, importance of time OOB, pacing/energy conservation, use of call bell to seek assistance as needed and fall prevention      Patient left up in chair with all lines intact, call button in reach, chair alarm on, and wife present..    GOALS:   Multidisciplinary Problems       Physical Therapy Goals          Problem: Physical Therapy    Goal Priority Disciplines Outcome Interventions   Physical Therapy Goal     PT, PT/OT Progressing    Description: Goals to be met by: 25     Patient will increase functional independence with mobility by performin. Supine to sit with Supervision  2. Sit to stand transfer with Supervision  3. Bed to chair transfer with Supervision using personal cane  4. Gait  x 150 feet with Supervision using personal cane                             DME Justifications:  No DME recommended requiring DME justifications    Time Tracking:     PT Received On: 25  PT Start Time: 1054     PT Stop Time: 1108  PT Total Time (min): 14 min     Billable Minutes: Gait Training 14    Treatment Type: Treatment  PT/PTA: PT     Number of PTA visits since last PT visit: 0     2025

## 2025-04-13 NOTE — PLAN OF CARE
Problem: Adult Inpatient Plan of Care  Goal: Plan of Care Review  Outcome: Adequate for Care Transition  Goal: Patient-Specific Goal (Individualized)  Outcome: Adequate for Care Transition  Goal: Absence of Hospital-Acquired Illness or Injury  Outcome: Adequate for Care Transition  Goal: Optimal Comfort and Wellbeing  Outcome: Adequate for Care Transition  Goal: Readiness for Transition of Care  Outcome: Adequate for Care Transition     Problem: Skin Injury Risk Increased  Goal: Skin Health and Integrity  Outcome: Adequate for Care Transition     Problem: Wound  Goal: Optimal Coping  Outcome: Adequate for Care Transition  Goal: Optimal Functional Ability  Outcome: Adequate for Care Transition  Goal: Absence of Infection Signs and Symptoms  Outcome: Adequate for Care Transition  Goal: Improved Oral Intake  Outcome: Adequate for Care Transition  Goal: Optimal Pain Control and Function  Outcome: Adequate for Care Transition  Goal: Skin Health and Integrity  Outcome: Adequate for Care Transition  Goal: Optimal Wound Healing  Outcome: Adequate for Care Transition     Problem: Fall Injury Risk  Goal: Absence of Fall and Fall-Related Injury  Outcome: Adequate for Care Transition     Problem: Heart Failure  Goal: Optimal Coping  Outcome: Adequate for Care Transition  Goal: Optimal Cardiac Output  Outcome: Adequate for Care Transition  Goal: Stable Heart Rate and Rhythm  Outcome: Adequate for Care Transition  Goal: Optimal Functional Ability  Outcome: Adequate for Care Transition  Goal: Fluid and Electrolyte Balance  Outcome: Adequate for Care Transition  Goal: Improved Oral Intake  Outcome: Adequate for Care Transition  Goal: Effective Oxygenation and Ventilation  Outcome: Adequate for Care Transition  Goal: Effective Breathing Pattern During Sleep  Outcome: Adequate for Care Transition

## 2025-04-14 ENCOUNTER — TELEPHONE (OUTPATIENT)
Dept: FAMILY MEDICINE | Facility: CLINIC | Age: OVER 89
End: 2025-04-14
Payer: MEDICARE

## 2025-04-14 ENCOUNTER — PATIENT OUTREACH (OUTPATIENT)
Dept: FAMILY MEDICINE | Facility: CLINIC | Age: OVER 89
End: 2025-04-14
Payer: MEDICARE

## 2025-04-14 DIAGNOSIS — L84 CALLUS OF FOOT: Primary | ICD-10-CM

## 2025-04-14 NOTE — TELEPHONE ENCOUNTER
----- Message from Nurse Hein sent at 4/14/2025  1:46 PM CDT -----  Call patient - needs post-hospital phone call within 2 business days and hospital follow up visit scheduled within 7-14 days.

## 2025-04-14 NOTE — PROGRESS NOTES
Discharge Information     Discharge Date: 4/13/2025      Primary Discharge Diagnosis:  New onset of congestive heart failure       Discharge Summary:  Reviewed      Medication & Order Review     Were medication changes made or new medications added?   Yes    If so, has the patient filled the prescriptions?  Yes     Was Home Health ordered? No    If so, has Home Health contacted patient and/or initiated services?  No    Name of Home Health Agency? N/A    Durable Medical Equipment ordered?  No     If so, has the DME provider contacted patient and delivered equipment?  N/A    Follow Up               Any problems since discharge? No    How is the patient feeling since returning home?      Have you set up recommended follow up appointments?  (cardiology, surgery, etc.)    Schedule Hospital Follow-up appointment within 7-14 days (preferably 7).      Notes:  Spoke to patient has an appt scheduled with JOSE Carter on 4/17/25 @ 11:20, which we will make his HFU. Has new medications, states he did not need home health services. Has cardiology follow up scheduled for early May            Joaquina Freedman

## 2025-04-15 ENCOUNTER — PATIENT OUTREACH (OUTPATIENT)
Dept: ADMINISTRATIVE | Facility: CLINIC | Age: OVER 89
End: 2025-04-15
Payer: MEDICARE

## 2025-04-15 NOTE — PROGRESS NOTES
C3 nurse attempted to contact Brit Jennings for a TCC post hospital discharge follow up call. No answer.  LVM. The patient has a scheduled HOSFU appointment with Harsh pelaez 04/17/25 @ 3380.

## 2025-04-16 NOTE — PROGRESS NOTES
C3 nurse spoke with Brit Jennings for a TCC post hospital discharge follow up call. The patient has a scheduled HOSFU appointment with Harsh pelaez 04/17/25 @ 8120.

## 2025-04-17 ENCOUNTER — TELEPHONE (OUTPATIENT)
Dept: FAMILY MEDICINE | Facility: CLINIC | Age: OVER 89
End: 2025-04-17

## 2025-04-17 ENCOUNTER — OFFICE VISIT (OUTPATIENT)
Dept: FAMILY MEDICINE | Facility: CLINIC | Age: OVER 89
End: 2025-04-17
Payer: MEDICARE

## 2025-04-17 ENCOUNTER — TELEPHONE (OUTPATIENT)
Dept: ADMINISTRATIVE | Facility: CLINIC | Age: OVER 89
End: 2025-04-17
Payer: MEDICARE

## 2025-04-17 VITALS
HEART RATE: 64 BPM | OXYGEN SATURATION: 98 % | BODY MASS INDEX: 20.56 KG/M2 | DIASTOLIC BLOOD PRESSURE: 78 MMHG | SYSTOLIC BLOOD PRESSURE: 110 MMHG | WEIGHT: 131 LBS | RESPIRATION RATE: 18 BRPM | HEIGHT: 67 IN

## 2025-04-17 DIAGNOSIS — I50.9 CONGESTIVE HEART FAILURE, UNSPECIFIED HF CHRONICITY, UNSPECIFIED HEART FAILURE TYPE: Primary | ICD-10-CM

## 2025-04-17 DIAGNOSIS — I10 ESSENTIAL HYPERTENSION: ICD-10-CM

## 2025-04-17 DIAGNOSIS — R60.0 BILATERAL LOWER EXTREMITY EDEMA: ICD-10-CM

## 2025-04-17 DIAGNOSIS — N32.81 OAB (OVERACTIVE BLADDER): ICD-10-CM

## 2025-04-17 DIAGNOSIS — F51.01 PRIMARY INSOMNIA: ICD-10-CM

## 2025-04-17 DIAGNOSIS — R54 AGE-RELATED PHYSICAL DEBILITY: ICD-10-CM

## 2025-04-17 RX ORDER — FUROSEMIDE 20 MG/1
20 TABLET ORAL 2 TIMES DAILY
Qty: 180 TABLET | Refills: 1 | Status: SHIPPED | OUTPATIENT
Start: 2025-04-17 | End: 2025-10-14

## 2025-04-17 NOTE — TELEPHONE ENCOUNTER
----- Message from Pharmacist Betty sent at 4/17/2025  9:34 AM CDT -----  Regarding: Medication Concerns  Good morning. I'm with the on-call post-discharge department.Mr. Jennings has an appointment with your office today at 11:20. He would like his concerns regarding furosemide addressed. He states he was instructed to take furosemide 20 mg daily, and was verbally told to take an additional dose if swelling occurred. On yesterday his feet started to swell and he was not going to the restroom as frequently, so after taking the morning dose he repeated a second dose at 1400. He states that second dose got rid of the feet swelling and he began to urinate more frequently with no visible swelling this morning. He was not given any additional refills, but will need a new RX (with instructions to take 1 additional tablet if needed). He also wanted you to know that he was instructed to take daily weights but he did not have a functioning scale. It will be delivered today.Please note, this message was sent on the patient's behalf to be addressed at today's office visit. This mailbox is not routinely monitored. Thanks.  Betty Bales, PharmD   Occupational Therapy   Treatment    Name: Herlinda Valdovinos  MRN: [id number]  Admitting Diagnosis:  Muscle weakness       Recommendations:     Discharge Recommendations: home health PT, home health OT  Discharge Equipment Recommendations:     Barriers to discharge:       Assessment:     Herlinda Valdovinos is a 56 y.o. female with a medical diagnosis of Muscle weakness.  She presents with performance deficits affecting function are weakness, impaired endurance, impaired self care skills, impaired functional mobility, gait instability, impaired balance, decreased coordination.     Rehab Prognosis:  Good; patient would benefit from acute skilled OT services to address these deficits and reach maximum level of function.       Plan:     Patient to be seen 5 x/week to address the above listed problems via self-care/home management, therapeutic activities, therapeutic exercises, neuromuscular re-education  Plan of Care Expires: 11/17/22  Plan of Care Reviewed with: patient, spouse    Subjective     Pain/Comfort:   None    Objective:     Communicated with: Nurse prior to session.  Patient found supine upon OT entry to room.    General Precautions: Standard, fall   Orthopedic Precautions:    Braces:    Respiratory Status: Room air     Occupational Performance:     Bed Mobility:    Patient completed Scooting/Bridging with modified independence  Patient completed Supine to Sit with modified independence     Functional Mobility/Transfers:  Patient completed Sit <> Stand Transfer with stand by assistance  with  rolling walker   Patient completed Bed <> Chair Transfer using Step Transfer technique with stand by assistance with rolling walker  Functional Mobility: x 10 feet in room with RW with SBA      Activities of Daily Living:      [facility] 6 Click ADL:      Treatment & Education:  Performed series of dynamic standing tasks challenging balance, posture, bilateral integrative use of UEs. Pt presents with slight left lateral lean and  Requested Prescriptions     Pending Prescriptions Disp Refills    carvedilol (COREG) 3.125 MG tablet 180 tablet 3     Sig: Take 1 tablet by mouth 2 times daily (with meals)     Verified rx. Last OV 11/01/23. Erx to pharm on file.     rotated hip position. Placed mirror in front of pt to challenge her to self correct posture. With mirror, pt successfully adjusted posture. Performed dynamic stand balance, catching and throwing large ball, demonstrating 75% catching accuracy with B UEs. Performed further dynamic stand challenging balance, righting reaction and postural correction having pt place ball from floor to shoulder height. Pt unsuccessful with task d/t LE weakness. Easily fatigued, requiring seated rest breaks.     Patient left supine in therapy gym with call button in reach    GOALS:   Multidisciplinary Problems       Occupational Therapy Goals          Problem: Occupational Therapy    Goal Priority Disciplines Outcome Interventions   Occupational Therapy Goal     OT, PT/OT Ongoing, Progressing    Description: Goals to be met by: 2 weeks     Patient will increase functional independence with ADLs by performing:    LE Dressing with Contact Guard Assistance.  Toileting from toilet with Contact Guard Assistance for hygiene and clothing management.   Bathing from  shower chair/bench with Contact Guard Assistance.  Toilet transfer to toilet with Contact Guard Assistance.    Goals to be met by: 4 weeks      Patient will increase functional independence with ADLs by performing:    LE Dressing with Modified [place].  Toileting from toilet with Modified [place] for hygiene and clothing management.   Bathing from  shower chair/bench with Modified [place].  Toilet transfer to toilet with Modified [place].  Light IADLs Tiffany.                            Time Tracking:     OT Date of Treatment: 11/09/22  OT Start Time: 0954  OT Stop Time: 1049  OT Total Time (min): 55 min    Billable Minutes:Neuromuscular Re-education 30    OT/HEMAL: OT        MARCUS Levy, OTR/L  11/9/2022

## 2025-04-17 NOTE — PROGRESS NOTES
SUBJECTIVE:    Patient ID: Brit Jennings is a 89 y.o. male.    Chief Complaint: Follow-up (No bottles// no refills needed// hospital follow up pt was sent to hospital 04/09 for SOB was discharged 04/13 pt states says he's feeling much better he is occasionally getting SOB also swelling in legs went down //)    History of Present Illness    CHIEF COMPLAINT:  Brit presents today for follow up after recent hospitalization for heart failure exacerbation.    HEART FAILURE:  He was hospitalized from the 9th until Sunday afternoon for heart failure exacerbation. Prior to admission, he experienced worsening leg edema and dyspnea unresponsive to respiratory inhalers. Chest XR showed right-sided effusion. Troponins were elevated but trended down during hospitalization. Echo showed EF of 45%, decreased from 58% the previous year. He passed the stress test. He currently reports some residual leg edema.    VASCULAR DISEASE:  He has known subclavian artery blockage causing significant blood pressure discrepancy between arms, with one arm showing elevated readings while the other occasionally yields undetectable measurements. He also has bilateral carotid artery stenosis, with left carotid showing <50% stenosis and right proximal carotid demonstrating 50-70% stenosis.    SLEEP:  He reports ongoing sleep issues. Trazodone 100mg helps with sleep initiation but not maintenance, causing awakening after 4-5 hours.    CURRENT MEDICATIONS:  He takes Lasix 20mg with option to increase to 40mg based on edema, morning potassium supplement with Lasix, recently increased dose of Lisinopril, Trazodone 100mg for sleep, and respiratory inhalers twice daily.      ROS:  General: -fever, -chills, -fatigue, -weight gain, -weight loss  Eyes: -vision changes, -redness, -discharge  ENT: -ear pain, -nasal congestion, -sore throat  Cardiovascular: -chest pain, -palpitations, +lower extremity edema  Respiratory: -cough, -shortness of breath,  +shortness of breath lying down  Gastrointestinal: -abdominal pain, -nausea, -vomiting, -diarrhea, -constipation, -blood in stool  Genitourinary: -dysuria, -hematuria, -frequency, +excessive urination  Musculoskeletal: -joint pain, -muscle pain  Skin: -rash, -lesion  Neurological: -headache, -dizziness, -numbness, -tingling, +difficulty staying asleep  Psychiatric: -anxiety, -depression, -sleep difficulty       Review of Systems    Admit Date: 4/9/25  Discharge Date: 4/13/25  Discharge Facility: Hospital      Family and/or Caretaker present at visit? Yes  Medication Reconciliation:  Medications changed/added/deleted. Lasix 20mg daily, double if swelling  New Prescriptions filled after discharge: yes  Discharge summary reviewed:  yes  Diagnostic tests reviewed/disposition: I have reviewed all completed as well as pending diagnostic tests at the time of discharge  Disease/illness education: yes  Follow up appointments scheduled:  yes              with Cardiology   Follow up labs/tests ordered:   not applicable  Home Health ordered on discharge: Patient does not have home health established from hospital visit.  They do not need home health.  If needed, we will set up home health for the patient  Home Health company name: none  Establishment or re-establishment of referral orders for community resources: No other necessary community resources  DME ordered at discharge:   no  How patient is feeling since discharge from the hospital?  improved     Discussion with other health care providers: No discussion with other health care providers necessary  Patient follow up phone call documented on separate encounter.    No results displayed because visit has over 200 results.      Lab Visit on 04/09/2025   Component Date Value Ref Range Status    BNP 04/09/2025 3,951 (H)  <=99 pg/mL Final    Sodium 04/09/2025 134 (L)  136 - 145 mmol/L Final    Potassium 04/09/2025 4.3  3.5 - 5.1 mmol/L Final    Chloride 04/09/2025 97  95 - 110  mmol/L Final    CO2 04/09/2025 28  23 - 29 mmol/L Final    Glucose 04/09/2025 93  70 - 110 mg/dL Final    BUN 04/09/2025 24 (H)  8 - 23 mg/dL Final    Creatinine 04/09/2025 1.0  0.5 - 1.4 mg/dL Final    Calcium 04/09/2025 10.2  8.7 - 10.5 mg/dL Final    Protein Total 04/09/2025 7.3  6.0 - 8.4 gm/dL Final    Albumin 04/09/2025 4.3  3.5 - 5.2 g/dL Final    Bilirubin Total 04/09/2025 0.8  0.1 - 1.0 mg/dL Final    ALP 04/09/2025 60  55 - 135 unit/L Final    AST 04/09/2025 30  10 - 40 unit/L Final    ALT 04/09/2025 24  10 - 44 unit/L Final    Anion Gap 04/09/2025 9  8 - 16 mmol/L Final    eGFR 04/09/2025 >60  >60 mL/min/1.73/m2 Final    Ferritin 04/09/2025 69.2  20.0 - 300.0 ng/mL Final    Iron Level 04/09/2025 56  45 - 160 ug/dL Final    Transferrin 04/09/2025 247  200 - 375 mg/dL Final    Iron Binding Capacity Total 04/09/2025 346  250 - 450 ug/dL Final    Iron Saturation 04/09/2025 16 (L)  20 - 50 % Final    WBC 04/09/2025 4.39  3.90 - 12.70 K/uL Final    RBC 04/09/2025 3.90 (L)  4.60 - 6.20 M/uL Final    Hgb 04/09/2025 13.1 (L)  14.0 - 18.0 gm/dL Final    Hct 04/09/2025 40.6  40.0 - 54.0 % Final    MCV 04/09/2025 104 (H)  82 - 98 fL Final    MCH 04/09/2025 33.6 (H)  27.0 - 31.0 pg Final    MCHC 04/09/2025 32.3  32.0 - 36.0 g/dL Final    RDW 04/09/2025 13.2  11.5 - 14.5 % Final    Platelet Count 04/09/2025 120 (L)  150 - 450 K/uL Final    MPV 04/09/2025 13.3 (H)  9.2 - 12.9 fL Final    Nucleated RBC 04/09/2025 0  <=0 /100 WBC Final    Neut % 04/09/2025 48.5  38 - 73 % Final    Lymph % 04/09/2025 33.0  18 - 48 % Final    Mono % 04/09/2025 17.1 (H)  4 - 15 % Final    Eos % 04/09/2025 0.2  0 - 8 % Final    Basophil % 04/09/2025 0.7  <=1.9 % Final    Imm Grans % 04/09/2025 0.5  0.0 - 0.5 % Final    Neut # 04/09/2025 2.1  1.8 - 7.7 K/uL Final    Lymph # 04/09/2025 1.45  1 - 4.8 K/uL Final    Mono # 04/09/2025 0.75  0.3 - 1 K/uL Final    Eos # 04/09/2025 0.01  <=0.5 K/uL Final    Baso # 04/09/2025 0.03  <=0.2 K/uL Final     Imm Grans # 04/09/2025 0.02  0.00 - 0.04 K/uL Final   Office Visit on 04/09/2025   Component Date Value Ref Range Status    EKG 12-Lead 04/09/2025    Corrected    Ventricular Rate 04/09/2025 72   Final    MT Interval 04/09/2025 302   Final    QRS Duration 04/09/2025 142   Final    QT/QTc 04/09/2025 432/473   Final    PRT Axes 04/09/2025 -14  -51  5   Final   Office Visit on 03/25/2025   Component Date Value Ref Range Status    Case Report 03/25/2025    Final                    Value:[facility]                           Case: [id number]                                Authorizing Provider:  Tova Miles MD    Collected:           03/25/2025 09:02 AM          Ordering Location:     [facility] -         Received:            03/26/2025 02:56 PM                                 Dermatology                                                                  Pathologist:           Ko Sarah MD                                                     Specimens:   1) - Skin, left paramedian anterior scalp                                                           2) - Cheek, Right, R preauricular cheek                                                    Final Diagnosis 03/25/2025    Final                    Value: 1. Skin,  left paramedian  anterior scalp, shave biopsy:   - ACTINIC KERATOSIS WITH FOCAL TRANSITION TO SQUAMOUS CELL CARCINOMA IN SITU.  - FULL-THICKNESS SQUAMOUS ATYPIA FOCALLY EXTENDS TO THE DEEP BIOPSY MARGIN AND A LATERAL BIOPSY MARGIN.     This lesion is atypical and further treatment may be advised.  You will be contacted by your provider's office.       2. Skin, right preauricular cheek, shave biopsy:   - ACTINIC KERATOSIS WITH FOCAL TRANSITION TO SQUAMOUS CELL CARCINOMA IN SITU.  - FULL-THICKNESS SQUAMOUS ATYPIA FOCALLY EXTENDS TO THE DEEP BIOPSY MARGIN AND A LATERAL BIOPSY MARGIN.     This lesion is atypical and further treatment may be advised.  You will be contacted by your  "provider's office.        Microscopic Description 03/25/2025    Final                    Value:Sections show atypia within the lowermost epidermal layers associated with foci showing full-thickness atypia.  The underlying papillary dermis shows solar elastosis.    The atypical squamous epithelium is focally transected at the deep biopsy margin and full-thickness squamous atypia extends to a peripheral biopsy margin.    Sections show atypia within the lowermost epidermal layers associated with foci showing full-thickness atypia.  The underlying papillary dermis shows solar elastosis.    The atypical squamous epithelium is focally transected at the deep biopsy margin and full-thickness squamous atypia extends to a peripheral biopsy margin.  Ki 67 immunohistochemical stain shows an elevated proliferative index focally spans nearly the full-thickness of the involved epidermis.  Appropriately reactive controls were reviewed.  Multiple levels were examined.      Gross Description 03/25/2025    Final                    Value:1. Skin: left paramedian anterior scalp  Two Part Case:    Part 1  Pathology MRN# [id number]  Hospital/Clinic label MRN# [id number]    Received in formalin labeled with the patient's name, MRN, and "paramedian anterior scalp is an unoriented, ovoid, 1.2 x 0.9 cm skin shave excised to a depth of 0.1 cm.  The epidermis is diffusely granular, tan-white, and otherwise grossly unremarkable.  The resection margin is inked blue, cut surfaces are grossly unremarkable.  The specimen is entirely submitted in cassette:  1A    BERTIN Yañez, PA ([facility])CM    Grossing was completed by Wade Gutierrez on 3/27/2025.    2. Cheek, Right: R preauricular cheek  Part 2  Pathology MRN# [id number]  Hospital/Clinic label MRN# [id number]    Received in formalin labeled with the patient's name, MRN, and "right preauricular cheek is an irregular, unoriented, 1.2 x 0.8 cm skin shave excised to a depth of 0.1 cm.  The epidermis is diffusely " "granular, tan-white, and otherwise grossly unremarkable.  The resection margin is inked                           blue, cut surfaces are grossly unremarkable.  The specimen is entirely submitted in cassette:  2A    BERTIN Yañez, PA ([facility])CM     Grossing was completed by Wade Gutierrez on 3/27/2025.        Report Footnotes 03/25/2025    Final                    Value:Unless the case is a "gross only" or additional testing only, the final diagnosis for each specimen is based on a microscopic examination of appropriate tissue sections.         Past Medical History:   Diagnosis Date    Basal cell carcinoma 2020    L temple / radiation therapy    Cataract     Hypertension     Squamous cell carcinoma of skin      Past Surgical History:   Procedure Laterality Date    APPENDECTOMY      EYE SURGERY      Moh's surgery on right temple area Right 2015    Cancerous spot    PROSTATE BIOPSY      TONSILLECTOMY       Family History   Problem Relation Name Age of Onset    Melanoma Neg Hx      Psoriasis Neg Hx      Lupus Neg Hx      Eczema Neg Hx         Marital Status:   Alcohol History:  reports that he does not currently use alcohol.  Tobacco History:  reports that he has never smoked. He has never used smokeless tobacco.  Drug History:  has no history on file for drug use.    Review of patient's allergies indicates:   Allergen Reactions    Pollen extracts Other (See Comments)     Current Medications[1]  Objective:      Vitals:    04/17/25 1132 04/17/25 1139   BP: (!) 140/90 110/78   Pulse: 64    Resp: 18    SpO2: 98%    Weight: 59.4 kg (131 lb)    Height: 5' 7" (1.702 m)      Physical Exam  Physical Exam    General: No acute distress. Well-developed. Well-nourished.  Eyes: EOMI. Sclerae anicteric.  HENT: Normocephalic. Atraumatic. Nares patent. Moist oral mucosa.  Ears: Bilateral TMs clear. Bilateral EACs clear.  Cardiovascular: Regular rate. Regular rhythm. No murmurs. No rubs. No gallops. Normal S1, S2.  Respiratory: " Normal respiratory effort. Clear to auscultation bilaterally. No rales. No rhonchi. No wheezing.  Abdomen: Soft. Non-tender. Non-distended. Normoactive bowel sounds.  Musculoskeletal: No  obvious deformity.  Extremities: No lower extremity edema. Trace pitting edema on both legs.  Neurological: Alert & oriented x3. No slurred speech. Normal gait.  Psychiatric: Normal mood. Normal affect. Good insight. Good judgment.  Skin: Warm. Dry. No rash.       Assessment:       Assessment & Plan    - Assessed recent hospitalization for acute heart failure exacerbation with noted improvement in symptoms post-discharge.  - Reviewed hospital course, including troponin trends and stress test results.  - Evaluated current EF (45%) compared to previous year (58%).  - Considered medication adjustments made during hospitalization, including Lasix and Lisinopril dose increases.  - Discussed known subclavian artery blockage and its impact on BP readings.  - Will maintain current medication regimen pending cardiology follow-up.    CHRONIC HEART FAILURE WITH ACUTE EXACERBATION:  - Monitored the patient's acute onset of heart failure with worsening leg swelling and difficulty breathing.  - Noted the patient's EF has decreased from 58% last year to 45% in the latest hospital test.  - Evaluated the patient's SpO2 at 98% with clear lungs, no crackles or wheezes, and trace pitting edema on both legs.  - Reviewed chest XR showing impressive right-sided effusion and noted elevated but trending down troponin levels.  - Explained the relationship between fluid overload and breathing difficulties when lying down.  - Instructed the patient to monitor daily weights and urine output.  - Increased Lasix to 20 mg oral BID, with instructions to take daily regularly and use second dose if needed for increased swelling or decreased urination.  - Continued Lisinopril 20 mg daily.  - Prescribed potassium supplement daily with Lasix.  - Scheduled follow-up  visit with cardiology for May 7th.  - Plan to reassess EF in 2 months.  - Monitored the patient's acute exacerbation of chronic heart failure with worsening leg swelling and breathing difficulties.  - Noted the patient was hospitalized for 4 nights due to acute exacerbation.  - Evaluated chest XR showing impressive right-sided effusion.  - Administered IV diuretics in the hospital for rapid fluid removal.  - Discharged the patient on oral Lasix 20 mg daily with instructions to double the dose if needed.  - Acknowledged the acute exacerbation and its resolution with hospital treatment.    SUBCLAVIAN ARTERY STRICTURE:  - Monitored the patient's known blockage in the subclavian artery causing discrepancy in blood pressure readings between arms.  - Evaluated blood pressure readings showing discrepancy between arms: 110/78 on one side and 140/90 on the other.  - Acknowledged the subclavian artery blockage and previous decision not to intervene.  - Recommend follow-up with vascular surgeon Dr. Kay to discuss the condition.    BILATERAL CAROTID ARTERY STENOSIS:  - Monitored the patient's known bilateral carotid artery stenosis, with right proximal stenosis of 50-70% and left stenosis less than 50%.  - Referred the patient to Dr. Wooten (vascular surgeon) for follow-up on carotid artery stenosis.  - Noted previous decision not to perform surgery or stenting.    EDEMA:  - Monitored the patient's significant leg swelling from last week, which has improved with treatment but still present.  - Evaluated physical exam revealing trace pitting edema on both legs.  - Prescribed Lasix 20 mg daily, with instructions to double the dose if swelling worsens.  - Recommend using higher compression stockings (knee-high) to help manage edema.    INSOMNIA:  - Monitored the patient's report of waking up after 4-5 hours of sleep while taking Trazodone.  - Assessed the patient's sleep issues and considered increasing Trazodone dosage.  -  Noted current treatment of Trazodone 100 mg for insomnia.  - Made no immediate changes to treatment plan.    ADDITIONAL RECOMMENDATI  ONS:  - Explained importance of potassium supplementation with Lasix use.  - Recommend daily potassium supplementation.  - Continued current inhaler regimen.       Plan:       Congestive heart failure, unspecified HF chronicity, unspecified heart failure type  -     furosemide (LASIX) 20 MG tablet; Take 1 tablet (20 mg total) by mouth 2 (two) times a day.  Dispense: 180 tablet; Refill: 1    Essential hypertension    Primary insomnia    OAB (overactive bladder)    Age-related physical debility    Bilateral lower extremity edema      Follow up in about 3 months (around 7/17/2025) for followup.    This note was generated with the assistance of ambient listening technology. Verbal consent was obtained by the patient and accompanying visitor(s) for the recording of patient appointment to facilitate this note. I attest to having reviewed and edited the generated note for accuracy, though some syntax or spelling errors may persist. Please contact the author of this note for any clarification.          Answers submitted by the patient for this visit:  Shortness of Breath Questionnaire (Submitted on 4/14/2025)  Chief Complaint: Shortness of breath  Chronicity: recurrent  Onset: 1 to 4 weeks ago  Frequency: constantly  Progression since onset: unchanged  Episode duration: 2 Hours  hemoptysis: No  sputum production: Yes  PND: Yes  syncope: No  claudication: No  leg pain: Yes  orthopnea: Yes  coryza: No  swollen glands: No  Aggravating factors: exercise, odors, pollens, weather changes  Improvement on treatment: significant  Risk factors for DVT/PE: no known risk factors  Treatments tried: beta agonist inhalers, cool air, rest, steroid inhalers  asthma: Yes  allergies: Yes  COPD: No  pneumonia: No  aspirin allergies: No  CAD: No  DVT: No  heart failure: No  PE: No  recent surgery:  No  bronchiolitis: No  chronic lung disease: No         [1]   Current Outpatient Medications:     albuterol (PROVENTIL/VENTOLIN HFA) 90 mcg/actuation inhaler, Inhale 1-2 puffs into the lungs daily as needed for Shortness of Breath. Rescue, Disp: 18 g, Rfl: 0    lisinopriL (PRINIVIL,ZESTRIL) 20 MG tablet, Take 1 tablet (20 mg total) by mouth once daily., Disp: 90 tablet, Rfl: 3    potassium chloride (MICRO-K) 10 MEQ CpSR, Take 1 capsule (10 mEq total) by mouth once daily., Disp: 30 capsule, Rfl: 0    SYMBICORT 160-4.5 mcg/actuation HFAA, Inhale 2 puffs into the lungs every 12 (twelve) hours., Disp: 30 g, Rfl: 4    traZODone (DESYREL) 100 MG tablet, Take 1 tablet (100 mg total) by mouth every evening., Disp: 90 tablet, Rfl: 1    ascorbic acid (VITAMIN C ORAL), Take 1 tablet by mouth 2 (two) times a day. (Patient not taking: Reported on 4/9/2025), Disp: , Rfl:     budesonide-glycopyr-formoterol (BREZTRI AEROSPHERE) 160-9-4.8 mcg/actuation HFAA, Inhale 2 puffs into the lungs 2 (two) times daily. (Patient not taking: Reported on 4/9/2025), Disp: 10.7 g, Rfl: 5    furosemide (LASIX) 20 MG tablet, Take 1 tablet (20 mg total) by mouth 2 (two) times a day., Disp: 180 tablet, Rfl: 1    neomycin-polymyxin-dexamethasone (DEXACINE) 3.5 mg/g-10,000 unit/g-0.1 % Oint, Place 1 application  into both eyes every 6 (six) hours. (Patient not taking: Reported on 4/16/2025), Disp: , Rfl:     Current Facility-Administered Medications:     albuterol inhaler 2 puff, 2 puff, Inhalation, Q20 Min PRN, Farhana Riggins, NP    diphenhydrAMINE injection 25 mg, 25 mg, Intravenous, Once PRN, Farhana Riggins, NP    EPINEPHrine (EPIPEN) 0.3 mg/0.3 mL pen injection 0.3 mg, 0.3 mg, Intramuscular, PRN, Farhana Riggins, NP

## 2025-04-17 NOTE — PROGRESS NOTES
"Phoned patient in response to reply of "3" to post-discharge texting tracker received after hours on 4/16/25. Mr. Jennings has a hospital f/u appointment with his PCP's office today at 11:20. He would like his concerns regarding furosemide addressed. He states he was instructed to take furosemide 20 mg daily, and was verbally told to take an additional dose if swelling occurred (which he did not hear, but his wife heard). On yesterday his feet started to swell and he was not going to the restroom as frequently, so after taking the morning dose he repeated a second dose at 1400. He states that second dose got rid of the feet swelling and he began to urinate more frequently with no visible swelling this morning. He was not given any additional refills, but will need a new RX (with instructions to take 1 additional tablet if needed). He also wanted the provider to know that he was instructed to take daily weights but he did not have a functioning scale. It will be delivered today. Offered to send a message to the provider's office to make them aware of what he needs addressed. Mr. Jennings accepted this outcome and high priority message was sent. Mr. Jennings also asked if it was ok to eat the meals provided by the insurance company as he is on a low sodium diet. He said he was told the meals would be heart-healthy. Advised that only the insurance company could speak to what they will be providing, but a heart healthy meal should be safe to consume. Mr. Jennings verbalized understanding.      "

## 2025-04-22 ENCOUNTER — PATIENT MESSAGE (OUTPATIENT)
Dept: DERMATOLOGY | Facility: CLINIC | Age: OVER 89
End: 2025-04-22
Payer: MEDICARE

## 2025-04-22 ENCOUNTER — PATIENT MESSAGE (OUTPATIENT)
Dept: FAMILY MEDICINE | Facility: CLINIC | Age: OVER 89
End: 2025-04-22
Payer: MEDICARE

## 2025-04-22 DIAGNOSIS — H61.20 IMPACTED CERUMEN, UNSPECIFIED LATERALITY: Primary | ICD-10-CM

## 2025-04-28 DIAGNOSIS — I50.9 CONGESTIVE HEART FAILURE, UNSPECIFIED HF CHRONICITY, UNSPECIFIED HEART FAILURE TYPE: ICD-10-CM

## 2025-04-28 RX ORDER — FUROSEMIDE 20 MG/1
20 TABLET ORAL 2 TIMES DAILY
Qty: 180 TABLET | Refills: 0 | Status: SHIPPED | OUTPATIENT
Start: 2025-04-28 | End: 2025-04-29 | Stop reason: SDUPTHER

## 2025-04-29 DIAGNOSIS — I50.9 CONGESTIVE HEART FAILURE, UNSPECIFIED HF CHRONICITY, UNSPECIFIED HEART FAILURE TYPE: ICD-10-CM

## 2025-04-30 ENCOUNTER — PATIENT MESSAGE (OUTPATIENT)
Dept: FAMILY MEDICINE | Facility: CLINIC | Age: OVER 89
End: 2025-04-30
Payer: MEDICARE

## 2025-04-30 RX ORDER — FUROSEMIDE 20 MG/1
20 TABLET ORAL 2 TIMES DAILY
Qty: 180 TABLET | Refills: 0 | Status: SHIPPED | OUTPATIENT
Start: 2025-04-30 | End: 2025-10-27

## 2025-05-07 ENCOUNTER — OFFICE VISIT (OUTPATIENT)
Dept: CARDIOLOGY | Facility: CLINIC | Age: OVER 89
End: 2025-05-07
Payer: MEDICARE

## 2025-05-07 VITALS
SYSTOLIC BLOOD PRESSURE: 163 MMHG | BODY MASS INDEX: 20.41 KG/M2 | WEIGHT: 130.06 LBS | HEART RATE: 55 BPM | DIASTOLIC BLOOD PRESSURE: 78 MMHG | HEIGHT: 67 IN

## 2025-05-07 DIAGNOSIS — I10 ESSENTIAL HYPERTENSION: Primary | Chronic | ICD-10-CM

## 2025-05-07 DIAGNOSIS — I65.23 BILATERAL CAROTID ARTERY STENOSIS: ICD-10-CM

## 2025-05-07 DIAGNOSIS — I50.31 ACUTE HEART FAILURE WITH PRESERVED EJECTION FRACTION: ICD-10-CM

## 2025-05-07 PROCEDURE — 99999 PR PBB SHADOW E&M-EST. PATIENT-LVL III: CPT | Mod: PBBFAC,,, | Performed by: NURSE PRACTITIONER

## 2025-05-07 PROCEDURE — 3288F FALL RISK ASSESSMENT DOCD: CPT | Mod: CPTII,S$GLB,, | Performed by: NURSE PRACTITIONER

## 2025-05-07 PROCEDURE — 1159F MED LIST DOCD IN RCRD: CPT | Mod: CPTII,S$GLB,, | Performed by: NURSE PRACTITIONER

## 2025-05-07 PROCEDURE — 1101F PT FALLS ASSESS-DOCD LE1/YR: CPT | Mod: CPTII,S$GLB,, | Performed by: NURSE PRACTITIONER

## 2025-05-07 PROCEDURE — 1126F AMNT PAIN NOTED NONE PRSNT: CPT | Mod: CPTII,S$GLB,, | Performed by: NURSE PRACTITIONER

## 2025-05-07 PROCEDURE — 99214 OFFICE O/P EST MOD 30 MIN: CPT | Mod: S$GLB,,, | Performed by: NURSE PRACTITIONER

## 2025-05-07 PROCEDURE — 1111F DSCHRG MED/CURRENT MED MERGE: CPT | Mod: CPTII,S$GLB,, | Performed by: NURSE PRACTITIONER

## 2025-05-07 RX ORDER — POTASSIUM CHLORIDE 750 MG/1
10 CAPSULE, EXTENDED RELEASE ORAL DAILY
Qty: 90 CAPSULE | Refills: 1 | Status: SHIPPED | OUTPATIENT
Start: 2025-05-07

## 2025-05-07 NOTE — PROGRESS NOTES
Subjective:    Patient ID:  Brit Jennings is a 89 y.o. male.  Chief Complaint   Patient presents with    Congestive Heart Failure    Hypertension       HPI:  Patient seen today for follow-up post hospitalization.  Patient was treated for acute heart failure with mildly reduced ejection fraction.  Patient has been doing well but is concerned because he has been eating less since he has been restricting sodium in his diet.  Patient is concerned that he may not be taking in enough nutrients and may be losing weight from muscle mass as well.  He denies any shortness of breath.    Review of patient's allergies indicates:   Allergen Reactions    Pollen extracts Other (See Comments)       Past Medical History:   Diagnosis Date    Basal cell carcinoma 2020    L temple / radiation therapy    Cataract     Hypertension     Squamous cell carcinoma of skin      Past Surgical History:   Procedure Laterality Date    APPENDECTOMY      EYE SURGERY      Moh's surgery on right temple area Right 2015    Cancerous spot    PROSTATE BIOPSY      TONSILLECTOMY       Social History[1]  Family History   Problem Relation Name Age of Onset    Melanoma Neg Hx      Psoriasis Neg Hx      Lupus Neg Hx      Eczema Neg Hx          Review of Systems:   Per HPI         Objective:        Vitals:    05/07/25 0815   BP: (!) 163/78   Pulse: (!) 55       Lab Results   Component Value Date    WBC 3.55 (L) 04/13/2025    HGB 11.9 (L) 04/13/2025    HCT 35.8 (L) 04/13/2025     (L) 04/13/2025    CHOL 97 06/19/2024    TRIG 69 06/19/2024    HDL 27 (L) 06/19/2024    ALT 25 04/09/2025    AST 33 04/09/2025     04/13/2025    K 3.6 04/13/2025     04/13/2025    CREATININE 0.9 04/13/2025    BUN 24 (H) 04/13/2025    CO2 31 (H) 04/13/2025    TSH 2.420 04/10/2025    INR 1.1 01/20/2024    MICROALBUR 11.9 06/19/2024        ECHOCARDIOGRAM RESULTS  Results for orders placed during the hospital encounter of 04/09/25    Echo    Interpretation Summary     Large left pleural effusion    Left Ventricle: The left ventricle is normal in size. Mildly increased wall thickness. Mild global hypokinesis present. There is mildly reduced systolic function with a visually estimated ejection fraction of 40 - 45%.    Right Ventricle: Right ventricle was not well visualized due to poor acoustic window. Systolic function is normal.    Left Atrium: Severely dilated    Right Atrium: Right atrium is severely dilated.    Aortic Valve: The aortic valve is a trileaflet valve. There is moderate aortic valve sclerosis. There is mild aortic regurgitation.    Mitral Valve: There is mild to moderate regurgitation.    Tricuspid Valve: There is mild regurgitation.    Pulmonic Valve: There is mild regurgitation.    Aorta: The Ascending aorta is mildly dilated measuring 4.3 cm.    IVC/SVC: Elevated venous pressure at 15 mmHg.        CURRENT/PREVIOUS VISIT EKG  Results for orders placed or performed during the hospital encounter of 04/09/25   EKG 12-lead    Collection Time: 04/09/25  5:13 PM   Result Value Ref Range    QRS Duration 124 ms    [facility]HS QTC Calculation 488 ms    Narrative    Test Reason : R06.02,    Vent. Rate :  87 BPM     Atrial Rate :  87 BPM     P-R Int : 284 ms          QRS Dur : 124 ms      QT Int : 406 ms       P-R-T Axes :    -50  31 degrees    QTcB Int : 488 ms    Sinus rhythm with 1st degree A-V block  Right bundle branch block  Left anterior fascicular block   Bifascicular block   Septal infarct ,age undetermined  Marked ST abnormality, possible anterolateral subendocardial injury  Abnormal ECG  No previous ECGs available  Confirmed by Jesús Feldman ([id number]) on 4/13/2025 9:21:53 AM    Referred By: AAAREFERRAL SELF           Confirmed By: Jesús Feldman     No valid procedures specified.   Results for orders placed during the hospital encounter of 04/09/25    Nuclear Stress Test    Interpretation Summary    The ECG portion of the study is negative for ischemia.    The patient  reported no chest pain during the stress test.    During stress, rare PVCs are noted.    The nuclear portion of this study will be reported separately.      Physical Exam:  CONSTITUTIONAL: No fever, no chills  HEENT: Normocephalic, atraumatic,pupils reactive to light                 NECK:  No JVD no carotid bruit  CVS: S1S2+, RRR, no murmurs,   LUNGS: Clear  ABDOMEN: Soft, NT, BS+  EXTREMITIES: No cyanosis, edema  : No kasper catheter  NEURO: AAO X 3  PSY: Normal affect      Medication List with Changes/Refills   Current Medications    ALBUTEROL (PROVENTIL/VENTOLIN HFA) 90 MCG/ACTUATION INHALER    Inhale 1-2 puffs into the lungs daily as needed for Shortness of Breath. Rescue    FUROSEMIDE (LASIX) 20 MG TABLET    Take 1 tablet (20 mg total) by mouth 2 (two) times a day.    LISINOPRIL (PRINIVIL,ZESTRIL) 20 MG TABLET    Take 1 tablet (20 mg total) by mouth once daily.    NEOMYCIN-POLYMYXIN-DEXAMETHASONE (DEXACINE) 3.5 MG/G-10,000 UNIT/G-0.1 % OINT    Place 1 application  into both eyes every 6 (six) hours.    SYMBICORT 160-4.5 MCG/ACTUATION HFAA    Inhale 2 puffs into the lungs every 12 (twelve) hours.    TRAZODONE (DESYREL) 100 MG TABLET    Take 1 tablet (100 mg total) by mouth every evening.   Changed and/or Refilled Medications    Modified Medication Previous Medication    POTASSIUM CHLORIDE (MICRO-K) 10 MEQ CPSR potassium chloride (MICRO-K) 10 MEQ CpSR       Take 1 capsule (10 mEq total) by mouth once daily.    Take 1 capsule (10 mEq total) by mouth once daily.   Discontinued Medications    ASCORBIC ACID (VITAMIN C ORAL)    Take 1 tablet by mouth 2 (two) times a day.    BUDESONIDE-GLYCOPYR-FORMOTEROL (BREZTRI AEROSPHERE) 160-9-4.8 MCG/ACTUATION HFAA    Inhale 2 puffs into the lungs 2 (two) times daily.             Assessment:       1. Essential hypertension    2. Bilateral carotid artery stenosis    3. Acute heart failure with preserved ejection fraction         Plan:     Encouraged him to increase nutrient dense  foods and protein in his diet.  Continue to monitor weight.  Patient cleared to resume exercise and activities as well as his part-time job.  He should avoid falls and take breaks as needed.    Problem List Items Addressed This Visit          Unprioritized    Essential hypertension - Primary (Chronic)    Current Assessment & Plan   Bp elevated at today's visit. He is taking lisinopril 20 mg po daily. Recommend BP log for evaluation. He has significantly reduced sodium in his diet.          Bilateral carotid artery stenosis    Current Assessment & Plan   Followed by vascular. Continue risk factor modifications and cholesterol control.          Acute heart failure with preserved ejection fraction    Current Assessment & Plan   Doing well post hospitalization and his weight is stable. Continue furosemide 20 mg po BID and kCL 10 meq po daily.   Check BMP and BNP.         Relevant Orders    Basic metabolic panel    BNP       Follow up in about 8 weeks (around 7/2/2025).            Chloe Sawant NP-C  Department of Cardiology   [facility] [place], [place]           [1]   Social History  Tobacco Use    Smoking status: Never    Smokeless tobacco: Never   Substance Use Topics    Alcohol use: Not Currently

## 2025-05-07 NOTE — ASSESSMENT & PLAN NOTE
Doing well post hospitalization and his weight is stable. Continue furosemide 20 mg po BID and kCL 10 meq po daily.   Check BMP and BNP.

## 2025-05-07 NOTE — ASSESSMENT & PLAN NOTE
Bp elevated at today's visit. He is taking lisinopril 20 mg po daily. Recommend BP log for evaluation. He has significantly reduced sodium in his diet.

## 2025-05-07 NOTE — PATIENT INSTRUCTIONS
Please track your BP (blood pressure) every 4 hours while awake for 5 days and turn in a log for evaluation. Please include the date, time, BP, and heart rate on your log in a list format.  Your BP log can be turned in via [employer] or in person at the  of our office.

## 2025-05-09 ENCOUNTER — TELEPHONE (OUTPATIENT)
Dept: CARDIOLOGY | Facility: CLINIC | Age: OVER 89
End: 2025-05-09
Payer: MEDICARE

## 2025-05-09 NOTE — TELEPHONE ENCOUNTER
----- Message from Lizbeth sent at 5/9/2025  3:14 PM CDT -----  Regarding: Need Medical Advice  Type: Needs Medical AdviceWho Called:  Patient at [phone] Information: Patient has some questions about his diagnosis of CHF, please call and advise. Thank you.

## 2025-06-02 ENCOUNTER — PATIENT MESSAGE (OUTPATIENT)
Dept: FAMILY MEDICINE | Facility: CLINIC | Age: OVER 89
End: 2025-06-02
Payer: MEDICARE

## 2025-06-06 ENCOUNTER — OFFICE VISIT (OUTPATIENT)
Dept: OTOLARYNGOLOGY | Facility: CLINIC | Age: OVER 89
End: 2025-06-06
Payer: MEDICARE

## 2025-06-06 VITALS — HEIGHT: 67 IN | WEIGHT: 135.56 LBS | BODY MASS INDEX: 21.28 KG/M2

## 2025-06-06 DIAGNOSIS — H91.93 BILATERAL HEARING LOSS, UNSPECIFIED HEARING LOSS TYPE: Primary | ICD-10-CM

## 2025-06-06 DIAGNOSIS — H61.20 IMPACTED CERUMEN, UNSPECIFIED LATERALITY: ICD-10-CM

## 2025-06-06 PROCEDURE — 99999 PR PBB SHADOW E&M-EST. PATIENT-LVL III: CPT | Mod: PBBFAC,,, | Performed by: OTOLARYNGOLOGY

## 2025-06-09 ENCOUNTER — OFFICE VISIT (OUTPATIENT)
Dept: DERMATOLOGY | Facility: CLINIC | Age: OVER 89
End: 2025-06-09
Payer: MEDICARE

## 2025-06-09 VITALS — HEIGHT: 67 IN | BODY MASS INDEX: 21.28 KG/M2 | WEIGHT: 135.56 LBS

## 2025-06-09 DIAGNOSIS — D48.5 NEOPLASM OF UNCERTAIN BEHAVIOR OF SKIN: Primary | ICD-10-CM

## 2025-06-09 DIAGNOSIS — L57.0 ACTINIC KERATOSES: ICD-10-CM

## 2025-06-09 DIAGNOSIS — Z08 ENCOUNTER FOR FOLLOW-UP SURVEILLANCE OF SKIN CANCER: ICD-10-CM

## 2025-06-09 DIAGNOSIS — Z85.828 ENCOUNTER FOR FOLLOW-UP SURVEILLANCE OF SKIN CANCER: ICD-10-CM

## 2025-06-09 PROCEDURE — 1159F MED LIST DOCD IN RCRD: CPT | Mod: CPTII,S$GLB,, | Performed by: DERMATOLOGY

## 2025-06-09 PROCEDURE — 3288F FALL RISK ASSESSMENT DOCD: CPT | Mod: CPTII,S$GLB,, | Performed by: DERMATOLOGY

## 2025-06-09 PROCEDURE — 17000 DESTRUCT PREMALG LESION: CPT | Mod: XS,S$GLB,, | Performed by: DERMATOLOGY

## 2025-06-09 PROCEDURE — 11102 TANGNTL BX SKIN SINGLE LES: CPT | Mod: S$GLB,,, | Performed by: DERMATOLOGY

## 2025-06-09 PROCEDURE — 1101F PT FALLS ASSESS-DOCD LE1/YR: CPT | Mod: CPTII,S$GLB,, | Performed by: DERMATOLOGY

## 2025-06-09 PROCEDURE — 99213 OFFICE O/P EST LOW 20 MIN: CPT | Mod: 25,S$GLB,, | Performed by: DERMATOLOGY

## 2025-06-09 PROCEDURE — 1160F RVW MEDS BY RX/DR IN RCRD: CPT | Mod: CPTII,S$GLB,, | Performed by: DERMATOLOGY

## 2025-06-09 PROCEDURE — 17003 DESTRUCT PREMALG LES 2-14: CPT | Mod: S$GLB,,, | Performed by: DERMATOLOGY

## 2025-06-09 PROCEDURE — 88305 TISSUE EXAM BY PATHOLOGIST: CPT | Mod: TC | Performed by: DERMATOLOGY

## 2025-06-09 NOTE — PROGRESS NOTES
Subjective:      Patient ID:  Brit Jennings is a 89 y.o. male who presents for   Chief Complaint   Patient presents with    Follow-up     Bx sites     Spot     Left forehead      LOV 3/25/25 NUB, AK      1. Skin,  left paramedian  anterior scalp, shave biopsy:   - ACTINIC KERATOSIS WITH FOCAL TRANSITION TO SQUAMOUS CELL CARCINOMA IN SITU.  - FULL-THICKNESS SQUAMOUS ATYPIA FOCALLY EXTENDS TO THE DEEP BIOPSY MARGIN AND A LATERAL BIOPSY MARGIN.      2. Skin, right preauricular cheek, shave biopsy:   - ACTINIC KERATOSIS WITH FOCAL TRANSITION TO SQUAMOUS CELL CARCINOMA IN SITU.  - FULL-THICKNESS SQUAMOUS ATYPIA FOCALLY EXTENDS TO THE DEEP BIOPSY MARGIN AND A LATERAL BIOPSY MARGIN.    Patient here today for F/U post Fluorouracil treatment to previous bx sites, pt states he applied cream to scalp and right cheek.  Did not apply as instructed states he forgot to treat, can't remember the amount of days he applied cream to sites and was mostly once daily.     C/O spot to left forehead, no symptoms.     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 Rescue, Disp: 18 g, Rfl: 0  ·  furosemide (LASIX) 20 MG tablet, Take 1 tablet (20 mg total) by mouth 2 (two) times a day., Disp: 180 tablet, Rfl: 0  ·  lisinopriL (PRINIVIL,ZESTRIL) 20 MG tablet, Take 1 tablet (20 mg total) by mouth once daily., Disp: 90 tablet, Rfl: 3  ·  neomycin-polymyxin-dexamethasone (DEXACINE) 3.5 mg/g-10,000 unit/g-0.1 % Oint, Place 1 application  into both eyes every 6 (six) hours. (Patient not taking: Reported on 4/16/2025), Disp: , Rfl:    ·  potassium chloride (MICRO-K) 10 MEQ CpSR, Take 1 capsule (10 mEq total) by mouth once daily., Disp: 90 capsule, Rfl: 1  ·  SYMBICORT 160-4.5 mcg/actuation HFAA, Inhale 2 puffs into the lungs every 12 (twelve) hours., Disp: 30 g, Rfl: 4  ·  traZODone (DESYREL) 100 MG tablet, Take 1 tablet (100 mg total) by mouth every evening., Disp: 90 tablet, Rfl: 1          Review of Systems   Constitutional:  Negative for fever, chills and fatigue.   Skin:  Negative for daily sunscreen use and activity-related sunscreen use.       Objective:   Physical Exam   Constitutional: He appears well-developed and well-nourished. No distress.   Neurological: He is alert and oriented to person, place, and time. He is not disoriented.   Psychiatric: He has a normal mood and affect.   Skin:   Areas Examined (abnormalities noted in diagram):   Scalp / Hair Palpated and Inspected  Head / Face Inspection Performed  Neck Inspection Performed            Diagram Legend     Erythematous scaling macule/papule c/w actinic keratosis       Vascular papule c/w angioma      Pigmented verrucoid papule/plaque c/w seborrheic keratosis      Yellow umbilicated papule c/w sebaceous hyperplasia      Irregularly shaped tan macule c/w lentigo     1-2 mm smooth white papules consistent with Milia      Movable subcutaneous cyst with punctum c/w epidermal inclusion cyst      Subcutaneous movable cyst c/w pilar cyst      Firm pink to brown papule c/w dermatofibroma      Pedunculated fleshy papule(s) c/w skin tag(s)      Evenly pigmented macule c/w junctional nevus     Mildly variegated pigmented, slightly irregular-bordered macule c/w mildly atypical nevus      Flesh colored to evenly pigmented papule c/w intradermal nevus       Pink pearly papule/plaque c/w basal cell carcinoma      Erythematous hyperkeratotic cursted plaque c/w SCC      Surgical scar with no sign of skin cancer recurrence      Open and closed comedones      Inflammatory papules and pustules       Verrucoid papule consistent consistent with wart     Erythematous eczematous patches and plaques     Dystrophic onycholytic nail with subungual debris c/w onychomycosis     Umbilicated papule    Erythematous-base heme-crusted tan verrucoid plaque consistent with inflamed seborrheic keratosis     Erythematous Silvery Scaling Plaque c/w Psoriasis     See annotation            Assessment / Plan:      Pathology Orders:       Normal Orders This Visit    Specimen to Pathology, Dermatology     Questions:    Procedure Type: Dermatology and skin neoplasms    Number of Specimens: 1    ------------------------: -------------------------    Spec 1 Procedure: Shave Biopsy    Spec 1 Clinical Impression: rebiopsy of AK/SCCIS site for residual lesion    Spec 1 Source: R paramedian anterior scalp    Clinical Information: see EPIC    Clinical History: see EPIC    Specimen Source: Skin    Release to patient: Immediate    Send normal result to authorizing provider's In Basket if patient is active on MyChart: Yes    Specimen to Pathology, Dermatology     Questions:    Procedure Type: Dermatology and skin neoplasms    Number of Specimens: 1    ------------------------: -------------------------    Spec 1 Procedure: Shave Biopsy    Spec 1 Clinical Impression: REBIOPSY OF AK/SCCIS FOR RESIDUAL LESION    Spec 1 Source: LEFT PARAMEDIAN ANTERIOR SCALP    Clinical Information: see EPIC    Clinical History: see EPIC    Specimen Source: Skin    Release to patient: Immediate    Send normal result to authorizing provider's In Basket if patient is active on MyChart: Yes          Neoplasm of uncertain behavior of skin  -     Specimen to Pathology, Dermatology  Shave biopsy procedure note:    Shave biopsy performed after verbal consent including risk of infection, scar, recurrence, need for additional treatment of site. Area prepped with alcohol, anesthetized with approximately 1.0cc of 1% lidocaine with epinephrine. Lesional tissue shaved with  razor blade. Hemostasis achieved with application of aluminum chloride followed by hyfrecation. No complications. Dressing applied. Wound care explained.     NEARLY IMPOSSIBLE TO NUMB AREA, c/o persistent pain after 3cc lido-epi    Actinic keratoses  Cryosurgery Procedure Note    Verbal consent from the patient is obtained and the patient is aware of the precancerous quality and need for treatment of these lesions. Liquid nitrogen cryosurgery is applied to the 5 actinic keratoses, as detailed in the physical exam, to produce a freeze injury. The patient is aware that blisters may form and is instructed on wound care with gentle cleansing and use of vaseline ointment to keep moist until healed. The patient is supplied a handout on cryosurgery and is instructed to call if lesions do not completely resolve.    Encounter for follow-up surveillance of skin cancer  Area of previous NMSC examined. Site well healed with no signs of recurrence, except left paramedian anterior scalp lesion biopsied today.    Patient instructed in importance in daily broad spectrum sun protection of at least spf 30. Mineral sunscreen ingredients preferred (Zinc +/- Titanium) and can be found OTC.   Patient encouraged to wear hat for all outdoor exposure.   Also discussed sun avoidance and use of protective clothing.               No follow-ups on file.

## 2025-06-11 LAB
ESTROGEN SERPL-MCNC: NORMAL PG/ML
INSULIN SERPL-ACNC: NORMAL U[IU]/ML
LAB AP CLINICAL INFORMATION: NORMAL
LAB AP GROSS DESCRIPTION: NORMAL
LAB AP REPORT FOOTNOTES: NORMAL
T3RU NFR SERPL: NORMAL %

## 2025-06-12 ENCOUNTER — RESULTS FOLLOW-UP (OUTPATIENT)
Dept: DERMATOLOGY | Facility: CLINIC | Age: OVER 89
End: 2025-06-12

## 2025-06-17 ENCOUNTER — PATIENT MESSAGE (OUTPATIENT)
Dept: DERMATOLOGY | Facility: CLINIC | Age: OVER 89
End: 2025-06-17
Payer: MEDICARE

## 2025-06-19 ENCOUNTER — CLINICAL SUPPORT (OUTPATIENT)
Dept: AUDIOLOGY | Facility: CLINIC | Age: OVER 89
End: 2025-06-19
Payer: MEDICARE

## 2025-06-19 DIAGNOSIS — H93.299 IMPAIRMENT OF SPEECH DISCRIMINATION: ICD-10-CM

## 2025-06-19 DIAGNOSIS — H90.3 SENSORINEURAL HEARING LOSS, BILATERAL: Primary | ICD-10-CM

## 2025-06-19 PROCEDURE — 92557 COMPREHENSIVE HEARING TEST: CPT | Mod: S$GLB,,, | Performed by: AUDIOLOGIST

## 2025-06-19 PROCEDURE — 92567 TYMPANOMETRY: CPT | Mod: S$GLB,,, | Performed by: AUDIOLOGIST

## 2025-06-19 NOTE — Clinical Note
Audiogram was completed. Results reveal a moderate-to-profound sensorineural hearing loss for the right ear and  severe-to-profound sensorineural hearing loss for the left ear.    Speech Reception Thresholds were  75 dBHL for the right ear and 80 dBHL for the left ear.    Word recognition scores were poor for the right ear and poor for the left ear.   Tympanograms were Type A for the right ear and Type A for the left ear.  Recommendations: 1) Annual hearing evaluation    2) Continue wearing hearing aids Thank you, Damon Wan CCC-A

## 2025-06-19 NOTE — PROGRESS NOTES
Brit Jennings was seen on 06/19/2025 for an audiological evaluation. Pt was alone during today's visit. Pertinent complaints today include hearing loss. Pt confirms history of loud noise exposure and denies early onset of genetic family history of hearing loss. Otoscopy revealed no cerumen in both ears. The tympanic membrane was visualized AU prior to proceeding with the hearing testing.     Results reveal a moderate-to-profound sensorineural hearing loss for the right ear and  severe-to-profound sensorineural hearing loss for the left ear.    Speech Reception Thresholds were  75 dBHL for the right ear and 80 dBHL for the left ear.    Word recognition scores were poor for the right ear and poor for the left ear.   Tympanograms were Type A for the right ear and Type A for the left ear.    Recommendations: 1) Annual hearing evaluation     2) Continue wearing hearing aids    Audiogram results were reviewed in detail with patient and all questions were answered. Results will be reviewed by the referring provider at the completion of this note. All complaints were addressed during this visit to the patient's satisfaction.

## 2025-07-08 ENCOUNTER — OFFICE VISIT (OUTPATIENT)
Dept: CARDIOLOGY | Facility: CLINIC | Age: OVER 89
End: 2025-07-08
Payer: MEDICARE

## 2025-07-08 VITALS
DIASTOLIC BLOOD PRESSURE: 60 MMHG | WEIGHT: 132.13 LBS | HEART RATE: 76 BPM | OXYGEN SATURATION: 98 % | SYSTOLIC BLOOD PRESSURE: 122 MMHG | BODY MASS INDEX: 20.69 KG/M2

## 2025-07-08 DIAGNOSIS — I95.1 ORTHOSTATIC SYNCOPE: ICD-10-CM

## 2025-07-08 DIAGNOSIS — I50.9 CONGESTIVE HEART FAILURE, UNSPECIFIED HF CHRONICITY, UNSPECIFIED HEART FAILURE TYPE: ICD-10-CM

## 2025-07-08 DIAGNOSIS — I50.32 CHRONIC HEART FAILURE WITH PRESERVED EJECTION FRACTION: ICD-10-CM

## 2025-07-08 DIAGNOSIS — I10 ESSENTIAL HYPERTENSION: Primary | Chronic | ICD-10-CM

## 2025-07-08 PROCEDURE — 99214 OFFICE O/P EST MOD 30 MIN: CPT | Mod: S$GLB,,, | Performed by: NURSE PRACTITIONER

## 2025-07-08 PROCEDURE — 1159F MED LIST DOCD IN RCRD: CPT | Mod: CPTII,S$GLB,, | Performed by: NURSE PRACTITIONER

## 2025-07-08 PROCEDURE — 1126F AMNT PAIN NOTED NONE PRSNT: CPT | Mod: CPTII,S$GLB,, | Performed by: NURSE PRACTITIONER

## 2025-07-08 PROCEDURE — 3288F FALL RISK ASSESSMENT DOCD: CPT | Mod: CPTII,S$GLB,, | Performed by: NURSE PRACTITIONER

## 2025-07-08 PROCEDURE — 99999 PR PBB SHADOW E&M-EST. PATIENT-LVL III: CPT | Mod: PBBFAC,,, | Performed by: NURSE PRACTITIONER

## 2025-07-08 PROCEDURE — 1101F PT FALLS ASSESS-DOCD LE1/YR: CPT | Mod: CPTII,S$GLB,, | Performed by: NURSE PRACTITIONER

## 2025-07-08 RX ORDER — FUROSEMIDE 20 MG/1
20 TABLET ORAL 2 TIMES DAILY
Qty: 180 TABLET | Refills: 0 | Status: SHIPPED | OUTPATIENT
Start: 2025-07-08 | End: 2026-01-04

## 2025-07-08 NOTE — PROGRESS NOTES
Subjective:    Patient ID:  Brit Jennings is a 89 y.o. male.  Chief Complaint   Patient presents with    Follow-up       HPI:  Patient seen today for follow-up appointment.  Denies any chest discomfort or shortness of breath.  Breathing is stable.  Patient feeling well overall.  He has continued to follow a low-sodium diet and has been doing well.    Review of patient's allergies indicates:   Allergen Reactions    Pollen extracts Other (See Comments)       Past Medical History:   Diagnosis Date    Basal cell carcinoma 2020    L temple / radiation therapy    Cataract     Hypertension     Squamous cell carcinoma of skin      Past Surgical History:   Procedure Laterality Date    APPENDECTOMY      EYE SURGERY      Moh's surgery on right temple area Right 2015    Cancerous spot    PROSTATE BIOPSY      TONSILLECTOMY       Social History[1]  Family History   Problem Relation Name Age of Onset    Melanoma Neg Hx      Psoriasis Neg Hx      Lupus Neg Hx      Eczema Neg Hx          Review of Systems:   Per HPI         Objective:        Vitals:    07/08/25 1051   BP: 122/60   Pulse: 76       Lab Results   Component Value Date    WBC 3.55 (L) 04/13/2025    HGB 11.9 (L) 04/13/2025    HCT 35.8 (L) 04/13/2025     (L) 04/13/2025    CHOL 97 06/19/2024    TRIG 69 06/19/2024    HDL 27 (L) 06/19/2024    ALT 25 04/09/2025    AST 33 04/09/2025     04/13/2025    K 3.6 04/13/2025     04/13/2025    CREATININE 0.9 04/13/2025    BUN 24 (H) 04/13/2025    CO2 31 (H) 04/13/2025    TSH 2.420 04/10/2025    INR 1.1 01/20/2024    MICROALBUR 11.9 06/19/2024        ECHOCARDIOGRAM RESULTS  Results for orders placed during the hospital encounter of 04/09/25    Echo    Interpretation Summary    Large left pleural effusion    Left Ventricle: The left ventricle is normal in size. Mildly increased wall thickness. Mild global hypokinesis present. There is mildly reduced systolic function with a visually estimated ejection fraction of  40 - 45%.    Right Ventricle: Right ventricle was not well visualized due to poor acoustic window. Systolic function is normal.    Left Atrium: Severely dilated    Right Atrium: Right atrium is severely dilated.    Aortic Valve: The aortic valve is a trileaflet valve. There is moderate aortic valve sclerosis. There is mild aortic regurgitation.    Mitral Valve: There is mild to moderate regurgitation.    Tricuspid Valve: There is mild regurgitation.    Pulmonic Valve: There is mild regurgitation.    Aorta: The Ascending aorta is mildly dilated measuring 4.3 cm.    IVC/SVC: Elevated venous pressure at 15 mmHg.        CURRENT/PREVIOUS VISIT EKG  Results for orders placed or performed during the hospital encounter of 04/09/25   EKG 12-lead    Collection Time: 04/09/25  5:13 PM   Result Value Ref Range    QRS Duration 124 ms    [facility]HS QTC Calculation 488 ms    Narrative    Test Reason : R06.02,    Vent. Rate :  87 BPM     Atrial Rate :  87 BPM     P-R Int : 284 ms          QRS Dur : 124 ms      QT Int : 406 ms       P-R-T Axes :    -50  31 degrees    QTcB Int : 488 ms    Sinus rhythm with 1st degree A-V block  Right bundle branch block  Left anterior fascicular block   Bifascicular block   Septal infarct ,age undetermined  Marked ST abnormality, possible anterolateral subendocardial injury  Abnormal ECG  No previous ECGs available  Confirmed by Jesús Feldman ([id number]) on 4/13/2025 9:21:53 AM    Referred By: AAAREFERRAL SELF           Confirmed By: Jesús Feldman     No valid procedures specified.   Results for orders placed during the hospital encounter of 04/09/25    Nuclear Stress Test    Interpretation Summary    The ECG portion of the study is negative for ischemia.    The patient reported no chest pain during the stress test.    During stress, rare PVCs are noted.    The nuclear portion of this study will be reported separately.      Physical Exam:  CONSTITUTIONAL: No fever, no chills  HEENT: Normocephalic,  atraumatic,pupils reactive to light                 NECK:  No JVD no carotid bruit  CVS: S1S2+, RRR  LUNGS: Clear  ABDOMEN: Soft, NT, BS+  EXTREMITIES: No cyanosis, edema  : No kasper catheter  NEURO: AAO X 3  PSY: Normal affect      Medication List with Changes/Refills   Current Medications    ALBUTEROL (PROVENTIL/VENTOLIN HFA) 90 MCG/ACTUATION INHALER    Inhale 1-2 puffs into the lungs daily as needed for Shortness of Breath. Rescue    LISINOPRIL (PRINIVIL,ZESTRIL) 20 MG TABLET    Take 1 tablet (20 mg total) by mouth once daily.    NEOMYCIN-POLYMYXIN-DEXAMETHASONE (DEXACINE) 3.5 MG/G-10,000 UNIT/G-0.1 % OINT    Place 1 application  into both eyes every 6 (six) hours.    POTASSIUM CHLORIDE (MICRO-K) 10 MEQ CPSR    Take 1 capsule (10 mEq total) by mouth once daily.    SYMBICORT 160-4.5 MCG/ACTUATION HFAA    Inhale 2 puffs into the lungs every 12 (twelve) hours.    TRAZODONE (DESYREL) 100 MG TABLET    Take 1 tablet (100 mg total) by mouth every evening.   Changed and/or Refilled Medications    Modified Medication Previous Medication    FUROSEMIDE (LASIX) 20 MG TABLET furosemide (LASIX) 20 MG tablet       Take 1 tablet (20 mg total) by mouth 2 (two) times a day.    Take 1 tablet (20 mg total) by mouth 2 (two) times a day.             Assessment:       1. Essential hypertension    2. Orthostatic syncope    3. Chronic heart failure with preserved ejection fraction    4. Congestive heart failure, unspecified HF chronicity, unspecified heart failure type         Plan:     Problem List Items Addressed This Visit          Unprioritized    Essential hypertension - Primary (Chronic)    Current Assessment & Plan   Blood pressure stable and well controlled on current regimen.  Continue lisinopril 20 mg p.o. daily.  Continue low-sodium diet.         Orthostatic syncope    Current Assessment & Plan   Denies any dizziness or syncopal or near syncopal episodes.  He is doing well on diuresis and antihypertensive medications  currently.         Chronic heart failure with preserved ejection fraction    Current Assessment & Plan   Patient seems to be well compensated and does not have any significant symptoms of fluid volume overload.  Currently on furosemide 20 mg p.o. b.i.d. and seems to be doing well on that regimen.  Trace to mild BLE edema is noted but not significant.  Continue current medications and low-sodium diet.         Relevant Medications    furosemide (LASIX) 20 MG tablet     Other Visit Diagnoses         Congestive heart failure, unspecified HF chronicity, unspecified heart failure type        Relevant Medications    furosemide (LASIX) 20 MG tablet            Follow up in about 3 months (around 10/8/2025).            Chloe Sawant NP-C  Department of Cardiology   [facility] [place]           [1]   Social History  Tobacco Use    Smoking status: Never    Smokeless tobacco: Never   Substance Use Topics    Alcohol use: Not Currently

## 2025-07-08 NOTE — ASSESSMENT & PLAN NOTE
Patient seems to be well compensated and does not have any significant symptoms of fluid volume overload.  Currently on furosemide 20 mg p.o. b.i.d. and seems to be doing well on that regimen.  Trace to mild BLE edema is noted but not significant.  Continue current medications and low-sodium diet.

## 2025-07-08 NOTE — ASSESSMENT & PLAN NOTE
Blood pressure stable and well controlled on current regimen.  Continue lisinopril 20 mg p.o. daily.  Continue low-sodium diet.

## 2025-07-08 NOTE — ASSESSMENT & PLAN NOTE
Denies any dizziness or syncopal or near syncopal episodes.  He is doing well on diuresis and antihypertensive medications currently.

## 2025-07-15 ENCOUNTER — OFFICE VISIT (OUTPATIENT)
Dept: DERMATOLOGY | Facility: CLINIC | Age: OVER 89
End: 2025-07-15
Payer: MEDICARE

## 2025-07-15 VITALS — WEIGHT: 132 LBS | HEIGHT: 67 IN | BODY MASS INDEX: 20.72 KG/M2

## 2025-07-15 DIAGNOSIS — D48.5 NEOPLASM OF UNCERTAIN BEHAVIOR OF SKIN: ICD-10-CM

## 2025-07-15 DIAGNOSIS — D04.4 SQUAMOUS CELL CARCINOMA IN SITU (SCCIS) OF SCALP: Primary | ICD-10-CM

## 2025-07-15 PROCEDURE — 1101F PT FALLS ASSESS-DOCD LE1/YR: CPT | Mod: CPTII,S$GLB,, | Performed by: DERMATOLOGY

## 2025-07-15 PROCEDURE — 11102 TANGNTL BX SKIN SINGLE LES: CPT | Mod: S$GLB,,, | Performed by: DERMATOLOGY

## 2025-07-15 PROCEDURE — 1160F RVW MEDS BY RX/DR IN RCRD: CPT | Mod: CPTII,S$GLB,, | Performed by: DERMATOLOGY

## 2025-07-15 PROCEDURE — 3288F FALL RISK ASSESSMENT DOCD: CPT | Mod: CPTII,S$GLB,, | Performed by: DERMATOLOGY

## 2025-07-15 PROCEDURE — 1126F AMNT PAIN NOTED NONE PRSNT: CPT | Mod: CPTII,S$GLB,, | Performed by: DERMATOLOGY

## 2025-07-15 PROCEDURE — 1159F MED LIST DOCD IN RCRD: CPT | Mod: CPTII,S$GLB,, | Performed by: DERMATOLOGY

## 2025-07-15 PROCEDURE — 88305 TISSUE EXAM BY PATHOLOGIST: CPT | Mod: TC | Performed by: DERMATOLOGY

## 2025-07-15 PROCEDURE — 99213 OFFICE O/P EST LOW 20 MIN: CPT | Mod: 25,S$GLB,, | Performed by: DERMATOLOGY

## 2025-07-15 PROCEDURE — 88305 TISSUE EXAM BY PATHOLOGIST: CPT | Mod: 26,,, | Performed by: DERMATOLOGY

## 2025-07-15 NOTE — PROGRESS NOTES
"  Subjective:      Patient ID:  Brit Jennings is a 89 y.o. male who presents for   Chief Complaint   Patient presents with    Spot     scalp     LOV - 6/9/25- NUB, ak    Please schedule FU in one month to reexamine and potentially rebiopsy.   Patient has difficulty applying efudex consistently and for a long enough duration  Path starting with "AK with transition" is not Mohs appropriate sadly     1. 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  - FULL THICKNESS SQUAMOUS ATYPIA EXTENDS TO 1 PERIPHERAL BIOPSY MARGIN.    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 Rescue, Disp: 18 g, Rfl: 0  ·  furosemide (LASIX) 20 MG tablet, Take 1 tablet (20 mg total) by mouth 2 (two) times a day., Disp: 180 tablet, Rfl: 0  ·  lisinopriL (PRINIVIL,ZESTRIL) 20 MG tablet, Take 1 tablet (20 mg total) by mouth once daily., Disp: 90 tablet, Rfl: 3  ·  neomycin-polymyxin-dexamethasone (DEXACINE) 3.5 mg/g-10,000 unit/g-0.1 % Oint, Place 1 application  into both eyes every 6 (six) hours., Disp: , Rfl:   ·  potassium chloride (MICRO-K) 10 MEQ CpSR, Take 1 capsule (10 mEq total) by mouth once daily., Disp: 90 capsule, Rfl: 1  ·  SYMBICORT 160-4.5 mcg/actuation HFAA, Inhale 2 puffs into the lungs every 12 (twelve) hours., Disp: 30 g, Rfl: " 4  ·  traZODone (DESYREL) 100 MG tablet, Take 1 tablet (100 mg total) by mouth every evening., Disp: 90 tablet, Rfl: 1            Review of Systems   Constitutional:  Negative for fever, chills and fatigue.   Skin:  Positive for wears hat. Negative for daily sunscreen use and activity-related sunscreen use.       Objective:   Physical Exam   Constitutional: He appears well-developed and well-nourished. No distress.   Neurological: He is alert and oriented to person, place, and time. He is not disoriented.   Psychiatric: He has a normal mood and affect.   Skin:   Areas Examined (abnormalities noted in diagram):   Scalp / Hair Palpated and Inspected  Head / Face Inspection Performed  Neck Inspection Performed            Diagram Legend     Erythematous scaling macule/papule c/w actinic keratosis       Vascular papule c/w angioma      Pigmented verrucoid papule/plaque c/w seborrheic keratosis      Yellow umbilicated papule c/w sebaceous hyperplasia      Irregularly shaped tan macule c/w lentigo     1-2 mm smooth white papules consistent with Milia      Movable subcutaneous cyst with punctum c/w epidermal inclusion cyst      Subcutaneous movable cyst c/w pilar cyst      Firm pink to brown papule c/w dermatofibroma      Pedunculated fleshy papule(s) c/w skin tag(s)      Evenly pigmented macule c/w junctional nevus     Mildly variegated pigmented, slightly irregular-bordered macule c/w mildly atypical nevus      Flesh colored to evenly pigmented papule c/w intradermal nevus       Pink pearly papule/plaque c/w basal cell carcinoma      Erythematous hyperkeratotic cursted plaque c/w SCC      Surgical scar with no sign of skin cancer recurrence      Open and closed comedones      Inflammatory papules and pustules      Verrucoid papule consistent consistent with wart     Erythematous eczematous patches and plaques     Dystrophic onycholytic nail with subungual debris c/w onychomycosis     Umbilicated papule     Erythematous-base heme-crusted tan verrucoid plaque consistent with inflamed seborrheic keratosis     Erythematous Silvery Scaling Plaque c/w Psoriasis     See annotation      Assessment / Plan:      Pathology Orders:       Normal Orders This Visit    Specimen to Pathology, Dermatology     Questions:    Procedure Type: Dermatology and skin neoplasms    Number of Specimens: 1    ------------------------: -------------------------    Spec 1 Procedure: Shave Biopsy    Spec 1 Clinical Impression: SCCIS vs HAK    Spec 1 Source: left upper forehead    Clinical Information: see EPIC    Clinical History: see EPIC    Specimen Source: Skin    Release to patient: Immediate    Send normal result to authorizing provider's In Basket if patient is active on MyChart: Yes          Squamous cell carcinoma in situ (SCCIS) of scalp  Left ant scalp- defer rebiopsy, patient has been using efudex, no evidence of more invasive lesion, will continue efudex BID x 2 weeks    Neoplasm of uncertain behavior of skin  Shave biopsy procedure note:    Shave biopsy performed after verbal consent including risk of infection, scar, recurrence, need for additional treatment of site. Area prepped with alcohol, anesthetized with approximately 1.0cc of 1% lidocaine with epinephrine. Lesional tissue shaved with razor blade. Hemostasis achieved with application of aluminum chloride followed by hyfrecation. No complications. Dressing applied. Wound care explained.             Follow up in about 3 months (around 10/15/2025).

## 2025-07-15 NOTE — PATIENT INSTRUCTIONS
Shave Biopsy Wound Care    Your doctor has performed a shave biopsy today.  A band aid and vaseline ointment has been placed over the site.  This should remain in place for 24 hours.  It is recommended that you keep the area dry for the first 24 hours.  After 24 hours, you may remove the band aid and wash the area with warm soap and water and apply Vaseline jelly.  Many patients prefer to use Neosporin or Bacitracin ointment.  This is acceptable; however, know that you can develop an allergy to this medication even if you have used it safely for years.  It is important to keep the area moist.  Letting it dry out and get air slows healing time, and will worsen the scar.  Band aid is optional after first 24 hours.      If you notice increasing redness, tenderness, pain, or yellow drainage at the biopsy site, please notify your doctor.  These are signs of an infection.    If your biopsy site is bleeding, apply firm pressure for 15 minutes straight.  Repeat for another 15 minutes, if it is still bleeding.   If the surgical site continues to bleed, then please contact your doctor.       [facility] - DERMATOLOGY  [address], [address]  [place] [address]  Dept: [phone]  Dept Fax: [phone]

## 2025-07-18 ENCOUNTER — TELEPHONE (OUTPATIENT)
Dept: FAMILY MEDICINE | Facility: CLINIC | Age: OVER 89
End: 2025-07-18
Payer: MEDICARE

## 2025-07-18 NOTE — TELEPHONE ENCOUNTER
If had fall and has laceration to the head that is different from derm I would advise that he be evaluated at the UC/ER. If he is having pain 5/10 in the head since the fall he may need additional imaging to ensure nothing emergent.

## 2025-07-18 NOTE — TELEPHONE ENCOUNTER
Called pt back, said he had a few falls over past years, and bleed once.   Then skin biopsy on head, after the 2nd one started a sharp pain in brow, went back to dermatologist Wednesday and she said that was not from the skin bx she did, and he should see PCP  Stabbing pain that moves around his head.   Pain a 4-5  Asked him about Dr. Dinero bc his name was on MRI but he d/c him and said see a neurologist-went to one in [place] once

## 2025-07-18 NOTE — TELEPHONE ENCOUNTER
Notified, pain is significant sometimes so he agreed to UC /ED evaluation, denied any new falls but pain has been getting worse wince Wednesday, once evaluated I will call next week to get him a follow up

## 2025-07-18 NOTE — TELEPHONE ENCOUNTER
----- Message from Nurse Lyons sent at 7/18/2025 10:55 AM CDT -----    ----- Message -----  From: Stephanie Babin  Sent: 7/18/2025  10:53 AM CDT  To: [facility]    Pt stated that he has had some pain in his head and would like to come in today.     [phone]

## 2025-07-22 ENCOUNTER — TELEPHONE (OUTPATIENT)
Dept: FAMILY MEDICINE | Facility: CLINIC | Age: OVER 89
End: 2025-07-22
Payer: MEDICARE

## 2025-07-22 NOTE — TELEPHONE ENCOUNTER
Called back today to follow up on our conversation from Friday about head pains, he ended up not going to urgent care, feels like the pains in the head really were from the skin biopsies and meds he was using, has improved and declined need for appt this week

## 2025-07-25 ENCOUNTER — OFFICE VISIT (OUTPATIENT)
Dept: FAMILY MEDICINE | Facility: CLINIC | Age: OVER 89
End: 2025-07-25
Payer: MEDICARE

## 2025-07-25 VITALS
OXYGEN SATURATION: 97 % | DIASTOLIC BLOOD PRESSURE: 62 MMHG | BODY MASS INDEX: 21.5 KG/M2 | WEIGHT: 137 LBS | HEIGHT: 67 IN | HEART RATE: 59 BPM | SYSTOLIC BLOOD PRESSURE: 120 MMHG

## 2025-07-25 DIAGNOSIS — I10 ESSENTIAL HYPERTENSION: ICD-10-CM

## 2025-07-25 DIAGNOSIS — I50.9 CONGESTIVE HEART FAILURE, UNSPECIFIED HF CHRONICITY, UNSPECIFIED HEART FAILURE TYPE: Primary | ICD-10-CM

## 2025-07-25 DIAGNOSIS — F51.01 PRIMARY INSOMNIA: ICD-10-CM

## 2025-07-25 DIAGNOSIS — R54 AGE-RELATED PHYSICAL DEBILITY: ICD-10-CM

## 2025-07-25 RX ORDER — TRAZODONE HYDROCHLORIDE 100 MG/1
100 TABLET ORAL NIGHTLY
Qty: 90 TABLET | Refills: 1 | Status: SHIPPED | OUTPATIENT
Start: 2025-07-25 | End: 2026-01-21

## 2025-07-25 NOTE — PROGRESS NOTES
SUBJECTIVE:    Patient ID: Brit Jennings is a 89 y.o. male.    Chief Complaint: Follow-up (No bottles//Pt is here for a check up//JEAN )    History of Present Illness    CHIEF COMPLAINT:  Brit presents today for follow-up of multiple medical conditions.    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 He reports intense burning when application extends beyond the biopsy site. He admits to irregular application instead of the prescribed twice-daily regimen. He understands this topical chemotherapy aims to prevent surgical intervention.    CARDIOVASCULAR:  He recently required hospitalization for fluid overload presenting with significant leg and foot edema with skin breakdown and fluid leakage. He experienced dyspnea which he attributes to pulmonary edema rather than COPD. Currently, he reports minimal leg swelling with residual pedal edema. He has known vascular disease with 80% right carotid stenosis, 70% right vertebral artery stenosis, and left subclavian artery involvement. He denies any symptoms of stroke, TIA, or vascular insufficiency.    SLEEP:  He reports uncertain sleep quality with daytime somnolence. He actively avoids daytime naps to maintain his sleep schedule, though occasionally dozes off in his recliner for 10-15 minutes.    WEIGHT:  His current weight is 133.4 lbs, which he notes is his lowest since seventh grade. He attributes this weight loss to age-related muscle and bone mass reduction.    MEDICATIONS:  He has not used Symbicort since hospital discharge and denies needing it.      ROS:  General: -fever, -chills, -fatigue, -weight gain, -weight loss  Eyes: -vision changes, -redness, -discharge  ENT: -ear pain, -nasal congestion, -sore throat  Cardiovascular: -chest pain, -palpitations, +lower extremity edema  Respiratory: -cough, -shortness of breath  Gastrointestinal: -abdominal pain,  -nausea, -vomiting, -diarrhea, -constipation, -blood in stool  Genitourinary: -dysuria, -hematuria, -frequency, +excessive urination  Musculoskeletal: -joint pain, -muscle pain  Skin: -rash, +lesion, +burning sensation  Neurological: -headache, -dizziness, -numbness, -tingling, +sleep disturbances  Psychiatric: -anxiety, -depression, -sleep difficulty         Office Visit on 07/15/2025   Component Date Value Ref Range Status    Case Report 07/15/2025    Final                    Value:Dontrell Payne - [facility]                           Case: [id number]                                Authorizing Provider:  Tova Miles MD    Collected:           07/15/2025 11:23 AM          Ordering Location:     [facility] -         Received:            07/15/2025 11:26 AM                                 Dermatology                                                                  Pathologist:           Sharmin Powell MD                                                            Specimen:    Skin, left upper forehead                                                                  Clinical Information 07/15/2025    Final                    Value:Spec 1 Clinical Impression: SCCIS vs HAK            Final Diagnosis 07/15/2025    Final                    Value:1. Skin, left upper forehead, shave biopsy:    - LICHENOID ACTINIC KERATOSIS    This lesion is atypical and further treatment may be required. You will be contacted by your provider's office.       Microscopic Description 07/15/2025    Final                    Value:1.Sections show atrophic epidermis with overlying parakeratosis and keratinocyte atypia largely limited to the lower two-thirds of the epidermis, as well as interface change.  A dense lichenoid inflammatory infiltrate is present in the underlying superficial dermis.  Background solar elastosis is present.      Gross Description 07/15/2025    Final                    Value:1. Skin: left upper forehead  MRN #  "on Epic Label:  [id number]  MRN # on Pathology Label:  [id number]    The specimen is received in formalin labeled "left upper forehead".  The specimen is a shave of tan-yellow skin measuring 0.7 x 0.6 x 0.1 cm. The specimen is inked black at the resection margin, bisected, and submitted entirely in cassette 1A.    SERGEY Foley  Gross Technologist    Grossing was completed by Nano Rodrigues on 7/16/2025.        Report Footnotes 07/15/2025    Final                    Value:Unless the case is a "gross only" or additional testing only, the final diagnosis for each specimen is based on a microscopic examination of appropriate tissue sections.     Office Visit on 06/09/2025   Component Date Value Ref Range Status    Case Report 06/09/2025    Final                    Value:Dontrell nataliia - [facility]                           Case: [id number]                                Authorizing Provider:  Tova Miles MD    Collected:           06/09/2025 01:43 PM          Ordering Location:     [facility] -         Received:            06/09/2025 02:00 PM                                 Dermatology                                                                  Pathologist:           Ko Sarah MD                                                     Specimen:    Skin, LEFT PARAMEDIAN ANTERIOR SCALP                                                       Clinical Information 06/09/2025    Final                    Value:  REBIOPSY OF AK/SCCIS FOR RESIDUAL LESION            Final Diagnosis 06/09/2025    Final                    Value:1. 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  - FULL THICKNESS SQUAMOUS ATYPIA EXTENDS TO 1 PERIPHERAL BIOPSY MARGIN.  - DERMAL SCAR.    This lesion is atypical and further treatment may be required. You will be contacted by your providers office.       Microscopic Description 06/09/2025    Final      " "              Value:Sections show atypia within the lower to mid epidermal layers associated with foci showing full-thickness atypia.  The underlying papillary dermis shows solar elastosis and perpendicularly oriented fibroblasts with vertically-oriented blood vessels, consistent with a scar from a previous biopsy site.       Gross Description 06/09/2025    Final                    Value:1. Skin: LEFT PARAMEDIAN ANTERIOR SCALP  MRN # on Epic Label:  [id number]  MRN # on Pathology Label: [id number]    The specimen is received in formalin labeled "left paramedian anterior scalp".  The specimen is a shave biopsy of tan skin measuring 1.1 x 1.0 cm.  The specimen is trisected,  inked blue at the resection margin and submitted entirely in cassette 1A.    Grossing was completed by All Whitten on 6/10/2025.        Report Footnotes 06/09/2025    Final                    Value:Unless the case is a "gross only" or additional testing only, the final diagnosis for each specimen is based on a microscopic examination of appropriate tissue sections.     No results displayed because visit has over 200 results.      Lab Visit on 04/09/2025   Component Date Value Ref Range Status    BNP 04/09/2025 3,951 (H)  <=99 pg/mL Final    Sodium 04/09/2025 134 (L)  136 - 145 mmol/L Final    Potassium 04/09/2025 4.3  3.5 - 5.1 mmol/L Final    Chloride 04/09/2025 97  95 - 110 mmol/L Final    CO2 04/09/2025 28  23 - 29 mmol/L Final    Glucose 04/09/2025 93  70 - 110 mg/dL Final    BUN 04/09/2025 24 (H)  8 - 23 mg/dL Final    Creatinine 04/09/2025 1.0  0.5 - 1.4 mg/dL Final    Calcium 04/09/2025 10.2  8.7 - 10.5 mg/dL Final    Protein Total 04/09/2025 7.3  6.0 - 8.4 gm/dL Final    Albumin 04/09/2025 4.3  3.5 - 5.2 g/dL Final    Bilirubin Total 04/09/2025 0.8  0.1 - 1.0 mg/dL Final    ALP 04/09/2025 60  55 - 135 unit/L Final    AST 04/09/2025 30  10 - 40 unit/L Final    ALT 04/09/2025 24  10 - 44 unit/L Final    Anion Gap 04/09/2025 9  8 - 16 mmol/L Final "    eGFR 04/09/2025 >60  >60 mL/min/1.73/m2 Final    Ferritin 04/09/2025 69.2  20.0 - 300.0 ng/mL Final    Iron Level 04/09/2025 56  45 - 160 ug/dL Final    Transferrin 04/09/2025 247  200 - 375 mg/dL Final    Iron Binding Capacity Total 04/09/2025 346  250 - 450 ug/dL Final    Iron Saturation 04/09/2025 16 (L)  20 - 50 % Final    WBC 04/09/2025 4.39  3.90 - 12.70 K/uL Final    RBC 04/09/2025 3.90 (L)  4.60 - 6.20 M/uL Final    Hgb 04/09/2025 13.1 (L)  14.0 - 18.0 gm/dL Final    Hct 04/09/2025 40.6  40.0 - 54.0 % Final    MCV 04/09/2025 104 (H)  82 - 98 fL Final    MCH 04/09/2025 33.6 (H)  27.0 - 31.0 pg Final    MCHC 04/09/2025 32.3  32.0 - 36.0 g/dL Final    RDW 04/09/2025 13.2  11.5 - 14.5 % Final    Platelet Count 04/09/2025 120 (L)  150 - 450 K/uL Final    MPV 04/09/2025 13.3 (H)  9.2 - 12.9 fL Final    Nucleated RBC 04/09/2025 0  <=0 /100 WBC Final    Neut % 04/09/2025 48.5  38 - 73 % Final    Lymph % 04/09/2025 33.0  18 - 48 % Final    Mono % 04/09/2025 17.1 (H)  4 - 15 % Final    Eos % 04/09/2025 0.2  0 - 8 % Final    Basophil % 04/09/2025 0.7  <=1.9 % Final    Imm Grans % 04/09/2025 0.5  0.0 - 0.5 % Final    Neut # 04/09/2025 2.1  1.8 - 7.7 K/uL Final    Lymph # 04/09/2025 1.45  1 - 4.8 K/uL Final    Mono # 04/09/2025 0.75  0.3 - 1 K/uL Final    Eos # 04/09/2025 0.01  <=0.5 K/uL Final    Baso # 04/09/2025 0.03  <=0.2 K/uL Final    Imm Grans # 04/09/2025 0.02  0.00 - 0.04 K/uL Final   Office Visit on 04/09/2025   Component Date Value Ref Range Status    EKG 12-Lead 04/09/2025    Corrected    Ventricular Rate 04/09/2025 72   Final    TX Interval 04/09/2025 302   Final    QRS Duration 04/09/2025 142   Final    QT/QTc 04/09/2025 432/473   Final    PRT Axes 04/09/2025 -14  -51  5   Final   Office Visit on 03/25/2025   Component Date Value Ref Range Status    Case Report 03/25/2025    Final                    Value:Dontrell Tan [facility]                           Case: [id number]                                 Authorizing Provider:  Tova Miles MD    Collected:           03/25/2025 09:02 AM          Ordering Location:     [facility] -         Received:            03/26/2025 02:56 PM                                 Dermatology                                                                  Pathologist:           Ko Sarah MD                                                     Specimens:   1) - Skin, left paramedian anterior scalp                                                           2) - Cheek, Right, R preauricular cheek                                                    Final Diagnosis 03/25/2025    Final                    Value: 1. Skin,  left paramedian  anterior scalp, shave biopsy:   - ACTINIC KERATOSIS WITH FOCAL TRANSITION TO SQUAMOUS CELL CARCINOMA IN SITU.  - FULL-THICKNESS SQUAMOUS ATYPIA FOCALLY EXTENDS TO THE DEEP BIOPSY MARGIN AND A LATERAL BIOPSY MARGIN.     This lesion is atypical and further treatment may be advised.  You will be contacted by your provider's office.       2. Skin, right preauricular cheek, shave biopsy:   - ACTINIC KERATOSIS WITH FOCAL TRANSITION TO SQUAMOUS CELL CARCINOMA IN SITU.  - FULL-THICKNESS SQUAMOUS ATYPIA FOCALLY EXTENDS TO THE DEEP BIOPSY MARGIN AND A LATERAL BIOPSY MARGIN.     This lesion is atypical and further treatment may be advised.  You will be contacted by your provider's office.        Microscopic Description 03/25/2025    Final                    Value:Sections show atypia within the lowermost epidermal layers associated with foci showing full-thickness atypia.  The underlying papillary dermis shows solar elastosis.    The atypical squamous epithelium is focally transected at the deep biopsy margin and full-thickness squamous atypia extends to a peripheral biopsy margin.    Sections show atypia within the lowermost epidermal layers associated with foci showing full-thickness atypia.  The underlying papillary dermis shows solar  "elastosis.    The atypical squamous epithelium is focally transected at the deep biopsy margin and full-thickness squamous atypia extends to a peripheral biopsy margin.  Ki 67 immunohistochemical stain shows an elevated proliferative index focally spans nearly the full-thickness of the involved epidermis.  Appropriately reactive controls were reviewed.  Multiple levels were examined.      Gross Description 03/25/2025    Final                    Value:1. Skin: left paramedian anterior scalp  Two Part Case:    Part 1  Pathology MRN# [id number]  Hospital/Clinic label MRN# [id number]    Received in formalin labeled with the patient's name, MRN, and "paramedian anterior scalp is an unoriented, ovoid, 1.2 x 0.9 cm skin shave excised to a depth of 0.1 cm.  The epidermis is diffusely granular, tan-white, and otherwise grossly unremarkable.  The resection margin is inked blue, cut surfaces are grossly unremarkable.  The specimen is entirely submitted in cassette:  1A    BERTIN Yañez, PA (ASCP)CM    Grossing was completed by Wade Gutierrez on 3/27/2025.    2. Cheek, Right: R preauricular cheek  Part 2  Pathology MRN# [id number]  Hospital/Clinic label MRN# [id number]    Received in formalin labeled with the patient's name, MRN, and "right preauricular cheek is an irregular, unoriented, 1.2 x 0.8 cm skin shave excised to a depth of 0.1 cm.  The epidermis is diffusely granular, tan-white, and otherwise grossly unremarkable.  The resection margin is inked                           blue, cut surfaces are grossly unremarkable.  The specimen is entirely submitted in cassette:  2A    BERTIN Yañez PA (ASCP)CM     Grossing was completed by Wade Gutierrez on 3/27/2025.        Report Footnotes 03/25/2025    Final                    Value:Unless the case is a "gross only" or additional testing only, the final diagnosis for each specimen is based on a microscopic examination of appropriate tissue sections.         Past Medical History: " "  Diagnosis Date    Anemia     Asthma 1980    Basal cell carcinoma 2020    L temple / radiation therapy    Cataract     CHF (congestive heart failure) 3/26/2026    Hearing loss     Hypertension     Seasonal allergies     Sleep apnea     Squamous cell carcinoma of skin      Past Surgical History:   Procedure Laterality Date    APPENDECTOMY      EYE SURGERY      Moh's surgery on right temple area Right 2015    Cancerous spot    PROSTATE BIOPSY      TONSILLECTOMY       Family History   Problem Relation Name Age of Onset    Hypertension Mother Jaida Jennings     Arthritis Mother Jaida Jennings     Hearing loss Mother Jaida Jennings     Diabetes Father Krishna Jennings     Diabetes Brother Mamadou Jennings     Hypertension Brother Mamadou Jennings     Melanoma Neg Hx      Psoriasis Neg Hx      Lupus Neg Hx      Eczema Neg Hx         Marital Status:   Alcohol History:  reports that he does not currently use alcohol.  Tobacco History:  reports that he has never smoked. He has never used smokeless tobacco.  Drug History:  has no history on file for drug use.    Review of patient's allergies indicates:   Allergen Reactions    Pollen extracts Other (See Comments)     Current Medications[1]    Objective:      Vitals:    07/25/25 1056   BP: 120/62   Pulse: (!) 59   SpO2: 97%   Weight: 62.1 kg (137 lb)   Height: 5' 7" (1.702 m)     Physical Exam    General: No acute distress. Well-developed. Well-nourished.  Eyes: EOMI. Sclerae anicteric.  HENT: Normocephalic. Atraumatic. Nares patent. Moist oral mucosa.  Ears: Bilateral TMs clear. Bilateral EACs clear.  Cardiovascular: Regular rate. Regular rhythm. No murmurs. No rubs. No gallops. Normal S1, S2.  Respiratory: Normal respiratory effort. Clear to auscultation bilaterally. No rales. No rhonchi. No wheezing.  Abdomen: Soft. Non-tender. Non-distended. Normoactive bowel sounds.  Musculoskeletal: No  obvious deformity.  Extremities: Mild pedal edema.  Neurological: Alert & oriented " x3. No slurred speech. Normal gait.  Psychiatric: Normal mood. Normal affect. Good insight. Good judgment.  Skin: Warm. Dry. No rash.         Assessment:       Assessment & Plan    - Reviewed recent dermatology treatment for actinic keratosis transitioning to squamous cell carcinoma on ear, managed with Efudex (fluorouracil) cream, which is a form of topical chemotherapy that attacks cancer cells on the skin surface.  - BP control improved with Lisinopril 20 mg and resolution of previous fluid overload.  - Evaluated carotid artery stenosis (80% right carotid, 70% right vertebral) and left subclavian artery occlusion, last assessed by Dr. Best in August 2023.  - Considered reassessment of vascular status given improved overall health since last evaluation.  - Cholesterol levels well-controlled without medication (LDL 55).  - Assessed sleep patterns and continued trazodone.  - Determined Symbicort inhaler no longer needed since hospital discharge.  - Clarified that previous breathing difficulties were likely due to fluid in the lungs rather than COPD or asthma.    ACTINIC KERATOSIS AND SQUAMOUS CELL CARCINOMA:  - Evaluated actinic keratosis which has transitioned to squamous cell carcinoma.  - Prescribed Efudex (fluorouracil) cream to be applied daily to lesions on the head, noting patient was initially instructed to apply twice daily.  - Arranged for Dr. Miles to monitor the condition with follow up in October.    CAROTID ARTERY STENOSIS:  - Monitored patient's 80% stenosis on the right carotid artery.  - Instructed patient to schedule follow-up with Dr. Best (vascular surgeon) via [facility] to discuss surgical options.  - Brit advised to discuss potential need for antiplatelet therapy or other interventions with cardiologist Dr. Bernal at upcoming October appointment.  - Continued Lisinopril for blood pressure management.    VERTEBRAL ARTERY STENOSIS:  - Monitored patient's 70% stenosis on the right  vertebral artery.  - Advised patient to consider follow-up with Dr. Best and discuss surgical options with cardiologist Dr. Bernal.  - Continued Lisinopril for blood pressure management.    SUBCLAVIAN ARTERY OCCLUSION:  - Monitored patient's left subclavian artery occlusion.  - Instructed patient to schedule follow-[facility] Dr[facility] Best (vascular surgeon[facility] to discuss [facility] options and to consult with cardiologist Dr. Bernal.  - Continued Lisinopril for blood pressure management.    CHRONIC OBSTRUCTIVE PULMONARY DISEASE (COPD):  - Discontinued Symbicort inhaler as patient has not used it since hospital discharge.  - Determined that patient's breathing issues were due to fluid in the lungs, not COPD.  - Instructed patient to use Symbicort inhaler, two puffs twice daily if needed for COPD management.    FLUID OVERLOAD:  - Monitored fluid overload condition which previously presented with swollen legs and fluid leaking from skin.  - Condition is now well compensated with diuretics.  - Advised patient to maintain fluid levels to prevent breathing issues.  - Continued Lasix 20 mg BID for fluid management.    SLEEP DISORDER:  - Monitored sleep patterns, noting daytime sleepiness.  - Brit is unsure about sleep quality at night.  - Continued and refilled trazodone prescription for sleep management.    WEIGHT MANAGEMENT:  - Monitored patient's weight, which has been stable between 132-133 lbs over the last couple of months.  - Noted patient has lost muscle and bone mass, possibly due to age.    HYPERTENSION:  - Blood pressure is well controlled with Lisinopril 20 mg, which will be continued.  - Brit denies dizziness or near-syncopal episodes recently.    FOLLOW-UP:  - Follow up in 6 months.       Plan:       Congestive heart failure, unspecified HF chronicity, unspecified heart failure type    Essential hypertension    Primary insomnia  -     traZODone (DESYREL) 100 MG tablet; Take 1 tablet (100 mg total)  by mouth every evening.  Dispense: 90 tablet; Refill: 1    Age-related physical debility    Primary insomnia  Comments:  Trazodone has done beatuifully for sleep. NO reported falls since I saw him last. Cane for balance when he needs.  Orders:  -     traZODone (DESYREL) 100 MG tablet; Take 1 tablet (100 mg total) by mouth every evening.  Dispense: 90 tablet; Refill: 1      Follow up in about 6 months (around 1/25/2026).    This note was generated with the assistance of ambient listening technology. Verbal consent was obtained by the patient and accompanying visitor(s) for the recording of patient appointment to facilitate this note. I attest to having reviewed and edited the generated note for accuracy, though some syntax or spelling errors may persist. Please contact the author of this note for any clarification.          7/25/2025 Harsh Rubalcava PA-C           [1]   Current Outpatient Medications:     albuterol (PROVENTIL/VENTOLIN HFA) 90 mcg/actuation inhaler, Inhale 1-2 puffs into the lungs daily as needed for Shortness of Breath. Rescue, Disp: 18 g, Rfl: 0    furosemide (LASIX) 20 MG tablet, Take 1 tablet (20 mg total) by mouth 2 (two) times a day., Disp: 180 tablet, Rfl: 0    lisinopriL (PRINIVIL,ZESTRIL) 20 MG tablet, Take 1 tablet (20 mg total) by mouth once daily., Disp: 90 tablet, Rfl: 3    neomycin-polymyxin-dexamethasone (DEXACINE) 3.5 mg/g-10,000 unit/g-0.1 % Oint, Place 1 application  into both eyes every 6 (six) hours., Disp: , Rfl:     potassium chloride (MICRO-K) 10 MEQ CpSR, Take 1 capsule (10 mEq total) by mouth once daily., Disp: 90 capsule, Rfl: 1    SYMBICORT 160-4.5 mcg/actuation HFAA, Inhale 2 puffs into the lungs every 12 (twelve) hours., Disp: 30 g, Rfl: 4    traZODone (DESYREL) 100 MG tablet, Take 1 tablet (100 mg total) by mouth every evening., Disp: 90 tablet, Rfl: 1    Current Facility-Administered Medications:     albuterol inhaler 2 puff, 2 puff, Inhalation, Q20 Min PRN, Melerine,  Farhana VENTURA, NP    diphenhydrAMINE injection 25 mg, 25 mg, Intravenous, Once PRN, Farhana Riggins, NP    EPINEPHrine (EPIPEN) 0.3 mg/0.3 mL pen injection 0.3 mg, 0.3 mg, Intramuscular, PRN, Farhana Riggins, NP

## 2025-09-02 DIAGNOSIS — I50.9 CONGESTIVE HEART FAILURE, UNSPECIFIED HF CHRONICITY, UNSPECIFIED HEART FAILURE TYPE: Primary | ICD-10-CM

## 2025-09-03 RX ORDER — POTASSIUM CHLORIDE 750 MG/1
10 CAPSULE, EXTENDED RELEASE ORAL DAILY
Qty: 90 CAPSULE | Refills: 0 | Status: SHIPPED | OUTPATIENT
Start: 2025-09-03